# Patient Record
Sex: MALE | Race: BLACK OR AFRICAN AMERICAN | NOT HISPANIC OR LATINO | Employment: FULL TIME | URBAN - METROPOLITAN AREA
[De-identification: names, ages, dates, MRNs, and addresses within clinical notes are randomized per-mention and may not be internally consistent; named-entity substitution may affect disease eponyms.]

---

## 2017-01-04 ENCOUNTER — OFFICE VISIT (OUTPATIENT)
Dept: INTERNAL MEDICINE | Facility: CLINIC | Age: 47
End: 2017-01-04

## 2017-01-04 VITALS
OXYGEN SATURATION: 96 % | TEMPERATURE: 98.7 F | RESPIRATION RATE: 16 BRPM | HEART RATE: 88 BPM | BODY MASS INDEX: 40.18 KG/M2 | WEIGHT: 280 LBS | SYSTOLIC BLOOD PRESSURE: 130 MMHG | DIASTOLIC BLOOD PRESSURE: 90 MMHG

## 2017-01-04 DIAGNOSIS — L81.8 HYPERPIGMENTATION OF SKIN OF EYE REGION: Primary | ICD-10-CM

## 2017-01-04 DIAGNOSIS — Z09 FOLLOW UP: ICD-10-CM

## 2017-01-04 PROCEDURE — 99213 OFFICE O/P EST LOW 20 MIN: CPT | Performed by: NURSE PRACTITIONER

## 2017-01-04 NOTE — PROGRESS NOTES
Vitals:    01/04/17 1541   BP: 130/90   Pulse:    Resp:    Temp:    SpO2:      Last 2 weights    01/04/17  1523   Weight: 280 lb (127 kg)     Social History   Substance Use Topics   • Smoking status: Never Smoker   • Smokeless tobacco: Not on file   • Alcohol use No       Subjective     HPI  Pt presents to office for follow up bilateral leg swelling. Last visit we stopped his minoxidil due to possibility of that being the cause of leg swelling. Pt states swelling has much improved and no longer having issues with that.    He did mention he has noticed around his eyes have become darker than usual and was unsure if that was a SE of minoxidil or not. Denies changes in vision, pain, recent rashes, discharge from eye.    The following portions of the patient's history were reviewed and updated as appropriate: allergies, current medications, past medical history, past social history and problem list.    Review of Systems   Constitutional: Negative.    Eyes:        Eyelid hyperpigmentation   Respiratory: Negative.    Cardiovascular: Negative.         Follow up leg swelling         Objective     Physical Exam   Constitutional: He is oriented to person, place, and time. He appears well-developed and well-nourished.   HENT:   Head: Normocephalic and atraumatic.   Eyes:   Bilateral upper and lower eyelid hyperpigmentation   Neck: Normal range of motion.   Cardiovascular: Normal rate, regular rhythm and normal heart sounds.    Pulmonary/Chest: Effort normal and breath sounds normal.   Musculoskeletal: Normal range of motion.   Neurological: He is alert and oriented to person, place, and time.   Nursing note and vitals reviewed.      Assessment/Plan   Grant was seen today for follow-up.    Diagnoses and all orders for this visit:    Hyperpigmentation of skin of eye region  -     Ambulatory Referral to Dermatology    Follow up           -sleep study appointment 4/27 at 9:15  -spoke to Dr Burger in regards to possible causes  of hyperpigmentation of eye. Cont to monitor, will put in referral for derm to be further evaluated  -cont home meds  -FU prn or if symptoms persist/worsen

## 2017-01-04 NOTE — MR AVS SNAPSHOT
Grant FABOI Edge Jr.   1/4/2017 3:00 PM   Office Visit    Dept Phone:  [phone]   Encounter #:  [id number]    Provider:  MELANY Hicks   Department:  [facility] INTERNAL MEDICINE                Your Full Care Plan              Your Updated Medication List          This list is accurate as of: 1/4/17  3:43 PM.  Always use your most recent med list.                benazepril 10 MG tablet   Commonly known as:  LOTENSIN   TAKE 1 TABLET BY MOUTH EVERY DAY       * diltiaZEM 30 MG tablet   Commonly known as:  CARDIZEM       * DILT- MG 24 hr capsule   Generic drug:  diltiazem XR   TAKE 1 CAPSULE BY MOUTH EVERY DAY       esomeprazole 40 MG capsule   Commonly known as:  nexIUM       levothyroxine 125 MCG tablet   Commonly known as:  SYNTHROID, LEVOTHROID       * metoprolol succinate XL 50 MG 24 hr tablet   Commonly known as:  TOPROL-XL       * metoprolol succinate XL 50 MG 24 hr tablet   Commonly known as:  TOPROL-XL   TAKE 1 TABLET BY MOUTH DAILY       minoxidil 10 MG tablet   Commonly known as:  LONITEN       * Notice:  This list has 4 medication(s) that are the same as other medications prescribed for you. Read the directions carefully, and ask your doctor or other care provider to review them with you.            We Performed the Following     Ambulatory Referral to Dermatology       You Were Diagnosed With        Codes Comments    Hyperpigmentation of skin of eye region    -  Primary ICD-10-CM: H02.719  ICD-9-CM: 374.52     Follow up     ICD-10-CM: Z09  ICD-9-CM: V67.9       Instructions     None    Patient Instructions History      Upcoming Appointments     Visit Type Date Time Department    OFFICE VISIT 1/4/2017  3:00 PM [facility] 1 4003    OFFICE VISIT 2/22/2017  3:15 PM [facility] 1 4003    CONSULT SLEEP CLINIC 4/27/2017  9:15 AM [facility] SLEEP LAB      MyChart Signup     Our records indicate that you have an active [facility] account.    You can  view your After Visit Summary by going to [employer] and logging in with your [employer] username and password.  If you don't have a TapTrak username and password but a parent or guardian has access to your record, the parent or guardian should login with their own Tap[employer]k username and password and access your record to view the After Visit Summary.    If you have questions, you can email [email or web address] or call [phone] to talk to our Tap[employer] staff.  Remember, Tap[employer]k is NOT to be used for urgent needs.  For medical emergencies, dial 911.               Other Info from Your Visit           Your Appointments     Feb 22, 2017  3:15 PM EST   Office Visit with Stone Burger MD   [facility] INTERNAL MEDICINE (--)    [address]  [facility] [place] [address] [phone]           Arrive 15 minutes prior to appointment.            Apr 27, 2017  9:15 AM EDT   Consult Sleep Clinic with CRYSTAL RAMIRES SLEEP LAB PROVIDER SCHEDULE   [facility] ([place])    [address] [facility] [address] [phone]              Allergies     No Known Allergies      Reason for Visit     Follow-up swelling in leg and foot has gone away      Vital Signs     Blood Pressure Pulse Temperature Respirations Weight Oxygen Saturation    130/90 88 98.7 °F (37.1 °C) (Tympanic) 16 280 lb (127 kg) 96%    Body Mass Index Smoking Status                40.18 kg/m2 Never Smoker          Problems and Diagnoses Noted     Hyperpigmentation of skin of eye region    -  Primary    Follow up

## 2017-01-09 RX ORDER — ESOMEPRAZOLE MAGNESIUM 40 MG/1
CAPSULE, DELAYED RELEASE ORAL
Qty: 30 CAPSULE | Refills: 5 | Status: SHIPPED | OUTPATIENT
Start: 2017-01-09 | End: 2017-09-23 | Stop reason: SDUPTHER

## 2017-01-16 RX ORDER — BENAZEPRIL HYDROCHLORIDE 10 MG/1
TABLET ORAL
Qty: 30 TABLET | Refills: 0 | Status: SHIPPED | OUTPATIENT
Start: 2017-01-16 | End: 2017-03-24 | Stop reason: SDUPTHER

## 2017-02-02 ENCOUNTER — HOSPITAL ENCOUNTER (OUTPATIENT)
Dept: SLEEP MEDICINE | Facility: HOSPITAL | Age: 47
Discharge: HOME | End: 2017-02-02
Admitting: NURSE PRACTITIONER

## 2017-02-02 PROCEDURE — G0463 HOSPITAL OUTPT CLINIC VISIT: HCPCS

## 2017-02-03 NOTE — PROGRESS NOTES
DATE OF VISIT:  02/02/2017    CONSULTATION    REFERRING/PRIMARY CARE PHYSICIAN:  Stone Burger MD    REASON FOR VISIT:  Obstructive sleep apnea     CHIEF COMPLAINT: Hypersomnia     I had the pleasure of seeing the patient in the office today. Below, please find summary report for pertinent findings and conclusions.      HISTORY: The patient is a very pleasant 46-year-old male who was diagnosed with obstructive sleep apnea in 2014 through [facility]. He was prescribed a BiPAP device for treatment of severe obstructive sleep apnea. He is currently on pressures of 19/ 15. His machine currently does not have a card. He misplaced his card. He was previously following with Dr. Pop Milton, but prefers to transfer to [facility] for sleep services. His humidifier is broken.    He goes to bed at 11 p.m. He falls asleep fairly quickly and is up at 5 a.m. He wakes up in the morning feeling tired. He works a 2nd job in the evening, hours from 4 p.m. until 8 p.m. Unfortunately, he continues to feel sleepy during the day and requires a nap if he can to feel more refreshed. His sleep quality is poor. At times, he finds himself snoring despite using the BiPAP device.     PAST HISTORY:  1. Thyroid disease. He required total thyroidectomy due to a benign mass.  2. Hypertension.    3. GERD.     CURRENT MEDICATIONS:  1. Diltiazem.  2. Levothyroxine.   3. Benazepril.  4. Metoprolol.    5. Omeprazole     ALLERGIES: MINOXIDIL.    FAMILY HISTORY:   Significant for diabetes and obesity.    SOCIAL HISTORY: He works maintenance. Does not smoke.  Does not drink. He drinks 1-2 sodas a day.     REVIEW OF SYSTEMS: Significant for frequent urination, irregular heartbeat and diarrhea.     [place] Sleepiness Scale score is 7 out of 24.     PHYSICAL EXAMINATION:  VITAL SIGNS: Height 5 feet 9 inches, weight 280 pounds, BMI 41, blood pressure 136/98, heart rate 85, neck size 16-3/4.   HEENT: Class IV  Mallampati airway. Large-size tongue. There is redundant lateral pharyngeal tissue.   NECK: Trachea midline.    LUNGS: Respiratory effort nonlabored.   HEART: Regular rate and rhythm. No murmurs, rubs or gallops.   ABDOMEN: Soft and nontender. Bowel sounds are present.   EXTREMITIES: No evidence of edema. Pedal pulses positive.     DIAGNOSTIC DATA:  Sleep study done at [facility] in 2013 reviewed and shows severe sleep apnea corrected with BiPAP 19 over 15.     ASSESSMENT:  1. Severe sleep apnea.   2. Morbid obesity.   3. Hypertension.  4. History of thyroidectomy.     PLAN:   1. The patient presents today for evaluation of hypersomnia despite using the BiPAP device as prescribed. He misplaced his card. We do not have any data from his current machine to evaluate the apnea-hypopnea index as well as his recent use. He is not sure what type of device he has. I asked him to bring his device to the sleep lab in the next several days, so that our technologist, Polly, can examine the device.  After the device type is confirmed, he will be given a new smart chip to insert into the device. He will be asked to use the device for 3-4 weeks and bring the card here for our review. We may need to adjust the BiPAP settings if apnea-hypopnea index remains suboptimal. He may also require another BiPAP titration here if he remains sleepy and continues to snore despite increasing the BiPAP pressures. Of note, he is already on high BiPAP pressure of 19 over 15.  He may develop intolerance of the BiPAP if we continue to raise pressures. In that case, he perhaps could be a good candidate for an oral mandibular advancement device to be used with the BiPAP device in conjunction to allow better BiPAP tolerance. A combination of oral mandibular advancement device with the BiPAP device at times can allow lowering BiPAP pressures and improved compliance.   2. I asked the patient to follow up with Dr. Michael Davalos in 4 weeks.     I  appreciate the opportunity of participating in this patient's care.       MELANY Pulido  AZ:maida  D:   02/03/2017 09:24:28  T:   02/03/2017 15:07:40  Job ID:   [id number]  Document ID:   [id number]  cc:   Stone Burger M.D.

## 2017-03-07 ENCOUNTER — TELEPHONE (OUTPATIENT)
Dept: SLEEP MEDICINE | Facility: HOSPITAL | Age: 47
End: 2017-03-07

## 2017-03-07 NOTE — TELEPHONE ENCOUNTER
Spoke with pt.  Pt states his dme( [facility]) is requesting an order for full face msk fit be faxed to them.      Pt is having issues with oral leak with his current nasal msk.    sdc to request order from either samy or dr. Davalos, then fax to [facility].

## 2017-03-07 NOTE — TELEPHONE ENCOUNTER
[facility] [place] once order has been signed and faxed to [facility].    Call pt  @ his cell number [phone]

## 2017-03-24 ENCOUNTER — HOSPITAL ENCOUNTER (OUTPATIENT)
Dept: SLEEP MEDICINE | Facility: HOSPITAL | Age: 47
Discharge: HOME | End: 2017-03-24
Admitting: INTERNAL MEDICINE

## 2017-03-24 PROCEDURE — G0463 HOSPITAL OUTPT CLINIC VISIT: HCPCS

## 2017-03-24 RX ORDER — BENAZEPRIL HYDROCHLORIDE 10 MG/1
TABLET ORAL
Qty: 30 TABLET | Refills: 0 | Status: SHIPPED | OUTPATIENT
Start: 2017-03-24 | End: 2017-04-28 | Stop reason: SDUPTHER

## 2017-03-24 RX ORDER — METOPROLOL SUCCINATE 50 MG/1
TABLET, EXTENDED RELEASE ORAL
Qty: 90 TABLET | Refills: 0 | Status: SHIPPED | OUTPATIENT
Start: 2017-03-24 | End: 2017-07-14 | Stop reason: SDUPTHER

## 2017-03-30 NOTE — PROGRESS NOTES
DATE OF SERVICE: 03/24/2017    PRIMARY CARE/REFERRING PHYSICIAN:  Stone Burger MD    I had the pleasure of seeing the patient in the office today. Below please find summary of positive pertinent findings and conclusions.      HISTORY OF PRESENT ILLNESS: The patient is a very pleasant 46-year-old male who was last seen in this office on 12/08/2016. He is here today to follow up on his progress with the CPAP device. He is currently set with pressures of 13. He uses the device every night. He complains of snoring despite using the device. Of note, his machine is a CPAP, not a BiPAP. He goes to bed at midnight and is up at 5 a.m. His [place] Sleepiness Score today is 5.     CURRENT MEDICATIONS:  1.  Benazepril.   2.  Nexium.  3.  Levothyroxine.  4.  Diltiazem.  5.  Metoprolol     SOCIAL HISTORY: No tobacco. Drinks no alcohol.  Drinks 1-2 caffeinated beverages a day. No energy drinks.     PHYSICAL EXAMINATION:  VITAL SIGNS: Height 5 feet 10 inches, weight 282 pounds, BMI 39, blood pressure 187/106, heart rate 102.   HEENT: Class IV Mallampati airway.  Large-size tongue with no evidence of exudate.   NECK: Trachea is midline.   LUNGS: Respiratory effort nonlabored.   HEART: Regular rate and rhythm. No murmurs or rubs.   ABDOMEN: Soft and nontender. Bowel sounds are present. Morbidly obese.   EXTREMITIES: No evidence of edema. Pedal pulses positive.     DIAGNOSTIC DATA: Download dates 02/20/2017 through 03/21/2017 shows 76% compliance with average use of 4 hours 37 minutes on CPAP pressures of 13 with a residual AHI of 0.4.     ASSESSMENT:  1.  Obstructive sleep apnea.   2.  Morbid obesity.   3.  Hypertension.  4.  Hypothyroidism.     PLAN: The patient will be changed to auto CPAP, 7-15 cmH2O.  Hopefully that will improve the snoring. He is requesting a new mask and all supplies renewed. Weight loss will be strongly beneficial in order to reduce the severity of sleep-disordered breathing. He does have elevated blood  pressure reading today.  He has not taken his blood pressure medication. He was asked to take as soon as possible. He does have hypothyroidism, history of, currently controlled.    I asked the patient to follow up with me at [facility] in 3 months to see how he is doing. I appreciate the opportunity to participate in this patient's care.    MELANY Pulido, dictating for MD Qi Menard APRN  AZ:tp  D:   03/29/2017 15:58:03  T:   03/30/2017 00:59:15  Job ID:   [id number]  Document ID:   [id number]  cc:   Stone Burger M.D.

## 2017-04-28 RX ORDER — BENAZEPRIL HYDROCHLORIDE 10 MG/1
TABLET ORAL
Qty: 30 TABLET | Refills: 0 | Status: SHIPPED | OUTPATIENT
Start: 2017-04-28 | End: 2017-06-02 | Stop reason: SDUPTHER

## 2017-05-04 ENCOUNTER — APPOINTMENT (OUTPATIENT)
Dept: GENERAL RADIOLOGY | Facility: HOSPITAL | Age: 47
End: 2017-05-04

## 2017-05-04 ENCOUNTER — HOSPITAL ENCOUNTER (EMERGENCY)
Facility: HOSPITAL | Age: 47
Discharge: HOME | End: 2017-05-04
Attending: EMERGENCY MEDICINE | Admitting: EMERGENCY MEDICINE

## 2017-05-04 VITALS
HEIGHT: 70 IN | WEIGHT: 285 LBS | SYSTOLIC BLOOD PRESSURE: 153 MMHG | BODY MASS INDEX: 40.8 KG/M2 | HEART RATE: 105 BPM | DIASTOLIC BLOOD PRESSURE: 103 MMHG | RESPIRATION RATE: 18 BRPM | OXYGEN SATURATION: 98 % | TEMPERATURE: 98.6 F

## 2017-05-04 DIAGNOSIS — M25.561 ACUTE PAIN OF RIGHT KNEE: Primary | ICD-10-CM

## 2017-05-04 PROCEDURE — 73560 X-RAY EXAM OF KNEE 1 OR 2: CPT

## 2017-05-04 PROCEDURE — 99283 EMERGENCY DEPT VISIT LOW MDM: CPT

## 2017-05-04 RX ORDER — TRAMADOL HYDROCHLORIDE 50 MG/1
50 TABLET ORAL EVERY 6 HOURS PRN
Qty: 8 TABLET | Refills: 0 | Status: SHIPPED | OUTPATIENT
Start: 2017-05-04 | End: 2017-10-26

## 2017-05-04 RX ORDER — FEXOFENADINE HCL 180 MG/1
180 TABLET ORAL ONCE AS NEEDED
COMMUNITY
End: 2020-07-08

## 2017-05-10 ENCOUNTER — TELEPHONE (OUTPATIENT)
Dept: SOCIAL WORK | Facility: HOSPITAL | Age: 47
End: 2017-05-10

## 2017-05-17 ENCOUNTER — OFFICE VISIT (OUTPATIENT)
Dept: INTERNAL MEDICINE | Facility: CLINIC | Age: 47
End: 2017-05-17

## 2017-05-17 VITALS
HEART RATE: 88 BPM | SYSTOLIC BLOOD PRESSURE: 130 MMHG | RESPIRATION RATE: 16 BRPM | TEMPERATURE: 97.8 F | DIASTOLIC BLOOD PRESSURE: 90 MMHG | BODY MASS INDEX: 40.03 KG/M2 | WEIGHT: 279 LBS

## 2017-05-17 DIAGNOSIS — I10 ESSENTIAL HYPERTENSION: Primary | ICD-10-CM

## 2017-05-17 PROCEDURE — 99213 OFFICE O/P EST LOW 20 MIN: CPT | Performed by: NURSE PRACTITIONER

## 2017-05-19 ENCOUNTER — OFFICE VISIT (OUTPATIENT)
Dept: ORTHOPEDIC SURGERY | Facility: CLINIC | Age: 47
End: 2017-05-19

## 2017-05-19 VITALS — BODY MASS INDEX: 41.83 KG/M2 | HEIGHT: 68 IN | WEIGHT: 276 LBS | TEMPERATURE: 98.2 F

## 2017-05-19 DIAGNOSIS — M25.561 CHRONIC PAIN OF RIGHT KNEE: Primary | ICD-10-CM

## 2017-05-19 DIAGNOSIS — G89.29 CHRONIC PAIN OF RIGHT KNEE: Primary | ICD-10-CM

## 2017-05-19 PROCEDURE — 73562 X-RAY EXAM OF KNEE 3: CPT | Performed by: NURSE PRACTITIONER

## 2017-05-19 PROCEDURE — 20610 DRAIN/INJ JOINT/BURSA W/O US: CPT | Performed by: NURSE PRACTITIONER

## 2017-05-19 PROCEDURE — 99213 OFFICE O/P EST LOW 20 MIN: CPT | Performed by: NURSE PRACTITIONER

## 2017-05-19 RX ORDER — LIDOCAINE HYDROCHLORIDE 10 MG/ML
4 INJECTION, SOLUTION INFILTRATION; PERINEURAL
Status: COMPLETED | OUTPATIENT
Start: 2017-05-19 | End: 2017-05-19

## 2017-05-19 RX ORDER — MELOXICAM 15 MG/1
TABLET ORAL
Qty: 30 TABLET | Refills: 3 | Status: SHIPPED | OUTPATIENT
Start: 2017-05-19 | End: 2017-10-26

## 2017-05-19 RX ORDER — BUPIVACAINE HYDROCHLORIDE 2.5 MG/ML
2 INJECTION, SOLUTION INFILTRATION; PERINEURAL
Status: COMPLETED | OUTPATIENT
Start: 2017-05-19 | End: 2017-05-19

## 2017-05-19 RX ORDER — METHYLPREDNISOLONE ACETATE 80 MG/ML
80 INJECTION, SUSPENSION INTRA-ARTICULAR; INTRALESIONAL; INTRAMUSCULAR; SOFT TISSUE
Status: COMPLETED | OUTPATIENT
Start: 2017-05-19 | End: 2017-05-19

## 2017-05-19 RX ADMIN — METHYLPREDNISOLONE ACETATE 80 MG: 80 INJECTION, SUSPENSION INTRA-ARTICULAR; INTRALESIONAL; INTRAMUSCULAR; SOFT TISSUE at 17:29

## 2017-05-19 RX ADMIN — BUPIVACAINE HYDROCHLORIDE 2 ML: 2.5 INJECTION, SOLUTION INFILTRATION; PERINEURAL at 17:29

## 2017-05-19 RX ADMIN — LIDOCAINE HYDROCHLORIDE 4 ML: 10 INJECTION, SOLUTION INFILTRATION; PERINEURAL at 17:29

## 2017-06-02 ENCOUNTER — HOSPITAL ENCOUNTER (OUTPATIENT)
Dept: SLEEP MEDICINE | Facility: HOSPITAL | Age: 47
Discharge: HOME | End: 2017-06-02

## 2017-06-02 RX ORDER — LEVOTHYROXINE SODIUM 0.12 MG/1
TABLET ORAL
Qty: 30 TABLET | Refills: 0 | Status: SHIPPED | OUTPATIENT
Start: 2017-06-02 | End: 2017-07-05 | Stop reason: SDUPTHER

## 2017-06-02 RX ORDER — BENAZEPRIL HYDROCHLORIDE 10 MG/1
TABLET ORAL
Qty: 30 TABLET | Refills: 0 | Status: SHIPPED | OUTPATIENT
Start: 2017-06-02 | End: 2017-07-05 | Stop reason: SDUPTHER

## 2017-06-07 NOTE — PROGRESS NOTES
DATE OF SERVICE: 06/02/2017    PRIMARY CARE/REFERRING PHYSICIAN:  Stone Burger MD    I had the pleasure of seeing the patient in the office today. Below please find summary of positive pertinent findings and conclusions.       HISTORY OF PRESENT ILLNESS: The patient is a very pleasant 46-year-old male who was last seen in this office on 03/24/2017. His last visit CPAP settings were changed to 7-15 cmH2O; however, his device continues to demonstrate that he is on fixed pressures of 13. He has not used the CPAP device since he got a new mask recently. [facility] apparently did not show the patient the setup. His [place] Sleepiness Score is 4.     CURRENT MEDICATIONS:   1.  Donepezil.  2.  Nexium.    3.  Metoprolol.    4.  Levothyroxine.    PHYSICAL EXAMINATION:  VITAL SIGNS: Height 69 inches, weight 268 pounds, BMI pressure 40, blood pressure 150/90, heart rate 102.   HEENT: Class IV Mallampati airway. Large-size with no evidence of exudate.    NECK: Trachea in midline.  LUNGS: Respiratory effort nonlabored.   HEART: Regular rate and rhythm. No murmurs, rubs, or gallops.  ABDOMEN: Soft, nontender. Bowel sounds  are present.  EXTREMITIES: No evidence of edema.    DIAGNOSTIC DATA: Download dates 04/14/2017 through 04/20/2017 shows 100% compliance in the past 7 days used, 4 hours 35 minutes on set pressures of 13 with residual AHI of 0.4.     ASSESSMENT:  1.  Obstructive sleep apnea.   2.  Morbid obesity.   3.  Hypertension.   4.  Hypothyroidism, history of.    PLAN:  I asked our technologist to change the pressures to 7-15 cmH2O. The change was done by Polly  during today's office visit.  Weight loss will be strongly beneficial in order to reduce the severity of sleep-disordered breathing. The patient was met with our sleep technologist today to show him the setup of the mask. He will need to have a good mask fit in order to remain compliant. The download from 04/2017 does not show excessive air leak.      The patient will follow up at [facility] in 6-8 weeks.     I appreciate the opportunity of participating in this patient's care.     MELANY Pulido, dictating for MD Qi Menard APRN  AZ:rufina  D:   06/06/2017 14:23:38  T:   06/06/2017 23:54:12  Job ID:   [id number]  Document ID:   [id number]  cc:   Stone Burger M.D.

## 2017-07-05 ENCOUNTER — OFFICE VISIT (OUTPATIENT)
Dept: INTERNAL MEDICINE | Facility: CLINIC | Age: 47
End: 2017-07-05

## 2017-07-05 VITALS
OXYGEN SATURATION: 97 % | DIASTOLIC BLOOD PRESSURE: 103 MMHG | SYSTOLIC BLOOD PRESSURE: 158 MMHG | HEART RATE: 73 BPM | HEIGHT: 68 IN | BODY MASS INDEX: 41.22 KG/M2 | WEIGHT: 272 LBS | TEMPERATURE: 97.4 F

## 2017-07-05 DIAGNOSIS — F32.1 MODERATE SINGLE CURRENT EPISODE OF MAJOR DEPRESSIVE DISORDER (HCC): ICD-10-CM

## 2017-07-05 DIAGNOSIS — J30.1 SEASONAL ALLERGIC RHINITIS DUE TO POLLEN: ICD-10-CM

## 2017-07-05 DIAGNOSIS — F51.01 PRIMARY INSOMNIA: ICD-10-CM

## 2017-07-05 DIAGNOSIS — E03.8 OTHER SPECIFIED HYPOTHYROIDISM: ICD-10-CM

## 2017-07-05 DIAGNOSIS — K21.9 GASTROESOPHAGEAL REFLUX DISEASE WITHOUT ESOPHAGITIS: ICD-10-CM

## 2017-07-05 DIAGNOSIS — I10 ESSENTIAL HYPERTENSION: Primary | ICD-10-CM

## 2017-07-05 DIAGNOSIS — E66.01 MORBID OBESITY DUE TO EXCESS CALORIES (HCC): ICD-10-CM

## 2017-07-05 PROCEDURE — 90471 IMMUNIZATION ADMIN: CPT | Performed by: INTERNAL MEDICINE

## 2017-07-05 PROCEDURE — 90715 TDAP VACCINE 7 YRS/> IM: CPT | Performed by: INTERNAL MEDICINE

## 2017-07-05 PROCEDURE — 99214 OFFICE O/P EST MOD 30 MIN: CPT | Performed by: INTERNAL MEDICINE

## 2017-07-05 RX ORDER — BENAZEPRIL HYDROCHLORIDE 10 MG/1
TABLET ORAL
Qty: 30 TABLET | Refills: 0 | Status: SHIPPED | OUTPATIENT
Start: 2017-07-05 | End: 2017-08-05 | Stop reason: SDUPTHER

## 2017-07-05 RX ORDER — LEVOTHYROXINE SODIUM 0.12 MG/1
TABLET ORAL
Qty: 30 TABLET | Refills: 0 | Status: SHIPPED | OUTPATIENT
Start: 2017-07-05 | End: 2017-08-05 | Stop reason: SDUPTHER

## 2017-07-05 RX ORDER — BUPROPION HYDROCHLORIDE 75 MG/1
75 TABLET ORAL 3 TIMES DAILY
Qty: 270 TABLET | Refills: 1 | Status: SHIPPED | OUTPATIENT
Start: 2017-07-05 | End: 2020-07-08

## 2017-07-05 RX ORDER — AMLODIPINE BESYLATE 10 MG/1
10 TABLET ORAL DAILY
Qty: 90 TABLET | Refills: 1 | Status: SHIPPED | OUTPATIENT
Start: 2017-07-05 | End: 2018-05-04 | Stop reason: SDUPTHER

## 2017-07-06 LAB
ALBUMIN SERPL-MCNC: 4.4 G/DL (ref 3.5–5.2)
ALBUMIN/GLOB SERPL: 2 G/DL
ALP SERPL-CCNC: 88 U/L (ref 39–117)
ALT SERPL-CCNC: 17 U/L (ref 1–41)
APPEARANCE UR: CLEAR
AST SERPL-CCNC: 14 U/L (ref 1–40)
BACTERIA #/AREA URNS HPF: NORMAL /HPF
BASOPHILS # BLD AUTO: 0.05 10*3/MM3 (ref 0–0.2)
BASOPHILS NFR BLD AUTO: 0.9 % (ref 0–1.5)
BILIRUB SERPL-MCNC: 0.9 MG/DL (ref 0.1–1.2)
BILIRUB UR QL STRIP: NEGATIVE
BUN SERPL-MCNC: 8 MG/DL (ref 6–20)
BUN/CREAT SERPL: 6.7 (ref 7–25)
CALCIUM SERPL-MCNC: 9.1 MG/DL (ref 8.6–10.5)
CASTS URNS MICRO: NORMAL
CHLORIDE SERPL-SCNC: 101 MMOL/L (ref 98–107)
CHOLEST SERPL-MCNC: 160 MG/DL (ref 0–200)
CO2 SERPL-SCNC: 26.9 MMOL/L (ref 22–29)
COLOR UR: YELLOW
CREAT SERPL-MCNC: 1.2 MG/DL (ref 0.76–1.27)
EOSINOPHIL # BLD AUTO: 0.08 10*3/MM3 (ref 0–0.7)
EOSINOPHIL NFR BLD AUTO: 1.4 % (ref 0.3–6.2)
EPI CELLS #/AREA URNS HPF: NORMAL /HPF
ERYTHROCYTE [DISTWIDTH] IN BLOOD BY AUTOMATED COUNT: 15 % (ref 11.5–14.5)
GLOBULIN SER CALC-MCNC: 2.2 GM/DL
GLUCOSE SERPL-MCNC: 105 MG/DL (ref 65–99)
GLUCOSE UR QL: NEGATIVE
HCT VFR BLD AUTO: 49.3 % (ref 40.4–52.2)
HDLC SERPL-MCNC: 50 MG/DL (ref 40–60)
HGB BLD-MCNC: 16.5 G/DL (ref 13.7–17.6)
HGB UR QL STRIP: NEGATIVE
IMM GRANULOCYTES # BLD: 0 10*3/MM3 (ref 0–0.03)
IMM GRANULOCYTES NFR BLD: 0 % (ref 0–0.5)
KETONES UR QL STRIP: NEGATIVE
LDLC SERPL CALC-MCNC: 98 MG/DL (ref 0–100)
LDLC/HDLC SERPL: 1.96 {RATIO}
LEUKOCYTE ESTERASE UR QL STRIP: NEGATIVE
LYMPHOCYTES # BLD AUTO: 1.58 10*3/MM3 (ref 0.9–4.8)
LYMPHOCYTES NFR BLD AUTO: 27.1 % (ref 19.6–45.3)
MCH RBC QN AUTO: 30.8 PG (ref 27–32.7)
MCHC RBC AUTO-ENTMCNC: 33.5 G/DL (ref 32.6–36.4)
MCV RBC AUTO: 92.1 FL (ref 79.8–96.2)
MONOCYTES # BLD AUTO: 0.59 10*3/MM3 (ref 0.2–1.2)
MONOCYTES NFR BLD AUTO: 10.1 % (ref 5–12)
NEUTROPHILS # BLD AUTO: 3.52 10*3/MM3 (ref 1.9–8.1)
NEUTROPHILS NFR BLD AUTO: 60.5 % (ref 42.7–76)
NITRITE UR QL STRIP: NEGATIVE
PH UR STRIP: 7.5 [PH] (ref 5–8)
PLATELET # BLD AUTO: 230 10*3/MM3 (ref 140–500)
POTASSIUM SERPL-SCNC: 4 MMOL/L (ref 3.5–5.2)
PROT SERPL-MCNC: 6.6 G/DL (ref 6–8.5)
PROT UR QL STRIP: NEGATIVE
RBC # BLD AUTO: 5.35 10*6/MM3 (ref 4.6–6)
RBC #/AREA URNS HPF: NORMAL /HPF
SODIUM SERPL-SCNC: 140 MMOL/L (ref 136–145)
SP GR UR: 1.02 (ref 1–1.03)
T4 FREE SERPL-MCNC: 1.74 NG/DL (ref 0.93–1.7)
TRIGL SERPL-MCNC: 61 MG/DL (ref 0–150)
TSH SERPL DL<=0.005 MIU/L-ACNC: 0.88 MIU/ML (ref 0.27–4.2)
UROBILINOGEN UR STRIP-MCNC: (no result) MG/DL
VLDLC SERPL CALC-MCNC: 12.2 MG/DL (ref 5–40)
WBC # BLD AUTO: 5.82 10*3/MM3 (ref 4.5–10.7)
WBC #/AREA URNS HPF: NORMAL /HPF

## 2017-07-14 RX ORDER — METOPROLOL SUCCINATE 50 MG/1
TABLET, EXTENDED RELEASE ORAL
Qty: 90 TABLET | Refills: 0 | Status: SHIPPED | OUTPATIENT
Start: 2017-07-14 | End: 2018-05-04 | Stop reason: SDUPTHER

## 2017-07-17 NOTE — PROGRESS NOTES
Subjective   Grant Edge Jr. is a 46 y.o. male.   He is here today for hypertension allergic rhinitis GERD hypothyroidism insomnia depression morbid obesity  History of Present Illness   He is here today for hypertension allergic rhinitis GERD hypothyroidism insomnia depression and morbid obesity  The following portions of the patient's history were reviewed and updated as appropriate: allergies, current medications, past family history, past medical history, past social history, past surgical history and problem list.    Review of Systems   Psychiatric/Behavioral: Positive for dysphoric mood and sleep disturbance.   All other systems reviewed and are negative.      Objective   Physical Exam   Constitutional: He is oriented to person, place, and time. He appears well-developed and well-nourished. He is cooperative.   HENT:   Head: Normocephalic and atraumatic.   Right Ear: Hearing, tympanic membrane, external ear and ear canal normal.   Left Ear: Hearing, tympanic membrane, external ear and ear canal normal.   Nose: Nose normal.   Mouth/Throat: Uvula is midline, oropharynx is clear and moist and mucous membranes are normal.   Eyes: Conjunctivae, EOM and lids are normal. Pupils are equal, round, and reactive to light.   Neck: Phonation normal. Neck supple. Carotid bruit is not present.   Cardiovascular: Normal rate, regular rhythm and normal heart sounds.  Exam reveals no gallop and no friction rub.    No murmur heard.  Pulmonary/Chest: Effort normal and breath sounds normal. No respiratory distress.   Abdominal: Soft. Bowel sounds are normal. He exhibits no distension and no mass. There is no hepatosplenomegaly. There is no tenderness. There is no rebound and no guarding. No hernia.   Musculoskeletal: He exhibits no edema.   Neurological: He is alert and oriented to person, place, and time. Coordination and gait normal.   Skin: Skin is warm and dry.   Psychiatric: His speech is normal and behavior is normal.  Judgment and thought content normal. He exhibits a depressed mood.   Nursing note and vitals reviewed.      Assessment/Plan   Diagnoses and all orders for this visit:    Essential hypertension  -     Lipid Panel With LDL / HDL Ratio  -     T4, Free  -     CBC & Differential  -     Comprehensive Metabolic Panel  -     TSH  -     Urinalysis With Microscopic    Seasonal allergic rhinitis due to pollen  -     Lipid Panel With LDL / HDL Ratio  -     T4, Free  -     CBC & Differential  -     Comprehensive Metabolic Panel  -     TSH  -     Urinalysis With Microscopic    Gastroesophageal reflux disease without esophagitis  -     Lipid Panel With LDL / HDL Ratio  -     T4, Free  -     CBC & Differential  -     Comprehensive Metabolic Panel  -     TSH  -     Urinalysis With Microscopic    Other specified hypothyroidism  -     Lipid Panel With LDL / HDL Ratio  -     T4, Free  -     CBC & Differential  -     Comprehensive Metabolic Panel  -     TSH  -     Urinalysis With Microscopic    Primary insomnia  -     Lipid Panel With LDL / HDL Ratio  -     T4, Free  -     CBC & Differential  -     Comprehensive Metabolic Panel  -     TSH  -     Urinalysis With Microscopic    Moderate single current episode of major depressive disorder  -     Lipid Panel With LDL / HDL Ratio  -     T4, Free  -     CBC & Differential  -     Comprehensive Metabolic Panel  -     TSH  -     Urinalysis With Microscopic    Morbid obesity due to excess calories  -     Lipid Panel With LDL / HDL Ratio  -     T4, Free  -     CBC & Differential  -     Comprehensive Metabolic Panel  -     TSH  -     Urinalysis With Microscopic    Other orders  -     buPROPion (WELLBUTRIN) 75 MG tablet; Take 1 tablet by mouth 3 (Three) Times a Day.  -     Tdap Vaccine Greater Than or Equal To 6yo IM  -     amLODIPine (NORVASC) 10 MG tablet; Take 1 tablet by mouth Daily.  -     Microscopic Examination      Hypertension not well-controlled we will try new medication for  this  Insomnia supportive meds proper sleep hygiene  Depression medication for this as well  Morbid obesity weight loss with proper diet exercise medication  Hypothyroidism follow TSH free T4  GERD stable on current medication  Allergic rhinitis supportive meds for this

## 2017-07-31 ENCOUNTER — TELEPHONE (OUTPATIENT)
Dept: ORTHOPEDIC SURGERY | Facility: CLINIC | Age: 47
End: 2017-07-31

## 2017-08-01 NOTE — TELEPHONE ENCOUNTER
Would recommend that he supplement with Tylenol.  We cannot prescribe any additional pain medication

## 2017-08-04 ENCOUNTER — APPOINTMENT (OUTPATIENT)
Dept: SLEEP MEDICINE | Facility: HOSPITAL | Age: 47
End: 2017-08-04

## 2017-08-07 RX ORDER — LEVOTHYROXINE SODIUM 0.12 MG/1
TABLET ORAL
Qty: 30 TABLET | Refills: 0 | Status: SHIPPED | OUTPATIENT
Start: 2017-08-07 | End: 2017-08-08

## 2017-08-07 RX ORDER — BENAZEPRIL HYDROCHLORIDE 10 MG/1
TABLET ORAL
Qty: 30 TABLET | Refills: 0 | Status: SHIPPED | OUTPATIENT
Start: 2017-08-07 | End: 2017-09-24 | Stop reason: SDUPTHER

## 2017-08-08 ENCOUNTER — OFFICE VISIT (OUTPATIENT)
Dept: ORTHOPEDIC SURGERY | Facility: CLINIC | Age: 47
End: 2017-08-08

## 2017-08-08 VITALS — HEIGHT: 68 IN | WEIGHT: 272 LBS | TEMPERATURE: 99.3 F | BODY MASS INDEX: 41.22 KG/M2

## 2017-08-08 DIAGNOSIS — M17.11 PRIMARY OSTEOARTHRITIS OF RIGHT KNEE: Primary | ICD-10-CM

## 2017-08-08 PROCEDURE — 20610 DRAIN/INJ JOINT/BURSA W/O US: CPT | Performed by: ORTHOPAEDIC SURGERY

## 2017-08-08 RX ORDER — LIDOCAINE HYDROCHLORIDE 10 MG/ML
4 INJECTION, SOLUTION INFILTRATION; PERINEURAL
Status: COMPLETED | OUTPATIENT
Start: 2017-08-08 | End: 2017-08-08

## 2017-08-08 RX ORDER — METHYLPREDNISOLONE ACETATE 80 MG/ML
80 INJECTION, SUSPENSION INTRA-ARTICULAR; INTRALESIONAL; INTRAMUSCULAR; SOFT TISSUE
Status: COMPLETED | OUTPATIENT
Start: 2017-08-08 | End: 2017-08-08

## 2017-08-08 RX ADMIN — METHYLPREDNISOLONE ACETATE 80 MG: 80 INJECTION, SUSPENSION INTRA-ARTICULAR; INTRALESIONAL; INTRAMUSCULAR; SOFT TISSUE at 14:39

## 2017-08-08 RX ADMIN — LIDOCAINE HYDROCHLORIDE 4 ML: 10 INJECTION, SOLUTION INFILTRATION; PERINEURAL at 14:39

## 2017-08-08 NOTE — PROGRESS NOTES
8/8/2017    Grant MCCARTHY Minesh Henao is here today for worsening knee pain. Pt has undergone injection of the knee in the past with good resolution of symptoms. Pt is requesting a repeat injection.     KNEE Injection Procedure Note:    Large Joint Arthrocentesis  Date/Time: 8/8/2017 2:39 PM  Consent given by: patient  Site marked: site marked  Timeout: Immediately prior to procedure a time out was called to verify the correct patient, procedure, equipment, support staff and site/side marked as required   Supporting Documentation  Indications: pain and joint swelling   Procedure Details  Location: knee - R knee  Preparation: Patient was prepped and draped in the usual sterile fashion  Needle size: 18 G  Approach: anterolateral  Medications administered: 4 mL lidocaine 1 %; 80 mg methylPREDNISolone acetate 80 MG/ML            At the conclusion of the injection I discussed the importance of continued quad strengthening exercises on a daily basis. I will see the patient back if the symptoms should fail to improve or worsen.    Also discussed the importance of weight management.    Lionel Swift MD  8/8/2017

## 2017-09-25 RX ORDER — BENAZEPRIL HYDROCHLORIDE 10 MG/1
TABLET ORAL
Qty: 30 TABLET | Refills: 0 | Status: SHIPPED | OUTPATIENT
Start: 2017-09-25 | End: 2017-11-16 | Stop reason: SDUPTHER

## 2017-09-25 RX ORDER — LEVOTHYROXINE SODIUM 0.12 MG/1
TABLET ORAL
Qty: 30 TABLET | Refills: 0 | Status: SHIPPED | OUTPATIENT
Start: 2017-09-25 | End: 2017-11-03 | Stop reason: SDUPTHER

## 2017-09-25 RX ORDER — ESOMEPRAZOLE MAGNESIUM 40 MG/1
CAPSULE, DELAYED RELEASE ORAL
Qty: 30 CAPSULE | Refills: 0 | Status: SHIPPED | OUTPATIENT
Start: 2017-09-25 | End: 2017-11-16 | Stop reason: SDUPTHER

## 2017-10-26 ENCOUNTER — OFFICE VISIT (OUTPATIENT)
Dept: ORTHOPEDIC SURGERY | Facility: CLINIC | Age: 47
End: 2017-10-26

## 2017-10-26 VITALS — TEMPERATURE: 97.8 F | WEIGHT: 271 LBS | HEIGHT: 68 IN | BODY MASS INDEX: 41.07 KG/M2

## 2017-10-26 DIAGNOSIS — M25.561 CHRONIC PAIN OF RIGHT KNEE: Primary | ICD-10-CM

## 2017-10-26 DIAGNOSIS — G89.29 CHRONIC PAIN OF RIGHT KNEE: Primary | ICD-10-CM

## 2017-10-26 PROCEDURE — 99213 OFFICE O/P EST LOW 20 MIN: CPT | Performed by: NURSE PRACTITIONER

## 2017-10-26 NOTE — PROGRESS NOTES
Patient: Grant Edge Jr.  YOB: 1970 47 y.o. male  Medical Record Number: [id number]    Chief Complaints:   Chief Complaint   Patient presents with   • Right Knee - Follow-up, Pain       History of Present Illness:Grant Edge Jr. is a 47 y.o. male who presents for follow-up of  Right knee pain.  He is about 10 weeks out from a right knee cortisone injection and it did seem to help for a little while but the pain has returned.  The majority of the patient's pain is with sitting or lying down, doesn't really have much pain with walking.  He has not yet gone to physical therapy for his knee but is interested in doing so.  He describes the right knee pain as a mild to moderate constant ache again worse with sitting or lying down.    Allergies: No Known Allergies    Medications:   Current Outpatient Prescriptions   Medication Sig Dispense Refill   • amLODIPine (NORVASC) 10 MG tablet Take 1 tablet by mouth Daily. 90 tablet 1   • benazepril (LOTENSIN) 10 MG tablet TAKE 1 TABLET BY MOUTH EVERY DAY 30 tablet 0   • buPROPion (WELLBUTRIN) 75 MG tablet Take 1 tablet by mouth 3 (Three) Times a Day. 270 tablet 1   • esomeprazole (nexIUM) 40 MG capsule TAKE 1 CAPSULE BY MOUTH EVERY DAY 30 capsule 0   • levothyroxine (SYNTHROID, LEVOTHROID) 125 MCG tablet TAKE 1 TABLET BY MOUTH EVERY DAY 30 tablet 0   • metoprolol succinate XL (TOPROL-XL) 50 MG 24 hr tablet TAKE 1 TABLET BY MOUTH DAILY 90 tablet 0   • fexofenadine (ALLEGRA) 180 MG tablet Take 180 mg by mouth 1 (One) Time As Needed (allergy).       No current facility-administered medications for this visit.          The following portions of the patient's history were reviewed and updated as appropriate: allergies, current medications, past family history, past medical history, past social history, past surgical history and problem list.    Review of Systems:   A 14 point review of systems was performed. All systems negative except pertinent positives/negative  "listed in HPI above    Physical Exam:   Vitals:    10/26/17 1644   Temp: 97.8 °F (36.6 °C)   TempSrc: Temporal Artery    Weight: 271 lb (123 kg)   Height: 68\" (172.7 cm)       General: A and O x 3, ASA, NAD    SCLERA:    Normal    DENTITION:   NormalSkin clear no unusual lesions noted  Right knee patient has no appreciable fusion with 120° flexion neutron extension with some mild patellofemoral crepitus noted.  Calf is soft and nontender    Assessment/Plan:  Right knee pain secondary to osteoarthritis    Patient was given samples of Pennsaid, was referred to outpatient physical therapy, was given information for Synvisc and was encouraged to continue to work on weight loss and he will return to the office as needed  "

## 2017-10-31 DIAGNOSIS — G89.29 CHRONIC PAIN OF RIGHT KNEE: Primary | ICD-10-CM

## 2017-10-31 DIAGNOSIS — M25.561 CHRONIC PAIN OF RIGHT KNEE: Primary | ICD-10-CM

## 2017-11-03 RX ORDER — LEVOTHYROXINE SODIUM 0.12 MG/1
TABLET ORAL
Qty: 30 TABLET | Refills: 5 | Status: SHIPPED | OUTPATIENT
Start: 2017-11-03 | End: 2018-09-04 | Stop reason: SDUPTHER

## 2017-11-05 DIAGNOSIS — M17.12 PRIMARY OSTEOARTHRITIS OF LEFT KNEE: Primary | ICD-10-CM

## 2017-11-16 RX ORDER — BENAZEPRIL HYDROCHLORIDE 10 MG/1
TABLET ORAL
Qty: 30 TABLET | Refills: 5 | Status: SHIPPED | OUTPATIENT
Start: 2017-11-16 | End: 2018-10-22 | Stop reason: SDUPTHER

## 2017-11-16 RX ORDER — ESOMEPRAZOLE MAGNESIUM 40 MG/1
CAPSULE, DELAYED RELEASE ORAL
Qty: 30 CAPSULE | Refills: 5 | Status: SHIPPED | OUTPATIENT
Start: 2017-11-16 | End: 2021-12-28 | Stop reason: SDUPTHER

## 2017-11-17 ENCOUNTER — OFFICE VISIT (OUTPATIENT)
Dept: ORTHOPEDIC SURGERY | Facility: CLINIC | Age: 47
End: 2017-11-17

## 2017-11-17 VITALS — HEIGHT: 70 IN | BODY MASS INDEX: 40.09 KG/M2 | TEMPERATURE: 98.4 F | WEIGHT: 280 LBS

## 2017-11-17 DIAGNOSIS — M17.11 PRIMARY OSTEOARTHRITIS OF RIGHT KNEE: Primary | ICD-10-CM

## 2017-11-17 DIAGNOSIS — M17.12 PRIMARY OSTEOARTHRITIS OF LEFT KNEE: ICD-10-CM

## 2017-11-17 PROCEDURE — 20610 DRAIN/INJ JOINT/BURSA W/O US: CPT | Performed by: NURSE PRACTITIONER

## 2017-11-17 RX ORDER — LIDOCAINE HYDROCHLORIDE 10 MG/ML
2 INJECTION, SOLUTION EPIDURAL; INFILTRATION; INTRACAUDAL; PERINEURAL
Status: COMPLETED | OUTPATIENT
Start: 2017-11-17 | End: 2017-11-17

## 2017-11-17 RX ADMIN — LIDOCAINE HYDROCHLORIDE 2 ML: 10 INJECTION, SOLUTION EPIDURAL; INFILTRATION; INTRACAUDAL; PERINEURAL at 15:13

## 2017-11-17 NOTE — PROGRESS NOTES
11/17/2017    Grant FABIO Edge Jr. is here today for worsening knee pain. Pt has undergone injection of the knee in the past with good resolution of symptoms. Pt is requesting a repeat injection.     KNEE Injection Procedure Note:    Large Joint Arthrocentesis  Date/Time: 11/17/2017 3:13 PM  Consent given by: patient  Timeout: Immediately prior to procedure a time out was called to verify the correct patient, procedure, equipment, support staff and site/side marked as required   Supporting Documentation  Indications: pain   Procedure Details  Location: knee - R knee  Preparation: Patient was prepped and draped in the usual sterile fashion  Needle size: 22 G  Approach: anterolateral  Medications administered: 48 mg hylan 48 MG/6ML; 2 mL lidocaine PF 1% 1 %  Patient tolerance: patient tolerated the procedure well with no immediate complications      Risks were discussed including infection and pain.  Patient verbalized understanding and would like to proceed with injection.    At the conclusion of the injection I discussed the importance of continued quad strengthening exercises on a daily basis. I will see the patient back if the symptoms should fail to improve or worsen.    Ivonne Beauchamp APRN  11/17/2017

## 2017-12-15 ENCOUNTER — OFFICE VISIT (OUTPATIENT)
Dept: ORTHOPEDIC SURGERY | Facility: CLINIC | Age: 47
End: 2017-12-15

## 2017-12-15 VITALS — TEMPERATURE: 98 F | BODY MASS INDEX: 40.8 KG/M2 | HEIGHT: 70 IN | WEIGHT: 285 LBS

## 2017-12-15 DIAGNOSIS — M25.532 WRIST PAIN, LEFT: Primary | ICD-10-CM

## 2017-12-15 PROCEDURE — 99202 OFFICE O/P NEW SF 15 MIN: CPT | Performed by: ORTHOPAEDIC SURGERY

## 2017-12-15 PROCEDURE — 73100 X-RAY EXAM OF WRIST: CPT | Performed by: ORTHOPAEDIC SURGERY

## 2017-12-18 NOTE — PROGRESS NOTES
Patient:Grant Edge Jr.    YOB: 1970    Medical Record Number:[id number]    Chief Complaints:  Left wrist mass    History of Present Illness:     47 y.o. male patient who presents for evaluation of his left wrist.  He noticed a mass over the front of his left wrist several months ago.  He tells me that it periodically gets bigger and smaller.  He has some mild discomfort when pushing on it but otherwise it does not bother him.  Denies any other associated symptoms.    Allergies:No Known Allergies    Home Medications:    Current Outpatient Prescriptions:   •  amLODIPine (NORVASC) 10 MG tablet, Take 1 tablet by mouth Daily., Disp: 90 tablet, Rfl: 1  •  benazepril (LOTENSIN) 10 MG tablet, TAKE 1 TABLET BY MOUTH EVERY DAY, Disp: 30 tablet, Rfl: 5  •  buPROPion (WELLBUTRIN) 75 MG tablet, Take 1 tablet by mouth 3 (Three) Times a Day., Disp: 270 tablet, Rfl: 1  •  esomeprazole (nexIUM) 40 MG capsule, TAKE 1 CAPSULE BY MOUTH EVERY DAY, Disp: 30 capsule, Rfl: 5  •  fexofenadine (ALLEGRA) 180 MG tablet, Take 180 mg by mouth 1 (One) Time As Needed (allergy)., Disp: , Rfl:   •  levothyroxine (SYNTHROID, LEVOTHROID) 125 MCG tablet, TAKE 1 TABLET BY MOUTH EVERY DAY, Disp: 30 tablet, Rfl: 5  •  metoprolol succinate XL (TOPROL-XL) 50 MG 24 hr tablet, TAKE 1 TABLET BY MOUTH DAILY, Disp: 90 tablet, Rfl: 0    Current Facility-Administered Medications:   •  hylan (SYNVISC ONE) injection 48 mg, 48 mg, Intra-articular, Once, MELANY Cohen    Past Medical History:   Diagnosis Date   • Abnormal ECG    • Chest pain    • DVT (deep venous thrombosis)    • GERD (gastroesophageal reflux disease)    • Hyperlipidemia    • Hypertension    • Hypokalemia    • Myocardial infarction    • Simple goiter    • Sinus bradycardia    • Vitamin D deficiency        Past Surgical History:   Procedure Laterality Date   • CARDIAC CATHETERIZATION N/A 10/18/2016    Procedure: Left Heart Cath;  Surgeon: Daniel Rosas MD;  Location:  "[facility] INVASIVE LOCATION;  Service:    • CHOLECYSTECTOMY     • SALIVARY GLAND SURGERY     • THYROIDECTOMY  2013   • TONSILLECTOMY     • TOTAL HIP ARTHROPLASTY REVISION Left 2015       Social History     Occupational History   • Not on file.     Social History Main Topics   • Smoking status: Never Smoker   • Smokeless tobacco: Never Used   • Alcohol use No   • Drug use: No   • Sexual activity: Defer      Social History     Social History Narrative       Family History   Problem Relation Age of Onset   • Diabetes Father    • Heart disease Father        Review of Systems:      Constitutional: Denies fever, shaking or chills   Eyes: Denies change in visual acuity   HEENT: Denies nasal congestion or sore throat   Respiratory: Denies cough or shortness of breath   Cardiovascular: Denies chest pain or edema  Endocrine: Denies tremors, palpitations, intolerance of heat or cold, polyuria, polydipsia.  GI: Denies abdominal pain, nausea, vomiting, bloody stools or diarrhea  : Denies frequency, urgency, incontinence, retention, or nocturia.  Musculoskeletal: Denies numbness tingling or loss of motor function except as above  Integument: Denies rash, lesion or ulceration   Neurologic: Denies headache or focal weakness, deficits  Heme: Denies epistaxis, spontaneous or excessive bleeding, epistaxis, hematuria, melena, fatigue, enlarged or tender lymph nodes.      All other pertinent positives and negatives as noted above in HPI.    Physical Exam:47 y.o. male  Vitals:    12/15/17 1452   Temp: 98 °F (36.7 °C)   Weight: 129 kg (285 lb)   Height: 177.8 cm (70\")       General:  Patient is awake and alert.  Appears in no acute distress or discomfort.    Psych:  Affect and demeanor are appropriate.    Extremities:  The left wrist is examined.  Skin is benign.  He has a small volar mass just adjacent to the F CR tendon along the volar wrist crease.  The overlying skin is benign.  The mass is soft, non-pulsatile and nontender.  He " has good wrist motion.  No wrist instability.  No tenderness.  Good , pinch, finger and thumb abduction strength.  Intact sensation.  Palpable radial pulse.  Brisk cap refill.    Imaging:  AP and lateral views left wrist are ordered and reviewed.  No comparison films are available.  No concerning findings noted on the x-rays.    Assessment/Plan:  Left wrist ganglion cyst    We discussed options for him.  He is not interested in getting this aspirated/ injected nor is he interested in considering surgical excision.  He tells me that he can live with it as is.  Going forward, he will follow-up as needed.  I told him that if he notices changes in the size or character of the lesion, he needs to let me know.    Omid Booker MD    12/15/2017

## 2018-05-04 RX ORDER — AMLODIPINE BESYLATE 10 MG/1
10 TABLET ORAL DAILY
Qty: 90 TABLET | Refills: 2 | Status: SHIPPED | OUTPATIENT
Start: 2018-05-04 | End: 2019-08-02 | Stop reason: SDUPTHER

## 2018-05-04 RX ORDER — METOPROLOL SUCCINATE 50 MG/1
TABLET, EXTENDED RELEASE ORAL
Qty: 90 TABLET | Refills: 2 | Status: SHIPPED | OUTPATIENT
Start: 2018-05-04 | End: 2020-03-06 | Stop reason: SDUPTHER

## 2018-07-23 ENCOUNTER — APPOINTMENT (OUTPATIENT)
Dept: GENERAL RADIOLOGY | Facility: HOSPITAL | Age: 48
End: 2018-07-23

## 2018-07-23 PROCEDURE — 73130 X-RAY EXAM OF HAND: CPT | Performed by: GENERAL PRACTICE

## 2018-07-27 ENCOUNTER — APPOINTMENT (OUTPATIENT)
Dept: GENERAL RADIOLOGY | Facility: HOSPITAL | Age: 48
End: 2018-07-27

## 2018-07-27 PROCEDURE — 73140 X-RAY EXAM OF FINGER(S): CPT | Performed by: GENERAL PRACTICE

## 2018-07-30 RX ORDER — ESOMEPRAZOLE MAGNESIUM 40 MG/1
CAPSULE, DELAYED RELEASE ORAL
Qty: 30 CAPSULE | Refills: 0 | OUTPATIENT
Start: 2018-07-30

## 2018-09-04 RX ORDER — LEVOTHYROXINE SODIUM 0.12 MG/1
TABLET ORAL
Qty: 30 TABLET | Refills: 0 | Status: SHIPPED | OUTPATIENT
Start: 2018-09-04 | End: 2018-10-22 | Stop reason: SDUPTHER

## 2018-10-22 RX ORDER — LEVOTHYROXINE SODIUM 0.12 MG/1
TABLET ORAL
Qty: 30 TABLET | Refills: 0 | Status: SHIPPED | OUTPATIENT
Start: 2018-10-22 | End: 2018-11-23 | Stop reason: SDUPTHER

## 2018-10-22 RX ORDER — BENAZEPRIL HYDROCHLORIDE 10 MG/1
TABLET ORAL
Qty: 30 TABLET | Refills: 0 | Status: SHIPPED | OUTPATIENT
Start: 2018-10-22 | End: 2018-11-24 | Stop reason: SDUPTHER

## 2018-11-26 RX ORDER — LEVOTHYROXINE SODIUM 0.12 MG/1
TABLET ORAL
Qty: 30 TABLET | Refills: 0 | Status: SHIPPED | OUTPATIENT
Start: 2018-11-26 | End: 2020-11-02

## 2018-11-26 RX ORDER — BENAZEPRIL HYDROCHLORIDE 10 MG/1
TABLET ORAL
Qty: 30 TABLET | Refills: 0 | Status: SHIPPED | OUTPATIENT
Start: 2018-11-26 | End: 2018-12-26 | Stop reason: SDUPTHER

## 2018-11-29 ENCOUNTER — OFFICE VISIT (OUTPATIENT)
Dept: INTERNAL MEDICINE | Facility: CLINIC | Age: 48
End: 2018-11-29

## 2018-11-29 VITALS
RESPIRATION RATE: 19 BRPM | HEART RATE: 96 BPM | SYSTOLIC BLOOD PRESSURE: 144 MMHG | BODY MASS INDEX: 42.09 KG/M2 | DIASTOLIC BLOOD PRESSURE: 87 MMHG | HEIGHT: 70 IN | TEMPERATURE: 97.4 F | OXYGEN SATURATION: 96 % | WEIGHT: 294 LBS

## 2018-11-29 DIAGNOSIS — E78.2 MIXED HYPERLIPIDEMIA: ICD-10-CM

## 2018-11-29 DIAGNOSIS — E55.9 VITAMIN D DEFICIENCY: ICD-10-CM

## 2018-11-29 DIAGNOSIS — M25.561 CHRONIC PAIN OF RIGHT KNEE: ICD-10-CM

## 2018-11-29 DIAGNOSIS — I10 ESSENTIAL HYPERTENSION: Primary | ICD-10-CM

## 2018-11-29 DIAGNOSIS — E03.8 OTHER SPECIFIED HYPOTHYROIDISM: ICD-10-CM

## 2018-11-29 DIAGNOSIS — G89.29 CHRONIC PAIN OF RIGHT KNEE: ICD-10-CM

## 2018-11-29 PROCEDURE — 99214 OFFICE O/P EST MOD 30 MIN: CPT | Performed by: INTERNAL MEDICINE

## 2018-11-29 PROCEDURE — 90471 IMMUNIZATION ADMIN: CPT | Performed by: INTERNAL MEDICINE

## 2018-11-29 PROCEDURE — 90674 CCIIV4 VAC NO PRSV 0.5 ML IM: CPT | Performed by: INTERNAL MEDICINE

## 2018-11-30 LAB
ALBUMIN SERPL-MCNC: 4.5 G/DL (ref 3.5–5.2)
ALBUMIN/GLOB SERPL: 1.9 G/DL
ALP SERPL-CCNC: 93 U/L (ref 39–117)
ALT SERPL-CCNC: 16 U/L (ref 1–41)
APPEARANCE UR: CLEAR
AST SERPL-CCNC: 17 U/L (ref 1–40)
BACTERIA #/AREA URNS HPF: ABNORMAL /HPF
BASOPHILS # BLD AUTO: 0.03 10*3/MM3 (ref 0–0.2)
BASOPHILS NFR BLD AUTO: 0.6 % (ref 0–1.5)
BILIRUB SERPL-MCNC: 1.1 MG/DL (ref 0.1–1.2)
BILIRUB UR QL STRIP: NEGATIVE
BUN SERPL-MCNC: 10 MG/DL (ref 6–20)
BUN/CREAT SERPL: 8.6 (ref 7–25)
CALCIUM SERPL-MCNC: 9.5 MG/DL (ref 8.6–10.5)
CASTS URNS MICRO: ABNORMAL
CHLORIDE SERPL-SCNC: 103 MMOL/L (ref 98–107)
CHOLEST SERPL-MCNC: 166 MG/DL (ref 0–200)
CO2 SERPL-SCNC: 26.3 MMOL/L (ref 22–29)
COLOR UR: YELLOW
CREAT SERPL-MCNC: 1.16 MG/DL (ref 0.76–1.27)
EOSINOPHIL # BLD AUTO: 0.07 10*3/MM3 (ref 0–0.7)
EOSINOPHIL NFR BLD AUTO: 1.4 % (ref 0.3–6.2)
EPI CELLS #/AREA URNS HPF: ABNORMAL /HPF
ERYTHROCYTE [DISTWIDTH] IN BLOOD BY AUTOMATED COUNT: 14.3 % (ref 11.5–14.5)
GLOBULIN SER CALC-MCNC: 2.4 GM/DL
GLUCOSE SERPL-MCNC: 107 MG/DL (ref 65–99)
GLUCOSE UR QL: NEGATIVE
HBA1C MFR BLD: 5.7 % (ref 4.8–5.6)
HCT VFR BLD AUTO: 47.7 % (ref 40.4–52.2)
HDLC SERPL-MCNC: 38 MG/DL (ref 40–60)
HGB BLD-MCNC: 16.2 G/DL (ref 13.7–17.6)
HGB UR QL STRIP: (no result)
IMM GRANULOCYTES # BLD: 0.01 10*3/MM3 (ref 0–0.03)
IMM GRANULOCYTES NFR BLD: 0.2 % (ref 0–0.5)
KETONES UR QL STRIP: NEGATIVE
LDLC SERPL CALC-MCNC: 112 MG/DL (ref 0–100)
LDLC/HDLC SERPL: 2.95 {RATIO}
LEUKOCYTE ESTERASE UR QL STRIP: NEGATIVE
LYMPHOCYTES # BLD AUTO: 1.3 10*3/MM3 (ref 0.9–4.8)
LYMPHOCYTES NFR BLD AUTO: 25.9 % (ref 19.6–45.3)
MCH RBC QN AUTO: 31 PG (ref 27–32.7)
MCHC RBC AUTO-ENTMCNC: 34 G/DL (ref 32.6–36.4)
MCV RBC AUTO: 91.2 FL (ref 79.8–96.2)
MONOCYTES # BLD AUTO: 0.5 10*3/MM3 (ref 0.2–1.2)
MONOCYTES NFR BLD AUTO: 10 % (ref 5–12)
NEUTROPHILS # BLD AUTO: 3.11 10*3/MM3 (ref 1.9–8.1)
NEUTROPHILS NFR BLD AUTO: 62.1 % (ref 42.7–76)
NITRITE UR QL STRIP: NEGATIVE
PH UR STRIP: 7 [PH] (ref 5–8)
PLATELET # BLD AUTO: 241 10*3/MM3 (ref 140–500)
POTASSIUM SERPL-SCNC: 4 MMOL/L (ref 3.5–5.2)
PROT SERPL-MCNC: 6.9 G/DL (ref 6–8.5)
PROT UR QL STRIP: NEGATIVE
PSA SERPL-MCNC: 1.77 NG/ML (ref 0–4)
RBC # BLD AUTO: 5.23 10*6/MM3 (ref 4.6–6)
RBC #/AREA URNS HPF: ABNORMAL /HPF
SODIUM SERPL-SCNC: 141 MMOL/L (ref 136–145)
SP GR UR: 1.02 (ref 1–1.03)
T4 FREE SERPL-MCNC: 1.44 NG/DL (ref 0.93–1.7)
TRIGL SERPL-MCNC: 79 MG/DL (ref 0–150)
TSH SERPL DL<=0.005 MIU/L-ACNC: 1.21 MIU/ML (ref 0.27–4.2)
UROBILINOGEN UR STRIP-MCNC: (no result) MG/DL
VLDLC SERPL CALC-MCNC: 15.8 MG/DL (ref 5–40)
WBC # BLD AUTO: 5.01 10*3/MM3 (ref 4.5–10.7)
WBC #/AREA URNS HPF: ABNORMAL /HPF

## 2018-12-14 NOTE — PROGRESS NOTES
Subjective   Grant Edge Jr. is a 48 y.o. male.   He is here for six-month follow-up for hypertension mixed hyper lipidemia vitamin D deficiency and hypothyroidism and chronic pain of right knee  History of Present Illness   He is here today for six-month follow-up for hypertension which is overall stable on current medication mixed hyper lipidemia which has been stable on current medication vitamin D deficiency stable on supplementation hypothyroidism which has been stable on levothyroxin chronic pain of right knee which overall is about the same as before  The following portions of the patient's history were reviewed and updated as appropriate: allergies, current medications, past family history, past medical history, past social history, past surgical history and problem list.    Review of Systems   Constitutional: Negative for fatigue.   Endocrine: Negative for cold intolerance and heat intolerance.   Musculoskeletal: Positive for arthralgias. Negative for myalgias.   Neurological: Negative for weakness.   All other systems reviewed and are negative.      Objective   Physical Exam   Constitutional: He is oriented to person, place, and time. He appears well-developed and well-nourished. He is cooperative.   HENT:   Head: Normocephalic and atraumatic.   Right Ear: Hearing, tympanic membrane, external ear and ear canal normal.   Left Ear: Hearing, tympanic membrane, external ear and ear canal normal.   Nose: Nose normal.   Mouth/Throat: Uvula is midline, oropharynx is clear and moist and mucous membranes are normal.   Eyes: Conjunctivae, EOM and lids are normal. Pupils are equal, round, and reactive to light.   Neck: Phonation normal. Neck supple. Carotid bruit is not present.   Cardiovascular: Normal rate, regular rhythm and normal heart sounds. Exam reveals no gallop and no friction rub.   No murmur heard.  Pulmonary/Chest: Effort normal and breath sounds normal. No respiratory distress.   Abdominal: Soft.  Bowel sounds are normal. He exhibits no distension and no mass. There is no hepatosplenomegaly. There is no tenderness. There is no rebound and no guarding. No hernia.   Musculoskeletal: He exhibits no edema.        Right knee: Tenderness found.   Neurological: He is alert and oriented to person, place, and time. Coordination and gait normal.   Skin: Skin is warm and dry.   Psychiatric: He has a normal mood and affect. His speech is normal and behavior is normal. Judgment and thought content normal.   Nursing note and vitals reviewed.      Assessment/Plan   Diagnoses and all orders for this visit:    Essential hypertension  -     PSA DIAGNOSTIC    Mixed hyperlipidemia  -     Lipid Panel With LDL / HDL Ratio  -     Comprehensive Metabolic Panel  -     CBC & Differential  -     T4, Free  -     TSH  -     Urinalysis With Microscopic - Urine, Clean Catch  -     Hemoglobin A1c    Vitamin D deficiency    Other specified hypothyroidism    Chronic pain of right knee    Other orders  -     Flucelvax Quad=>4Years (PFS)  -     Microscopic Examination -        Hypertension well-controlled current medication no changes  Mixed hyper lipidemia has been stable on current medication we will check lipid panel with liver enzymes  M.D. deficiency stable on supplementation we will follow  Hypothyroidism asthma stable on levothyroxin we will check TSH free T4  Chronic pain of right knee about the same as before no changes he will work on weight loss

## 2018-12-21 RX ORDER — LEVOTHYROXINE SODIUM 0.12 MG/1
TABLET ORAL
Qty: 90 TABLET | Refills: 2 | Status: SHIPPED | OUTPATIENT
Start: 2018-12-21 | End: 2019-11-06 | Stop reason: SDUPTHER

## 2018-12-26 RX ORDER — BENAZEPRIL HYDROCHLORIDE 10 MG/1
TABLET ORAL
Qty: 30 TABLET | Refills: 0 | Status: SHIPPED | OUTPATIENT
Start: 2018-12-26 | End: 2019-01-26 | Stop reason: SDUPTHER

## 2019-01-28 RX ORDER — BENAZEPRIL HYDROCHLORIDE 10 MG/1
TABLET ORAL
Qty: 90 TABLET | Refills: 1 | Status: SHIPPED | OUTPATIENT
Start: 2019-01-28 | End: 2019-11-06 | Stop reason: SDUPTHER

## 2019-05-28 ENCOUNTER — OFFICE VISIT (OUTPATIENT)
Dept: ORTHOPEDIC SURGERY | Facility: CLINIC | Age: 49
End: 2019-05-28

## 2019-05-28 VITALS — HEIGHT: 70 IN | TEMPERATURE: 98.3 F | WEIGHT: 285 LBS | BODY MASS INDEX: 40.8 KG/M2

## 2019-05-28 DIAGNOSIS — M17.11 PRIMARY OSTEOARTHRITIS OF RIGHT KNEE: Primary | ICD-10-CM

## 2019-05-28 PROCEDURE — 73562 X-RAY EXAM OF KNEE 3: CPT | Performed by: ORTHOPAEDIC SURGERY

## 2019-05-28 PROCEDURE — 99212 OFFICE O/P EST SF 10 MIN: CPT | Performed by: ORTHOPAEDIC SURGERY

## 2019-05-28 NOTE — PROGRESS NOTES
Patient: Grant Edge Jr.  YOB: 1970 48 y.o. male  Medical Record Number: [id number]    Chief Complaints:   Chief Complaint   Patient presents with   • Right Knee - Follow-up       History of Present Illness:Grant Edge Jr. is a 48 y.o. male who presents for follow-up of right knee pain has progressive increase in aching about the medial and anterior aspect of the knee months.  Has had previous Synvisc injection which helped for almost a year.  Pain has worsened recently.    Allergies: No Known Allergies    Medications:   Current Outpatient Medications   Medication Sig Dispense Refill   • amLODIPine (NORVASC) 10 MG tablet TAKE 1 TABLET BY MOUTH DAILY 90 tablet 2   • benazepril (LOTENSIN) 10 MG tablet TAKE ONE TABLET BY MOUTH EVERY DAY 90 tablet 1   • buPROPion (WELLBUTRIN) 75 MG tablet Take 1 tablet by mouth 3 (Three) Times a Day. 270 tablet 1   • esomeprazole (nexIUM) 40 MG capsule TAKE 1 CAPSULE BY MOUTH EVERY DAY 30 capsule 5   • fexofenadine (ALLEGRA) 180 MG tablet Take 180 mg by mouth 1 (One) Time As Needed (allergy).     • levothyroxine (SYNTHROID, LEVOTHROID) 125 MCG tablet TAKE 1 TABLET BY MOUTH EVERY DAY 30 tablet 0   • levothyroxine (SYNTHROID, LEVOTHROID) 125 MCG tablet TAKE 1 TABLET BY MOUTH EVERY DAY 90 tablet 2   • metoprolol succinate XL (TOPROL-XL) 50 MG 24 hr tablet TAKE 1 TABLET BY MOUTH DAILY 90 tablet 2     Current Facility-Administered Medications   Medication Dose Route Frequency Provider Last Rate Last Dose   • hylan (SYNVISC ONE) injection 48 mg  48 mg Intra-articular Once Ivonne Beauchamp APRN             The following portions of the patient's history were reviewed and updated as appropriate: allergies, current medications, past family history, past medical history, past social history, past surgical history and problem list.    Review of Systems:   A 14 point review of systems was performed. All systems negative except pertinent positives/negative listed in HPI  "above    Physical Exam:   Vitals:    05/28/19 1634   Temp: 98.3 °F (36.8 °C)   TempSrc: Temporal   Weight: 129 kg (285 lb)   Height: 177.8 cm (70\")       General: A and O x 3, ASA, NAD    SCLERA:    Normal    DENTITION:   Normal  Knee:  right    ALIGNMENT:     Varus  ,   Patella  tracks  midline    GAIT:    Antalgic    SKIN:    No abnormality    RANGE OF MOTION:   0  -  130   DEG    STRENGTH:   4  / 5    LIGAMENTS:    No varus / valgus instability.   Negative  Lachman.    MENISCUS:     Negative   Ebenezer       DISTAL PULSES:    Paplable    DISTAL SENSATION :   Intact    LYMPHATICS:     No   lymphadenopathy    OTHER:          - Positive   effusion      - Crepitance with ROM       Radiology:  Xrays 3views right knee  (ap,lateral, sunrise) were ordered and reviewed for evaluation of knee pain demonstratingmoderate joint space narrowing  - medial and PF  todays xrays were compared to previous xrays and demonstrate no change    Assessment/Plan:  Right knee moderate osteoarthritis approved for Synvisc injection if not improved we will do steroid.  He will return to see Olivia in 2 weeks for injection  "

## 2019-08-02 ENCOUNTER — OFFICE VISIT (OUTPATIENT)
Dept: INTERNAL MEDICINE | Facility: CLINIC | Age: 49
End: 2019-08-02

## 2019-08-02 VITALS
OXYGEN SATURATION: 98 % | SYSTOLIC BLOOD PRESSURE: 138 MMHG | WEIGHT: 294 LBS | TEMPERATURE: 98.3 F | HEART RATE: 105 BPM | DIASTOLIC BLOOD PRESSURE: 94 MMHG | BODY MASS INDEX: 42.18 KG/M2

## 2019-08-02 DIAGNOSIS — M25.512 ACUTE PAIN OF LEFT SHOULDER: Primary | ICD-10-CM

## 2019-08-02 PROCEDURE — 99213 OFFICE O/P EST LOW 20 MIN: CPT | Performed by: NURSE PRACTITIONER

## 2019-08-02 RX ORDER — AMLODIPINE BESYLATE 10 MG/1
10 TABLET ORAL DAILY
Qty: 30 TABLET | Refills: 0 | Status: SHIPPED | OUTPATIENT
Start: 2019-08-02 | End: 2019-12-14 | Stop reason: SDUPTHER

## 2019-08-05 ENCOUNTER — HOSPITAL ENCOUNTER (OUTPATIENT)
Dept: GENERAL RADIOLOGY | Facility: HOSPITAL | Age: 49
Discharge: HOME | End: 2019-08-05

## 2019-08-05 ENCOUNTER — HOSPITAL ENCOUNTER (OUTPATIENT)
Dept: GENERAL RADIOLOGY | Facility: HOSPITAL | Age: 49
Discharge: HOME | End: 2019-08-05
Admitting: NURSE PRACTITIONER

## 2019-08-05 PROCEDURE — 73030 X-RAY EXAM OF SHOULDER: CPT

## 2019-08-05 PROCEDURE — 72040 X-RAY EXAM NECK SPINE 2-3 VW: CPT

## 2019-08-05 NOTE — PROGRESS NOTES
Please notify patient that his x-rays are showing he has some severe disc space narrowing in his cervical spine as well as degenerative changes of the spine and left shoulder.  Please let me know if the Voltaren gel, lidocaine patches help.  If symptoms persist or worsen we can proceed with physical therapy referral or MRI for further evaluation and treatment.

## 2019-11-06 ENCOUNTER — HOSPITAL ENCOUNTER (EMERGENCY)
Facility: HOSPITAL | Age: 49
Discharge: HOME | End: 2019-11-06
Attending: EMERGENCY MEDICINE | Admitting: EMERGENCY MEDICINE

## 2019-11-06 VITALS
DIASTOLIC BLOOD PRESSURE: 100 MMHG | BODY MASS INDEX: 42.23 KG/M2 | TEMPERATURE: 98.4 F | OXYGEN SATURATION: 97 % | HEIGHT: 70 IN | WEIGHT: 295 LBS | HEART RATE: 81 BPM | RESPIRATION RATE: 18 BRPM | SYSTOLIC BLOOD PRESSURE: 171 MMHG

## 2019-11-06 DIAGNOSIS — G89.29 CHRONIC PAIN OF RIGHT KNEE: Primary | ICD-10-CM

## 2019-11-06 DIAGNOSIS — Z87.39 HISTORY OF OSTEOARTHRITIS: ICD-10-CM

## 2019-11-06 DIAGNOSIS — M25.561 CHRONIC PAIN OF RIGHT KNEE: Primary | ICD-10-CM

## 2019-11-06 PROCEDURE — 99282 EMERGENCY DEPT VISIT SF MDM: CPT

## 2019-11-06 PROCEDURE — 25010000002 KETOROLAC TROMETHAMINE PER 15 MG: Performed by: EMERGENCY MEDICINE

## 2019-11-06 PROCEDURE — 96372 THER/PROPH/DIAG INJ SC/IM: CPT

## 2019-11-06 RX ORDER — NAPROXEN SODIUM 550 MG/1
550 TABLET ORAL 2 TIMES DAILY WITH MEALS
Qty: 12 TABLET | Refills: 0 | OUTPATIENT
Start: 2019-11-06 | End: 2020-01-23

## 2019-11-06 RX ORDER — LEVOTHYROXINE SODIUM 0.12 MG/1
TABLET ORAL
Qty: 90 TABLET | Refills: 1 | Status: SHIPPED | OUTPATIENT
Start: 2019-11-06 | End: 2020-05-04

## 2019-11-06 RX ORDER — BENAZEPRIL HYDROCHLORIDE 10 MG/1
TABLET ORAL
Qty: 30 TABLET | Refills: 5 | Status: SHIPPED | OUTPATIENT
Start: 2019-11-06 | End: 2020-05-12

## 2019-11-06 RX ORDER — KETOROLAC TROMETHAMINE 30 MG/ML
30 INJECTION, SOLUTION INTRAMUSCULAR; INTRAVENOUS ONCE
Status: COMPLETED | OUTPATIENT
Start: 2019-11-06 | End: 2019-11-06

## 2019-11-06 RX ADMIN — KETOROLAC TROMETHAMINE 30 MG: 30 INJECTION, SOLUTION INTRAMUSCULAR at 08:28

## 2019-11-06 NOTE — ED TRIAGE NOTES
Pt has R knee pain.  Can not get into see his Ortho Dr until next week, came to ER for steroid shot.

## 2019-11-06 NOTE — ED PROVIDER NOTES
" EMERGENCY DEPARTMENT ENCOUNTER    Room Number:  02/02  Date of encounter:  11/6/2019  PCP: Patricia Peter APRN  Historian: patient      HPI:  Chief Complaint: right knee pain  A complete HPI/ROS/PMH/PSH/SH/FH are unobtainable due to: none    Context: Grant Edge Jr. is a 49 y.o. male who presents to the ED c/o worsening right knee pain, described as \"burning\", for the last few days radiating into the right calf.  Pt denies erythema and warmth to the knee. Pt states he came to [facility] ED today for a steroid shot. Past medical hx of osteoarthritis to right and left knees since 2016 and left total hip arthroplasty 7/2015. Pt is supposed to see orthopedist on 11/12.      PAST MEDICAL HISTORY  Active Ambulatory Problems     Diagnosis Date Noted   • Abdominal abscess 03/07/2016   • Acute bronchitis 03/07/2016   • Seasonal allergic rhinitis 03/07/2016   • Benign essential hypertension 03/07/2016   • Benign prostatic hyperplasia with urinary obstruction 03/07/2016   • Atypical chest pain 03/07/2016   • Cough 03/07/2016   • Degeneration of intervertebral disc of lumbar region 03/07/2016   • Shortness of breath 03/07/2016   • Gastroesophageal reflux disease with esophagitis 03/07/2016   • Primary hypertriglyceridemia 03/07/2016   • Gastroesophageal reflux disease 03/07/2016   • Green stool 03/07/2016   • Hematochezia 03/07/2016   • Hematuria 03/07/2016   • Hyperlipidemia 03/07/2016   • Hypertension 03/07/2016   • Hypokalemia 03/07/2016   • Hypothyroidism 03/07/2016   • Insomnia 03/07/2016   • Arthralgia of hip 03/07/2016   • Chronic low back pain 03/07/2016   • Lumbar radiculopathy 03/07/2016   • Morbid obesity (CMS/HCC) 03/07/2016   • Nonvenomous insect bite with infection 03/07/2016   • Osteoarthritis of hip 03/07/2016   • Pleurodynia 03/07/2016   • Sinus tachycardia 03/07/2016   • Vitamin D deficiency 03/07/2016   • Right knee pain 03/07/2016   • Pain of lower extremity 03/07/2016   • Bilateral headaches 03/07/2016   • Left " lower quadrant pain 03/07/2016   • Chest pain on breathing 08/22/2016   • Health care maintenance 08/22/2016   • Old anterior myocardial infarction 08/22/2016   • EKG, abnormal 08/22/2016   • History of hepatic disease 04/21/2015   • Essential hypertension 10/12/2016   • Abnormal stress test 10/12/2016   • Moderate single current episode of major depressive disorder (CMS/HCC) 07/05/2017     Resolved Ambulatory Problems     Diagnosis Date Noted   • No Resolved Ambulatory Problems     Past Medical History:   Diagnosis Date   • Abnormal ECG    • Chest pain    • DVT (deep venous thrombosis) (CMS/HCC)    • GERD (gastroesophageal reflux disease)    • Hyperlipidemia    • Hypertension    • Hypokalemia    • Myocardial infarction (CMS/[facility])    • Simple goiter    • Sinus bradycardia    • Vitamin D deficiency          PAST SURGICAL HISTORY  Past Surgical History:   Procedure Laterality Date   • CARDIAC CATHETERIZATION N/A 10/18/2016    Procedure: Left Heart Cath;  Surgeon: Daniel Rosas MD;  Location: [facility] INVASIVE LOCATION;  Service:    • CHOLECYSTECTOMY     • SALIVARY GLAND SURGERY     • THYROIDECTOMY  2013   • TONSILLECTOMY     • TOTAL HIP ARTHROPLASTY REVISION Left 2015         FAMILY HISTORY  Family History   Problem Relation Age of Onset   • Diabetes Father    • Heart disease Father          SOCIAL HISTORY  Social History     Socioeconomic History   • Marital status:      Spouse name: Not on file   • Number of children: Not on file   • Years of education: Not on file   • Highest education level: Not on file   Tobacco Use   • Smoking status: Never Smoker   • Smokeless tobacco: Never Used   Substance and Sexual Activity   • Alcohol use: No   • Drug use: No   • Sexual activity: Defer         ALLERGIES  Patient has no known allergies.        REVIEW OF SYSTEMS  Review of Systems   Cardiovascular: Negative for leg swelling.   Musculoskeletal: Negative for joint swelling.      All other systems reviewed and  negative except for those discussed in HPI.       PHYSICAL EXAM    I have reviewed the triage vital signs and nursing notes.    ED Triage Vitals [11/06/19 0807]   Temp Heart Rate Resp BP SpO2   98.4 °F (36.9 °C) 101 18 -- 97 %      Temp src Heart Rate Source Patient Position BP Location FiO2 (%)   -- -- -- -- --       General: Awake, alert, no acute distress  HEENT: EOMI  Pulm: Symmetric chest rise, nonlabored breathing  CV: Regular rate and rhythm  GI: Non-distended  MSK: Right knee without erythema or warmth, no significant edema, no deformity  Skin: Warm, dry  Neuro: Alert and oriented x 3, moving all extremities, no focal deficits  Psych: Calm, cooperative    Vital signs and nursing notes reviewed.         Physical Exam    LAB RESULTS  No results found for this or any previous visit (from the past 24 hour(s)).          RADIOLOGY  No Radiology Exams Resulted Within Past 24 Hours          PROCEDURES    Procedures      MEDICATIONS GIVEN IN ER    Medications   ketorolac (TORADOL) injection 30 mg (30 mg Intramuscular Given 11/6/19 0828)         PROGRESS, DATA ANALYSIS, CONSULTS, AND MEDICAL DECISION MAKING    All labs have been independently reviewed by me.  All radiology studies have been reviewed by me and discussed with radiologist dictating the report.   EKG's independently viewed and interpreted by me.  Discussion below represents my analysis of pertinent findings related to patient's condition, differential diagnosis, treatment plan and final disposition.      ED Course as of Nov 06 1553 Wed Nov 06, 2019   0822 Patient comes in today for evaluation of chronic right knee pain.  Has been worsening over the last week.  Has received Synvisc and steroid injections in the past with Orthopedics per chart review, and has an appointment scheduled for next Tuesday.  Initially was asking for a steroid injection, however, I discussed with him that I do not do these in the emergency department, and recent literature has  suggested that steroid injections into the joint may not be as beneficial as previously thought.  Plan today will be for IM Toradol, and a course of Anaprox.  We did discuss Voltaren cream, but I feel like the oral medication may give him more diffuse chronic pain improvement.  I discussed needing to follow-up with orthopedics as scheduled next week, and return to the emergency department for worsening symptoms as needed.  No evidence or concern at this time for an infected joint. No recent concerning trauma to suggest the need for new x-rays.   [DC]      ED Course User Index  [DC] Jack Roy MD        8177 informed pt that ED does not give steroid shots but offered Toradol shot and plans to discharge with anti inflammatory and instructions to f/u with orthopedics. Pt understands and agrees with the plan. All questions have been answered.        AS OF 3:53 PM VITALS:    BP - 171/100  HR - 81  TEMP - 98.4 °F (36.9 °C)  02 SATS - 97%        DIAGNOSIS  Final diagnoses:   Chronic pain of right knee   History of osteoarthritis         DISPOSITION  DISCHARGE    Patient discharged in stable condition.    Reviewed implications of results, diagnosis, meds, responsibility to follow up, warning signs and symptoms of possible worsening, potential complications and reasons to return to ER, including new or worsening sx.    Patient/Family voiced understanding of above instructions.    Discussed plan for discharge, as there is no emergent indication for admission. Patient referred to primary care provider for BP management due to today's BP. Pt/family is agreeable and understands need for follow up and repeat testing.  Pt is aware that discharge does not mean that nothing is wrong but it indicates no emergency is present that requires admission and they must continue care with follow-up as given below or physician of their choice.     FOLLOW-UP  [facility] Emergency Department  [address]  [place] [address] [phone]    As needed, If symptoms worsen    Patricia Peter APRN  [address]  [facility] [address]  [facility] [address] [phone]    Schedule an appointment as soon as possible for a visit   As needed         Medication List      New Prescriptions    naproxen sodium 550 MG tablet  Commonly known as:  ANAPROX  Take 1 tablet by mouth 2 (Two) Times a Day With Meals.                  Documentation assistance provided by alden Ramos for Dr. Roy.  Information recorded by the alden was done at my direction and has been verified and validated by me.       Shasta Ramos  11/06/19 0823       Jack Roy MD  11/06/19 5927

## 2019-11-06 NOTE — DISCHARGE INSTRUCTIONS
Take medications as prescribed, follow-up with orthopedics next week as scheduled, follow-up with your primary care provider as needed, return to the emergency department for worsening symptoms as needed.

## 2019-11-12 ENCOUNTER — OFFICE VISIT (OUTPATIENT)
Dept: ORTHOPEDIC SURGERY | Facility: CLINIC | Age: 49
End: 2019-11-12

## 2019-11-12 VITALS — HEIGHT: 70 IN | BODY MASS INDEX: 42.23 KG/M2 | WEIGHT: 295 LBS | TEMPERATURE: 96.2 F

## 2019-11-12 DIAGNOSIS — M17.11 PRIMARY OSTEOARTHRITIS OF RIGHT KNEE: Primary | ICD-10-CM

## 2019-11-12 PROCEDURE — 20610 DRAIN/INJ JOINT/BURSA W/O US: CPT | Performed by: NURSE PRACTITIONER

## 2019-11-12 RX ORDER — METHYLPREDNISOLONE ACETATE 80 MG/ML
80 INJECTION, SUSPENSION INTRA-ARTICULAR; INTRALESIONAL; INTRAMUSCULAR; SOFT TISSUE
Status: COMPLETED | OUTPATIENT
Start: 2019-11-12 | End: 2019-11-12

## 2019-11-12 RX ADMIN — METHYLPREDNISOLONE ACETATE 80 MG: 80 INJECTION, SUSPENSION INTRA-ARTICULAR; INTRALESIONAL; INTRAMUSCULAR; SOFT TISSUE at 08:15

## 2019-11-12 NOTE — PROGRESS NOTES
11/12/2019    Grant FABIO Edge Jr. is here today for worsening knee pain. Pt has undergone injection of the knee in the past with good resolution of symptoms. Pt is requesting a repeat injection.     KNEE Injection Procedure Note:    Large Joint Arthrocentesis: R knee  Date/Time: 11/12/2019 8:15 AM  Consent given by: patient  Site marked: site marked  Timeout: Immediately prior to procedure a time out was called to verify the correct patient, procedure, equipment, support staff and site/side marked as required   Supporting Documentation  Indications: pain and joint swelling   Procedure Details  Location: knee - R knee  Preparation: Patient was prepped and draped in the usual sterile fashion  Needle gauge: 21.  Approach: anterolateral  Medications administered: 2 mL lidocaine (cardiac); 80 mg methylPREDNISolone acetate 80 MG/ML  Patient tolerance: patient tolerated the procedure well with no immediate complications          At the conclusion of the injection I discussed the importance of continued quad strengthening exercises on a daily basis. I will see the patient back if the symptoms should fail to improve or worsen.    Ivonne Beauchamp APRN  11/12/2019

## 2019-11-21 ENCOUNTER — TELEPHONE (OUTPATIENT)
Dept: INTERNAL MEDICINE | Facility: CLINIC | Age: 49
End: 2019-11-21

## 2019-11-21 RX ORDER — AZITHROMYCIN 250 MG/1
TABLET, FILM COATED ORAL
Qty: 6 TABLET | Refills: 0 | Status: SHIPPED | OUTPATIENT
Start: 2019-11-21 | End: 2019-12-05

## 2019-11-21 NOTE — TELEPHONE ENCOUNTER
Pt called and stated he has a sinus infection, he is taking mucinex right now, so he is spitting flem up, he has nasal drip, and head congestion. Wants to know if we can send something in? Please advise?

## 2019-12-05 ENCOUNTER — OFFICE VISIT (OUTPATIENT)
Dept: INTERNAL MEDICINE | Facility: CLINIC | Age: 49
End: 2019-12-05

## 2019-12-05 VITALS
BODY MASS INDEX: 43.09 KG/M2 | SYSTOLIC BLOOD PRESSURE: 137 MMHG | DIASTOLIC BLOOD PRESSURE: 96 MMHG | WEIGHT: 301 LBS | RESPIRATION RATE: 16 BRPM | HEIGHT: 70 IN | OXYGEN SATURATION: 97 % | HEART RATE: 95 BPM | TEMPERATURE: 98.9 F

## 2019-12-05 DIAGNOSIS — J01.00 ACUTE NON-RECURRENT MAXILLARY SINUSITIS: ICD-10-CM

## 2019-12-05 DIAGNOSIS — N13.8 BENIGN PROSTATIC HYPERPLASIA WITH URINARY OBSTRUCTION: ICD-10-CM

## 2019-12-05 DIAGNOSIS — G47.33 OBSTRUCTIVE SLEEP APNEA SYNDROME: ICD-10-CM

## 2019-12-05 DIAGNOSIS — Z23 NEEDS FLU SHOT: ICD-10-CM

## 2019-12-05 DIAGNOSIS — E78.2 MIXED HYPERLIPIDEMIA: ICD-10-CM

## 2019-12-05 DIAGNOSIS — R73.03 PREDIABETES: ICD-10-CM

## 2019-12-05 DIAGNOSIS — N40.1 BENIGN PROSTATIC HYPERPLASIA WITH URINARY OBSTRUCTION: ICD-10-CM

## 2019-12-05 DIAGNOSIS — E03.8 OTHER SPECIFIED HYPOTHYROIDISM: ICD-10-CM

## 2019-12-05 DIAGNOSIS — F32.1 MODERATE SINGLE CURRENT EPISODE OF MAJOR DEPRESSIVE DISORDER (HCC): ICD-10-CM

## 2019-12-05 DIAGNOSIS — Z00.00 HEALTHCARE MAINTENANCE: ICD-10-CM

## 2019-12-05 DIAGNOSIS — K21.9 GASTROESOPHAGEAL REFLUX DISEASE WITHOUT ESOPHAGITIS: ICD-10-CM

## 2019-12-05 DIAGNOSIS — I10 BENIGN ESSENTIAL HYPERTENSION: Primary | ICD-10-CM

## 2019-12-05 PROCEDURE — 90471 IMMUNIZATION ADMIN: CPT | Performed by: NURSE PRACTITIONER

## 2019-12-05 PROCEDURE — 90674 CCIIV4 VAC NO PRSV 0.5 ML IM: CPT | Performed by: NURSE PRACTITIONER

## 2019-12-05 PROCEDURE — 99214 OFFICE O/P EST MOD 30 MIN: CPT | Performed by: NURSE PRACTITIONER

## 2019-12-05 RX ORDER — AMOXICILLIN AND CLAVULANATE POTASSIUM 875; 125 MG/1; MG/1
1 TABLET, FILM COATED ORAL 2 TIMES DAILY
Qty: 20 TABLET | Refills: 0 | Status: SHIPPED | OUTPATIENT
Start: 2019-12-05 | End: 2019-12-15

## 2019-12-05 NOTE — PATIENT INSTRUCTIONS
"Please monitor blood pressure at home and let me know if readings are staying consistently higher than 130/80.       Hypertension  Hypertension, commonly called high blood pressure, is when the force of blood pumping through the arteries is too strong. The arteries are the blood vessels that carry blood from the heart throughout the body. Hypertension forces the heart to work harder to pump blood and may cause arteries to become narrow or stiff. Having untreated or uncontrolled hypertension can cause heart attacks, strokes, kidney disease, and other problems.  A blood pressure reading consists of a higher number over a lower number. Ideally, your blood pressure should be below 120/80. The first (\"top\") number is called the systolic pressure. It is a measure of the pressure in your arteries as your heart beats. The second (\"bottom\") number is called the diastolic pressure. It is a measure of the pressure in your arteries as the heart relaxes.  What are the causes?  The cause of this condition is not known.  What increases the risk?  Some risk factors for high blood pressure are under your control. Others are not.  Factors you can change  · Smoking.  · Having type 2 diabetes mellitus, high cholesterol, or both.  · Not getting enough exercise or physical activity.  · Being overweight.  · Having too much fat, sugar, calories, or salt (sodium) in your diet.  · Drinking too much alcohol.  Factors that are difficult or impossible to change  · Having chronic kidney disease.  · Having a family history of high blood pressure.  · Age. Risk increases with age.  · Race. You may be at higher risk if you are -American.  · Gender. Men are at higher risk than women before age 45. After age 65, women are at higher risk than men.  · Having obstructive sleep apnea.  · Stress.  What are the signs or symptoms?  Extremely high blood pressure (hypertensive crisis) may cause:  · Headache.  · Anxiety.  · Shortness of " breath.  · Nosebleed.  · Nausea and vomiting.  · Severe chest pain.  · Jerky movements you cannot control (seizures).  How is this diagnosed?  This condition is diagnosed by measuring your blood pressure while you are seated, with your arm resting on a surface. The cuff of the blood pressure monitor will be placed directly against the skin of your upper arm at the level of your heart. It should be measured at least twice using the same arm. Certain conditions can cause a difference in blood pressure between your right and left arms.  Certain factors can cause blood pressure readings to be lower or higher than normal (elevated) for a short period of time:  · When your blood pressure is higher when you are in a health care provider's office than when you are at home, this is called white coat hypertension. Most people with this condition do not need medicines.  · When your blood pressure is higher at home than when you are in a health care provider's office, this is called masked hypertension. Most people with this condition may need medicines to control blood pressure.  If you have a high blood pressure reading during one visit or you have normal blood pressure with other risk factors:  · You may be asked to return on a different day to have your blood pressure checked again.  · You may be asked to monitor your blood pressure at home for 1 week or longer.  If you are diagnosed with hypertension, you may have other blood or imaging tests to help your health care provider understand your overall risk for other conditions.  How is this treated?  This condition is treated by making healthy lifestyle changes, such as eating healthy foods, exercising more, and reducing your alcohol intake. Your health care provider may prescribe medicine if lifestyle changes are not enough to get your blood pressure under control, and if:  · Your systolic blood pressure is above 130.  · Your diastolic blood pressure is above 80.  Your  personal target blood pressure may vary depending on your medical conditions, your age, and other factors.  Follow these instructions at home:  Eating and drinking    · Eat a diet that is high in fiber and potassium, and low in sodium, added sugar, and fat. An example eating plan is called the DASH (Dietary Approaches to Stop Hypertension) diet. To eat this way:  ? Eat plenty of fresh fruits and vegetables. Try to fill half of your plate at each meal with fruits and vegetables.  ? Eat whole grains, such as whole wheat pasta, brown rice, or whole grain bread. Fill about one quarter of your plate with whole grains.  ? Eat or drink low-fat dairy products, such as skim milk or low-fat yogurt.  ? Avoid fatty cuts of meat, processed or cured meats, and poultry with skin. Fill about one quarter of your plate with lean proteins, such as fish, chicken without skin, beans, eggs, and tofu.  ? Avoid premade and processed foods. These tend to be higher in sodium, added sugar, and fat.  · Reduce your daily sodium intake. Most people with hypertension should eat less than 1,500 mg of sodium a day.  · Limit alcohol intake to no more than 1 drink a day for nonpregnant women and 2 drinks a day for men. One drink equals 12 oz of beer, 5 oz of wine, or 1½ oz of hard liquor.  Lifestyle    · Work with your health care provider to maintain a healthy body weight or to lose weight. Ask what an ideal weight is for you.  · Get at least 30 minutes of exercise that causes your heart to beat faster (aerobic exercise) most days of the week. Activities may include walking, swimming, or biking.  · Include exercise to strengthen your muscles (resistance exercise), such as pilates or lifting weights, as part of your weekly exercise routine. Try to do these types of exercises for 30 minutes at least 3 days a week.  · Do not use any products that contain nicotine or tobacco, such as cigarettes and e-cigarettes. If you need help quitting, ask your  health care provider.  · Monitor your blood pressure at home as told by your health care provider.  · Keep all follow-up visits as told by your health care provider. This is important.  Medicines  · Take over-the-counter and prescription medicines only as told by your health care provider. Follow directions carefully. Blood pressure medicines must be taken as prescribed.  · Do not skip doses of blood pressure medicine. Doing this puts you at risk for problems and can make the medicine less effective.  · Ask your health care provider about side effects or reactions to medicines that you should watch for.  Contact a health care provider if:  · You think you are having a reaction to a medicine you are taking.  · You have headaches that keep coming back (recurring).  · You feel dizzy.  · You have swelling in your ankles.  · You have trouble with your vision.  Get help right away if:  · You develop a severe headache or confusion.  · You have unusual weakness or numbness.  · You feel faint.  · You have severe pain in your chest or abdomen.  · You vomit repeatedly.  · You have trouble breathing.  Summary  · Hypertension is when the force of blood pumping through your arteries is too strong. If this condition is not controlled, it may put you at risk for serious complications.  · Your personal target blood pressure may vary depending on your medical conditions, your age, and other factors. For most people, a normal blood pressure is less than 120/80.  · Hypertension is treated with lifestyle changes, medicines, or a combination of both. Lifestyle changes include weight loss, eating a healthy, low-sodium diet, exercising more, and limiting alcohol.  This information is not intended to replace advice given to you by your health care provider. Make sure you discuss any questions you have with your health care provider.  Document Released: 12/18/2006 Document Revised: 11/15/2017 Document Reviewed: 11/15/2017  Andre  Interactive Patient Education © 2019 [employer]

## 2019-12-05 NOTE — PROGRESS NOTES
"Subjective   Grant Edge Jr. is a 49 y.o. male.   CC: 6-month follow-up, hypertension, GERD, hypothyroidism, sinus congestion    Patient presents for six-month follow-up.  This is a 49-year-old male.    For hypertension he takes amlodipine 10 mg daily and benazepril 10 mg daily.  He reports that he has taken Toprol-XL in the past but not \"in quite a while.\"  He reports that he does not routinely check his blood pressures at home.  Blood pressure today is 137/96.    He also has a history of GERD.  He has had no recent symptoms.  He does take Nexium 40 mg as needed for symptoms but \"I have not had to take it completely.\"    For depression he takes Wellbutrin 75 mg twice daily.  No SI or HI and this is working well for him.    For hypothyroidism he takes levothyroxine 125 mcg daily. He reports excellent compliance and toleration of this medication.    For about 2 to 3 weeks he has had sinus pressure and pain with nasal congestion, postnasal drip and dry cough.  He was treated with a Z-Jono in late November but reports that symptoms are gradually worsening despite this therapy.  He denies fever or chills, shortness of breath or chest discomfort.    He denies development of any other new issues today.         The following portions of the patient's history were reviewed and updated as appropriate: allergies, current medications, past family history, past medical history, past social history, past surgical history and problem list.    Review of Systems   Constitutional: Negative for activity change, chills, fatigue, fever, unexpected weight gain and unexpected weight loss.   HENT: Positive for congestion, postnasal drip, rhinorrhea and sinus pressure. Negative for hearing loss, sneezing, sore throat, swollen glands and tinnitus.    Eyes: Negative for photophobia, pain and visual disturbance.   Respiratory: Positive for cough. Negative for chest tightness, shortness of breath and wheezing.    Cardiovascular: Negative for " "chest pain, palpitations and leg swelling.   Gastrointestinal: Negative for abdominal distention, abdominal pain, constipation, diarrhea, nausea and vomiting.   Endocrine: Negative for polydipsia, polyphagia and polyuria.   Genitourinary: Negative for dysuria, frequency, hematuria and urgency.   Neurological: Negative for dizziness, weakness, numbness and headache.   All other systems reviewed and are negative.      Objective    /96 (BP Location: Left arm, Patient Position: Sitting, Cuff Size: Adult)   Pulse 95   Temp 98.9 °F (37.2 °C) (Oral)   Resp 16   Ht 177.8 cm (70\")   Wt (!) 137 kg (301 lb)   SpO2 97%   BMI 43.19 kg/m²     Physical Exam   Constitutional: He is oriented to person, place, and time. He appears well-developed and well-nourished. No distress.   HENT:   Head: Normocephalic and atraumatic.   Right Ear: External ear normal.   Left Ear: External ear normal.   Nose: Nose normal.   Mouth/Throat: Oropharynx is clear and moist.   Eyes: EOM are normal. Pupils are equal, round, and reactive to light.   Neck: Normal range of motion. Neck supple. No JVD present. No tracheal deviation present. No thyromegaly present.   Cardiovascular: Normal rate, regular rhythm, normal heart sounds and intact distal pulses. Exam reveals no gallop and no friction rub.   No murmur heard.  No peripheral edema.  Posterior tib pulses 2+ and equal bilaterally.   Pulmonary/Chest: Effort normal and breath sounds normal. No stridor. No respiratory distress. He has no wheezes. He has no rales. He exhibits no tenderness.   Lungs CTA bilaterally   Abdominal: Soft. Bowel sounds are normal. He exhibits no distension. There is no tenderness.   Musculoskeletal: Normal range of motion.   Lymphadenopathy:     He has no cervical adenopathy.   Neurological: He is alert and oriented to person, place, and time.   Skin: Skin is warm and dry. Capillary refill takes less than 2 seconds. He is not diaphoretic.   Psychiatric: He has a " "normal mood and affect. His behavior is normal. Judgment and thought content normal.   Nursing note and vitals reviewed.    Current outpatient and discharge medications have been reconciled for the patient.  Reviewed by: MELANY Bridges      Assessment/Plan   Grant was seen today for follow-up and nasal congestion.    Diagnoses and all orders for this visit:    Benign essential hypertension  -     CBC & Differential  -     Comprehensive metabolic panel    Mixed hyperlipidemia  -     Lipid panel    Gastroesophageal reflux disease without esophagitis    Other specified hypothyroidism  -     TSH  -     T4, Free    Benign prostatic hyperplasia with urinary obstruction    Moderate single current episode of major depressive disorder (CMS/HCC)    Healthcare maintenance  -     PSA SCREENING    Needs flu shot  -     Flucelvax Quad=>4Years (PFS)    Obstructive sleep apnea syndrome  -     Ambulatory Referral to Sleep Medicine    Acute non-recurrent maxillary sinusitis  -     amoxicillin-clavulanate (AUGMENTIN) 875-125 MG per tablet; Take 1 tablet by mouth 2 (Two) Times a Day for 10 days.    Prediabetes  -     Hemoglobin A1c    -Hypertension: Continue benazepril and amlodipine.  He has a history of SUSAN but reports that he has not been using his mask\" quite some time.\"  I did educate him that uncontrolled sleep apnea can contribute to heart disease and uncontrolled hypertension.  Blood pressure is a bit high today at 137/96.  Continue current medications and we will refer him back to sleep medicine for further fitting and evaluation/treatment of his SUSAN.    -Hyperlipidemia: Continue dietary modifications.  We will check a lipid panel today and adjust therapy if needed based on labs.  Currently not on a statin but we will adjust therapy if needed.    -GERD: Stable with PRN Nexium, continue current therapy.    -Hypothyroidism: Continue levo thyroxine 125 mcg daily.  We will check a TSH and free T4 today and adjust therapy if " needed based on labs.    -BPH: Stable at this time.  We will check a PSA today.  No new symptoms.    -Depression: Stable with Wellbutrin, continue current therapy.    -Sinusitis: We will treat with Augmentin 875-125 twice daily x10 days.  I recommended Flonase and an antihistamine as well.    -Healthcare maintenance: Routine labs today.  He will get his flu shot today.  He will get a colonoscopy after he turns 50 in August 2020.    -Prediabetes: Last A1c was 5.7.  We will recheck today.    -We will contact patient with the results of his labs and any further recommendations.  Follow-up PRN and I will see him back in 3 months for recheck of labs/routine health maintenance, follow-up on chronic conditions.

## 2019-12-06 LAB
ALBUMIN SERPL-MCNC: 4.6 G/DL (ref 3.5–5.2)
ALBUMIN/GLOB SERPL: 1.9 G/DL
ALP SERPL-CCNC: 98 U/L (ref 39–117)
ALT SERPL-CCNC: 23 U/L (ref 1–41)
AST SERPL-CCNC: 20 U/L (ref 1–40)
BASOPHILS # BLD AUTO: 0.06 10*3/MM3 (ref 0–0.2)
BASOPHILS NFR BLD AUTO: 0.8 % (ref 0–1.5)
BILIRUB SERPL-MCNC: 1.3 MG/DL (ref 0.2–1.2)
BUN SERPL-MCNC: 10 MG/DL (ref 6–20)
BUN/CREAT SERPL: 9.1 (ref 7–25)
CALCIUM SERPL-MCNC: 9.3 MG/DL (ref 8.6–10.5)
CHLORIDE SERPL-SCNC: 102 MMOL/L (ref 98–107)
CHOLEST SERPL-MCNC: 151 MG/DL (ref 0–200)
CO2 SERPL-SCNC: 28 MMOL/L (ref 22–29)
CREAT SERPL-MCNC: 1.1 MG/DL (ref 0.76–1.27)
EOSINOPHIL # BLD AUTO: 0.05 10*3/MM3 (ref 0–0.4)
EOSINOPHIL NFR BLD AUTO: 0.7 % (ref 0.3–6.2)
ERYTHROCYTE [DISTWIDTH] IN BLOOD BY AUTOMATED COUNT: 12.7 % (ref 12.3–15.4)
GLOBULIN SER CALC-MCNC: 2.4 GM/DL
GLUCOSE SERPL-MCNC: 88 MG/DL (ref 65–99)
HBA1C MFR BLD: 5.9 % (ref 4.8–5.6)
HCT VFR BLD AUTO: 47.5 % (ref 37.5–51)
HDLC SERPL-MCNC: 49 MG/DL (ref 40–60)
HGB BLD-MCNC: 16.3 G/DL (ref 13–17.7)
IMM GRANULOCYTES # BLD AUTO: 0.05 10*3/MM3 (ref 0–0.05)
IMM GRANULOCYTES NFR BLD AUTO: 0.7 % (ref 0–0.5)
LDLC SERPL CALC-MCNC: 87 MG/DL (ref 0–100)
LYMPHOCYTES # BLD AUTO: 1.69 10*3/MM3 (ref 0.7–3.1)
LYMPHOCYTES NFR BLD AUTO: 23.9 % (ref 19.6–45.3)
MCH RBC QN AUTO: 31.8 PG (ref 26.6–33)
MCHC RBC AUTO-ENTMCNC: 34.3 G/DL (ref 31.5–35.7)
MCV RBC AUTO: 92.8 FL (ref 79–97)
MONOCYTES # BLD AUTO: 0.66 10*3/MM3 (ref 0.1–0.9)
MONOCYTES NFR BLD AUTO: 9.3 % (ref 5–12)
NEUTROPHILS # BLD AUTO: 4.55 10*3/MM3 (ref 1.7–7)
NEUTROPHILS NFR BLD AUTO: 64.6 % (ref 42.7–76)
NRBC BLD AUTO-RTO: 0.1 /100 WBC (ref 0–0.2)
PLATELET # BLD AUTO: 259 10*3/MM3 (ref 140–450)
POTASSIUM SERPL-SCNC: 4.2 MMOL/L (ref 3.5–5.2)
PROT SERPL-MCNC: 7 G/DL (ref 6–8.5)
PSA SERPL-MCNC: 2.19 NG/ML (ref 0–4)
RBC # BLD AUTO: 5.12 10*6/MM3 (ref 4.14–5.8)
SODIUM SERPL-SCNC: 141 MMOL/L (ref 136–145)
T4 FREE SERPL-MCNC: 1.59 NG/DL (ref 0.93–1.7)
TRIGL SERPL-MCNC: 73 MG/DL (ref 0–150)
TSH SERPL DL<=0.005 MIU/L-ACNC: 1.26 UIU/ML (ref 0.27–4.2)
VLDLC SERPL CALC-MCNC: 14.6 MG/DL
WBC # BLD AUTO: 7.06 10*3/MM3 (ref 3.4–10.8)

## 2019-12-06 NOTE — PROGRESS NOTES
Please notify patient that his blood count looks stable with no signs of anemia.  Metabolic panel looks stable as well.  Cholesterol looks great.  PSA is normal.  A1c is stable at 5.9.  Thyroid labs are normal.  Please let me know of any questions or concerns.

## 2019-12-16 RX ORDER — AMLODIPINE BESYLATE 10 MG/1
10 TABLET ORAL DAILY
Qty: 30 TABLET | Refills: 3 | Status: SHIPPED | OUTPATIENT
Start: 2019-12-16 | End: 2020-04-13

## 2020-01-06 ENCOUNTER — APPOINTMENT (OUTPATIENT)
Dept: GENERAL RADIOLOGY | Facility: HOSPITAL | Age: 50
End: 2020-01-06

## 2020-01-06 ENCOUNTER — HOSPITAL ENCOUNTER (EMERGENCY)
Facility: HOSPITAL | Age: 50
Discharge: HOME | End: 2020-01-06
Attending: EMERGENCY MEDICINE | Admitting: EMERGENCY MEDICINE

## 2020-01-06 VITALS
DIASTOLIC BLOOD PRESSURE: 90 MMHG | TEMPERATURE: 97.9 F | WEIGHT: 295 LBS | RESPIRATION RATE: 18 BRPM | BODY MASS INDEX: 42.23 KG/M2 | HEIGHT: 70 IN | OXYGEN SATURATION: 97 % | HEART RATE: 106 BPM | SYSTOLIC BLOOD PRESSURE: 145 MMHG

## 2020-01-06 DIAGNOSIS — M25.511 RIGHT SHOULDER PAIN, UNSPECIFIED CHRONICITY: ICD-10-CM

## 2020-01-06 DIAGNOSIS — R07.89 ATYPICAL CHEST PAIN: Primary | ICD-10-CM

## 2020-01-06 LAB
ALBUMIN SERPL-MCNC: 4.2 G/DL (ref 3.5–5.2)
ALBUMIN/GLOB SERPL: 1.4 G/DL
ALP SERPL-CCNC: 91 U/L (ref 39–117)
ALT SERPL W P-5'-P-CCNC: 19 U/L (ref 1–41)
ANION GAP SERPL CALCULATED.3IONS-SCNC: 9.7 MMOL/L (ref 5–15)
AST SERPL-CCNC: 19 U/L (ref 1–40)
BASOPHILS # BLD AUTO: 0.05 10*3/MM3 (ref 0–0.2)
BASOPHILS NFR BLD AUTO: 0.8 % (ref 0–1.5)
BILIRUB SERPL-MCNC: 0.8 MG/DL (ref 0.2–1.2)
BUN BLD-MCNC: 8 MG/DL (ref 6–20)
BUN/CREAT SERPL: 7.9 (ref 7–25)
CALCIUM SPEC-SCNC: 9.3 MG/DL (ref 8.6–10.5)
CHLORIDE SERPL-SCNC: 101 MMOL/L (ref 98–107)
CO2 SERPL-SCNC: 29.3 MMOL/L (ref 22–29)
CREAT BLD-MCNC: 1.01 MG/DL (ref 0.76–1.27)
D DIMER PPP FEU-MCNC: 0.39 MCGFEU/ML (ref 0–0.49)
DEPRECATED RDW RBC AUTO: 44 FL (ref 37–54)
EOSINOPHIL # BLD AUTO: 0.04 10*3/MM3 (ref 0–0.4)
EOSINOPHIL NFR BLD AUTO: 0.7 % (ref 0.3–6.2)
ERYTHROCYTE [DISTWIDTH] IN BLOOD BY AUTOMATED COUNT: 13 % (ref 12.3–15.4)
GFR SERPL CREATININE-BSD FRML MDRD: 95 ML/MIN/1.73
GLOBULIN UR ELPH-MCNC: 2.9 GM/DL
GLUCOSE BLD-MCNC: 107 MG/DL (ref 65–99)
HCT VFR BLD AUTO: 46.9 % (ref 37.5–51)
HGB BLD-MCNC: 16 G/DL (ref 13–17.7)
IMM GRANULOCYTES # BLD AUTO: 0.06 10*3/MM3 (ref 0–0.05)
IMM GRANULOCYTES NFR BLD AUTO: 1 % (ref 0–0.5)
LYMPHOCYTES # BLD AUTO: 1.25 10*3/MM3 (ref 0.7–3.1)
LYMPHOCYTES NFR BLD AUTO: 20.9 % (ref 19.6–45.3)
MAGNESIUM SERPL-MCNC: 2.3 MG/DL (ref 1.6–2.6)
MCH RBC QN AUTO: 31.9 PG (ref 26.6–33)
MCHC RBC AUTO-ENTMCNC: 34.1 G/DL (ref 31.5–35.7)
MCV RBC AUTO: 93.6 FL (ref 79–97)
MONOCYTES # BLD AUTO: 0.52 10*3/MM3 (ref 0.1–0.9)
MONOCYTES NFR BLD AUTO: 8.7 % (ref 5–12)
NEUTROPHILS # BLD AUTO: 4.07 10*3/MM3 (ref 1.7–7)
NEUTROPHILS NFR BLD AUTO: 67.9 % (ref 42.7–76)
NRBC BLD AUTO-RTO: 0 /100 WBC (ref 0–0.2)
PLATELET # BLD AUTO: 245 10*3/MM3 (ref 140–450)
PMV BLD AUTO: 9.5 FL (ref 6–12)
POTASSIUM BLD-SCNC: 4.5 MMOL/L (ref 3.5–5.2)
PROT SERPL-MCNC: 7.1 G/DL (ref 6–8.5)
RBC # BLD AUTO: 5.01 10*6/MM3 (ref 4.14–5.8)
SODIUM BLD-SCNC: 140 MMOL/L (ref 136–145)
TROPONIN T SERPL-MCNC: <0.01 NG/ML (ref 0–0.03)
WBC NRBC COR # BLD: 5.99 10*3/MM3 (ref 3.4–10.8)

## 2020-01-06 PROCEDURE — 85025 COMPLETE CBC W/AUTO DIFF WBC: CPT | Performed by: EMERGENCY MEDICINE

## 2020-01-06 PROCEDURE — 73030 X-RAY EXAM OF SHOULDER: CPT

## 2020-01-06 PROCEDURE — 93005 ELECTROCARDIOGRAM TRACING: CPT | Performed by: EMERGENCY MEDICINE

## 2020-01-06 PROCEDURE — 84484 ASSAY OF TROPONIN QUANT: CPT | Performed by: EMERGENCY MEDICINE

## 2020-01-06 PROCEDURE — 71046 X-RAY EXAM CHEST 2 VIEWS: CPT

## 2020-01-06 PROCEDURE — 83735 ASSAY OF MAGNESIUM: CPT | Performed by: EMERGENCY MEDICINE

## 2020-01-06 PROCEDURE — 93010 ELECTROCARDIOGRAM REPORT: CPT | Performed by: INTERNAL MEDICINE

## 2020-01-06 PROCEDURE — 80053 COMPREHEN METABOLIC PANEL: CPT | Performed by: EMERGENCY MEDICINE

## 2020-01-06 PROCEDURE — 85379 FIBRIN DEGRADATION QUANT: CPT | Performed by: EMERGENCY MEDICINE

## 2020-01-06 PROCEDURE — 99283 EMERGENCY DEPT VISIT LOW MDM: CPT

## 2020-01-06 RX ORDER — SODIUM CHLORIDE 0.9 % (FLUSH) 0.9 %
10 SYRINGE (ML) INJECTION AS NEEDED
Status: DISCONTINUED | OUTPATIENT
Start: 2020-01-06 | End: 2020-01-06 | Stop reason: HOSPADM

## 2020-01-06 NOTE — ED TRIAGE NOTES
"Pt to ED from home with reports of right arm pain, states \"I don't know if I pulled something, a month ago I was having the same symptoms in my left arm and they told me I have a herniated disc, so I don't know if I'm having that or not\".      Pt denies numbness/tingling, has full ROM of affected extremity.  Pt states \"I just be sitting and have that feeling like I had before so I figure I should just get it checked out\".  "

## 2020-01-06 NOTE — ED PROVIDER NOTES
EMERGENCY DEPARTMENT ENCOUNTER    Room Number:  03/03  Date of encounter:  1/6/2020  PCP: Patricia Peter APRN  Historian: Pt      HPI:  Chief Complaint: R sided chest pain  A complete HPI/ROS/PMH/PSH/SH/FH are unobtainable due to: None  Context: Grant Edge Jr. is a 49 y.o. male who presents to the ED c/o constant, gradual onset R upper chest discomfort radiating over R shoulder over the last couple of days. The pain is exacerbated with movement of shoulder and extending the chest. Pt had a similar episode with the opposite extremity about a month ago. No other complaints at this time. He denies any cardiac or pulmonary hx.       MEDICAL HISTORY REVIEW  Old records reviewed.     PAST MEDICAL HISTORY  Active Ambulatory Problems     Diagnosis Date Noted   • Abdominal abscess 03/07/2016   • Acute bronchitis 03/07/2016   • Seasonal allergic rhinitis 03/07/2016   • Benign essential hypertension 03/07/2016   • Benign prostatic hyperplasia with urinary obstruction 03/07/2016   • Atypical chest pain 03/07/2016   • Cough 03/07/2016   • Degeneration of intervertebral disc of lumbar region 03/07/2016   • Shortness of breath 03/07/2016   • Gastroesophageal reflux disease with esophagitis 03/07/2016   • Primary hypertriglyceridemia 03/07/2016   • Gastroesophageal reflux disease 03/07/2016   • Green stool 03/07/2016   • Hematochezia 03/07/2016   • Hematuria 03/07/2016   • Hyperlipidemia 03/07/2016   • Hypertension 03/07/2016   • Hypokalemia 03/07/2016   • Hypothyroidism 03/07/2016   • Insomnia 03/07/2016   • Arthralgia of hip 03/07/2016   • Chronic low back pain 03/07/2016   • Lumbar radiculopathy 03/07/2016   • Morbid obesity (CMS/HCC) 03/07/2016   • Nonvenomous insect bite with infection 03/07/2016   • Osteoarthritis of hip 03/07/2016   • Pleurodynia 03/07/2016   • Sinus tachycardia 03/07/2016   • Vitamin D deficiency 03/07/2016   • Right knee pain 03/07/2016   • Pain of lower extremity 03/07/2016   • Bilateral headaches  03/07/2016   • Left lower quadrant pain 03/07/2016   • Chest pain on breathing 08/22/2016   • Health care maintenance 08/22/2016   • Old anterior myocardial infarction 08/22/2016   • EKG, abnormal 08/22/2016   • History of hepatic disease 04/21/2015   • Essential hypertension 10/12/2016   • Abnormal stress test 10/12/2016   • Moderate single current episode of major depressive disorder (CMS/HCC) 07/05/2017   • Prediabetes 12/05/2019     Resolved Ambulatory Problems     Diagnosis Date Noted   • No Resolved Ambulatory Problems     Past Medical History:   Diagnosis Date   • Abnormal ECG    • Chest pain    • DVT (deep venous thrombosis) (CMS/HCC)    • GERD (gastroesophageal reflux disease)    • Myocardial infarction (CMS/[facility])    • Simple goiter    • Sinus bradycardia          PAST SURGICAL HISTORY  Past Surgical History:   Procedure Laterality Date   • CARDIAC CATHETERIZATION N/A 10/18/2016    Procedure: Left Heart Cath;  Surgeon: Daneil Rosas MD;  Location: [facility] INVASIVE LOCATION;  Service:    • CHOLECYSTECTOMY     • SALIVARY GLAND SURGERY     • THYROIDECTOMY  2013   • TONSILLECTOMY     • TOTAL HIP ARTHROPLASTY REVISION Left 2015         FAMILY HISTORY  Family History   Problem Relation Age of Onset   • Diabetes Father    • Heart disease Father          SOCIAL HISTORY  Social History     Socioeconomic History   • Marital status:      Spouse name: Not on file   • Number of children: Not on file   • Years of education: Not on file   • Highest education level: Not on file   Tobacco Use   • Smoking status: Never Smoker   • Smokeless tobacco: Never Used   Substance and Sexual Activity   • Alcohol use: No   • Drug use: No   • Sexual activity: Defer         ALLERGIES  Patient has no known allergies.        REVIEW OF SYSTEMS  Review of Systems   Constitutional: Negative for activity change, appetite change and fever.   HENT: Negative for congestion and sore throat.    Eyes: Negative.    Respiratory:  Negative for cough and shortness of breath.    Cardiovascular: Positive for chest pain (R sided). Negative for leg swelling.   Gastrointestinal: Negative for abdominal pain, diarrhea and vomiting.   Endocrine: Negative.    Genitourinary: Negative for decreased urine volume and dysuria.   Musculoskeletal: Negative for neck pain.   Skin: Negative for rash and wound.   Allergic/Immunologic: Negative.    Neurological: Negative for weakness, numbness and headaches.   Hematological: Negative.    Psychiatric/Behavioral: Negative.    All other systems reviewed and are negative.       All systems reviewed and negative except for those discussed in HPI.       PHYSICAL EXAM    I have reviewed the triage vital signs and nursing notes.    ED Triage Vitals   Temp Heart Rate Resp BP SpO2   01/06/20 0645 01/06/20 0644 01/06/20 0644 01/06/20 0644 01/06/20 0644   97.9 °F (36.6 °C) 106 18 145/90 97 %      Temp src Heart Rate Source Patient Position BP Location FiO2 (%)   01/06/20 0645 01/06/20 0644 01/06/20 0644 01/06/20 0644 --   Tympanic Monitor Standing Left arm        GENERAL: awake and alert, not distressed  HENT: nares patent  EYES: no scleral icterus  CV: regular rhythm, regular rate, intact distal pulses  RESPIRATORY: normal effort, CTAB  ABDOMEN: soft, non-tender, morbidly obese  MUSCULOSKELETAL: no deformity, tenderness to R superior lateral chest wall reproducible with movement and palpation.  Patient also has pain is reproducible to his right shoulder with rotation around the glenohumeral joint and specifically with abduction of the right arm.  He does have some radiation of the pain or discomfort feeling to his right deltoid aspect of his right upper extremity.  He has intact distal pulses he has no motor or sensory changes to his upper extremities bilaterally.  His  strength is intact and equal.  His motor and sensory to his radial, ulnar, and medial nerve are normal and equal bilaterally.  Patient's biceps and  triceps are intact without weakness and equal bilaterally.  NEURO: alert, moves all extremities, follows commands, strength and sensation 5/5 BUE  SKIN: warm, dry    LAB RESULTS  Recent Results (from the past 24 hour(s))   Comprehensive Metabolic Panel    Collection Time: 01/06/20  9:29 AM   Result Value Ref Range    Glucose 107 (H) 65 - 99 mg/dL    BUN 8 6 - 20 mg/dL    Creatinine 1.01 0.76 - 1.27 mg/dL    Sodium 140 136 - 145 mmol/L    Potassium 4.5 3.5 - 5.2 mmol/L    Chloride 101 98 - 107 mmol/L    CO2 29.3 (H) 22.0 - 29.0 mmol/L    Calcium 9.3 8.6 - 10.5 mg/dL    Total Protein 7.1 6.0 - 8.5 g/dL    Albumin 4.20 3.50 - 5.20 g/dL    ALT (SGPT) 19 1 - 41 U/L    AST (SGOT) 19 1 - 40 U/L    Alkaline Phosphatase 91 39 - 117 U/L    Total Bilirubin 0.8 0.2 - 1.2 mg/dL    eGFR  African Amer 95 >60 mL/min/1.73    Globulin 2.9 gm/dL    A/G Ratio 1.4 g/dL    BUN/Creatinine Ratio 7.9 7.0 - 25.0    Anion Gap 9.7 5.0 - 15.0 mmol/L   Troponin    Collection Time: 01/06/20  9:29 AM   Result Value Ref Range    Troponin T <0.010 0.000 - 0.030 ng/mL   D-dimer, Quantitative    Collection Time: 01/06/20  9:29 AM   Result Value Ref Range    D-Dimer, Quantitative 0.39 0.00 - 0.49 MCGFEU/mL   Magnesium    Collection Time: 01/06/20  9:29 AM   Result Value Ref Range    Magnesium 2.3 1.6 - 2.6 mg/dL   CBC Auto Differential    Collection Time: 01/06/20  9:29 AM   Result Value Ref Range    WBC 5.99 3.40 - 10.80 10*3/mm3    RBC 5.01 4.14 - 5.80 10*6/mm3    Hemoglobin 16.0 13.0 - 17.7 g/dL    Hematocrit 46.9 37.5 - 51.0 %    MCV 93.6 79.0 - 97.0 fL    MCH 31.9 26.6 - 33.0 pg    MCHC 34.1 31.5 - 35.7 g/dL    RDW 13.0 12.3 - 15.4 %    RDW-SD 44.0 37.0 - 54.0 fl    MPV 9.5 6.0 - 12.0 fL    Platelets 245 140 - 450 10*3/mm3    Neutrophil % 67.9 42.7 - 76.0 %    Lymphocyte % 20.9 19.6 - 45.3 %    Monocyte % 8.7 5.0 - 12.0 %    Eosinophil % 0.7 0.3 - 6.2 %    Basophil % 0.8 0.0 - 1.5 %    Immature Grans % 1.0 (H) 0.0 - 0.5 %    Neutrophils, Absolute  4.07 1.70 - 7.00 10*3/mm3    Lymphocytes, Absolute 1.25 0.70 - 3.10 10*3/mm3    Monocytes, Absolute 0.52 0.10 - 0.90 10*3/mm3    Eosinophils, Absolute 0.04 0.00 - 0.40 10*3/mm3    Basophils, Absolute 0.05 0.00 - 0.20 10*3/mm3    Immature Grans, Absolute 0.06 (H) 0.00 - 0.05 10*3/mm3    nRBC 0.0 0.0 - 0.2 /100 WBC       Ordered the above labs and independently reviewed the results.        RADIOLOGY  Xr Chest 2 View    Result Date: 1/6/2020  PA AND LATERAL CHEST X-RAY AND RIGHT SHOULDER X-RAY  HISTORY: Upper chest and right shoulder pain. No known injury.  Chest x-ray consisting of three images is provided. Two images of the right shoulder were also acquired. Comparison exams: Chest x-ray 08/22/2016 and the left shoulder x-ray from 08/05/2019.  FINDINGS: There are surgical clips along the lower neck, compatible with previous thyroidectomy. There are also clips from previous cholecystectomy. The cardiomediastinal silhouette is normal. The lungs are clear. The costophrenic sulci are dry and the bones appear normal. There is no pneumothorax.  Images of the right shoulder show subcortical cystic change at the glenoid along with a small ridge of marginal osteophyte around the caudal edge of the humeral head, indicative of chronic osteoarthritic change at the glenohumeral joint. The acromioclavicular joint appears normal and humeral head is normally positioned. The bones around the shoulder otherwise appear normal.      Negative chest x-ray. Evidence of chronic degenerative change at the glenohumeral joint of the right shoulder.  This report was finalized on 1/6/2020 10:15 AM by Dr. Collin Dennis M.D.      Xr Shoulder 2+ View Right    Result Date: 1/6/2020  PA AND LATERAL CHEST X-RAY AND RIGHT SHOULDER X-RAY  HISTORY: Upper chest and right shoulder pain. No known injury.  Chest x-ray consisting of three images is provided. Two images of the right shoulder were also acquired. Comparison exams: Chest x-ray 08/22/2016 and  the left shoulder x-ray from 08/05/2019.  FINDINGS: There are surgical clips along the lower neck, compatible with previous thyroidectomy. There are also clips from previous cholecystectomy. The cardiomediastinal silhouette is normal. The lungs are clear. The costophrenic sulci are dry and the bones appear normal. There is no pneumothorax.  Images of the right shoulder show subcortical cystic change at the glenoid along with a small ridge of marginal osteophyte around the caudal edge of the humeral head, indicative of chronic osteoarthritic change at the glenohumeral joint. The acromioclavicular joint appears normal and humeral head is normally positioned. The bones around the shoulder otherwise appear normal.      Negative chest x-ray. Evidence of chronic degenerative change at the glenohumeral joint of the right shoulder.  This report was finalized on 1/6/2020 10:15 AM by Dr. Collin Dennis M.D.        I ordered the above noted radiological studies. Reviewed by me and discussed with radiologist.  See dictation for official radiology interpretation.      PROCEDURES    Procedures    EKG          EKG time: 0921  Rhythm/Rate: SR 87  P waves and [place]: Nml  QRS, axis: Narrow complex, normal axis   ST and T waves: Nonspecific T wave changes in inferior leads    Interpreted Contemporaneously by me, independently viewed  Unchanged compared to prior 1/8/2013    MEDICATIONS GIVEN IN ER    Medications   sodium chloride 0.9 % flush 10 mL (has no administration in time range)         PROGRESS, DATA ANALYSIS, CONSULTS, AND MEDICAL DECISION MAKING    All labs have been independently reviewed by me.  All radiology studies have been reviewed by me and discussed with radiologist dictating the report.   EKG's independently viewed and interpreted by me.  Discussion below represents my analysis of pertinent findings related to patient's condition, differential diagnosis, treatment plan and final disposition.    0909: Ordered XR chest,  XR R shoulder and labs for further evaluation.     1052: Rechecked pt who is resting comfortably. Informed pt on negative lab and imaging results. Discussed plan to discharge pt home. Pt understands and agrees with the plan, all questions answered.    ED Course as of Jan 06 1247   Mon Jan 06, 2020   1050 I have a very low clinical suspicion that this is a pulmonary embolism.  The only PERC positive was at triage she had a heart rate of 106.  On my exam he is not tachycardic.  He has no risk factors for pulmonary embolism other than obesity and he has had no history of pulmonary embolism in the past.  His history and exam are very inconsistent with PE.   D-Dimer, Quant: 0.39 [MM]   1053 I have reevaluated the patient.  Patient still is very comfortable in no distress.  I re-examined him and his pain is very reproducible with abduction of his right shoulder also with extending his chest and bringing in his shoulder blades posteriorly.  And rotation at the glenohumeral humeral joint on the right.  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  He believes that is what caused that irritation to his right shoulder.  I think this is very unlikely a serious etiology.  Think is unlikely cardiac in etiology.  I instructed him to take Tylenol or Motrin and follow-up with his primary care doctor.  Instructed him to return if he develops any shortness of breath, chest pain, fever, or any concerns.    [MM]      ED Course User Index  [MM] Claus Hawkins MD       AS OF 12:47 PM VITALS:    BP - 145/90  HR - 106  TEMP - 97.9 °F (36.6 °C) (Tympanic)  O2 SATS - 97%        DIAGNOSIS  Final diagnoses:   Atypical chest pain   Right shoulder pain, unspecified chronicity         DISPOSITION  DISCHARGE    Patient discharged in stable condition.    Reviewed implications of results, diagnosis, meds, responsibility to follow up, warning signs and symptoms of possible worsening, potential  complications and reasons to return to ER.    Patient/Family voiced understanding of above instructions.    Discussed plan for discharge, as there is no emergent indication for admission.  Pt/family is agreeable and understands need for follow up and repeat testing.  Pt is aware that discharge does not mean that nothing is wrong but it indicates no emergency is present that requires admission and they must continue care with follow-up as given below or physician of their choice.     FOLLOW-UP  Patricia Peter APRN  [address] [facility] [email or web address]][place] [facility]  [place]   [facility] [id number] [phone]    In 1 week  Return if pain worsens, If symptoms worsen, shortness of breath, fever, any concerns    Tip Zheng II, MD  [address] [facility]  [place]   [facility] [id number] [phone]      For the shoulder pain you can follow-up with the orthopedic doctor, Dr. Zheng., Return if pain worsens, If symptoms worsen, shortness of breath, fever, any concerns         Medication List      No changes were made to your prescriptions during this visit.               Documentation assistance provided by alden Hawkins MD for Dr. Hawkins.  Information recorded by the scribe was done at my direction and has been verified and validated by me.       Ángela Sprague  01/06/20 1891       Claus Hawkins MD  01/06/20 9726

## 2020-01-23 ENCOUNTER — HOSPITAL ENCOUNTER (EMERGENCY)
Facility: HOSPITAL | Age: 50
Discharge: HOME | End: 2020-01-23
Attending: EMERGENCY MEDICINE | Admitting: EMERGENCY MEDICINE

## 2020-01-23 VITALS
SYSTOLIC BLOOD PRESSURE: 141 MMHG | BODY MASS INDEX: 42.23 KG/M2 | OXYGEN SATURATION: 100 % | WEIGHT: 295 LBS | RESPIRATION RATE: 16 BRPM | TEMPERATURE: 98.5 F | DIASTOLIC BLOOD PRESSURE: 96 MMHG | HEART RATE: 89 BPM | HEIGHT: 70 IN

## 2020-01-23 DIAGNOSIS — M19.011 ARTHRITIS OF RIGHT SHOULDER REGION: Primary | ICD-10-CM

## 2020-01-23 PROCEDURE — 99282 EMERGENCY DEPT VISIT SF MDM: CPT

## 2020-01-23 RX ORDER — TRAMADOL HYDROCHLORIDE 50 MG/1
50 TABLET ORAL EVERY 6 HOURS PRN
Qty: 20 TABLET | Refills: 0 | Status: SHIPPED | OUTPATIENT
Start: 2020-01-23 | End: 2020-07-08

## 2020-01-23 NOTE — ED PROVIDER NOTES
EMERGENCY DEPARTMENT ENCOUNTER    Room Number:  11/11  Date of encounter:  1/23/2020  PCP: Patricia Peter APRN  Historian: Pt      HPI:  Chief Complaint: Right shoulder pain  A complete HPI/ROS/PMH/PSH/SH/FH are unobtainable due to: n/a    Context: Grant Edge Jr. is a 49 y.o. male who presents to the ED c/o worsening posterior right shoulder pain for several weeks. The pain radiates down his right arm and is aggravated with movement. He denies numbness or weakness. He was seen in the ED on 1/6/20 for right arm and shoulder pain. His pain was felt to be related to chronic arthritis. He is scheduled to f/u with Dr. Booker (ortho) on 2/5/20. Pt has been taking Tylenol Arthritis at home without significant relief.       Duration:  Several weeks  Onset: gradual  Timing: constant   Location: R shoulder  Quality: aching   Intensity/Severity: moderate  Progression: worsening   Associated Symptoms: none stated  Aggravating Factors: movement   Alleviating Factors: none  Treatment before arrival: Pt was seen in ED for right arm and shoulder pain on 1/6/20      PAST MEDICAL HISTORY  Active Ambulatory Problems     Diagnosis Date Noted   • Abdominal abscess 03/07/2016   • Acute bronchitis 03/07/2016   • Seasonal allergic rhinitis 03/07/2016   • Benign essential hypertension 03/07/2016   • Benign prostatic hyperplasia with urinary obstruction 03/07/2016   • Atypical chest pain 03/07/2016   • Cough 03/07/2016   • Degeneration of intervertebral disc of lumbar region 03/07/2016   • Shortness of breath 03/07/2016   • Gastroesophageal reflux disease with esophagitis 03/07/2016   • Primary hypertriglyceridemia 03/07/2016   • Gastroesophageal reflux disease 03/07/2016   • Green stool 03/07/2016   • Hematochezia 03/07/2016   • Hematuria 03/07/2016   • Hyperlipidemia 03/07/2016   • Hypertension 03/07/2016   • Hypokalemia 03/07/2016   • Hypothyroidism 03/07/2016   • Insomnia 03/07/2016   • Arthralgia of hip 03/07/2016   • Chronic low  back pain 03/07/2016   • Lumbar radiculopathy 03/07/2016   • Morbid obesity (CMS/HCC) 03/07/2016   • Nonvenomous insect bite with infection 03/07/2016   • Osteoarthritis of hip 03/07/2016   • Pleurodynia 03/07/2016   • Sinus tachycardia 03/07/2016   • Vitamin D deficiency 03/07/2016   • Right knee pain 03/07/2016   • Pain of lower extremity 03/07/2016   • Bilateral headaches 03/07/2016   • Left lower quadrant pain 03/07/2016   • Chest pain on breathing 08/22/2016   • Health care maintenance 08/22/2016   • Old anterior myocardial infarction 08/22/2016   • EKG, abnormal 08/22/2016   • History of hepatic disease 04/21/2015   • Essential hypertension 10/12/2016   • Abnormal stress test 10/12/2016   • Moderate single current episode of major depressive disorder (CMS/HCC) 07/05/2017   • Prediabetes 12/05/2019     Resolved Ambulatory Problems     Diagnosis Date Noted   • No Resolved Ambulatory Problems     Past Medical History:   Diagnosis Date   • Abnormal ECG    • Chest pain    • DVT (deep venous thrombosis) (CMS/[facility])    • GERD (gastroesophageal reflux disease)    • Myocardial infarction (CMS/[facility])    • Simple goiter    • Sinus bradycardia          PAST SURGICAL HISTORY  Past Surgical History:   Procedure Laterality Date   • CARDIAC CATHETERIZATION N/A 10/18/2016    Procedure: Left Heart Cath;  Surgeon: Daniel Rosas MD;  Location: [facility] INVASIVE LOCATION;  Service:    • CHOLECYSTECTOMY     • SALIVARY GLAND SURGERY     • THYROIDECTOMY  2013   • TONSILLECTOMY     • TOTAL HIP ARTHROPLASTY REVISION Left 2015         FAMILY HISTORY  Family History   Problem Relation Age of Onset   • Diabetes Father    • Heart disease Father          SOCIAL HISTORY  Social History     Socioeconomic History   • Marital status:      Spouse name: Not on file   • Number of children: Not on file   • Years of education: Not on file   • Highest education level: Not on file   Tobacco Use   • Smoking status: Never Smoker   •  Smokeless tobacco: Never Used   Substance and Sexual Activity   • Alcohol use: No   • Drug use: No   • Sexual activity: Defer         ALLERGIES  Patient has no known allergies.        REVIEW OF SYSTEMS  Review of Systems   Constitutional: Negative for fever.   Respiratory: Negative for shortness of breath.    Cardiovascular: Negative for chest pain.   Musculoskeletal: Positive for arthralgias (R shoulder).   Neurological: Negative for weakness and numbness.        All systems reviewed and negative except for those discussed in HPI.       PHYSICAL EXAM    I have reviewed the triage vital signs and nursing notes.    ED Triage Vitals   Temp Heart Rate Resp BP SpO2   01/23/20 0643 01/23/20 0643 01/23/20 0643 01/23/20 0746 01/23/20 0643   98.8 °F (37.1 °C) 102 18 (!) 142/102 97 %      Temp src Heart Rate Source Patient Position BP Location FiO2 (%)   01/23/20 0643 01/23/20 0643 -- -- --   Tympanic Monitor          Physical Exam   Constitutional: Pt. is oriented to person, place, and time and well-developed, well-nourished, and in no distress. No distress.   HENT:   Head: Normocephalic and atraumatic.   Abdominal: Obese   Musculoskeletal:  Right upper extremity: There is limited abduction of the shoulder due to pain. Internal and external rotation is normal. Forward flexion is normal. Sensation is normal throughout. There is no deformity nor effusion.   Neurological: Pt. is alert and oriented to person, place, and time. Pt. has normal sensation and normal strength. No cranial nerve deficit. GCS score is 15.   Skin: Skin is warm and dry. No rash noted. Pt. is not diaphoretic. No erythema.   Psychiatric: Mood, affect and judgment normal.   Nursing note and vitals reviewed.      PROCEDURES    Procedures      MEDICATIONS GIVEN IN ER    Medications - No data to display      PROGRESS, DATA ANALYSIS, CONSULTS, AND MEDICAL DECISION MAKING  Discussion below represents my analysis of pertinent findings related to patient's  condition, differential diagnosis, treatment plan and final disposition.      ED Course as of Jan 23 1403   Thu Jan 23, 2020   0869 Prior record review: The patient was seen here on 1/6/2024 right arm and shoulder pain.  Symptoms were felt to be due to chronic osteoarthritis.  Work-ups for other etiologies were unremarkable.    [WC]   0756 Right upper extremity: There is limited abduction of the shoulder due to pain.  Internal and external rotation is normal.  Forward flexion is normal.  Sensation is normal throughout.  There is no deformity nor effusion.    [WC]      ED Course User Index  [WC] Marcus Cross MD     7:58 AM  Discussed plan to discharge pt with pain medicine. I recommended he f/u with ortho as scheduled. Pt understands and agrees with the plan, all questions answered.          AS OF 2:03 PM VITALS:    BP - 141/96  HR - 89  TEMP - 98.5 °F (36.9 °C) (Oral)  02 SATS - 100%        DIAGNOSIS  Final diagnoses:   Arthritis of right shoulder region         DISPOSITION  DISCHARGE    Patient discharged in stable condition.    Reviewed implications of results, diagnosis, meds, responsibility to follow up, warning signs and symptoms of possible worsening, potential complications and reasons to return to ER.    Patient/Family voiced understanding of above instructions.    Discussed plan for discharge, as there is no emergent indication for admission. Patient referred to primary care provider for BP management due to today's BP. Pt/family is agreeable and understands need for follow up and repeat testing.  Pt is aware that discharge does not mean that nothing is wrong but it indicates no emergency is present that requires admission and they must continue care with follow-up as given below or physician of their choice.     FOLLOW-UP  Omid Booker MD  [address] [facility] [address]  [place] [id number]  [phone]      as scheduled    [facility]  [address] [facility]  [place] [address] [phone]    As needed         Medication List      New Prescriptions    diclofenac 50 MG EC tablet  Commonly known as:  VOLTAREN  Take 1 tablet by mouth 3 (Three) Times a Day.     traMADol 50 MG tablet  Commonly known as:  ULTRAM  Take 1 tablet by mouth Every 6 (Six) Hours As Needed for Moderate Pain .        Stop    diclofenac 1 % gel gel  Commonly known as:  VOLTAREN     naproxen sodium 550 MG tablet  Commonly known as:  ANAPROX                Documentation assistance provided by alden Acevedo for Dr. Cross.  Information recorded by the alden was done at my direction and has been verified and validated by me.     Anamika Acevedo  01/23/20 0803       Marcus Cross MD  01/23/20 1634

## 2020-01-23 NOTE — ED NOTES
"PT SEEN HERE RECENTLY FOR RIGHT ARM/SHOULDER PAIN, HAD CARDIAC WORKUP, DX ARTHRITIS, HAS ORTHO FOLLOW UP 2/5 BUT SAYS \"I NEED RELIEF FOR NOW\"  PAIN WORSE WITH MOVEMENT     Dick Mccormick RN  01/23/20 7475    "

## 2020-01-27 ENCOUNTER — TELEPHONE (OUTPATIENT)
Dept: INTERNAL MEDICINE | Facility: CLINIC | Age: 50
End: 2020-01-27

## 2020-01-27 NOTE — TELEPHONE ENCOUNTER
Pt called and LVM stating he wanted something stronger than Tramadol.    I advised the Pt MELANY Bridges would need to see him for a ER follow up. Due to her not seeing him for this issue.     MELANY Bridges also advised that Pt needs to follow up with his orthopedic doctor for this issue. But he can make an appt with us if needed.

## 2020-02-05 ENCOUNTER — OFFICE VISIT (OUTPATIENT)
Dept: ORTHOPEDIC SURGERY | Facility: CLINIC | Age: 50
End: 2020-02-05

## 2020-02-05 VITALS — BODY MASS INDEX: 43.52 KG/M2 | WEIGHT: 304 LBS | TEMPERATURE: 99.2 F | HEIGHT: 70 IN

## 2020-02-05 DIAGNOSIS — M25.511 RIGHT SHOULDER PAIN, UNSPECIFIED CHRONICITY: Primary | ICD-10-CM

## 2020-02-05 PROCEDURE — 20610 DRAIN/INJ JOINT/BURSA W/O US: CPT | Performed by: ORTHOPAEDIC SURGERY

## 2020-02-05 PROCEDURE — 99214 OFFICE O/P EST MOD 30 MIN: CPT | Performed by: ORTHOPAEDIC SURGERY

## 2020-02-05 RX ADMIN — METHYLPREDNISOLONE ACETATE 80 MG: 80 INJECTION, SUSPENSION INTRA-ARTICULAR; INTRALESIONAL; INTRAMUSCULAR; SOFT TISSUE at 16:00

## 2020-02-05 NOTE — PROGRESS NOTES
Patient: Grant Edge Jr.    YOB: 1970    Medical Record Number: [id number]    Chief Complaints: Right shoulder and arm pain    History of Present Illness:     49 y.o. male patient who presents for his right shoulder and arm.  He reports a one-month history of pain in the back of his shoulder that occasionally shoots down the arm into his hand.  He reports occasional numbness and tingling as well.  Denies weakness.  The pain is severe, constant and aching.  Symptoms are worse with walking, driving, sitting and standing.  He has not noticed any alleviating factors.  He works maintenance for a living.  He is right-hand dominant.    Allergies: No Known Allergies    Home Medications:    Current Outpatient Medications:   •  amLODIPine (NORVASC) 10 MG tablet, TAKE 1 TABLET BY MOUTH DAILY, Disp: 30 tablet, Rfl: 3  •  benazepril (LOTENSIN) 10 MG tablet, TAKE ONE TABLET BY MOUTH EVERY DAY, Disp: 30 tablet, Rfl: 5  •  esomeprazole (nexIUM) 40 MG capsule, TAKE 1 CAPSULE BY MOUTH EVERY DAY, Disp: 30 capsule, Rfl: 5  •  levothyroxine (SYNTHROID, LEVOTHROID) 125 MCG tablet, TAKE 1 TABLET BY MOUTH EVERY DAY, Disp: 30 tablet, Rfl: 0  •  traMADol (ULTRAM) 50 MG tablet, Take 1 tablet by mouth Every 6 (Six) Hours As Needed for Moderate Pain ., Disp: 20 tablet, Rfl: 0  •  buPROPion (WELLBUTRIN) 75 MG tablet, Take 1 tablet by mouth 3 (Three) Times a Day., Disp: 270 tablet, Rfl: 1  •  diclofenac (VOLTAREN) 50 MG EC tablet, Take 1 tablet by mouth 3 (Three) Times a Day., Disp: 30 tablet, Rfl: 0  •  fexofenadine (ALLEGRA) 180 MG tablet, Take 180 mg by mouth 1 (One) Time As Needed (allergy)., Disp: , Rfl:   •  levothyroxine (SYNTHROID, LEVOTHROID) 125 MCG tablet, TAKE 1 TABLET BY MOUTH EVERY DAY, Disp: 90 tablet, Rfl: 1  •  methylPREDNISolone (MEDROL) 4 MG tablet, Take as directed on package instructions, Disp: 21 tablet, Rfl: 0  •  metoprolol succinate XL (TOPROL-XL) 50 MG 24 hr tablet, TAKE 1 TABLET BY MOUTH DAILY, Disp:  90 tablet, Rfl: 2    Current Facility-Administered Medications:   •  hylan (SYNVISC ONE) injection 48 mg, 48 mg, Intra-articular, Once, Ivonne Beauchamp APRN    Past Medical History:   Diagnosis Date   • Abnormal ECG    • Chest pain    • DVT (deep venous thrombosis) (CMS/[facility])    • GERD (gastroesophageal reflux disease)    • Hyperlipidemia    • Hypertension    • Hypokalemia    • Myocardial infarction (CMS/HCC)    • Simple goiter    • Sinus bradycardia    • Vitamin D deficiency        Past Surgical History:   Procedure Laterality Date   • CARDIAC CATHETERIZATION N/A 10/18/2016    Procedure: Left Heart Cath;  Surgeon: Daniel Rosas MD;  Location:  [facility];  Service:    • CHOLECYSTECTOMY     • SALIVARY GLAND SURGERY     • THYROIDECTOMY  2013   • TONSILLECTOMY     • TOTAL HIP ARTHROPLASTY REVISION Left 2015       Social History     Occupational History   • Not on file   Tobacco Use   • Smoking status: Never Smoker   • Smokeless tobacco: Never Used   Substance and Sexual Activity   • Alcohol use: No   • Drug use: No   • Sexual activity: Defer      Social History     Social History Narrative   • Not on file       Family History   Problem Relation Age of Onset   • Diabetes Father    • Heart disease Father        Review of Systems:      Constitutional: Denies fever, shaking or chills   Eyes: Denies change in visual acuity   HEENT: Denies nasal congestion or sore throat   Respiratory: Denies cough or shortness of breath   Cardiovascular: Denies chest pain or edema  Endocrine: Denies tremors, palpitations, intolerance of heat or cold, polyuria, polydipsia.  GI: Denies abdominal pain, nausea, vomiting, bloody stools or diarrhea  : Denies frequency, urgency, incontinence, retention, or nocturia.  Musculoskeletal: Denies numbness, tingling or loss of motor function except as above  Integument: Denies rash, lesion or ulceration   Neurologic: Denies headache or focal weakness, deficits  Heme: Denies  "spontaneous or excessive bleeding, epistaxis, hematuria, melena, fatigue, enlarged or tender lymph nodes.      All other pertinent positives and negatives as noted above in HPI.    Physical Exam: 49 y.o. male  Vitals:    02/05/20 1524   Temp: 99.2 °F (37.3 °C)   Weight: (!) 138 kg (304 lb)   Height: 177.8 cm (70\")       General:  Patient is awake and alert.  Appears in no acute distress or discomfort.    Psych:  Affect and demeanor are appropriate.    Eyes:  Conjunctiva and sclera appear grossly normal.  Eyes track well and EOM seem to be intact.    Ears:  No gross abnormalities.  Hearing adequate for the exam.    Cardiovascular:  Regular rate and rhythm.    Lungs:  Good chest expansion.  Breathing unlabored.    Neck: Skin is benign.  No tenderness.  Good motion.  A Spurling's to the right does reproduce posterior scapular pain and some tingling into his fingers.    Extremities: Right shoulder is examined. Skin is benign.  No obvious gross abnormalities.  No palpable masses or adenopathy.  Moderate tenderness noted over anterior glenohumeral joint and rotator interval.  Motion is functional but a little limited.  Forward elevation about 160 degrees, external rotation about 60 degrees, internal rotation to roughly T10.  No instability.  Rotator cuff strength seems to be well preserved but the exam is limited due to patient discomfort with resisted active motion.  Good motor and sensory function in lower arm and hand.  Brisk capillary refill in hand.  Palpable radial pulse.  Good skin turgor.    Imaging: Outside 2 views of the right shoulder are reviewed alongside the associated report.  No comparison films are available.  He has large cyst in the glenoid consistent with advanced glenohumeral arthritis.  Acromiohumeral interval measures normal.  Glenoid version is difficult to assess on the limited views.  AP and lateral views of the cervical spine are also reviewed.  He has degenerative disc disease at C4-5, C5-6 and " C6-7.  No malalignment or other concerning findings.    Assessment/Plan: 1.  Right shoulder osteoarthritis 2.  Cervical radiculopathy    I think he has 2 separate and distinct issues.  I definitely think the shoulder is part of his overall symptomatology.  Ultimately, he is headed towards a replacement but I recommend conservative treatment for now.  I suggested an injection and therapy.    I also think he has a component of radiculopathy.  I recommend that we try a Dosepak and therapy.  Risk of the medicine were discussed.      The risk, benefits and alternatives to the shoulder injection were discussed.  He consented and the injection was performed as described below. He will follow-up with me in a month if no better.    Omid Booker MD    02/05/2020    Large Joint Arthrocentesis: R glenohumeral  Date/Time: 2/5/2020 4:00 PM  Consent given by: patient  Site marked: site marked  Timeout: Immediately prior to procedure a time out was called to verify the correct patient, procedure, equipment, support staff and site/side marked as required   Supporting Documentation  Indications: pain and joint swelling   Procedure Details  Location: shoulder - R glenohumeral  Preparation: Patient was prepped and draped in the usual sterile fashion  Needle gauge: 21g.  Approach: anterolateral  Medications administered: 80 mg methylPREDNISolone acetate 80 MG/ML; 2 mL lidocaine (cardiac)  Patient tolerance: patient tolerated the procedure well with no immediate complications

## 2020-02-06 ENCOUNTER — TELEPHONE (OUTPATIENT)
Dept: ORTHOPEDIC SURGERY | Facility: CLINIC | Age: 50
End: 2020-02-06

## 2020-02-06 RX ORDER — METHYLPREDNISOLONE 4 MG/1
TABLET ORAL
Qty: 21 TABLET | Refills: 0 | Status: SHIPPED | OUTPATIENT
Start: 2020-02-06 | End: 2020-03-06

## 2020-02-09 RX ORDER — METHYLPREDNISOLONE ACETATE 80 MG/ML
80 INJECTION, SUSPENSION INTRA-ARTICULAR; INTRALESIONAL; INTRAMUSCULAR; SOFT TISSUE
Status: COMPLETED | OUTPATIENT
Start: 2020-02-05 | End: 2020-02-05

## 2020-02-18 ENCOUNTER — TREATMENT (OUTPATIENT)
Dept: PHYSICAL THERAPY | Facility: CLINIC | Age: 50
End: 2020-02-18

## 2020-02-18 DIAGNOSIS — M25.511 RIGHT SHOULDER PAIN, UNSPECIFIED CHRONICITY: Primary | ICD-10-CM

## 2020-02-18 DIAGNOSIS — M54.12 RADICULOPATHY, CERVICAL: ICD-10-CM

## 2020-02-18 PROCEDURE — 97014 ELECTRIC STIMULATION THERAPY: CPT | Performed by: PHYSICAL THERAPIST

## 2020-02-18 PROCEDURE — 97110 THERAPEUTIC EXERCISES: CPT | Performed by: PHYSICAL THERAPIST

## 2020-02-18 PROCEDURE — 97162 PT EVAL MOD COMPLEX 30 MIN: CPT | Performed by: PHYSICAL THERAPIST

## 2020-02-18 NOTE — PROGRESS NOTES
Physical Therapy Initial Evaluation and Plan of Care    Patient: Grant Edge Jr.   : 1970  Diagnosis/ICD-10 Code:  Right shoulder pain, unspecified chronicity [M25.511]  Referring practitioner: Omid Booker MD  Date of Initial Visit: 2020  Today's Date: 2020    Subjective Evaluation    History of Present Illness  Mechanism of injury: Patient is a 49 year old male who reports to PT for pain in the right shoulder that travels down the arm. He reports that it's been happening for a little over a month.The pain originated within the back of the shoulder and occasionally will travel down the arm into the hand. He reports that he has occasional numbness and tingling also but denies any weakness. He describes the pain as severe, constant and aching. Symptoms are worse with walking, driving, sitting and standing.  He has not noticed any alleviating factors. Patient went to see Dr. Booker on 2020 and issued him a cortisone injection, which helped some but he still has some discomfort.    Subjective comment: Quick DASH score: 36.36%  Patient Occupation: Maintenance/longterm for [employer] Quality of life: good    Pain  Current pain rating: 3 (Currently.)  At worst pain rating: 10 (Prior to the injection)  Quality: dull ache  Alleviating factors: No alleviating factors noted.  Aggravating factors: ambulation and movement    Social Support  Lives with: spouse    Hand dominance: right    Diagnostic Tests  X-ray: normal (X-rays taken on 1/10/2020. Refer to [facility] for results. )    Treatments  Previous treatment: immobilization  Current treatment: physical therapy  Patient Goals  Patient goals for therapy: decreased edema, increased motion, decreased pain, increased strength, independence with ADLs/IADLs, return to sport/leisure activities and return to work             Objective     Special Questions      Additional Special Questions  Patient is (-) for any red flags.      Postural Observations  Seated  posture: good  Standing posture: good  Correction of posture: has no consistent effect        Observations     Right Shoulder  Negative for atrophy, deformity, drainage, edema, effusion, incision, spasms and trophic changes.     Additional Observation Details  No atrophy, edema or effusion noted.    Palpation     Right   No palpable tenderness to the biceps, cervical interspinals, deltoid, intercostals, latissimus, levator scapulae, lower trapezius, middle trapezius, pectoralis major, pectoralis minor, rhomboids, scalenes, serratus anterior, sternocleidomastoid, subclavius, suboccipitals, subscapularis and thoracic paraspinals. Tenderness of the anterior deltoid, infraspinatus, middle deltoid, posterior deltoid, supraspinatus, teres major, teres minor, triceps and upper trapezius.     Additional Palpation Details  Palpable tenderness noted at the structures listed above.    Tenderness   Cervical Spine   Tenderness in the right scapula.   No tenderness in the hyoid bone, thyroid cartilage, sternum, facet joint, spinous process, pedicle, right transverse process, right ribs/costal cartilage and right 1st rib.     Right Shoulder  No tenderness No tenderness in the clavicle.     Additional Tenderness Details  Some sensitivity, however no tenderness reported.    Cervical/Thoracic Screen   Cervical range of motion within normal limits  Thoracic range of motion within normal limits    Neurological Testing     Sensation   Cervical/Thoracic   Left   Intact: light touch and hot/cold discrimination    Right   Intact: light touch and hot/cold discrimination    Shoulder   Left Shoulder   Intact: light touch and hot/cold discrimination    Right Shoulder   Intact: light touch and hot/cold discrimination  Hypersensation: light touch    Reflexes   Left   Brachioradialis (C6): normal (2+)    Right   Brachioradialis (C6): normal (2+)    Additional Neurological Details  Significant hyper sensation noted on the affected side. Reflexes  are present and equal bilaterally.     Active Range of Motion   Cervical/Thoracic Spine   Cervical    Flexion: 25 degrees   Extension: 35 degrees   Left rotation: 45 degrees   Right rotation: 40 degrees     Right Shoulder   Flexion: 160 degrees with pain  Abduction: 145 degrees with pain    Additional Active Range of Motion Details  Significant tenderness noted at end range.    Scapular Mobility     Right Shoulder   Scapular mobility: WFL  Scapular Mobility with Shoulder to 90° FF   Scapular winging: minimal  Scapular elevation: minimal    Scapular Mobility beyond 90° FF   Scapular winging: minimal  Scapular elevation: minimal    Strength/Myotome Testing   Cervical Spine   Neck extension: 4  Neck flexion: 4    Left   Neck lateral flexion (C3): 4    Right   Neck lateral flexion (C3): 4 (pain reproduced on right side.)    Right Shoulder     Planes of Motion   Flexion: 4   Abduction: 4   External rotation at 0°: 4   Internal rotation at 0°: 4     Additional Strength Details  Significant pain reproduced with resisted flexion and extension.     Tests   Cervical   Negative repeated extension and repeated flexion.     Right   Positive Spurling's sign.   Negative active compression ([place]).     Right Shoulder   Positive apprehension.   Negative Hawkin's and Neer's.     Additional Tests Details  Patient exhibited (+) response to compression and Spurling's on the right side.   Patient was (+) for apprehension.          Assessment & Plan     Assessment  Impairments: abnormal muscle firing, abnormal or restricted ROM, activity intolerance, impaired physical strength, lacks appropriate home exercise program, pain with function and weight-bearing intolerance  Assessment details: Patient is a pleasant 49 year old male who reports to PT for an insidious onset of right shoulder pain that has a tendency to travel down to the hand. Most likely resulting from a combination of cervical radiculopathy and degeneration of the  shoulder/cervical spine. Patient presents with inability to tolerate overhead positioning, pain with most movement and numbness and tingling that travels to the wrist and hand. Patient will benefit from skilled PT intervention to address these deficits so he can regain function necessary to complete ADL's.   Prognosis: good  Prognosis details: Patient has a good prognosis for skilled PT intervention.                  Functional Limitations: carrying objects, lifting, uncomfortable because of pain, sitting, standing and reaching overhead  Goals  Plan Goals: Long Term Goals, in 6-8 weeks patient will:  1) Demonstrate improved endurance and strength for postural control and UE function by being able to drive for 60 minutes, without reproduction of adverse symptoms.  2) Demonstrate improvement of pain by being able to report 0/10 pain rating consecutively for an entire week.  3) Demonstrate improved ROM and decreased pain by being able to obtain 160 degrees, without reproduction of adverse symptoms in order to regain functional independence and be able to reach overhead in the cabinet.  4) Score </= 10 on the Quick DASH to demonstrate improvement of function.    Short Term Goals, in 4 weeks patient will:  1) Be independent and compliant with HEP.  2) Demonstrate cessation of pain by reporting 0/10 pain rating for 3 consecutive days.  3) Demonstrate improvement of ROM by being able to obtain 90 degrees of shoulder flexion and abduction, without reproduction of adverse symptoms traveling down to the wrist and hand.   4)Demonstrate improved endurance and strength of UE function by being able to tolerate driving for 30 minutes, without reproduction of adverse symptoms.          Plan  Therapy options: will be seen for skilled physical therapy services  Planned modality interventions: TENS, iontophoresis, thermotherapy (hydrocollator packs), traction, ultrasound, microcurrent electrical stimulation, hydrotherapy, high voltage  pulsed current (spasm management), high voltage pulsed current (pain management), high voltage pulsed current (dermal wound therapy), electrical stimulation/Russian stimulation and cryotherapy  Planned therapy interventions: abdominal trunk stabilization, manual therapy, neuromuscular re-education, ADL retraining, balance/weight-bearing training, body mechanics training, postural training, soft tissue mobilization, spinal/joint mobilization, strengthening, stretching, therapeutic activities, joint mobilization, functional ROM exercises, flexibility and home exercise program  Frequency: 2x week  Treatment plan discussed with: patient  Plan details: Patient is a pleasant 49 year old male who reports to PT for an insidious onset of right shoulder pain that has a tendency to travel down to the hand. Most likely resulting from a combination of cervical radiculopathy and degeneration of the shoulder/cervical spine. Patient presents with inability to tolerate overhead positioning, pain with most movement and numbness and tingling that travels to the wrist and hand. Patient will report to PT 2x/wk to increase strength, improve shoulder ROM and decrease pain in order to return to functional activity and total independence.           Manual Therapy:    0     mins  81257;  Therapeutic Exercise:    8     mins  20319;     Neuromuscular Michael:   0    mins  29565;    Therapeutic Activity:     0     mins  58269;     Gait Trainin     mins  28894;     Ultrasound:     0     mins  53282;    Electrical Stimulation:    15     mins  66546 ( );    Timed Treatment:   8   mins   Total Treatment:     60   mins    PT SIGNATURE:  Ngoc Landon PT, DPT           Physical Therapist                                 [place] License #[id number]    DATE TREATMENT INITIATED: 2020    Initial Certification  Certification Period: 2020  I certify that the therapy services are furnished while this patient is under my care.  The services  outlined above are required by this patient, and will be reviewed every 90 days.     PHYSICIAN: Omid Booker MD      DATE:     Please sign and return via fax to [phone].. Thank you, [facility].

## 2020-02-25 ENCOUNTER — TREATMENT (OUTPATIENT)
Dept: PHYSICAL THERAPY | Facility: CLINIC | Age: 50
End: 2020-02-25

## 2020-02-25 DIAGNOSIS — M54.12 RADICULOPATHY, CERVICAL: ICD-10-CM

## 2020-02-25 DIAGNOSIS — M25.511 RIGHT SHOULDER PAIN, UNSPECIFIED CHRONICITY: Primary | ICD-10-CM

## 2020-02-25 PROCEDURE — 97014 ELECTRIC STIMULATION THERAPY: CPT | Performed by: PHYSICAL THERAPIST

## 2020-02-25 PROCEDURE — 97012 MECHANICAL TRACTION THERAPY: CPT | Performed by: PHYSICAL THERAPIST

## 2020-02-25 PROCEDURE — 97110 THERAPEUTIC EXERCISES: CPT | Performed by: PHYSICAL THERAPIST

## 2020-02-25 NOTE — PROGRESS NOTES
Physical Therapy Daily Progress Note    Visit #2    Subjective     Grant Edge reports: that his symptoms have not worsened since the initial evaluation. Patient reports compliance with his HEP.      Objective   See Exercise, Manual, and Modality Logs for complete treatment.       Assessment/Plan  Placed patient on mechanical cervical traction for a span of 15 minutes to determine how well he can tolerate it. Initially, patient reported some difficulty of the mechanical traction due to the device impinging on his neck causing discomfort. After numerous times repositioning he was able to tolerate 15 minutes of mechanical traction. Following completion of mechanical traction, additional exercises were incorporated, which included:  **Shoulder extension (using green theraband)  **Mini scapular retractions (using green theraband)  **Theraband rows (using green theraband)  **Theraband abduction at 90 degrees (using green theraband)  Overall, patient exhibited excellent tolerance following completion of new and existing exercises. Discussed with patient at the next session will resume with traction and continue to gradually incorporate scapular stabilization exercises that help promote scapular retraction.     **Patient was issued a moist hot pack/ESTIM following completion of therapy.**    Progress per Plan of Care           Manual Therapy:    0     mins  07900;  Therapeutic Exercise:    15     mins  81675;     Neuromuscular Michael:   0    mins  71656;    Therapeutic Activity:     0     mins  08572;     Gait Trainin     mins  91487;     Ultrasound:     0     mins  21259;    Electrical Stimulation:   15     mins  81175 ( );  Mechanical Traction     15     Mins   94168    Timed Treatment:   15   mins   Total Treatment:     45   mins    Ngoc Landon PT, DPT  Physical Therapist  [place] License #[id number]

## 2020-02-27 ENCOUNTER — TREATMENT (OUTPATIENT)
Dept: PHYSICAL THERAPY | Facility: CLINIC | Age: 50
End: 2020-02-27

## 2020-02-27 DIAGNOSIS — M25.511 RIGHT SHOULDER PAIN, UNSPECIFIED CHRONICITY: Primary | ICD-10-CM

## 2020-02-27 DIAGNOSIS — M54.12 RADICULOPATHY, CERVICAL: ICD-10-CM

## 2020-02-27 PROCEDURE — 97110 THERAPEUTIC EXERCISES: CPT | Performed by: PHYSICAL THERAPIST

## 2020-02-27 PROCEDURE — 97012 MECHANICAL TRACTION THERAPY: CPT | Performed by: PHYSICAL THERAPIST

## 2020-02-27 PROCEDURE — 97014 ELECTRIC STIMULATION THERAPY: CPT | Performed by: PHYSICAL THERAPIST

## 2020-02-27 NOTE — PROGRESS NOTES
Physical Therapy Daily Progress Note    Visit #3    Subjective     Grant Edge reports: that he's still been experiencing some intermittent numbness and tingling in the right upper extremity. Patient reports that pain is about the same though and he felt okay following the last session.      Objective   See Exercise, Manual, and Modality Logs for complete treatment.       Assessment/Plan  Resumed with current POC and did not incorporate any additional exercises. However,educated patient on a couple of median nerve glides that were incorporated into the HEP for patient to continue outside of PT. Patient was able to tolerate mechanical traction for a period of 20 minutes and reported no increase of adverse symptoms. Following that he was issued a moist hot pack and ESTIM for pain relief. Patient returns to work next week, therefore discussed with him about maintaining compliance with his HEP once he returns to work. Next session, will resume with current POC and progress as tolerated.    Progress per Plan of Care           Manual Therapy:    0     mins  38089;  Therapeutic Exercise:    15   mins  67732;     Neuromuscular Michael:   0     mins  37501;    Therapeutic Activity:     0     mins  64425;     Gait Trainin     mins  34176;     Ultrasound:     0     mins  97545;    Electrical Stimulation:    15   mins  64959 ( );  Mechanical Traction      20    mins  92259    Timed Treatment:   15   mins   Total Treatment:     45   mins    Ngoc Landon PT, DPT  Physical Therapist  [place] License #[id number]

## 2020-03-05 ENCOUNTER — TREATMENT (OUTPATIENT)
Dept: PHYSICAL THERAPY | Facility: CLINIC | Age: 50
End: 2020-03-05

## 2020-03-05 DIAGNOSIS — M54.12 RADICULOPATHY, CERVICAL: ICD-10-CM

## 2020-03-05 DIAGNOSIS — M25.511 RIGHT SHOULDER PAIN, UNSPECIFIED CHRONICITY: Primary | ICD-10-CM

## 2020-03-05 PROCEDURE — 97110 THERAPEUTIC EXERCISES: CPT | Performed by: PHYSICAL THERAPIST

## 2020-03-05 PROCEDURE — 97012 MECHANICAL TRACTION THERAPY: CPT | Performed by: PHYSICAL THERAPIST

## 2020-03-05 PROCEDURE — 97014 ELECTRIC STIMULATION THERAPY: CPT | Performed by: PHYSICAL THERAPIST

## 2020-03-05 NOTE — PROGRESS NOTES
Physical Therapy Daily Progress Note    Visit #4    Subjective     Grant Edge reports: that his shoulder pain has not worsened. If anything, patient reports that he feels the same. He reports that since he has returned to work the pain has calmed but the numbness has been more problematic.       Objective   See Exercise, Manual, and Modality Logs for complete treatment.       Assessment/Plan  Due to time restrictions from patient arriving 13 minutes late resumed with current POC. Did not incorporate any additional exercises to the POC. Since patient has returned to work encouraged him to be more aware of postural deficits when working. He has a tendency to slouch and round the shoulder forward. He reports that a lot of repetitive motions seem to reproduce his symptoms. Therefore, will continue to monitor his symptoms over time. Next session, will resume with current POC and progress as tolerated.    Progress per Plan of Care           Manual Therapy:    0     mins  57244;  Therapeutic Exercise:    15     mins  56859;     Neuromuscular Michael:   0    mins  78633;    Therapeutic Activity:     0     mins  87321;     Gait Trainin     mins  79913;     Ultrasound:     0     mins  51468;    Electrical Stimulation:    15     mins  15611 ( );  Mechanical Traction      20      Mins   62991      Timed Treatment:   15   mins   Total Treatment:     50   mins    Ngoc Landon PT, DPT  Physical Therapist  [place] License #[id number]

## 2020-03-06 ENCOUNTER — OFFICE VISIT (OUTPATIENT)
Dept: INTERNAL MEDICINE | Facility: CLINIC | Age: 50
End: 2020-03-06

## 2020-03-06 VITALS
BODY MASS INDEX: 44.24 KG/M2 | OXYGEN SATURATION: 94 % | HEIGHT: 70 IN | WEIGHT: 309 LBS | DIASTOLIC BLOOD PRESSURE: 90 MMHG | TEMPERATURE: 98.2 F | HEART RATE: 104 BPM | SYSTOLIC BLOOD PRESSURE: 149 MMHG | RESPIRATION RATE: 16 BRPM

## 2020-03-06 DIAGNOSIS — E03.8 OTHER SPECIFIED HYPOTHYROIDISM: ICD-10-CM

## 2020-03-06 DIAGNOSIS — J01.00 ACUTE NON-RECURRENT MAXILLARY SINUSITIS: ICD-10-CM

## 2020-03-06 DIAGNOSIS — R73.03 PREDIABETES: ICD-10-CM

## 2020-03-06 DIAGNOSIS — Z00.00 HEALTHCARE MAINTENANCE: Primary | ICD-10-CM

## 2020-03-06 DIAGNOSIS — E78.1 PRIMARY HYPERTRIGLYCERIDEMIA: ICD-10-CM

## 2020-03-06 DIAGNOSIS — I10 BENIGN ESSENTIAL HYPERTENSION: ICD-10-CM

## 2020-03-06 DIAGNOSIS — F32.A DEPRESSION, UNSPECIFIED DEPRESSION TYPE: ICD-10-CM

## 2020-03-06 PROCEDURE — 99396 PREV VISIT EST AGE 40-64: CPT | Performed by: NURSE PRACTITIONER

## 2020-03-06 RX ORDER — FLUTICASONE PROPIONATE 50 MCG
2 SPRAY, SUSPENSION (ML) NASAL DAILY
Qty: 15.8 ML | Refills: 1 | Status: SHIPPED | OUTPATIENT
Start: 2020-03-06

## 2020-03-06 RX ORDER — AMOXICILLIN AND CLAVULANATE POTASSIUM 875; 125 MG/1; MG/1
1 TABLET, FILM COATED ORAL 2 TIMES DAILY
Qty: 20 TABLET | Refills: 0 | Status: SHIPPED | OUTPATIENT
Start: 2020-03-06 | End: 2020-03-16

## 2020-03-06 RX ORDER — METOPROLOL SUCCINATE 50 MG/1
50 TABLET, EXTENDED RELEASE ORAL DAILY
Qty: 30 TABLET | Refills: 3 | Status: SHIPPED | OUTPATIENT
Start: 2020-03-06 | End: 2020-07-22

## 2020-03-06 NOTE — PROGRESS NOTES
Subjective   Grant Edge Jr. is a 49 y.o. male.     CC: CPE, prediabetes, sinus pressure/congestion    Patient presents for CPE. This is a 49 YOM.     He has hyperlipidemia hx but no statin currently. Last lipid panel was checked December 2019, within normal limits.    For hypertension he takes amlodipine 10 mg daily, benazepril 10 mg daily.  He has been off of Toprol-XL for a while, stating that he ran out and never restarted the medication.  BP is elevated today at 149/90.    He has hypothyroidism and takes levothyroxine 125 mcg daily.  Thyroid labs on 12/5/2019 were normal.    He has had maxillary sinus pain and pressure for 2 weeks.  He endorses cough with production of thick sputum, sinus congestion, bilateral plugged ear sensation.  He has not been taking his Allegra.  No fever or chills.    He is prediabetic, last A1c was 5.9 on 12/5/2019.    For depression he takes Wellbutrin 75 mg twice daily.  No SI or HI and this works well for him.    He denies development of any other new issues today.       The following portions of the patient's history were reviewed and updated as appropriate: allergies, current medications, past family history, past medical history, past social history, past surgical history and problem list.    Review of Systems   Constitutional: Negative for activity change, chills, fatigue, fever, unexpected weight gain and unexpected weight loss.   HENT: Positive for congestion and sinus pressure. Negative for hearing loss, postnasal drip, sneezing, sore throat, swollen glands and tinnitus.    Eyes: Negative for photophobia, pain and visual disturbance.   Respiratory: Negative for cough, chest tightness, shortness of breath and wheezing.    Cardiovascular: Negative for chest pain, palpitations and leg swelling.   Gastrointestinal: Negative for abdominal distention, abdominal pain, constipation, diarrhea, nausea and vomiting.   Endocrine: Negative for polydipsia, polyphagia and polyuria.  "  Genitourinary: Negative for dysuria, frequency, hematuria and urgency.   Neurological: Negative for dizziness, weakness, numbness and headache.   All other systems reviewed and are negative.      Objective    /90 (BP Location: Left arm, Patient Position: Sitting, Cuff Size: Adult)   Pulse 104   Temp 98.2 °F (36.8 °C) (Oral)   Resp 16   Ht 177.8 cm (70\")   Wt (!) 140 kg (309 lb)   SpO2 94%   BMI 44.34 kg/m²     Physical Exam   Constitutional: He is oriented to person, place, and time. He appears well-developed and well-nourished. No distress.   HENT:   Head: Normocephalic and atraumatic.   Eyes: Pupils are equal, round, and reactive to light. EOM are normal.   Neck: Normal range of motion. Neck supple. No JVD present. No tracheal deviation present. No thyromegaly present.   Cardiovascular: Normal rate, regular rhythm, normal heart sounds and intact distal pulses. Exam reveals no gallop and no friction rub.   No murmur heard.  No peripheral pitting edema, posterior tib pulses 2+ and equal bilat   Pulmonary/Chest: Effort normal and breath sounds normal. No stridor. No respiratory distress. He has no wheezes. He has no rales. He exhibits no tenderness.   Lungs CTA bilat   Abdominal: Soft. Bowel sounds are normal. He exhibits no distension. There is no tenderness.   Musculoskeletal: Normal range of motion.   Lymphadenopathy:     He has no cervical adenopathy.   Neurological: He is alert and oriented to person, place, and time.   Skin: Skin is warm and dry. Capillary refill takes less than 2 seconds. He is not diaphoretic.   Psychiatric: He has a normal mood and affect. His behavior is normal. Judgment and thought content normal.   Nursing note and vitals reviewed.    Current outpatient and discharge medications have been reconciled for the patient.  Reviewed by: MELANY Bridges      Assessment/Plan   Grant was seen today for annual exam.    Diagnoses and all orders for this visit:    Healthcare " maintenance    Primary hypertriglyceridemia    Benign essential hypertension  -     metoprolol succinate XL (TOPROL-XL) 50 MG 24 hr tablet; Take 1 tablet by mouth Daily.    Other specified hypothyroidism    Prediabetes    Depression, unspecified depression type    Acute non-recurrent maxillary sinusitis  -     amoxicillin-clavulanate (AUGMENTIN) 875-125 MG per tablet; Take 1 tablet by mouth 2 (Two) Times a Day for 10 days.  -     fluticasone (FLONASE) 50 MCG/ACT nasal spray; 2 sprays into the nostril(s) as directed by provider Daily.      -Hypertriglyceridemia: Normal lipid panel in 12/5/2019.  Continue diet modifications will recheck at next 3-month follow-up.    -Hypertension: Uncontrolled at this time, 149/90.  Will add back Toprol-XL 50 mg daily.  Continue benazepril 10 mg daily and amlodipine 10 mg daily.  He will monitor blood pressures daily for goal of less than 130/80.    Hypothyroidism: Continue levothyroxine 125 mcg daily.  TSH and free T4 within normal limits on 12/5/2019.    Prediabetes: Stable at 5.9 A1c on 12/5/2019.  Continue diet modifications.  We will check at next 3-month follow-up.    We will treat maxillary sinusitis with Augmentin twice daily x10 days along with Flonase.  He will add back his Allegra as well.    Discussed diet lifestyle modifications including increasing physical activity and decreasing dietary cholesterol, sodium and concentrated sweets intake.    Depression: Stable with Wellbutrin, no SI or HI.  Continue current therapy.    Follow-up PRN.  I will see him back in 3 months for routine health maintenance/follow-up on chronic conditions with fasting labs on that day.

## 2020-03-10 ENCOUNTER — TREATMENT (OUTPATIENT)
Dept: PHYSICAL THERAPY | Facility: CLINIC | Age: 50
End: 2020-03-10

## 2020-03-10 DIAGNOSIS — M54.12 RADICULOPATHY, CERVICAL: ICD-10-CM

## 2020-03-10 DIAGNOSIS — M25.511 RIGHT SHOULDER PAIN, UNSPECIFIED CHRONICITY: Primary | ICD-10-CM

## 2020-03-10 PROCEDURE — 97012 MECHANICAL TRACTION THERAPY: CPT | Performed by: PHYSICAL THERAPIST

## 2020-03-10 PROCEDURE — 97014 ELECTRIC STIMULATION THERAPY: CPT | Performed by: PHYSICAL THERAPIST

## 2020-03-10 PROCEDURE — 97110 THERAPEUTIC EXERCISES: CPT | Performed by: PHYSICAL THERAPIST

## 2020-03-10 NOTE — PROGRESS NOTES
Physical Therapy Daily Progress Note    Visit #5    Subjective     Grant Edge reports: that he still has some intermittent numbness around the digits of his right hand. Patient reports that he feels some relief around the elbow region, however the symptoms are still prominent. The therapy is helping to a certain degree, however he is aware that this may take time for actual relief.      Objective   See Exercise, Manual, and Modality Logs for complete treatment.       Assessment/Plan  Resumed with current POC and did not incorporate any additional exercises. Patient reports that with the mechanical traction he is starting to notice a slight improvement of symptoms. Encouraged patient to be more aware of postural deficits at work and to try and refrain from any forward positioning. Following completion of therapy, patient was issued a moist hot pack/ESTIM. Next session, will resume with current POC and progress as tolerated.    Progress per Plan of Care           Manual Therapy:    0     mins  55680;  Therapeutic Exercise:    15     mins  65619;     Neuromuscular Michael:   0    mins  83737;    Therapeutic Activity:     0     mins  26459;     Gait Trainin     mins  03919;     Ultrasound:     0     mins  58685;    Electrical Stimulation:    15     mins  50953 ( );  Mechanical Traction      20      Mins   96608    Timed Treatment:   15   mins   Total Treatment:     50   mins    Ngoc Landon PT, DPT  Physical Therapist  [place] License #[id number]

## 2020-03-11 ENCOUNTER — OFFICE VISIT (OUTPATIENT)
Dept: ORTHOPEDIC SURGERY | Facility: CLINIC | Age: 50
End: 2020-03-11

## 2020-03-11 VITALS — HEIGHT: 70 IN | BODY MASS INDEX: 43.95 KG/M2 | WEIGHT: 307 LBS | TEMPERATURE: 97.8 F

## 2020-03-11 DIAGNOSIS — R20.2 NUMBNESS AND TINGLING IN RIGHT HAND: Primary | ICD-10-CM

## 2020-03-11 DIAGNOSIS — M25.511 RIGHT SHOULDER PAIN, UNSPECIFIED CHRONICITY: ICD-10-CM

## 2020-03-11 DIAGNOSIS — R20.0 NUMBNESS AND TINGLING IN RIGHT HAND: Primary | ICD-10-CM

## 2020-03-11 PROCEDURE — 99213 OFFICE O/P EST LOW 20 MIN: CPT | Performed by: ORTHOPAEDIC SURGERY

## 2020-03-12 ENCOUNTER — TREATMENT (OUTPATIENT)
Dept: PHYSICAL THERAPY | Facility: CLINIC | Age: 50
End: 2020-03-12

## 2020-03-12 DIAGNOSIS — M25.511 RIGHT SHOULDER PAIN, UNSPECIFIED CHRONICITY: Primary | ICD-10-CM

## 2020-03-12 DIAGNOSIS — M54.12 RADICULOPATHY, CERVICAL: ICD-10-CM

## 2020-03-12 PROCEDURE — 97012 MECHANICAL TRACTION THERAPY: CPT | Performed by: PHYSICAL THERAPIST

## 2020-03-12 PROCEDURE — 97014 ELECTRIC STIMULATION THERAPY: CPT | Performed by: PHYSICAL THERAPIST

## 2020-03-12 PROCEDURE — 97110 THERAPEUTIC EXERCISES: CPT | Performed by: PHYSICAL THERAPIST

## 2020-03-12 NOTE — PROGRESS NOTES
Physical Therapy Daily Progress Note    Visit #6    Subjective     Grant Edge reports: that he went to see his physician yesterday who wants to run a few nerve tests on him because of how his symptoms run. Patient reports that the shoulder is still problematic, but also the elbow is where some of the pain originates as well. The numbness and tingling within the hand is still prominent.      Objective   See Exercise, Manual, and Modality Logs for complete treatment.       Assessment/Plan  Resumed with current POC and incorporated 2 additional theraband exercises, which include:  **Theraband lat pull downs (using green theraband)  **Shoulder rows (using green theraband)  The lat pull down seemed to really reproduce some of his symptoms, however after modifying the shoulder placement it seemed to get better. Patient was placed on the mechanical traction machine instead of using the device (which is what he has been doing prior) to determine if he can reach a better threshold and decrease reproduction of adverse symptoms. Overall, he exhibited a better response to the machine versus the device. Following completion of therapy, patient was issued a moist hot pack/ESTIM. Next session, will resume with current POC and progress as tolerated.    Progress per Plan of Care           Manual Therapy:    0     mins  35302;  Therapeutic Exercise:    15     mins  46955;     Neuromuscular Michael:   0    mins  77956;    Therapeutic Activity:     0     mins  50705;     Gait Trainin     mins  75742;     Ultrasound:     0     mins  26210;    Electrical Stimulation:    15     mins  65437 ( );  Mechanical Traction      20      Mins   17053    Timed Treatment:   15   mins   Total Treatment:     50   mins    Ngoc Landon PT, DPT  Physical Therapist  [place] License #[id number]

## 2020-03-12 NOTE — PROGRESS NOTES
Patient:Grant Edge Jr.    YOB: 1970    Medical Record Number:[id number]    Chief Complaints: Follow-up regarding right arm pain, right hand numbness and tingling    History of Present Illness:     49 y.o. male patient who presents for follow-up of his right upper extremity.  The shot did help tremendously with his pain.  He says that his pain and motion are both significantly improved.  He continues to have numbness and tingling in his hand.  The symptoms seem to be predominantly along the ulnar border of his hand and in the small and ring fingers.  He reports constant numbness and tingling but denies any weakness.    Allergies:No Known Allergies    Home Medications:    Current Outpatient Medications:   •  amLODIPine (NORVASC) 10 MG tablet, TAKE 1 TABLET BY MOUTH DAILY, Disp: 30 tablet, Rfl: 3  •  amoxicillin-clavulanate (AUGMENTIN) 875-125 MG per tablet, Take 1 tablet by mouth 2 (Two) Times a Day for 10 days., Disp: 20 tablet, Rfl: 0  •  benazepril (LOTENSIN) 10 MG tablet, TAKE ONE TABLET BY MOUTH EVERY DAY, Disp: 30 tablet, Rfl: 5  •  fluticasone (FLONASE) 50 MCG/ACT nasal spray, 2 sprays into the nostril(s) as directed by provider Daily., Disp: 15.8 mL, Rfl: 1  •  levothyroxine (SYNTHROID, LEVOTHROID) 125 MCG tablet, TAKE 1 TABLET BY MOUTH EVERY DAY, Disp: 30 tablet, Rfl: 0  •  levothyroxine (SYNTHROID, LEVOTHROID) 125 MCG tablet, TAKE 1 TABLET BY MOUTH EVERY DAY, Disp: 90 tablet, Rfl: 1  •  metoprolol succinate XL (TOPROL-XL) 50 MG 24 hr tablet, Take 1 tablet by mouth Daily., Disp: 30 tablet, Rfl: 3  •  buPROPion (WELLBUTRIN) 75 MG tablet, Take 1 tablet by mouth 3 (Three) Times a Day., Disp: 270 tablet, Rfl: 1  •  diclofenac (VOLTAREN) 50 MG EC tablet, Take 1 tablet by mouth 3 (Three) Times a Day., Disp: 30 tablet, Rfl: 0  •  esomeprazole (nexIUM) 40 MG capsule, TAKE 1 CAPSULE BY MOUTH EVERY DAY, Disp: 30 capsule, Rfl: 5  •  fexofenadine (ALLEGRA) 180 MG tablet, Take 180 mg by mouth 1 (One) Time  As Needed (allergy)., Disp: , Rfl:   •  traMADol (ULTRAM) 50 MG tablet, Take 1 tablet by mouth Every 6 (Six) Hours As Needed for Moderate Pain ., Disp: 20 tablet, Rfl: 0    Past Medical History:   Diagnosis Date   • Abnormal ECG    • Chest pain    • DVT (deep venous thrombosis) (CMS/HCC)    • GERD (gastroesophageal reflux disease)    • Hyperlipidemia    • Hypertension    • Hypokalemia    • Myocardial infarction (CMS/HCC)    • Simple goiter    • Sinus bradycardia    • Vitamin D deficiency        Past Surgical History:   Procedure Laterality Date   • CARDIAC CATHETERIZATION N/A 10/18/2016    Procedure: Left Heart Cath;  Surgeon: Daniel Rosas MD;  Location: [facility] INVASIVE LOCATION;  Service:    • CHOLECYSTECTOMY     • SALIVARY GLAND SURGERY     • THYROIDECTOMY  2013   • TONSILLECTOMY     • TOTAL HIP ARTHROPLASTY REVISION Left 2015       Social History     Occupational History   • Not on file   Tobacco Use   • Smoking status: Never Smoker   • Smokeless tobacco: Never Used   Substance and Sexual Activity   • Alcohol use: No   • Drug use: No   • Sexual activity: Defer      Social History     Social History Narrative   • Not on file       Family History   Problem Relation Age of Onset   • Diabetes Father    • Heart disease Father        Review of Systems:      Constitutional: Denies fever, shaking or chills   Eyes: Denies change in visual acuity   HEENT: Denies nasal congestion or sore throat   Respiratory: Denies cough or shortness of breath   Cardiovascular: Denies chest pain or edema  Endocrine: Denies tremors, palpitations, intolerance of heat or cold, polyuria, polydipsia.  GI: Denies abdominal pain, nausea, vomiting, bloody stools or diarrhea  : Denies frequency, urgency, incontinence, retention, or nocturia.  Musculoskeletal: Denies numbness, tingling or loss of motor function except as above  Integument: Denies rash, lesion or ulceration   Neurologic: Denies headache or focal weakness,  "deficits  Heme: Denies spontaneous or excessive bleeding, epistaxis, hematuria, melena, fatigue, enlarged or tender lymph nodes.      All other pertinent positives and negatives as noted above in HPI.    Physical Exam:49 y.o. male  Vitals:    03/11/20 1512   Temp: 97.8 °F (36.6 °C)   Weight: (!) 139 kg (307 lb)   Height: 177.8 cm (70\")       General:  Patient is awake and alert.  Appears in no acute distress or discomfort.    Psych:  Affect and demeanor are appropriate.    Extremities: Right upper extremity is examined.  His exam of the shoulder is fairly benign today.  No significant tenderness.  He has good motion.  He has a little bit of discomfort with elevation and abduction but good strength.  His shoulder exam seems to be tremendously improved.  He does have a positive Tinel's over the cubital tunnel.  He has full elbow, wrist and hand motion.  Good  and pinch strength.  He does have diminished sensation in the ulnar nerve distribution.  Brisk capillary refill.    Imaging: None taken.    Assessment/Plan:  1.  Right hand numbness and tingling, suspected cubital tunnel  2.  Right shoulder osteoarthritis    His shoulder seems to be much better since the injection.  We will manage that expectantly for now.  His numbness and tingling seem to be consistent with cubital tunnel.  Last visit, I was convinced that it may be coming from his neck but this seems to be declaring as cubital tunnel.  I am going to send him for nerve studies.  I told him I will call him with the results.    Omid Booker MD    03/11/2020    "

## 2020-03-19 ENCOUNTER — TREATMENT (OUTPATIENT)
Dept: PHYSICAL THERAPY | Facility: CLINIC | Age: 50
End: 2020-03-19

## 2020-03-19 DIAGNOSIS — M25.511 RIGHT SHOULDER PAIN, UNSPECIFIED CHRONICITY: Primary | ICD-10-CM

## 2020-03-19 DIAGNOSIS — M54.12 RADICULOPATHY, CERVICAL: ICD-10-CM

## 2020-03-19 PROCEDURE — 97110 THERAPEUTIC EXERCISES: CPT | Performed by: PHYSICAL THERAPIST

## 2020-03-19 PROCEDURE — 97012 MECHANICAL TRACTION THERAPY: CPT | Performed by: PHYSICAL THERAPIST

## 2020-03-19 PROCEDURE — 97014 ELECTRIC STIMULATION THERAPY: CPT | Performed by: PHYSICAL THERAPIST

## 2020-03-19 NOTE — PROGRESS NOTES
Re-Assessment / Re-Certification    Patient: Grant Edge Jr.   : 1970  Diagnosis/ICD-10 Code:  Right shoulder pain, unspecified chronicity [M25.511]  Referring practitioner: Omid Booker MD  Date of Initial Visit: 3/19/2020  Today's Date: 3/19/2020  Patient seen for 7 sessions      Subjective:   Grant Edge reports: that his symptoms have not worsened. However, due to the coronavirus he has taken additional hours at work and with his job as a  he is using the right shoulder more than often.  Subjective Questionnaire: QuickDASH: 18.18% (Previously 36.36%)  Clinical Progress: improved  Home Program Compliance: Yes  Treatment has included: therapeutic exercise, manual therapy, therapeutic activity, electrical stimulation, ultrasound, traction and moist heat    Previous Active Range of Motion   Cervical  Flexion: 25 degrees   Extension: 35 degrees   Left rotation: 45 degrees   Right rotation: 40 degrees     Right Shoulder   Flexion: 160 degrees with pain  Abduction: 145 degrees with pain      Previous Strength/Myotome Testing:   Cervical Spine   Neck extension: 4  Neck flexion: 4     Left   Neck lateral flexion (C3): 4     Right   Neck lateral flexion (C3): 4 (pain reproduced on right side.)    Right Shoulder   Flexion: 4   Abduction: 4   External rotation at 0°: 4   Internal rotation at 0°: 4     Subjective   Objective       Active Range of Motion   Cervical/Thoracic Spine   Cervical    Flexion: 25 degrees   Extension: 35 degrees   Left rotation: 43 degrees   Right rotation: 41 degrees     Strength/Myotome Testing   Cervical Spine   Neck extension: 4  Neck flexion: 4+    Right   Neck lateral flexion (C3): 4    Right Shoulder     Planes of Motion   Flexion: 4+   Abduction: 4   External rotation at 0°: 4   Internal rotation at 0°: 4      Assessment/Plan   Patient's functional outcome measure from the Quick DASH has improved greatly. His initial score was 36.36% and his current score is 18.18%.   His pain is still present, however patient reports that since he started PT he has had some relief. Current pain rating is 4/10, however he reports that he was feeling great for a majority of the day until he reached the end of his workday. His strength and ROM have not improved greatly (compared to the initial evaluation); however he reports that he has more mobility. His pain seems to reside on the lateral aspect of the right shoulder. Therefore, will continue to work on current POC and progress as tolerated.    Progress toward previous goals: Partially Met    Current Goals:   Long Term Goals, in 6-8 weeks patient will:  1) Demonstrate improved endurance and strength for postural control and UE function by being able to drive for 60 minutes, without reproduction of adverse symptoms.(NOT MET)  2) Demonstrate improvement of pain by being able to report 0/10 pain rating consecutively for an entire week.(NOT MET)  3) Demonstrate improved ROM and decreased pain by being able to obtain 160 degrees, without reproduction of adverse symptoms in order to regain functional independence and be able to reach overhead in the cabinet. (NOT MET)  4) Score </= 10 on the Quick DASH to demonstrate improvement of function. (PROGRESSING, currently at 18.18%)    Short Term Goals, in 4 weeks patient will:  1) Be independent and compliant with HEP. (MET)  2) Demonstrate cessation of pain by reporting 0/10 pain rating for 3 consecutive days. (PROGRESSING, pain varies depending on the workload)  3) Demonstrate improvement of ROM by being able to obtain 90 degrees of shoulder flexion and abduction, without reproduction of adverse symptoms traveling down to the wrist and hand. (PROGRESSING, patient still has significant difficulty with active motion due to work)  4)Demonstrate improved endurance and strength of UE function by being able to tolerate driving for 30 minutes, without reproduction of adverse symptoms. (NOT MET)         Recommendations: Continue as planned  Timeframe: 1 month  Prognosis to achieve goals: good    PT Signature: Ngoc Landon, PT, DPT                         Physical Therapist                         [place] License #[id number]    Based upon review of the patient's progress and continued therapy plan, it is my medical opinion that Grant Edge should continue physical therapy treatment at [facility]  [address] [place] [address], [address] [address] [phone].    Signature: __________________________________  Omid Booker MD    Manual Therapy:    0     mins  64682;  Therapeutic Exercise:    20     mins  82933;     Neuromuscular Michael:   0    mins  67884;    Therapeutic Activity:     0     mins  35936;     Gait Trainin     mins  41545;     Ultrasound:     0     mins  49416;    Electrical Stimulation:    15     mins  38508 ( );  Mechanical Traction      15      Mins   35013    Timed Treatment:   20   mins   Total Treatment:     50   mins

## 2020-04-01 ENCOUNTER — APPOINTMENT (OUTPATIENT)
Dept: INFUSION THERAPY | Facility: HOSPITAL | Age: 50
End: 2020-04-01

## 2020-04-13 RX ORDER — AMLODIPINE BESYLATE 10 MG/1
10 TABLET ORAL DAILY
Qty: 30 TABLET | Refills: 3 | Status: SHIPPED | OUTPATIENT
Start: 2020-04-13 | End: 2020-07-08 | Stop reason: SINTOL

## 2020-04-27 ENCOUNTER — APPOINTMENT (OUTPATIENT)
Dept: INFUSION THERAPY | Facility: HOSPITAL | Age: 50
End: 2020-04-27

## 2020-05-04 RX ORDER — LEVOTHYROXINE SODIUM 0.12 MG/1
TABLET ORAL
Qty: 90 TABLET | Refills: 1 | Status: SHIPPED | OUTPATIENT
Start: 2020-05-04 | End: 2020-07-08 | Stop reason: SDUPTHER

## 2020-05-12 RX ORDER — BENAZEPRIL HYDROCHLORIDE 10 MG/1
TABLET ORAL
Qty: 30 TABLET | Refills: 5 | Status: SHIPPED | OUTPATIENT
Start: 2020-05-12 | End: 2020-07-08 | Stop reason: DRUGHIGH

## 2020-05-20 ENCOUNTER — APPOINTMENT (OUTPATIENT)
Dept: INFUSION THERAPY | Facility: HOSPITAL | Age: 50
End: 2020-05-20

## 2020-05-30 ENCOUNTER — APPOINTMENT (OUTPATIENT)
Dept: CARDIOLOGY | Facility: HOSPITAL | Age: 50
End: 2020-05-30

## 2020-05-30 ENCOUNTER — HOSPITAL ENCOUNTER (EMERGENCY)
Facility: HOSPITAL | Age: 50
Discharge: HOME | End: 2020-05-30
Attending: EMERGENCY MEDICINE | Admitting: EMERGENCY MEDICINE

## 2020-05-30 VITALS
OXYGEN SATURATION: 99 % | RESPIRATION RATE: 16 BRPM | HEART RATE: 80 BPM | DIASTOLIC BLOOD PRESSURE: 84 MMHG | TEMPERATURE: 98.2 F | SYSTOLIC BLOOD PRESSURE: 142 MMHG

## 2020-05-30 DIAGNOSIS — I82.432 ACUTE DEEP VEIN THROMBOSIS (DVT) OF POPLITEAL VEIN OF LEFT LOWER EXTREMITY (HCC): Primary | ICD-10-CM

## 2020-05-30 LAB
ALBUMIN SERPL-MCNC: 4.6 G/DL (ref 3.5–5.2)
ALBUMIN/GLOB SERPL: 1.6 G/DL
ALP SERPL-CCNC: 88 U/L (ref 39–117)
ALT SERPL W P-5'-P-CCNC: 18 U/L (ref 1–41)
ANION GAP SERPL CALCULATED.3IONS-SCNC: 10.6 MMOL/L (ref 5–15)
AST SERPL-CCNC: 20 U/L (ref 1–40)
BASOPHILS # BLD AUTO: 0.06 10*3/MM3 (ref 0–0.2)
BASOPHILS NFR BLD AUTO: 1 % (ref 0–1.5)
BH CV LOW VAS LEFT POPLITEAL SPONT: 1
BH CV LOWER VASCULAR LEFT COMMON FEMORAL AUGMENT: NORMAL
BH CV LOWER VASCULAR LEFT COMMON FEMORAL COMPETENT: NORMAL
BH CV LOWER VASCULAR LEFT COMMON FEMORAL COMPRESS: NORMAL
BH CV LOWER VASCULAR LEFT COMMON FEMORAL PHASIC: NORMAL
BH CV LOWER VASCULAR LEFT COMMON FEMORAL SPONT: NORMAL
BH CV LOWER VASCULAR LEFT DISTAL FEMORAL COMPRESS: NORMAL
BH CV LOWER VASCULAR LEFT GASTRONEMIUS COMPRESS: NORMAL
BH CV LOWER VASCULAR LEFT GREATER SAPH AK COMPRESS: NORMAL
BH CV LOWER VASCULAR LEFT GREATER SAPH BK COMPRESS: NORMAL
BH CV LOWER VASCULAR LEFT MID FEMORAL AUGMENT: NORMAL
BH CV LOWER VASCULAR LEFT MID FEMORAL COMPETENT: NORMAL
BH CV LOWER VASCULAR LEFT MID FEMORAL COMPRESS: NORMAL
BH CV LOWER VASCULAR LEFT MID FEMORAL PHASIC: NORMAL
BH CV LOWER VASCULAR LEFT MID FEMORAL SPONT: NORMAL
BH CV LOWER VASCULAR LEFT PERONEAL COMPRESS: NORMAL
BH CV LOWER VASCULAR LEFT POPLITEAL AUGMENT: NORMAL
BH CV LOWER VASCULAR LEFT POPLITEAL COMPRESS: NORMAL
BH CV LOWER VASCULAR LEFT POPLITEAL PHASIC: NORMAL
BH CV LOWER VASCULAR LEFT POPLITEAL SPONT: NORMAL
BH CV LOWER VASCULAR LEFT POPLITEAL THROMBUS: NORMAL
BH CV LOWER VASCULAR LEFT POSTERIOR TIBIAL COMPRESS: NORMAL
BH CV LOWER VASCULAR LEFT PROFUNDA FEMORAL COMPRESS: NORMAL
BH CV LOWER VASCULAR LEFT PROXIMAL FEMORAL COMPRESS: NORMAL
BH CV LOWER VASCULAR LEFT SAPHENOFEMORAL JUNCTION COMPRESS: NORMAL
BH CV LOWER VASCULAR RIGHT COMMON FEMORAL AUGMENT: NORMAL
BH CV LOWER VASCULAR RIGHT COMMON FEMORAL COMPETENT: NORMAL
BH CV LOWER VASCULAR RIGHT COMMON FEMORAL COMPRESS: NORMAL
BH CV LOWER VASCULAR RIGHT COMMON FEMORAL PHASIC: NORMAL
BH CV LOWER VASCULAR RIGHT COMMON FEMORAL SPONT: NORMAL
BH CV LOWER VASCULAR RIGHT DISTAL FEMORAL COMPRESS: NORMAL
BH CV LOWER VASCULAR RIGHT GASTRONEMIUS COMPRESS: NORMAL
BH CV LOWER VASCULAR RIGHT GREATER SAPH AK COMPRESS: NORMAL
BH CV LOWER VASCULAR RIGHT GREATER SAPH BK COMPRESS: NORMAL
BH CV LOWER VASCULAR RIGHT MID FEMORAL AUGMENT: NORMAL
BH CV LOWER VASCULAR RIGHT MID FEMORAL COMPETENT: NORMAL
BH CV LOWER VASCULAR RIGHT MID FEMORAL COMPRESS: NORMAL
BH CV LOWER VASCULAR RIGHT MID FEMORAL PHASIC: NORMAL
BH CV LOWER VASCULAR RIGHT MID FEMORAL SPONT: NORMAL
BH CV LOWER VASCULAR RIGHT PERONEAL COMPRESS: NORMAL
BH CV LOWER VASCULAR RIGHT POPLITEAL AUGMENT: NORMAL
BH CV LOWER VASCULAR RIGHT POPLITEAL COMPETENT: NORMAL
BH CV LOWER VASCULAR RIGHT POPLITEAL COMPRESS: NORMAL
BH CV LOWER VASCULAR RIGHT POPLITEAL PHASIC: NORMAL
BH CV LOWER VASCULAR RIGHT POPLITEAL SPONT: NORMAL
BH CV LOWER VASCULAR RIGHT POSTERIOR TIBIAL COMPRESS: NORMAL
BH CV LOWER VASCULAR RIGHT PROFUNDA FEMORAL COMPRESS: NORMAL
BH CV LOWER VASCULAR RIGHT PROXIMAL FEMORAL COMPRESS: NORMAL
BH CV LOWER VASCULAR RIGHT SAPHENOFEMORAL JUNCTION COMPRESS: NORMAL
BILIRUB SERPL-MCNC: 0.8 MG/DL (ref 0.2–1.2)
BUN BLD-MCNC: 10 MG/DL (ref 6–20)
BUN/CREAT SERPL: 7.6 (ref 7–25)
CALCIUM SPEC-SCNC: 9.3 MG/DL (ref 8.6–10.5)
CHLORIDE SERPL-SCNC: 104 MMOL/L (ref 98–107)
CO2 SERPL-SCNC: 26.4 MMOL/L (ref 22–29)
CREAT BLD-MCNC: 1.31 MG/DL (ref 0.76–1.27)
DEPRECATED RDW RBC AUTO: 43 FL (ref 37–54)
EOSINOPHIL # BLD AUTO: 0.1 10*3/MM3 (ref 0–0.4)
EOSINOPHIL NFR BLD AUTO: 1.7 % (ref 0.3–6.2)
ERYTHROCYTE [DISTWIDTH] IN BLOOD BY AUTOMATED COUNT: 12.8 % (ref 12.3–15.4)
GFR SERPL CREATININE-BSD FRML MDRD: 70 ML/MIN/1.73
GLOBULIN UR ELPH-MCNC: 2.9 GM/DL
GLUCOSE BLD-MCNC: 108 MG/DL (ref 65–99)
HCT VFR BLD AUTO: 47 % (ref 37.5–51)
HGB BLD-MCNC: 15.7 G/DL (ref 13–17.7)
IMM GRANULOCYTES # BLD AUTO: 0.03 10*3/MM3 (ref 0–0.05)
IMM GRANULOCYTES NFR BLD AUTO: 0.5 % (ref 0–0.5)
LYMPHOCYTES # BLD AUTO: 2.04 10*3/MM3 (ref 0.7–3.1)
LYMPHOCYTES NFR BLD AUTO: 34.2 % (ref 19.6–45.3)
MCH RBC QN AUTO: 30.7 PG (ref 26.6–33)
MCHC RBC AUTO-ENTMCNC: 33.4 G/DL (ref 31.5–35.7)
MCV RBC AUTO: 92 FL (ref 79–97)
MONOCYTES # BLD AUTO: 0.59 10*3/MM3 (ref 0.1–0.9)
MONOCYTES NFR BLD AUTO: 9.9 % (ref 5–12)
NEUTROPHILS # BLD AUTO: 3.15 10*3/MM3 (ref 1.7–7)
NEUTROPHILS NFR BLD AUTO: 52.7 % (ref 42.7–76)
NRBC BLD AUTO-RTO: 0 /100 WBC (ref 0–0.2)
NT-PROBNP SERPL-MCNC: 21.4 PG/ML (ref 5–450)
PLATELET # BLD AUTO: 246 10*3/MM3 (ref 140–450)
PMV BLD AUTO: 9.7 FL (ref 6–12)
POTASSIUM BLD-SCNC: 3.6 MMOL/L (ref 3.5–5.2)
PROT SERPL-MCNC: 7.5 G/DL (ref 6–8.5)
RBC # BLD AUTO: 5.11 10*6/MM3 (ref 4.14–5.8)
SODIUM BLD-SCNC: 141 MMOL/L (ref 136–145)
WBC NRBC COR # BLD: 5.97 10*3/MM3 (ref 3.4–10.8)

## 2020-05-30 PROCEDURE — 99283 EMERGENCY DEPT VISIT LOW MDM: CPT

## 2020-05-30 PROCEDURE — 85025 COMPLETE CBC W/AUTO DIFF WBC: CPT | Performed by: EMERGENCY MEDICINE

## 2020-05-30 PROCEDURE — 83880 ASSAY OF NATRIURETIC PEPTIDE: CPT | Performed by: EMERGENCY MEDICINE

## 2020-05-30 PROCEDURE — 93970 EXTREMITY STUDY: CPT

## 2020-05-30 PROCEDURE — 80053 COMPREHEN METABOLIC PANEL: CPT | Performed by: EMERGENCY MEDICINE

## 2020-05-30 RX ORDER — SODIUM CHLORIDE 0.9 % (FLUSH) 0.9 %
10 SYRINGE (ML) INJECTION AS NEEDED
Status: DISCONTINUED | OUTPATIENT
Start: 2020-05-30 | End: 2020-05-30 | Stop reason: HOSPADM

## 2020-06-03 ENCOUNTER — OFFICE VISIT (OUTPATIENT)
Dept: INTERNAL MEDICINE | Facility: CLINIC | Age: 50
End: 2020-06-03

## 2020-06-03 ENCOUNTER — APPOINTMENT (OUTPATIENT)
Dept: WOMENS IMAGING | Facility: HOSPITAL | Age: 50
End: 2020-06-03

## 2020-06-03 VITALS
TEMPERATURE: 98.4 F | RESPIRATION RATE: 17 BRPM | HEART RATE: 91 BPM | BODY MASS INDEX: 45.1 KG/M2 | OXYGEN SATURATION: 97 % | DIASTOLIC BLOOD PRESSURE: 98 MMHG | SYSTOLIC BLOOD PRESSURE: 142 MMHG | HEIGHT: 70 IN | WEIGHT: 315 LBS

## 2020-06-03 DIAGNOSIS — Z00.00 HEALTHCARE MAINTENANCE: ICD-10-CM

## 2020-06-03 DIAGNOSIS — I10 ESSENTIAL HYPERTENSION: ICD-10-CM

## 2020-06-03 DIAGNOSIS — R60.0 LEG EDEMA: ICD-10-CM

## 2020-06-03 DIAGNOSIS — I82.432 ACUTE DEEP VEIN THROMBOSIS (DVT) OF POPLITEAL VEIN OF LEFT LOWER EXTREMITY (HCC): ICD-10-CM

## 2020-06-03 DIAGNOSIS — R73.03 PREDIABETES: ICD-10-CM

## 2020-06-03 DIAGNOSIS — E78.2 MIXED HYPERLIPIDEMIA: ICD-10-CM

## 2020-06-03 DIAGNOSIS — E03.8 OTHER SPECIFIED HYPOTHYROIDISM: Primary | ICD-10-CM

## 2020-06-03 DIAGNOSIS — R06.2 WHEEZING: ICD-10-CM

## 2020-06-03 DIAGNOSIS — J30.2 SEASONAL ALLERGIES: ICD-10-CM

## 2020-06-03 PROCEDURE — 99214 OFFICE O/P EST MOD 30 MIN: CPT | Performed by: NURSE PRACTITIONER

## 2020-06-03 PROCEDURE — 71046 X-RAY EXAM CHEST 2 VIEWS: CPT | Performed by: RADIOLOGY

## 2020-06-03 PROCEDURE — 71046 X-RAY EXAM CHEST 2 VIEWS: CPT | Performed by: NURSE PRACTITIONER

## 2020-06-03 RX ORDER — HYDROCHLOROTHIAZIDE 12.5 MG/1
12.5 TABLET ORAL DAILY
Qty: 90 TABLET | Refills: 1 | Status: SHIPPED | OUTPATIENT
Start: 2020-06-03 | End: 2021-03-22

## 2020-06-03 RX ORDER — MONTELUKAST SODIUM 10 MG/1
10 TABLET ORAL NIGHTLY
Qty: 30 TABLET | Refills: 5 | Status: SHIPPED | OUTPATIENT
Start: 2020-06-03 | End: 2020-12-02 | Stop reason: SDUPTHER

## 2020-06-03 NOTE — PROGRESS NOTES
Subjective   Grant Edge Jr. is a 49 y.o. male.   CC: 3-month follow-up, left popliteal DVT, thick respiratory secretions in the morning    Patient presents for 3-month follow-up.  This is a 49-year-old male.    He presented to the ER on 5/30/2020 complaining bilateral lower extremity edema worsening over the past few weeks.  He was diagnosed with an acute left popliteal DVT after bilateral Dopplers were done.  He was started on the Eliquis starter pack.  He has a history of left DVT several years ago after hip replacement.  Reports that he is doing well with the Eliquis and is having no abnormal bleeding.    He is still having swelling in the bilateral legs.  He stands on his a lot throughout the day at work and legs are significantly more swollen toward the end of the day.  He denies shortness of breath or chest discomfort.  He has been taking amlodipine 10 mg daily for several years.  Denies erythema or heat in either leg and denies pain in the legs.  He is no longer wearing compression hose consistently either.    He has hypertension and takes amlodipine 10 mg daily, benazepril 10 mg daily Toprol-XL 50 mg daily.  Blood pressure is a bit high today at 142/98.  He reports that he does not routinely check his blood pressure at home.    He has hypothyroidism and takes levothyroxine 125 mcg daily reporting good compliance with this medication.    He is prediabetic.  Last A1c was 5.9 on 12/5/2019.    He has depression and takes Wellbutrin 75 mg 3 times daily.  No SI or HI and this works well for him.    He has seasonal allergies and has taken allergy shots in the past.  Over the past 3 to 4 months he has been waking up with a lot of thick drainage in his throat.  Has been taking Mucinex regularly as well as an antihistamine and Flonase.  He is still having issues with this.  He denies fever, chills, shortness of breath or chest discomfort but wonders whether there is anything else that can be done for his  "drainage.    He denies development of any other new issues today.       The following portions of the patient's history were reviewed and updated as appropriate: allergies, current medications, past family history, past medical history, past social history, past surgical history and problem list.    Review of Systems   Constitutional: Negative for activity change, chills, fatigue, fever, unexpected weight gain and unexpected weight loss.   HENT: Negative for congestion, hearing loss, postnasal drip, sinus pressure, sneezing, sore throat, swollen glands and tinnitus.    Eyes: Negative for photophobia, pain and visual disturbance.   Respiratory: Negative for cough, chest tightness, shortness of breath and wheezing.         Increased respiratory secretions x3 to 4 months   Cardiovascular: Positive for leg swelling. Negative for chest pain and palpitations.   Gastrointestinal: Negative for abdominal distention, abdominal pain, constipation, diarrhea, nausea and vomiting.   Endocrine: Negative for polydipsia, polyphagia and polyuria.   Genitourinary: Negative for dysuria, frequency, hematuria and urgency.   Neurological: Negative for dizziness, weakness, numbness and headache.   All other systems reviewed and are negative.      Objective    Blood pressure 142/98, pulse 91, temperature 98.4 °F (36.9 °C), temperature source Oral, resp. rate 17, height 177.8 cm (70\"), weight (!) 145 kg (320 lb), SpO2 97 %.      Physical Exam   Constitutional: He is oriented to person, place, and time. He appears well-developed and well-nourished. No distress.   HENT:   Head: Normocephalic and atraumatic.   Eyes: Pupils are equal, round, and reactive to light. EOM are normal.   Neck: Normal range of motion. Neck supple.   Cardiovascular: Normal rate, regular rhythm, normal heart sounds and intact distal pulses. Exam reveals no gallop and no friction rub.   No murmur heard.  1+ bilateral lower extremity edema.  Posterior tib pulses 2+ and " equal bilaterally.  No erythema or heat.   Pulmonary/Chest: Effort normal. No stridor. No respiratory distress. He has wheezes in the left middle field and the left lower field. He has no rales. He exhibits no tenderness.   Abdominal: Soft. Bowel sounds are normal. He exhibits no distension. There is no tenderness.   Musculoskeletal: Normal range of motion.   Neurological: He is alert and oriented to person, place, and time.   Skin: Skin is warm and dry. Capillary refill takes less than 2 seconds. He is not diaphoretic.   Psychiatric: He has a normal mood and affect. His behavior is normal. Judgment and thought content normal.   Nursing note and vitals reviewed.    Current outpatient and discharge medications have been reconciled for the patient.  Reviewed by: MELANY Bridges      Assessment/Plan   Grant was seen today for follow-up.    Diagnoses and all orders for this visit:    Other specified hypothyroidism    Prediabetes  -     Hemoglobin A1c    Essential hypertension  -     CBC & Differential  -     Comprehensive metabolic panel  -     Hepatitis C antibody  -     hydroCHLOROthiazide (HYDRODIURIL) 12.5 MG tablet; Take 1 tablet by mouth Daily.  -     Lipid panel  -     TSH  -     T4, Free    Mixed hyperlipidemia    Acute deep vein thrombosis (DVT) of popliteal vein of left lower extremity (CMS/HCC)  -     Ambulatory Referral to Hematology    Healthcare maintenance  -     Hepatitis C antibody    Leg edema  -     hydroCHLOROthiazide (HYDRODIURIL) 12.5 MG tablet; Take 1 tablet by mouth Daily.    Seasonal allergies  -     montelukast (Singulair) 10 MG tablet; Take 1 tablet by mouth Every Night.    Wheezing  -     XR Chest 2 View    -Thyroidism: Continue levothyroxine 125 mcg daily.  We will check TSH and free T4 today and adjust therapy if needed based on labs.    -Prediabetes: Last A1c 5.9 on 12/5/2019.  A1c today we will adjust therapy if needed based on labs.    -Hypertension/bilateral lower extremity edema:  Continue amlodipine 10 mg daily, benazepril 10 mg daily and Toprol-XL 50 mg daily.  He is having increasing edema in his legs over the past 2 to 3 months.  Stands for long periods of time throughout the day.  We will add hydrochlorothiazide 12.5 mg daily.  This will help with swelling and his blood pressure which is a bit above goal.  Discussed limiting dietary sodium and he will add compression hose while at work on his feet.  We will follow-up in 1 month on the swelling and if no better we will discuss discontinuing or lowering the amlodipine.    -Acute DVT, left popliteal: Continue Eliquis.  He is almost done with the starter pack and will proceed to the continuing dose at that point.  We will have him see hematology for further evaluation as this is his second DVT.  Although the first was provoked by left hip surgery, the current DVT was non-provoked.  Continue Eliquis at this time.  Referral is entered for hematology.    -Healthcare maintenance: One-time hep C screening today.    -Seasonal allergies: We will add Singulair 10 mg nightly.  Continue Flonase and he may use an antihistamine as needed along with Mucinex.    -Wheezing: He has wheezing throughout the left middle and lower lobes.  As he has had increased respiratory drainage in the mornings over the past 3 to 4 months, we will get a chest x-ray to ensure that the lungs are clear.    -We will contact patient with his x-ray and lab results and any further recommendations.  Follow-up PRN and I will see him back in 1 month for follow-up on the initiation of the hydrochlorothiazide/bilateral lower extremity edema.

## 2020-06-04 ENCOUNTER — TELEMEDICINE (OUTPATIENT)
Dept: ONCOLOGY | Facility: CLINIC | Age: 50
End: 2020-06-04

## 2020-06-04 DIAGNOSIS — I82.432 ACUTE DEEP VEIN THROMBOSIS (DVT) OF POPLITEAL VEIN OF LEFT LOWER EXTREMITY (HCC): Primary | ICD-10-CM

## 2020-06-04 LAB
ALBUMIN SERPL-MCNC: 4.6 G/DL (ref 3.5–5.2)
ALBUMIN/GLOB SERPL: 1.8 G/DL
ALP SERPL-CCNC: 91 U/L (ref 39–117)
ALT SERPL-CCNC: 19 U/L (ref 1–41)
AST SERPL-CCNC: 17 U/L (ref 1–40)
BASOPHILS # BLD AUTO: 0.05 10*3/MM3 (ref 0–0.2)
BASOPHILS NFR BLD AUTO: 0.9 % (ref 0–1.5)
BILIRUB SERPL-MCNC: 0.9 MG/DL (ref 0.2–1.2)
BUN SERPL-MCNC: 9 MG/DL (ref 6–20)
BUN/CREAT SERPL: 8 (ref 7–25)
CALCIUM SERPL-MCNC: 9.6 MG/DL (ref 8.6–10.5)
CHLORIDE SERPL-SCNC: 100 MMOL/L (ref 98–107)
CHOLEST SERPL-MCNC: 152 MG/DL (ref 0–200)
CO2 SERPL-SCNC: 28.2 MMOL/L (ref 22–29)
CREAT SERPL-MCNC: 1.13 MG/DL (ref 0.76–1.27)
EOSINOPHIL # BLD AUTO: 0.08 10*3/MM3 (ref 0–0.4)
EOSINOPHIL NFR BLD AUTO: 1.4 % (ref 0.3–6.2)
ERYTHROCYTE [DISTWIDTH] IN BLOOD BY AUTOMATED COUNT: 12.8 % (ref 12.3–15.4)
GLOBULIN SER CALC-MCNC: 2.5 GM/DL
GLUCOSE SERPL-MCNC: 122 MG/DL (ref 65–99)
HBA1C MFR BLD: 6 % (ref 4.8–5.6)
HCT VFR BLD AUTO: 47.5 % (ref 37.5–51)
HCV AB S/CO SERPL IA: 0.1 S/CO RATIO (ref 0–0.9)
HDLC SERPL-MCNC: 40 MG/DL (ref 40–60)
HGB BLD-MCNC: 15.9 G/DL (ref 13–17.7)
IMM GRANULOCYTES # BLD AUTO: 0.04 10*3/MM3 (ref 0–0.05)
IMM GRANULOCYTES NFR BLD AUTO: 0.7 % (ref 0–0.5)
LDLC SERPL CALC-MCNC: 97 MG/DL (ref 0–100)
LYMPHOCYTES # BLD AUTO: 1.53 10*3/MM3 (ref 0.7–3.1)
LYMPHOCYTES NFR BLD AUTO: 26.1 % (ref 19.6–45.3)
MCH RBC QN AUTO: 30.9 PG (ref 26.6–33)
MCHC RBC AUTO-ENTMCNC: 33.5 G/DL (ref 31.5–35.7)
MCV RBC AUTO: 92.2 FL (ref 79–97)
MONOCYTES # BLD AUTO: 0.58 10*3/MM3 (ref 0.1–0.9)
MONOCYTES NFR BLD AUTO: 9.9 % (ref 5–12)
NEUTROPHILS # BLD AUTO: 3.59 10*3/MM3 (ref 1.7–7)
NEUTROPHILS NFR BLD AUTO: 61 % (ref 42.7–76)
NRBC BLD AUTO-RTO: 0 /100 WBC (ref 0–0.2)
PLATELET # BLD AUTO: 270 10*3/MM3 (ref 140–450)
POTASSIUM SERPL-SCNC: 4.1 MMOL/L (ref 3.5–5.2)
PROT SERPL-MCNC: 7.1 G/DL (ref 6–8.5)
RBC # BLD AUTO: 5.15 10*6/MM3 (ref 4.14–5.8)
SODIUM SERPL-SCNC: 136 MMOL/L (ref 136–145)
T4 FREE SERPL-MCNC: 1.38 NG/DL (ref 0.93–1.7)
TRIGL SERPL-MCNC: 75 MG/DL (ref 0–150)
TSH SERPL DL<=0.005 MIU/L-ACNC: 0.61 UIU/ML (ref 0.27–4.2)
VLDLC SERPL CALC-MCNC: 15 MG/DL (ref 5–40)
WBC # BLD AUTO: 5.87 10*3/MM3 (ref 3.4–10.8)

## 2020-06-04 PROCEDURE — 99204 OFFICE O/P NEW MOD 45 MIN: CPT | Performed by: INTERNAL MEDICINE

## 2020-06-04 NOTE — PROGRESS NOTES
Please notify patient that his A1c looks stable at 6.0.  This is still in the prediabetic range.  Please watch intake of carbs and sugars.  Blood count looks stable with no anemia.  Metabolic panel looks stable except for blood sugar which was a bit high at 122, reflecting the prediabetes.  Cholesterol panel is stable along with thyroid labs.  Please let me know of any questions or concerns and we will recheck routinely.

## 2020-06-04 NOTE — PROGRESS NOTES
Subjective   Grant Edge Jr. is a 49 y.o. male. Referred for Left LE DVT     History of Present Illness   Mr. Edge is a 49-year-old male with history of hypertension, previous history of provoked DVT following total hip replacement about 6 to 7 years ago presents for further evaluation and management of left lower extremity DVT.  He noticed bilateral lower extremity swelling about 2 weeks ago following which he presented to the emergency room.  Bilateral lower extremity Doppler was performed on 5/30/2020 on which an acute left lower extremity DVT was noted in the popliteal vein.  All other veins appeared normal.  He was started on Eliquis.  He was seen by Patricia Peter on 6/3/2020 and started on hydrochlorothiazide for bilateral lower extremity edema.  He has also been referred to us for further management of the DVT.  He denies any history of tobacco use, denies any recent travel.  Denies any recent changes in his health besides the bilateral lower extremity swelling.  No significant weight loss, fatigue, night sweats, lymphadenopathy, cough, dyspnea, nausea, vomiting, hematemesis, melena, hematochezia.  Last colonoscopy was about 5 to 6 years ago in which there was a benign polyp.      The following portions of the patient's history were reviewed and updated as appropriate: allergies, current medications, past family history, past medical history, past social history, past surgical history and problem list.    Past Medical History:   Diagnosis Date   • Abnormal ECG    • Chest pain    • DVT (deep venous thrombosis) (CMS/HCC)    • GERD (gastroesophageal reflux disease)    • Hyperlipidemia    • Hypertension    • Hypokalemia    • Myocardial infarction (CMS/HCC)    • Simple goiter    • Sinus bradycardia    • Vitamin D deficiency     hypothyroidism  Following total thyroidectomy    Past Surgical History:   Procedure Laterality Date   • CARDIAC CATHETERIZATION N/A 10/18/2016    Procedure: Left Heart Cath;  Surgeon:  Daniel Rosas MD;  Location: [facility] INVASIVE LOCATION;  Service:    • CHOLECYSTECTOMY     • SALIVARY GLAND SURGERY     • THYROIDECTOMY  2013   • TONSILLECTOMY     • TOTAL HIP ARTHROPLASTY REVISION Left 2015        Family History   Problem Relation Age of Onset   • Diabetes Father    • Heart disease Father    Maternal aunt - blood clots   Maternal aunt - lupus     Social History     Socioeconomic History   • Marital status:      Spouse name: Not on file   • Number of children: Not on file   • Years of education: Not on file   • Highest education level: Not on file   Tobacco Use   • Smoking status: Never Smoker   • Smokeless tobacco: Never Used   Substance and Sexual Activity   • Alcohol use: No   • Drug use: No   • Sexual activity: Defer      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   HENT: Negative for congestion, drooling, facial swelling, mouth sores, sore throat and trouble swallowing.    Eyes: Negative for blurred vision, double vision and visual disturbance.   Respiratory: Negative for apnea, cough, chest tightness, shortness of breath, wheezing and stridor.    Cardiovascular: Positive for leg swelling. Negative for chest pain and palpitations.   Gastrointestinal: Negative for abdominal distention, constipation, diarrhea, nausea, vomiting and indigestion.   Endocrine: Negative for cold intolerance and heat intolerance.   Genitourinary: Negative for dysuria, hematuria and urgency.   Musculoskeletal: Negative for arthralgias, back pain and myalgias.   Skin: Negative for color change, dry skin, skin lesions and bruise.   Neurological: Negative for dizziness, seizures, syncope, facial asymmetry, weakness, light-headedness and confusion.   Hematological: Negative for adenopathy. Does not  bruise/bleed easily.   Psychiatric/Behavioral: Negative for agitation, sleep disturbance and stress. The patient is not nervous/anxious.          Objective   There were no vitals taken for this visit.   Physical Exam   Constitutional: He is oriented to person, place, and time. He appears well-developed and well-nourished.   HENT:   Head: Normocephalic and atraumatic.   Eyes: EOM are normal. Right eye exhibits no discharge. Left eye exhibits no discharge.   Neck: Normal range of motion.   Pulmonary/Chest: Effort normal.   Neurological: He is alert and oriented to person, place, and time.   Skin: Skin is dry.   Psychiatric: He has a normal mood and affect. His behavior is normal. Judgment and thought content normal.   Vitals reviewed.        Office Visit on 06/03/2020   Component Date Value Ref Range Status   • Hemoglobin A1C 06/03/2020 6.00* 4.80 - 5.60 % Final    Comment: Hemoglobin A1C Ranges:  Increased Risk for Diabetes  5.7% to 6.4%  Diabetes                     >= 6.5%  Diabetic Goal                < 7.0%     • WBC 06/03/2020 5.87  3.40 - 10.80 10*3/mm3 Final   • RBC 06/03/2020 5.15  4.14 - 5.80 10*6/mm3 Final   • Hemoglobin 06/03/2020 15.9  13.0 - 17.7 g/dL Final   • Hematocrit 06/03/2020 47.5  37.5 - 51.0 % Final   • MCV 06/03/2020 92.2  79.0 - 97.0 fL Final   • MCH 06/03/2020 30.9  26.6 - 33.0 pg Final   • MCHC 06/03/2020 33.5  31.5 - 35.7 g/dL Final   • RDW 06/03/2020 12.8  12.3 - 15.4 % Final   • Platelets 06/03/2020 270  140 - 450 10*3/mm3 Final   • Neutrophil Rel % 06/03/2020 61.0  42.7 - 76.0 % Final   • Lymphocyte Rel % 06/03/2020 26.1  19.6 - 45.3 % Final   • Monocyte Rel % 06/03/2020 9.9  5.0 - 12.0 % Final   • Eosinophil Rel % 06/03/2020 1.4  0.3 - 6.2 % Final   • Basophil Rel % 06/03/2020 0.9  0.0 - 1.5 % Final   • Neutrophils Absolute 06/03/2020 3.59  1.70 - 7.00 10*3/mm3 Final   • Lymphocytes Absolute 06/03/2020 1.53  0.70 - 3.10 10*3/mm3 Final   • Monocytes Absolute 06/03/2020 0.58  0.10 - 0.90  10*3/mm3 Final   • Eosinophils Absolute 06/03/2020 0.08  0.00 - 0.40 10*3/mm3 Final   • Basophils Absolute 06/03/2020 0.05  0.00 - 0.20 10*3/mm3 Final   • Immature Granulocyte Rel % 06/03/2020 0.7* 0.0 - 0.5 % Final   • Immature Grans Absolute 06/03/2020 0.04  0.00 - 0.05 10*3/mm3 Final   • nRBC 06/03/2020 0.0  0.0 - 0.2 /100 WBC Final   • Glucose 06/03/2020 122* 65 - 99 mg/dL Final   • BUN 06/03/2020 9  6 - 20 mg/dL Final   • Creatinine 06/03/2020 1.13  0.76 - 1.27 mg/dL Final   • eGFR Non African Am 06/03/2020 69  >60 mL/min/1.73 Final   • eGFR African Am 06/03/2020 84  >60 mL/min/1.73 Final   • BUN/Creatinine Ratio 06/03/2020 8.0  7.0 - 25.0 Final   • Sodium 06/03/2020 136  136 - 145 mmol/L Final   • Potassium 06/03/2020 4.1  3.5 - 5.2 mmol/L Final   • Chloride 06/03/2020 100  98 - 107 mmol/L Final   • Total CO2 06/03/2020 28.2  22.0 - 29.0 mmol/L Final   • Calcium 06/03/2020 9.6  8.6 - 10.5 mg/dL Final   • Total Protein 06/03/2020 7.1  6.0 - 8.5 g/dL Final   • Albumin 06/03/2020 4.60  3.50 - 5.20 g/dL Final   • Globulin 06/03/2020 2.5  gm/dL Final   • A/G Ratio 06/03/2020 1.8  g/dL Final   • Total Bilirubin 06/03/2020 0.9  0.2 - 1.2 mg/dL Final   • Alkaline Phosphatase 06/03/2020 91  39 - 117 U/L Final   • AST (SGOT) 06/03/2020 17  1 - 40 U/L Final   • ALT (SGPT) 06/03/2020 19  1 - 41 U/L Final   • Hep C Virus Ab 06/03/2020 0.1  0.0 - 0.9 s/co ratio Final    Comment:                                   Negative:     < 0.8                               Indeterminate: 0.8 - 0.9                                    Positive:     > 0.9   The CDC recommends that a positive HCV antibody result   be followed up with a HCV Nucleic Acid Amplification   test ([id number]).     • Total Cholesterol 06/03/2020 152  0 - 200 mg/dL Final   • Triglycerides 06/03/2020 75  0 - 150 mg/dL Final   • HDL Cholesterol 06/03/2020 40  40 - 60 mg/dL Final   • VLDL Cholesterol 06/03/2020 15  5 - 40 mg/dL Final   • LDL Cholesterol  06/03/2020 97  0 -  100 mg/dL Final   • TSH 06/03/2020 0.607  0.270 - 4.200 uIU/mL Final   • Free T4 06/03/2020 1.38  0.93 - 1.70 ng/dL Final    Results may be falsely increased if patient taking Biotin.   Admission on 05/30/2020, Discharged on 05/30/2020   Component Date Value Ref Range Status   • Glucose 05/30/2020 108* 65 - 99 mg/dL Final   • BUN 05/30/2020 10  6 - 20 mg/dL Final   • Creatinine 05/30/2020 1.31* 0.76 - 1.27 mg/dL Final   • Sodium 05/30/2020 141  136 - 145 mmol/L Final   • Potassium 05/30/2020 3.6  3.5 - 5.2 mmol/L Final   • Chloride 05/30/2020 104  98 - 107 mmol/L Final   • CO2 05/30/2020 26.4  22.0 - 29.0 mmol/L Final   • Calcium 05/30/2020 9.3  8.6 - 10.5 mg/dL Final   • Total Protein 05/30/2020 7.5  6.0 - 8.5 g/dL Final   • Albumin 05/30/2020 4.60  3.50 - 5.20 g/dL Final   • ALT (SGPT) 05/30/2020 18  1 - 41 U/L Final   • AST (SGOT) 05/30/2020 20  1 - 40 U/L Final   • Alkaline Phosphatase 05/30/2020 88  39 - 117 U/L Final   • Total Bilirubin 05/30/2020 0.8  0.2 - 1.2 mg/dL Final   • eGFR   Amer 05/30/2020 70  >60 mL/min/1.73 Final   • Globulin 05/30/2020 2.9  gm/dL Final   • A/G Ratio 05/30/2020 1.6  g/dL Final   • BUN/Creatinine Ratio 05/30/2020 7.6  7.0 - 25.0 Final   • Anion Gap 05/30/2020 10.6  5.0 - 15.0 mmol/L Final   • proBNP 05/30/2020 21.4  5.0 - 450.0 pg/mL Final   • Left Popliteal Spont 05/30/2020 1   Final   • Right Common Femoral Spont 05/30/2020 Y   Final   • Right Common Femoral Phasic 05/30/2020 Y   Final   • Right Common Femoral Augment 05/30/2020 Y   Final   • Right Common Femoral Competent 05/30/2020 Y   Final   • Right Common Femoral Compress 05/30/2020 C   Final   • Right Saphenofemoral Junction Comp* 05/30/2020 C   Final   • Right Profunda Femoral Compress 05/30/2020 C   Final   • Right Proximal Femoral Compress 05/30/2020 C   Final   • Right Mid Femoral Spont 05/30/2020 Y   Final   • Right Mid Femoral Phasic 05/30/2020 Y   Final   • Right Mid Femoral Augment 05/30/2020 Y   Final   •  Right Mid Femoral Competent 05/30/2020 Y   Final   • Right Mid Femoral Compress 05/30/2020 C   Final   • Right Distal Femoral Compress 05/30/2020 C   Final   • Right Popliteal Spont 05/30/2020 Y   Final   • Right Popliteal Phasic 05/30/2020 Y   Final   • Right Popliteal Augment 05/30/2020 Y   Final   • Right Popliteal Competent 05/30/2020 Y   Final   • Right Popliteal Compress 05/30/2020 C   Final   • Right Posterior Tibial Compress 05/30/2020 C   Final   • Right Peroneal Compress 05/30/2020 C   Final   • Right GastronemiusSoleal Compress 05/30/2020 C   Final   • Right Greater Saph AK Compress 05/30/2020 C   Final   • Right Greater Saph BK Compress 05/30/2020 C   Final   • Left Common Femoral Spont 05/30/2020 Y   Final   • Left Common Femoral Phasic 05/30/2020 Y   Final   • Left Common Femoral Augment 05/30/2020 Y   Final   • Left Common Femoral Competent 05/30/2020 Y   Final   • Left Common Femoral Compress 05/30/2020 C   Final   • Left Saphenofemoral Junction Compr* 05/30/2020 C   Final   • Left Profunda Femoral Compress 05/30/2020 C   Final   • Left Proximal Femoral Compress 05/30/2020 C   Final   • Left Mid Femoral Spont 05/30/2020 Y   Final   • Left Mid Femoral Phasic 05/30/2020 Y   Final   • Left Mid Femoral Augment 05/30/2020 Y   Final   • Left Mid Femoral Competent 05/30/2020 Y   Final   • Left Mid Femoral Compress 05/30/2020 C   Final   • Left Distal Femoral Compress 05/30/2020 C   Final   • Left Popliteal Spont 05/30/2020 Y   Final   • Left Popliteal Phasic 05/30/2020 Y   Final   • Left Popliteal Augment 05/30/2020 Y   Final   • Left Popliteal Compress 05/30/2020 N   Final   • Left Popliteal Thrombus 05/30/2020 A   Final   • Left Posterior Tibial Compress 05/30/2020 C   Final   • Left Peroneal Compress 05/30/2020 C   Final   • Left GastronemiusSoleal Compress 05/30/2020 C   Final   • Left Greater Saph AK Compress 05/30/2020 C   Final   • Left Greater Saph BK Compress 05/30/2020 C   Final   • WBC 05/30/2020  5.97  3.40 - 10.80 10*3/mm3 Final   • RBC 05/30/2020 5.11  4.14 - 5.80 10*6/mm3 Final   • Hemoglobin 05/30/2020 15.7  13.0 - 17.7 g/dL Final   • Hematocrit 05/30/2020 47.0  37.5 - 51.0 % Final   • MCV 05/30/2020 92.0  79.0 - 97.0 fL Final   • MCH 05/30/2020 30.7  26.6 - 33.0 pg Final   • MCHC 05/30/2020 33.4  31.5 - 35.7 g/dL Final   • RDW 05/30/2020 12.8  12.3 - 15.4 % Final   • RDW-SD 05/30/2020 43.0  37.0 - 54.0 fl Final   • MPV 05/30/2020 9.7  6.0 - 12.0 fL Final   • Platelets 05/30/2020 246  140 - 450 10*3/mm3 Final   • Neutrophil % 05/30/2020 52.7  42.7 - 76.0 % Final   • Lymphocyte % 05/30/2020 34.2  19.6 - 45.3 % Final   • Monocyte % 05/30/2020 9.9  5.0 - 12.0 % Final   • Eosinophil % 05/30/2020 1.7  0.3 - 6.2 % Final   • Basophil % 05/30/2020 1.0  0.0 - 1.5 % Final   • Immature Grans % 05/30/2020 0.5  0.0 - 0.5 % Final   • Neutrophils, Absolute 05/30/2020 3.15  1.70 - 7.00 10*3/mm3 Final   • Lymphocytes, Absolute 05/30/2020 2.04  0.70 - 3.10 10*3/mm3 Final   • Monocytes, Absolute 05/30/2020 0.59  0.10 - 0.90 10*3/mm3 Final   • Eosinophils, Absolute 05/30/2020 0.10  0.00 - 0.40 10*3/mm3 Final   • Basophils, Absolute 05/30/2020 0.06  0.00 - 0.20 10*3/mm3 Final   • Immature Grans, Absolute 05/30/2020 0.03  0.00 - 0.05 10*3/mm3 Final   • nRBC 05/30/2020 0.0  0.0 - 0.2 /100 WBC Final        Xr Chest 2 View    Result Date: 6/3/2020  .xray exam complete     I reviewed his left lower extremity Doppler and noted to have left lower extremity DVT.  CBC from 5/30/2020 reviewed and noted to have normal WBC, hemoglobin, platelet count.  CMP from 5/30/2020 reviewed by me creatinine mildly elevated at 1.31      Assessment/Plan      Mr. Edge is a 49-year-old -American male with a previous history of left lower extremity DVT following a left hip replacement surgery, now with newly diagnosed left lower extremity DVT.     *DVT-  He is currently on Eliquis.  Recommend continuing the same.  This is his second episode of  DVT which is unprovoked.  His the first episode was provoked secondary to surgery.  Given that he has had 2 DVTs so far and the most recent one being unprovoked I will proceed with hypercoagulability work-up including prothrombin gene mutation, factor V Leiden mutation, protein S and protein C levels, Antithrombin III deficiency, testing for antiphospholipid syndrome including lupus anticoagulant, anticardiolipin antibody, beta-2 glycoprotein antibody.    I will plan to see Mr. Edge back in about 3 weeks from now to discuss the test results.  Also recommend age-appropriate screening which he has had a colonoscopy about 5 to 6 years ago and had a benign polyp.    Even if hypercoagulable work-up comes back negative, he would be someone I would likely recommend indefinite anticoagulation given that this is the second episode of DVT and this is unprovoked.  He also has a family history of DVT and PE.    Discussed the details of the testing as well as less the plan with Mr. Edge.      *Bilateral lower extremity swelling-could be secondary to amlodipine.  He has recently been started on hydrochlorothiazide by his primary care physician.  Further work-up to be performed by his primary care physician as needed.    *Hypertension-on hydrochlorothiazide, benazepril, metoprolol.    *Hypothyroidism-continue Synthroid    *Follow-up-3 weeks

## 2020-06-05 ENCOUNTER — LAB (OUTPATIENT)
Dept: OTHER | Facility: HOSPITAL | Age: 50
End: 2020-06-05

## 2020-06-05 PROCEDURE — 86146 BETA-2 GLYCOPROTEIN ANTIBODY: CPT | Performed by: INTERNAL MEDICINE

## 2020-06-05 PROCEDURE — 81241 F5 GENE: CPT | Performed by: INTERNAL MEDICINE

## 2020-06-05 PROCEDURE — 86147 CARDIOLIPIN ANTIBODY EA IG: CPT | Performed by: INTERNAL MEDICINE

## 2020-06-05 PROCEDURE — 85300 ANTITHROMBIN III ACTIVITY: CPT | Performed by: INTERNAL MEDICINE

## 2020-06-05 PROCEDURE — 81240 F2 GENE: CPT | Performed by: INTERNAL MEDICINE

## 2020-06-05 PROCEDURE — 85303 CLOT INHIBIT PROT C ACTIVITY: CPT | Performed by: INTERNAL MEDICINE

## 2020-06-05 PROCEDURE — 36415 COLL VENOUS BLD VENIPUNCTURE: CPT | Performed by: INTERNAL MEDICINE

## 2020-06-05 PROCEDURE — 85306 CLOT INHIBIT PROT S FREE: CPT | Performed by: INTERNAL MEDICINE

## 2020-06-06 LAB
CARDIOLIPIN IGG SER IA-ACNC: <9 GPL U/ML (ref 0–14)
CARDIOLIPIN IGM SER IA-ACNC: <9 MPL U/ML (ref 0–12)
F5 GENE MUT ANL BLD/T: NORMAL
FACTOR II, DNA ANALYSIS: NORMAL

## 2020-06-07 LAB
AT III PPP CHRO-ACNC: 103 % (ref 90–134)
PROT C ACT/NOR PPP: 151 % (ref 86–163)
PROT S ACT/NOR PPP: 131 % (ref 70–127)
PROT S FREE PPP-ACNC: 103 % (ref 49–138)

## 2020-06-08 LAB
B2 GLYCOPROT1 IGA SER-ACNC: 11 GPI IGA UNITS (ref 0–25)
B2 GLYCOPROT1 IGG SER-ACNC: <9 GPI IGG UNITS (ref 0–20)
B2 GLYCOPROT1 IGM SER-ACNC: <9 GPI IGM UNITS (ref 0–32)

## 2020-06-25 ENCOUNTER — TELEMEDICINE (OUTPATIENT)
Dept: ONCOLOGY | Facility: CLINIC | Age: 50
End: 2020-06-25

## 2020-06-25 DIAGNOSIS — I82.432 ACUTE DEEP VEIN THROMBOSIS (DVT) OF POPLITEAL VEIN OF LEFT LOWER EXTREMITY (HCC): Primary | ICD-10-CM

## 2020-06-25 PROCEDURE — 99213 OFFICE O/P EST LOW 20 MIN: CPT | Performed by: INTERNAL MEDICINE

## 2020-06-25 NOTE — PROGRESS NOTES
Subjective   Grant Edge Jr. is a 49 y.o. male. Referred for Left LE DVT     History of Present Illness   Mr. Edge is a 49-year-old male with history of hypertension, previous history of provoked DVT following total hip replacement about 6 to 7 years ago presents for further evaluation and management of left lower extremity DVT.  He noticed bilateral lower extremity swelling about 2 weeks ago following which he presented to the emergency room.  Bilateral lower extremity Doppler was performed on 5/30/2020 on which an acute left lower extremity DVT was noted in the popliteal vein.  All other veins appeared normal.  He was started on Eliquis.  He was seen by Patricia Peter on 6/3/2020 and started on hydrochlorothiazide for bilateral lower extremity edema.  He has also been referred to us for further management of the DVT.  He denies any history of tobacco use, denies any recent travel.  Denies any recent changes in his health besides the bilateral lower extremity swelling.  No significant weight loss, fatigue, night sweats, lymphadenopathy, cough, dyspnea, nausea, vomiting, hematemesis, melena, hematochezia.  Last colonoscopy was about 5 to 6 years ago in which there was a benign polyp.    Interval history  Mr. Edge presents today for a follow-up diabetes visit.  He reports that the lower extremity swelling has decreased some.  He is here to review the thrombophilia work-up and further recommendations.    The following portions of the patient's history were reviewed and updated as appropriate: allergies, current medications, past family history, past medical history, past social history, past surgical history and problem list.    Past Medical History:   Diagnosis Date   • Abnormal ECG    • Chest pain    • DVT (deep venous thrombosis) (CMS/HCC)    • GERD (gastroesophageal reflux disease)    • Hyperlipidemia    • Hypertension    • Hypokalemia    • Myocardial infarction (CMS/HCC)    • Simple goiter    • Sinus  bradycardia    • Vitamin D deficiency     hypothyroidism  Following total thyroidectomy    Past Surgical History:   Procedure Laterality Date   • CARDIAC CATHETERIZATION N/A 10/18/2016    Procedure: Left Heart Cath;  Surgeon: Daniel Rosas MD;  Location:  [facility];  Service:    • CHOLECYSTECTOMY     • SALIVARY GLAND SURGERY     • THYROIDECTOMY  2013   • TONSILLECTOMY     • TOTAL HIP ARTHROPLASTY REVISION Left 2015        Family History   Problem Relation Age of Onset   • Diabetes Father    • Heart disease Father    Maternal aunt - blood clots   Maternal aunt - lupus     Social History     Socioeconomic History   • Marital status:      Spouse name: Not on file   • Number of children: Not on file   • Years of education: Not on file   • Highest education level: Not on file   Tobacco Use   • Smoking status: Never Smoker   • Smokeless tobacco: Never Used   Substance and Sexual Activity   • Alcohol use: No   • Drug use: No   • Sexual activity: Defer      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   HENT: Negative for congestion, drooling, facial swelling, mouth sores, sore throat and trouble swallowing.    Eyes: Negative for blurred vision, double vision and visual disturbance.   Respiratory: Negative for apnea, cough, chest tightness, shortness of breath, wheezing and stridor.    Cardiovascular: Positive for leg swelling. Negative for chest pain and palpitations.   Gastrointestinal: Negative for abdominal distention, constipation, diarrhea, nausea, vomiting and indigestion.   Endocrine: Negative for cold intolerance and heat intolerance.   Genitourinary: Negative for dysuria, hematuria and urgency.   Musculoskeletal: Negative for arthralgias, back pain and myalgias.   Skin: Negative  for color change, dry skin, skin lesions and bruise.   Neurological: Negative for dizziness, seizures, syncope, facial asymmetry, weakness, light-headedness and confusion.   Hematological: Negative for adenopathy. Does not bruise/bleed easily.   Psychiatric/Behavioral: Negative for agitation, sleep disturbance and stress. The patient is not nervous/anxious.          Objective   There were no vitals taken for this visit.   Physical Exam   Constitutional: He is oriented to person, place, and time. He appears well-developed and well-nourished.   HENT:   Head: Normocephalic and atraumatic.   Eyes: EOM are normal. Right eye exhibits no discharge. Left eye exhibits no discharge.   Neck: Normal range of motion.   Pulmonary/Chest: Effort normal.   Neurological: He is alert and oriented to person, place, and time.   Skin: Skin is dry.   Psychiatric: He has a normal mood and affect. His behavior is normal. Judgment and thought content normal.         Telemedicine on 06/04/2020   Component Date Value Ref Range Status   • Factor V Leiden 06/05/2020 Normal  Normal Final   • Factor II, DNA Analysis 06/05/2020 Normal  Normal Final   • Antithrombin Activity 06/05/2020 103  90 - 134 % Final   • Protein S Functional 06/05/2020 131* 70 - 127 % Final   • Protein C Activity 06/05/2020 151  86 - 163 % Final   • Protein S Ag, Free 06/05/2020 103.0  49.0 - 138.0 % Final   • Anticardiolipin IgG 06/05/2020 <9  0 - 14 GPL U/mL Final                              Negative:              <15                            Indeterminate:     15 - 20                            Low-Med Positive: >20 - 80                            High Positive:         >80   • Anticardiolipin IgM 06/05/2020 <9  0 - 12 MPL U/mL Final                              Negative:              <13                            Indeterminate:     13 - 20                            Low-Med Positive: >20 - 80                            High Positive:         >80   • Beta-2 Glyco 1 IgG  06/05/2020 <9  0 - 20 GPI IgG units Final    The reference interval reflects a 3SD or 99th percentile interval,  which is thought to represent a potentially clinically significant  result in accordance with the International Consensus Statement on  the classification criteria for definitive antiphospholipid syndrome  (APS). J Thromb Haem 2006;4:295-306.   • Beta-2 Glyco 1 IgA 06/05/2020 11  0 - 25 GPI IgA units Final    The reference interval reflects a 3SD or 99th percentile interval,  which is thought to represent a potentially clinically significant  result in accordance with the International Consensus Statement on  the classification criteria for definitive antiphospholipid syndrome  (APS). J Thromb Haem 2006;4:295-306.   • Beta-2 Glyco 1 IgM 06/05/2020 <9  0 - 32 GPI IgM units Final    The reference interval reflects a 3SD or 99th percentile interval,  which is thought to represent a potentially clinically significant  result in accordance with the International Consensus Statement on  the classification criteria for definitive antiphospholipid syndrome  (APS). J Thromb Haem 2006;4:295-306.   Office Visit on 06/03/2020   Component Date Value Ref Range Status   • Hemoglobin A1C 06/03/2020 6.00* 4.80 - 5.60 % Final    Comment: Hemoglobin A1C Ranges:  Increased Risk for Diabetes  5.7% to 6.4%  Diabetes                     >= 6.5%  Diabetic Goal                < 7.0%     • WBC 06/03/2020 5.87  3.40 - 10.80 10*3/mm3 Final   • RBC 06/03/2020 5.15  4.14 - 5.80 10*6/mm3 Final   • Hemoglobin 06/03/2020 15.9  13.0 - 17.7 g/dL Final   • Hematocrit 06/03/2020 47.5  37.5 - 51.0 % Final   • MCV 06/03/2020 92.2  79.0 - 97.0 fL Final   • MCH 06/03/2020 30.9  26.6 - 33.0 pg Final   • MCHC 06/03/2020 33.5  31.5 - 35.7 g/dL Final   • RDW 06/03/2020 12.8  12.3 - 15.4 % Final   • Platelets 06/03/2020 270  140 - 450 10*3/mm3 Final   • Neutrophil Rel % 06/03/2020 61.0  42.7 - 76.0 % Final   • Lymphocyte Rel % 06/03/2020 26.1  19.6 -  45.3 % Final   • Monocyte Rel % 06/03/2020 9.9  5.0 - 12.0 % Final   • Eosinophil Rel % 06/03/2020 1.4  0.3 - 6.2 % Final   • Basophil Rel % 06/03/2020 0.9  0.0 - 1.5 % Final   • Neutrophils Absolute 06/03/2020 3.59  1.70 - 7.00 10*3/mm3 Final   • Lymphocytes Absolute 06/03/2020 1.53  0.70 - 3.10 10*3/mm3 Final   • Monocytes Absolute 06/03/2020 0.58  0.10 - 0.90 10*3/mm3 Final   • Eosinophils Absolute 06/03/2020 0.08  0.00 - 0.40 10*3/mm3 Final   • Basophils Absolute 06/03/2020 0.05  0.00 - 0.20 10*3/mm3 Final   • Immature Granulocyte Rel % 06/03/2020 0.7* 0.0 - 0.5 % Final   • Immature Grans Absolute 06/03/2020 0.04  0.00 - 0.05 10*3/mm3 Final   • nRBC 06/03/2020 0.0  0.0 - 0.2 /100 WBC Final   • Glucose 06/03/2020 122* 65 - 99 mg/dL Final   • BUN 06/03/2020 9  6 - 20 mg/dL Final   • Creatinine 06/03/2020 1.13  0.76 - 1.27 mg/dL Final   • eGFR Non African Am 06/03/2020 69  >60 mL/min/1.73 Final   • eGFR African Am 06/03/2020 84  >60 mL/min/1.73 Final   • BUN/Creatinine Ratio 06/03/2020 8.0  7.0 - 25.0 Final   • Sodium 06/03/2020 136  136 - 145 mmol/L Final   • Potassium 06/03/2020 4.1  3.5 - 5.2 mmol/L Final   • Chloride 06/03/2020 100  98 - 107 mmol/L Final   • Total CO2 06/03/2020 28.2  22.0 - 29.0 mmol/L Final   • Calcium 06/03/2020 9.6  8.6 - 10.5 mg/dL Final   • Total Protein 06/03/2020 7.1  6.0 - 8.5 g/dL Final   • Albumin 06/03/2020 4.60  3.50 - 5.20 g/dL Final   • Globulin 06/03/2020 2.5  gm/dL Final   • A/G Ratio 06/03/2020 1.8  g/dL Final   • Total Bilirubin 06/03/2020 0.9  0.2 - 1.2 mg/dL Final   • Alkaline Phosphatase 06/03/2020 91  39 - 117 U/L Final   • AST (SGOT) 06/03/2020 17  1 - 40 U/L Final   • ALT (SGPT) 06/03/2020 19  1 - 41 U/L Final   • Hep C Virus Ab 06/03/2020 0.1  0.0 - 0.9 s/co ratio Final    Comment:                                   Negative:     < 0.8                               Indeterminate: 0.8 - 0.9                                    Positive:     > 0.9   The CDC recommends that  a positive HCV antibody result   be followed up with a HCV Nucleic Acid Amplification   test ([id number]).     • Total Cholesterol 06/03/2020 152  0 - 200 mg/dL Final   • Triglycerides 06/03/2020 75  0 - 150 mg/dL Final   • HDL Cholesterol 06/03/2020 40  40 - 60 mg/dL Final   • VLDL Cholesterol 06/03/2020 15  5 - 40 mg/dL Final   • LDL Cholesterol  06/03/2020 97  0 - 100 mg/dL Final   • TSH 06/03/2020 0.607  0.270 - 4.200 uIU/mL Final   • Free T4 06/03/2020 1.38  0.93 - 1.70 ng/dL Final    Results may be falsely increased if patient taking Biotin.   Admission on 05/30/2020, Discharged on 05/30/2020   Component Date Value Ref Range Status   • Glucose 05/30/2020 108* 65 - 99 mg/dL Final   • BUN 05/30/2020 10  6 - 20 mg/dL Final   • Creatinine 05/30/2020 1.31* 0.76 - 1.27 mg/dL Final   • Sodium 05/30/2020 141  136 - 145 mmol/L Final   • Potassium 05/30/2020 3.6  3.5 - 5.2 mmol/L Final   • Chloride 05/30/2020 104  98 - 107 mmol/L Final   • CO2 05/30/2020 26.4  22.0 - 29.0 mmol/L Final   • Calcium 05/30/2020 9.3  8.6 - 10.5 mg/dL Final   • Total Protein 05/30/2020 7.5  6.0 - 8.5 g/dL Final   • Albumin 05/30/2020 4.60  3.50 - 5.20 g/dL Final   • ALT (SGPT) 05/30/2020 18  1 - 41 U/L Final   • AST (SGOT) 05/30/2020 20  1 - 40 U/L Final   • Alkaline Phosphatase 05/30/2020 88  39 - 117 U/L Final   • Total Bilirubin 05/30/2020 0.8  0.2 - 1.2 mg/dL Final   • eGFR   Amer 05/30/2020 70  >60 mL/min/1.73 Final   • Globulin 05/30/2020 2.9  gm/dL Final   • A/G Ratio 05/30/2020 1.6  g/dL Final   • BUN/Creatinine Ratio 05/30/2020 7.6  7.0 - 25.0 Final   • Anion Gap 05/30/2020 10.6  5.0 - 15.0 mmol/L Final   • proBNP 05/30/2020 21.4  5.0 - 450.0 pg/mL Final   • Left Popliteal Spont 05/30/2020 1   Final   • Right Common Femoral Spont 05/30/2020 Y   Final   • Right Common Femoral Phasic 05/30/2020 Y   Final   • Right Common Femoral Augment 05/30/2020 Y   Final   • Right Common Femoral Competent 05/30/2020 Y   Final   • Right Common  Femoral Compress 05/30/2020 C   Final   • Right Saphenofemoral Junction Comp* 05/30/2020 C   Final   • Right Profunda Femoral Compress 05/30/2020 C   Final   • Right Proximal Femoral Compress 05/30/2020 C   Final   • Right Mid Femoral Spont 05/30/2020 Y   Final   • Right Mid Femoral Phasic 05/30/2020 Y   Final   • Right Mid Femoral Augment 05/30/2020 Y   Final   • Right Mid Femoral Competent 05/30/2020 Y   Final   • Right Mid Femoral Compress 05/30/2020 C   Final   • Right Distal Femoral Compress 05/30/2020 C   Final   • Right Popliteal Spont 05/30/2020 Y   Final   • Right Popliteal Phasic 05/30/2020 Y   Final   • Right Popliteal Augment 05/30/2020 Y   Final   • Right Popliteal Competent 05/30/2020 Y   Final   • Right Popliteal Compress 05/30/2020 C   Final   • Right Posterior Tibial Compress 05/30/2020 C   Final   • Right Peroneal Compress 05/30/2020 C   Final   • Right GastronemiusSoleal Compress 05/30/2020 C   Final   • Right Greater Saph AK Compress 05/30/2020 C   Final   • Right Greater Saph BK Compress 05/30/2020 C   Final   • Left Common Femoral Spont 05/30/2020 Y   Final   • Left Common Femoral Phasic 05/30/2020 Y   Final   • Left Common Femoral Augment 05/30/2020 Y   Final   • Left Common Femoral Competent 05/30/2020 Y   Final   • Left Common Femoral Compress 05/30/2020 C   Final   • Left Saphenofemoral Junction Compr* 05/30/2020 C   Final   • Left Profunda Femoral Compress 05/30/2020 C   Final   • Left Proximal Femoral Compress 05/30/2020 C   Final   • Left Mid Femoral Spont 05/30/2020 Y   Final   • Left Mid Femoral Phasic 05/30/2020 Y   Final   • Left Mid Femoral Augment 05/30/2020 Y   Final   • Left Mid Femoral Competent 05/30/2020 Y   Final   • Left Mid Femoral Compress 05/30/2020 C   Final   • Left Distal Femoral Compress 05/30/2020 C   Final   • Left Popliteal Spont 05/30/2020 Y   Final   • Left Popliteal Phasic 05/30/2020 Y   Final   • Left Popliteal Augment 05/30/2020 Y   Final   • Left Popliteal  Compress 05/30/2020 N   Final   • Left Popliteal Thrombus 05/30/2020 A   Final   • Left Posterior Tibial Compress 05/30/2020 C   Final   • Left Peroneal Compress 05/30/2020 C   Final   • Left GastronemiusSoleal Compress 05/30/2020 C   Final   • Left Greater Saph AK Compress 05/30/2020 C   Final   • Left Greater Saph BK Compress 05/30/2020 C   Final   • WBC 05/30/2020 5.97  3.40 - 10.80 10*3/mm3 Final   • RBC 05/30/2020 5.11  4.14 - 5.80 10*6/mm3 Final   • Hemoglobin 05/30/2020 15.7  13.0 - 17.7 g/dL Final   • Hematocrit 05/30/2020 47.0  37.5 - 51.0 % Final   • MCV 05/30/2020 92.0  79.0 - 97.0 fL Final   • MCH 05/30/2020 30.7  26.6 - 33.0 pg Final   • MCHC 05/30/2020 33.4  31.5 - 35.7 g/dL Final   • RDW 05/30/2020 12.8  12.3 - 15.4 % Final   • RDW-SD 05/30/2020 43.0  37.0 - 54.0 fl Final   • MPV 05/30/2020 9.7  6.0 - 12.0 fL Final   • Platelets 05/30/2020 246  140 - 450 10*3/mm3 Final   • Neutrophil % 05/30/2020 52.7  42.7 - 76.0 % Final   • Lymphocyte % 05/30/2020 34.2  19.6 - 45.3 % Final   • Monocyte % 05/30/2020 9.9  5.0 - 12.0 % Final   • Eosinophil % 05/30/2020 1.7  0.3 - 6.2 % Final   • Basophil % 05/30/2020 1.0  0.0 - 1.5 % Final   • Immature Grans % 05/30/2020 0.5  0.0 - 0.5 % Final   • Neutrophils, Absolute 05/30/2020 3.15  1.70 - 7.00 10*3/mm3 Final   • Lymphocytes, Absolute 05/30/2020 2.04  0.70 - 3.10 10*3/mm3 Final   • Monocytes, Absolute 05/30/2020 0.59  0.10 - 0.90 10*3/mm3 Final   • Eosinophils, Absolute 05/30/2020 0.10  0.00 - 0.40 10*3/mm3 Final   • Basophils, Absolute 05/30/2020 0.06  0.00 - 0.20 10*3/mm3 Final   • Immature Grans, Absolute 05/30/2020 0.03  0.00 - 0.05 10*3/mm3 Final   • nRBC 05/30/2020 0.0  0.0 - 0.2 /100 WBC Final        Xr Chest 2 View    Result Date: 6/3/2020  .Xray results for review.     I reviewed his left lower extremity Doppler and noted to have left lower extremity DVT.  CBC from 5/30/2020 reviewed and noted to have normal WBC, hemoglobin, platelet count.  CMP from  5/30/2020 reviewed by me creatinine mildly elevated at 1.31    Reviewed thrombo-failure work-up and documented below  Beta-2 glycoprotein and anticardiolipin antibody negative  Protein S antigen free normal  Protein S functional normal  Antithrombin III level normal  Prothrombin gene mutation negative  Factor V Leiden mutation negative.    Assessment/Plan      Mr. Edge is a 49-year-old -American male with a previous history of left lower extremity DVT following a left hip replacement surgery, now with newly diagnosed left lower extremity DVT.     *DVT-  He is currently on Eliquis.  Recommend continuing the same.  This is his second episode of DVT which is unprovoked.  His the first episode was provoked secondary to surgery.  Given that he has had 2 DVTs so far and the most recent one being unprovoked thrombophilia work-up has been performed and all labs are negative.  Reviewed the thrombophilia work-up with Mr. Edge.  Given that he has had 1 provoked DVT in the past but now the most recent one is unprovoked, although thrombophilia work-up is negative would recommend long-term anticoagulation to prevent any further DVTs and PEs.     Also recommend age-appropriate screening which he has had a colonoscopy about 5 to 6 years ago and had a benign polyp.    Discussed the details of the testing as well as less the plan with Mr. Edge.    *Bilateral lower extremity swelling-could be secondary to amlodipine.  He has recently been started on hydrochlorothiazide by his primary care physician.  Further work-up to be performed by his primary care physician as needed.  Lower extremity edema improving.    *Hypertension-on hydrochlorothiazide, benazepril, metoprolol.    *Hypothyroidism-continue Synthroid    *Follow-up-1 year

## 2020-07-08 ENCOUNTER — OFFICE VISIT (OUTPATIENT)
Dept: INTERNAL MEDICINE | Facility: CLINIC | Age: 50
End: 2020-07-08

## 2020-07-08 VITALS
BODY MASS INDEX: 45.1 KG/M2 | OXYGEN SATURATION: 96 % | TEMPERATURE: 99.2 F | HEIGHT: 70 IN | WEIGHT: 315 LBS | DIASTOLIC BLOOD PRESSURE: 80 MMHG | HEART RATE: 97 BPM | SYSTOLIC BLOOD PRESSURE: 122 MMHG

## 2020-07-08 DIAGNOSIS — Z12.11 ENCOUNTER FOR SCREENING COLONOSCOPY: ICD-10-CM

## 2020-07-08 DIAGNOSIS — I82.432 ACUTE DEEP VEIN THROMBOSIS (DVT) OF POPLITEAL VEIN OF LEFT LOWER EXTREMITY (HCC): ICD-10-CM

## 2020-07-08 DIAGNOSIS — R60.0 BILATERAL LOWER EXTREMITY EDEMA: ICD-10-CM

## 2020-07-08 DIAGNOSIS — I10 ESSENTIAL HYPERTENSION: Primary | ICD-10-CM

## 2020-07-08 DIAGNOSIS — J30.2 SEASONAL ALLERGIES: ICD-10-CM

## 2020-07-08 DIAGNOSIS — I10 ESSENTIAL HYPERTENSION: ICD-10-CM

## 2020-07-08 PROCEDURE — 99214 OFFICE O/P EST MOD 30 MIN: CPT | Performed by: NURSE PRACTITIONER

## 2020-07-08 RX ORDER — BENAZEPRIL HYDROCHLORIDE 20 MG/1
20 TABLET ORAL DAILY
Qty: 60 TABLET | Refills: 0 | Status: SHIPPED | OUTPATIENT
Start: 2020-07-08 | End: 2020-07-08

## 2020-07-08 RX ORDER — BENAZEPRIL HYDROCHLORIDE 20 MG/1
20 TABLET ORAL DAILY
Qty: 90 TABLET | Refills: 1 | Status: SHIPPED | OUTPATIENT
Start: 2020-07-08 | End: 2020-10-09

## 2020-07-08 RX ORDER — AZELASTINE 1 MG/ML
2 SPRAY, METERED NASAL 2 TIMES DAILY
Qty: 30 ML | Refills: 2 | Status: SHIPPED | OUTPATIENT
Start: 2020-07-08

## 2020-07-08 NOTE — PROGRESS NOTES
"Subjective   Grant Edge Jr. is a 49 y.o. male.   CC: 1 month follow-up, lower extremity edema, dry cough/allergies    Patient presents for one-month follow-up on bilateral lower extremity edema.  This is a 49-year-old male.  He has chronic DVT as diagnosed on 5/30/2020 in the ER.  This was a second unprovoked DVT therefore he is now on Eliquis 5 mg every 12 hours chronically per the direction of hematology.  He needs an ultrasound ordered for recheck of the DVT, left-sided.  At his visit with me last on 6/3/2020, he was complaining of bilateral lower extremity edema.  I advised decreasing dietary sodium, adding compression hose and we added hydrochlorothiazide 12.5 mg daily.  Reports that he has been taking this as prescribed but has not seen much difference in the edema.  It is equal bilaterally, no weeping.  He does take amlodipine 10 mg daily.  Additionally, he has been having some associated right lateral ankle pain that \"flares up and down depending on the day.\"  No redness or erythema.  He has no known history of gout but wonders whether this may be the cause.    He also complains of a chronic \"cough and tickle in my throat.\"  The cough is nonproductive.  He does have allergies and in the past has had to see an allergist for injections.  Currently taking Singulair.  No longer taking an antihistamine or Flonase consistently.  We did a chest x-ray on 6/3/2020 which was clear.  He wonders what to do next.    He denies development of any other new issues today.       The following portions of the patient's history were reviewed and updated as appropriate: allergies, current medications, past family history, past medical history, past social history, past surgical history and problem list.    Review of Systems   Constitutional: Negative for activity change, chills, fatigue, fever, unexpected weight gain and unexpected weight loss.   HENT: Negative for congestion, hearing loss, postnasal drip, sinus pressure, " "sneezing, sore throat, swollen glands and tinnitus.    Eyes: Negative for photophobia, pain and visual disturbance.   Respiratory: Negative for cough, chest tightness, shortness of breath and wheezing.    Cardiovascular: Positive for leg swelling. Negative for chest pain and palpitations.   Gastrointestinal: Negative for abdominal distention, abdominal pain, constipation, diarrhea, nausea and vomiting.   Endocrine: Negative for polydipsia, polyphagia and polyuria.   Genitourinary: Negative for dysuria, frequency, hematuria and urgency.   Musculoskeletal: Positive for arthralgias.   Neurological: Negative for dizziness, weakness, numbness and headache.   All other systems reviewed and are negative.      Objective    /80   Pulse 97   Temp 99.2 °F (37.3 °C) (Oral)   Ht 177.8 cm (70\")   Wt (!) 143 kg (316 lb)   SpO2 96%   BMI 45.34 kg/m²     Physical Exam   Constitutional: He is oriented to person, place, and time. He appears well-developed and well-nourished. No distress.   HENT:   Head: Normocephalic and atraumatic.   Eyes: Pupils are equal, round, and reactive to light. EOM are normal.   Neck: Normal range of motion. Neck supple. No JVD present. No tracheal deviation present. No thyromegaly present.   Cardiovascular: Normal rate, regular rhythm, normal heart sounds and intact distal pulses. Exam reveals no gallop and no friction rub.   No murmur heard.  1+ pitting edema, bilateral lower extremities, equal.  Posterior tib pulses 2+ and equal bilaterally.   Pulmonary/Chest: Effort normal and breath sounds normal. No stridor. No respiratory distress. He has no wheezes. He has no rales. He exhibits no tenderness.   Lungs CTA bilaterally   Abdominal: Soft. Bowel sounds are normal. He exhibits no distension. There is no tenderness.   Musculoskeletal: Normal range of motion.   Lymphadenopathy:     He has no cervical adenopathy.   Neurological: He is alert and oriented to person, place, and time.   Skin: Skin is " "warm and dry. Capillary refill takes less than 2 seconds. He is not diaphoretic.   Psychiatric: He has a normal mood and affect. His behavior is normal. Judgment and thought content normal.   Nursing note and vitals reviewed.    Current outpatient and discharge medications have been reconciled for the patient.  Reviewed by: MELANY Bridges      Assessment/Plan   Grant was seen today for hypertension, hypothyroidism and cough.    Diagnoses and all orders for this visit:    Essential hypertension  -     benazepril (LOTENSIN) 20 MG tablet; Take 1 tablet by mouth Daily.    Bilateral lower extremity edema  -     Uric acid  -     Basic Metabolic Panel    Seasonal allergies  -     azelastine (ASTELIN) 0.1 % nasal spray; 2 sprays into the nostril(s) as directed by provider 2 (Two) Times a Day. Use in each nostril as directed    Acute deep vein thrombosis (DVT) of popliteal vein of left lower extremity (CMS/HCC)  -     Duplex Venous Lower Extremity - Left CAR; Future    Encounter for screening colonoscopy  -     Ambulatory Referral For Screening Colonoscopy    -Blood pressure has improved with the addition of the HCTZ.  He is still having lower extremity edema.  Continue HCTZ.  We will discontinue amlodipine and increase benazepril from 10 to 20 mg daily.  He will check blood pressures once daily and record readings in a log.  He will start to use compression hose when on his feet for long periods of time.    -Uric acid level today to rule out gout as contributor to ankle pain.    -Seasonal allergies: I believe this may be the cause of his chronic dry \"tickle in the throat/cough.\"  Chest x-ray was clear 6/3/2020.  I have asked him to use Flonase consistently, daily we will add Astelin as well as Claritin.  Continue Singulair.  We will recheck on the phone visit in 3 weeks and if not improved we will get him back to an allergist.    -DVT: We will recheck the left lower extremity DVT with a lower extremity ultrasound " Doppler 3 months from date of diagnosis which would be late August/early September 2020.  Orders entered.    -He will be due for screening colonoscopy which I have ordered.    -We will contact patient with his lab and imaging results and any further recommendations.    -Follow-up PRN.  I will have a phone visit with him in 3 weeks to follow-up on blood pressure/leg swelling after discontinuing the amlodipine, continuing the HCTZ 12.5 mg daily and increasing benazepril to 20 mg daily.  We will follow-up on his cough/new allergy regimen at that time.  4-month follow-up in the office as well.

## 2020-07-09 LAB
BUN SERPL-MCNC: 13 MG/DL (ref 6–20)
BUN/CREAT SERPL: 10.9 (ref 7–25)
CALCIUM SERPL-MCNC: 9.5 MG/DL (ref 8.6–10.5)
CHLORIDE SERPL-SCNC: 103 MMOL/L (ref 98–107)
CO2 SERPL-SCNC: 26.5 MMOL/L (ref 22–29)
CREAT SERPL-MCNC: 1.19 MG/DL (ref 0.76–1.27)
GLUCOSE SERPL-MCNC: 98 MG/DL (ref 65–99)
POTASSIUM SERPL-SCNC: 3.7 MMOL/L (ref 3.5–5.2)
SODIUM SERPL-SCNC: 140 MMOL/L (ref 136–145)
URATE SERPL-MCNC: 7.8 MG/DL (ref 3.4–7)

## 2020-07-10 PROBLEM — E79.0 HYPERURICEMIA: Status: ACTIVE | Noted: 2020-07-10

## 2020-07-10 RX ORDER — METHYLPREDNISOLONE 4 MG/1
TABLET ORAL
Qty: 21 TABLET | Refills: 0 | Status: SHIPPED | OUTPATIENT
Start: 2020-07-10 | End: 2020-07-29

## 2020-07-10 NOTE — PROGRESS NOTES
I sent in a medrol pack for him due to potential gout flare but be aware that steroids can cause elevation in blood sugar while on the medication.

## 2020-07-10 NOTE — PROGRESS NOTES
Please notify pt that his uric acid level came back slightly high indicating gout could periodically be playing a role in joint pain. Please let me know of any acute flare ups of joint pain. Metabolic panel looks stable.

## 2020-07-13 ENCOUNTER — PREP FOR SURGERY (OUTPATIENT)
Dept: OTHER | Facility: HOSPITAL | Age: 50
End: 2020-07-13

## 2020-07-13 DIAGNOSIS — Z83.71 FH: COLON POLYPS: ICD-10-CM

## 2020-07-13 DIAGNOSIS — Z86.010 HX OF COLONIC POLYPS: Primary | ICD-10-CM

## 2020-07-22 DIAGNOSIS — I10 BENIGN ESSENTIAL HYPERTENSION: ICD-10-CM

## 2020-07-22 RX ORDER — METOPROLOL SUCCINATE 50 MG/1
50 TABLET, EXTENDED RELEASE ORAL DAILY
Qty: 90 TABLET | Refills: 0 | Status: SHIPPED | OUTPATIENT
Start: 2020-07-22 | End: 2020-10-20

## 2020-07-24 ENCOUNTER — HOSPITAL ENCOUNTER (OUTPATIENT)
Dept: CARDIOLOGY | Facility: HOSPITAL | Age: 50
Discharge: HOME | End: 2020-07-24
Admitting: NURSE PRACTITIONER

## 2020-07-24 DIAGNOSIS — I82.432 ACUTE DEEP VEIN THROMBOSIS (DVT) OF POPLITEAL VEIN OF LEFT LOWER EXTREMITY (HCC): ICD-10-CM

## 2020-07-24 LAB
BH CV LOWER VASCULAR LEFT COMMON FEMORAL AUGMENT: NORMAL
BH CV LOWER VASCULAR LEFT COMMON FEMORAL COMPETENT: NORMAL
BH CV LOWER VASCULAR LEFT COMMON FEMORAL COMPRESS: NORMAL
BH CV LOWER VASCULAR LEFT COMMON FEMORAL PHASIC: NORMAL
BH CV LOWER VASCULAR LEFT COMMON FEMORAL SPONT: NORMAL
BH CV LOWER VASCULAR LEFT DISTAL FEMORAL COMPRESS: NORMAL
BH CV LOWER VASCULAR LEFT GASTRONEMIUS COMPRESS: NORMAL
BH CV LOWER VASCULAR LEFT GREATER SAPH AK COMPRESS: NORMAL
BH CV LOWER VASCULAR LEFT GREATER SAPH BK COMPRESS: NORMAL
BH CV LOWER VASCULAR LEFT MID FEMORAL AUGMENT: NORMAL
BH CV LOWER VASCULAR LEFT MID FEMORAL COMPETENT: NORMAL
BH CV LOWER VASCULAR LEFT MID FEMORAL COMPRESS: NORMAL
BH CV LOWER VASCULAR LEFT MID FEMORAL PHASIC: NORMAL
BH CV LOWER VASCULAR LEFT MID FEMORAL SPONT: NORMAL
BH CV LOWER VASCULAR LEFT PERONEAL COMPRESS: NORMAL
BH CV LOWER VASCULAR LEFT POPLITEAL AUGMENT: NORMAL
BH CV LOWER VASCULAR LEFT POPLITEAL COMPETENT: NORMAL
BH CV LOWER VASCULAR LEFT POPLITEAL COMPRESS: NORMAL
BH CV LOWER VASCULAR LEFT POPLITEAL PHASIC: NORMAL
BH CV LOWER VASCULAR LEFT POPLITEAL SPONT: NORMAL
BH CV LOWER VASCULAR LEFT POSTERIOR TIBIAL COMPRESS: NORMAL
BH CV LOWER VASCULAR LEFT PROFUNDA FEMORAL COMPRESS: NORMAL
BH CV LOWER VASCULAR LEFT PROXIMAL FEMORAL COMPRESS: NORMAL
BH CV LOWER VASCULAR LEFT SAPHENOFEMORAL JUNCTION COMPRESS: NORMAL
BH CV LOWER VASCULAR RIGHT COMMON FEMORAL AUGMENT: NORMAL
BH CV LOWER VASCULAR RIGHT COMMON FEMORAL COMPETENT: NORMAL
BH CV LOWER VASCULAR RIGHT COMMON FEMORAL COMPRESS: NORMAL
BH CV LOWER VASCULAR RIGHT COMMON FEMORAL PHASIC: NORMAL
BH CV LOWER VASCULAR RIGHT COMMON FEMORAL SPONT: NORMAL

## 2020-07-24 PROCEDURE — 93971 EXTREMITY STUDY: CPT

## 2020-07-29 ENCOUNTER — OFFICE VISIT (OUTPATIENT)
Dept: INTERNAL MEDICINE | Facility: CLINIC | Age: 50
End: 2020-07-29

## 2020-07-29 DIAGNOSIS — R60.0 BILATERAL LOWER EXTREMITY EDEMA: ICD-10-CM

## 2020-07-29 DIAGNOSIS — I10 BENIGN ESSENTIAL HYPERTENSION: ICD-10-CM

## 2020-07-29 DIAGNOSIS — J30.2 SEASONAL ALLERGIES: Primary | ICD-10-CM

## 2020-07-29 DIAGNOSIS — R05.3 CHRONIC COUGH: ICD-10-CM

## 2020-07-29 DIAGNOSIS — R51.9 ACUTE NONINTRACTABLE HEADACHE, UNSPECIFIED HEADACHE TYPE: ICD-10-CM

## 2020-07-29 PROCEDURE — 99442 PR PHYS/QHP TELEPHONE EVALUATION 11-20 MIN: CPT | Performed by: NURSE PRACTITIONER

## 2020-07-29 RX ORDER — SUMATRIPTAN 25 MG/1
TABLET, FILM COATED ORAL
Qty: 30 TABLET | Refills: 0 | Status: SHIPPED | OUTPATIENT
Start: 2020-07-29 | End: 2022-01-13

## 2020-07-29 NOTE — PATIENT INSTRUCTIONS
"Please monitor blood pressure at home for goal of less than 130/80.    Hypertension, Adult  High blood pressure (hypertension) is when the force of blood pumping through the arteries is too strong. The arteries are the blood vessels that carry blood from the heart throughout the body. Hypertension forces the heart to work harder to pump blood and may cause arteries to become narrow or stiff. Untreated or uncontrolled hypertension can cause a heart attack, heart failure, a stroke, kidney disease, and other problems.  A blood pressure reading consists of a higher number over a lower number. Ideally, your blood pressure should be below 120/80. The first (\"top\") number is called the systolic pressure. It is a measure of the pressure in your arteries as your heart beats. The second (\"bottom\") number is called the diastolic pressure. It is a measure of the pressure in your arteries as the heart relaxes.  What are the causes?  The exact cause of this condition is not known. There are some conditions that result in or are related to high blood pressure.  What increases the risk?  Some risk factors for high blood pressure are under your control. The following factors may make you more likely to develop this condition:  · Smoking.  · Having type 2 diabetes mellitus, high cholesterol, or both.  · Not getting enough exercise or physical activity.  · Being overweight.  · Having too much fat, sugar, calories, or salt (sodium) in your diet.  · Drinking too much alcohol.  Some risk factors for high blood pressure may be difficult or impossible to change. Some of these factors include:  · Having chronic kidney disease.  · Having a family history of high blood pressure.  · Age. Risk increases with age.  · Race. You may be at higher risk if you are .  · Gender. Men are at higher risk than women before age 45. After age 65, women are at higher risk than men.  · Having obstructive sleep apnea.  · Stress.  What are the " signs or symptoms?  High blood pressure may not cause symptoms. Very high blood pressure (hypertensive crisis) may cause:  · Headache.  · Anxiety.  · Shortness of breath.  · Nosebleed.  · Nausea and vomiting.  · Vision changes.  · Severe chest pain.  · Seizures.  How is this diagnosed?  This condition is diagnosed by measuring your blood pressure while you are seated, with your arm resting on a flat surface, your legs uncrossed, and your feet flat on the floor. The cuff of the blood pressure monitor will be placed directly against the skin of your upper arm at the level of your heart. It should be measured at least twice using the same arm. Certain conditions can cause a difference in blood pressure between your right and left arms.  Certain factors can cause blood pressure readings to be lower or higher than normal for a short period of time:  · When your blood pressure is higher when you are in a health care provider's office than when you are at home, this is called white coat hypertension. Most people with this condition do not need medicines.  · When your blood pressure is higher at home than when you are in a health care provider's office, this is called masked hypertension. Most people with this condition may need medicines to control blood pressure.  If you have a high blood pressure reading during one visit or you have normal blood pressure with other risk factors, you may be asked to:  · Return on a different day to have your blood pressure checked again.  · Monitor your blood pressure at home for 1 week or longer.  If you are diagnosed with hypertension, you may have other blood or imaging tests to help your health care provider understand your overall risk for other conditions.  How is this treated?  This condition is treated by making healthy lifestyle changes, such as eating healthy foods, exercising more, and reducing your alcohol intake. Your health care provider may prescribe medicine if lifestyle  changes are not enough to get your blood pressure under control, and if:  · Your systolic blood pressure is above 130.  · Your diastolic blood pressure is above 80.  Your personal target blood pressure may vary depending on your medical conditions, your age, and other factors.  Follow these instructions at home:  Eating and drinking    · Eat a diet that is high in fiber and potassium, and low in sodium, added sugar, and fat. An example eating plan is called the DASH (Dietary Approaches to Stop Hypertension) diet. To eat this way:  ? Eat plenty of fresh fruits and vegetables. Try to fill one half of your plate at each meal with fruits and vegetables.  ? Eat whole grains, such as whole-wheat pasta, brown rice, or whole-grain bread. Fill about one fourth of your plate with whole grains.  ? Eat or drink low-fat dairy products, such as skim milk or low-fat yogurt.  ? Avoid fatty cuts of meat, processed or cured meats, and poultry with skin. Fill about one fourth of your plate with lean proteins, such as fish, chicken without skin, beans, eggs, or tofu.  ? Avoid pre-made and processed foods. These tend to be higher in sodium, added sugar, and fat.  · Reduce your daily sodium intake. Most people with hypertension should eat less than 1,500 mg of sodium a day.  · Do not drink alcohol if:  ? Your health care provider tells you not to drink.  ? You are pregnant, may be pregnant, or are planning to become pregnant.  · If you drink alcohol:  ? Limit how much you use to:  § 0-1 drink a day for women.  § 0-2 drinks a day for men.  ? Be aware of how much alcohol is in your drink. In the [place] one drink equals one 12 oz bottle of beer (355 mL), one 5 oz glass of wine (148 mL), or one 1½ oz glass of hard liquor (44 mL).  Lifestyle    · Work with your health care provider to maintain a healthy body weight or to lose weight. Ask what an ideal weight is for you.  · Get at least 30 minutes of exercise most days of the week. Activities  may include walking, swimming, or biking.  · Include exercise to strengthen your muscles (resistance exercise), such as Pilates or lifting weights, as part of your weekly exercise routine. Try to do these types of exercises for 30 minutes at least 3 days a week.  · Do not use any products that contain nicotine or tobacco, such as cigarettes, e-cigarettes, and chewing tobacco. If you need help quitting, ask your health care provider.  · Monitor your blood pressure at home as told by your health care provider.  · Keep all follow-up visits as told by your health care provider. This is important.  Medicines  · Take over-the-counter and prescription medicines only as told by your health care provider. Follow directions carefully. Blood pressure medicines must be taken as prescribed.  · Do not skip doses of blood pressure medicine. Doing this puts you at risk for problems and can make the medicine less effective.  · Ask your health care provider about side effects or reactions to medicines that you should watch for.  Contact a health care provider if you:  · Think you are having a reaction to a medicine you are taking.  · Have headaches that keep coming back (recurring).  · Feel dizzy.  · Have swelling in your ankles.  · Have trouble with your vision.  Get help right away if you:  · Develop a severe headache or confusion.  · Have unusual weakness or numbness.  · Feel faint.  · Have severe pain in your chest or abdomen.  · Vomit repeatedly.  · Have trouble breathing.  Summary  · Hypertension is when the force of blood pumping through your arteries is too strong. If this condition is not controlled, it may put you at risk for serious complications.  · Your personal target blood pressure may vary depending on your medical conditions, your age, and other factors. For most people, a normal blood pressure is less than 120/80.  · Hypertension is treated with lifestyle changes, medicines, or a combination of both. Lifestyle  changes include losing weight, eating a healthy, low-sodium diet, exercising more, and limiting alcohol.  This information is not intended to replace advice given to you by your health care provider. Make sure you discuss any questions you have with your health care provider.  Document Released: 12/18/2006 Document Revised: 08/28/2019 Document Reviewed: 08/28/2019  Elsevier Patient Education © 2020 [employer]

## 2020-07-29 NOTE — PROGRESS NOTES
Subjective   Grant Edge Jr. is a 49 y.o. male.   Chief Complaint   Patient presents with   • Leg Swelling     follow up   • Headache   • Allergies     follow up   You have chosen to receive care through a telephone visit. Do you consent to use a telephone visit for your medical care today? Yes      Patient presents for 3-week follow-up via telephone.  This is a 49-year-old male.  I saw him in the office on 7/8/2020 at which time we were following up on bilateral lower extremity edema.  He was still having issues with edema despite adding hydrochlorothiazide 12.5 mg daily.  At that time I discontinued his amlodipine and increased his benazepril from 10 to 20 mg daily.  He presents today for follow-up.  Reports that the edema is much better in his legs.  He has not been checking his blood pressure over the past few days but notes that on average they have been less than 130/80 since our visit.  Denies visual changes, shortness of breath or chest discomfort.    He has seasonal allergies and has had a chronic nonproductive cough over the past few months.  He had a chest x-ray in June 2019 which was clear.  At our last visit I added Astelin and Claritin and he continued Singulair.  He has had no fever, chills, shortness of breath or chest discomfort and denies any COVID-19 exposure.  Reports that the cough is still present at times, worse on the days when he is outside around pollen.  Endorses postnasal drip and rhinorrhea as well.  Again this has been a chronic issue over the past few months.  He denies body aches or shortness of breath.  He has been taking his allergy medication as prescribed.    He also complains of intermittent headaches over the past few months.  Reports that these are present daily to every other day.  They usually occur around the base of the head but also at times on the top of the head.  He denies temple pain.  No visual changes.  Tylenol helps, but only temporarily.  At times he feels light  sensitive.  He cannot take NSAIDs due to being on Eliquis for chronic DVT.  He states that the headaches feels similar to migraines.    He denies development of any other new issues today.       The following portions of the patient's history were reviewed and updated as appropriate: allergies, current medications, past family history, past medical history, past social history, past surgical history and problem list.    Review of Systems   Constitutional: Negative for activity change, chills, fatigue, fever, unexpected weight gain and unexpected weight loss.   HENT: Negative for congestion, hearing loss, postnasal drip, sinus pressure, sneezing, sore throat, swollen glands and tinnitus.    Eyes: Negative for photophobia, pain and visual disturbance.   Respiratory: Positive for cough. Negative for chest tightness, shortness of breath and wheezing.    Cardiovascular: Negative for chest pain, palpitations and leg swelling.   Gastrointestinal: Negative for abdominal distention, abdominal pain, constipation, diarrhea, nausea and vomiting.   Endocrine: Negative for polydipsia, polyphagia and polyuria.   Genitourinary: Negative for dysuria, frequency, hematuria and urgency.   Neurological: Positive for headache (  daily to QOD). Negative for dizziness, weakness and numbness.   All other systems reviewed and are negative.      Objective    There were no vitals taken for this visit.    Physical Exam   Constitutional: He is oriented to person, place, and time.   Neurological: He is alert and oriented to person, place, and time.   Psychiatric: Thought content normal.     Current outpatient and discharge medications have been reconciled for the patient.  Reviewed by: MELANY Bridges      Assessment/Plan   Grant was seen today for leg swelling, headache and allergies.    Diagnoses and all orders for this visit:    Seasonal allergies  -     Ambulatory Referral to Allergy    Chronic cough  -     Ambulatory Referral to  Allergy    Acute nonintractable headache, unspecified headache type  -     SUMAtriptan (IMITREX) 25 MG tablet; Take one tablet at onset of headache. May repeat dose one time in 2 hours if headache not relieved.    Benign essential hypertension    Bilateral lower extremity edema      -Seasonal allergies: Still present.  He has required allergy shots in the past and I believe this may be contributing to his chronic dry cough, postnasal drip and headache.  We will get him referred back to an allergist for further intervention.  Continue Astelin, Claritin and Singulair.    -Headache: We will try Imitrex.  These may be migraines.  I did ask him to monitor his blood pressure while having the headaches to ensure that no spikes in BP are contributing.  He will use the Imitrex very sparingly, cautiously and take Tylenol for less severe headaches.    -Bilateral lower extremity edema is mostly resolved after going off of amlodipine.  Continue with increased dose of benazepril and daily blood pressure checks.    This visit has been contacted via telephone therefore no physical exam was performed.    This visit has been rescheduled as a phone visit to comply with patient safety concerns in accordance with CDC recommendations. Total time of discussion was 15 minutes.

## 2020-08-10 ENCOUNTER — APPOINTMENT (OUTPATIENT)
Dept: SLEEP MEDICINE | Facility: HOSPITAL | Age: 50
End: 2020-08-10

## 2020-08-27 ENCOUNTER — OFFICE VISIT (OUTPATIENT)
Dept: SLEEP MEDICINE | Facility: HOSPITAL | Age: 50
End: 2020-08-27

## 2020-08-27 VITALS
WEIGHT: 315 LBS | SYSTOLIC BLOOD PRESSURE: 138 MMHG | BODY MASS INDEX: 45.1 KG/M2 | HEART RATE: 88 BPM | HEIGHT: 70 IN | DIASTOLIC BLOOD PRESSURE: 86 MMHG | OXYGEN SATURATION: 96 %

## 2020-08-27 DIAGNOSIS — G47.34 NOCTURNAL HYPOXEMIA: ICD-10-CM

## 2020-08-27 DIAGNOSIS — E66.01 MORBID (SEVERE) OBESITY DUE TO EXCESS CALORIES (HCC): ICD-10-CM

## 2020-08-27 DIAGNOSIS — G47.33 OSA (OBSTRUCTIVE SLEEP APNEA): Primary | ICD-10-CM

## 2020-08-27 PROCEDURE — G0463 HOSPITAL OUTPT CLINIC VISIT: HCPCS

## 2020-08-27 NOTE — PROGRESS NOTES
[facility]  Telephone: [phone] / Fax: [phone] [place]  Telephone: [phone] / Fax: [phone] Lulu Ram[facility]rez    Referring Physician: Patricia Peter APRN  PCP: Patricia Peter APRN    Reason for consult:  sleep apnea    Grant Edge Jr. is a 50 y.o.male  was [facility] in the [facility] today for re-evaluation of sleep apnea. He was diagnosed with extremely severe SUSAN in 2013 by [facility]. AHI on the diagnostic study was 91 and RDI 94. Lowest O2 saturation was 81%. He used the BIPAP 19/15 until 1 year ago.  He stopped using his machine due to restless and disturbed sleep pattern and difficulty going back to sleep. When he was using the machine, he was feeling better. His machine is 7 years old and does not report AHI. In the past 3 years he gained 15 lbs. It is unclear if his old machine is an auto device. It is now beyond useful motor life.    He returns to see us in view of loud snoring and would like to resume using the machine. He reports worsening EDS and fatigue.  He wakes up gasping/coughing and choking. He used to use nasal mask. He was last seen by me in 2017. He did not return for f/u since then.    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    ROS-+frequent urination, +frequent heartburn, rest is negative.    Grant Edge Jr.  has a past medical history of Abnormal ECG, Chest pain, DVT (deep venous thrombosis) (CMS/[facility]), GERD (gastroesophageal reflux disease), Hyperlipidemia, Hypertension, Hypokalemia, Myocardial infarction (CMS/[facility]), Simple goiter, Sinus bradycardia, and Vitamin D deficiency.    Current Medications:    Current Outpatient Medications:   •  apixaban (ELIQUIS) 5 MG tablet tablet, Take 1 tablet by mouth Every 12 (Twelve) Hours., Disp: 120 tablet, Rfl: 2  •  azelastine (ASTELIN) 0.1 % nasal spray, 2 sprays into the nostril(s) as directed by provider 2 (Two) Times a Day. Use in each nostril as directed,  "Disp: 30 mL, Rfl: 2  •  benazepril (LOTENSIN) 20 MG tablet, TAKE 1 TABLET BY MOUTH DAILY, Disp: 90 tablet, Rfl: 1  •  esomeprazole (nexIUM) 40 MG capsule, TAKE 1 CAPSULE BY MOUTH EVERY DAY, Disp: 30 capsule, Rfl: 5  •  fluticasone (FLONASE) 50 MCG/ACT nasal spray, 2 sprays into the nostril(s) as directed by provider Daily., Disp: 15.8 mL, Rfl: 1  •  hydroCHLOROthiazide (HYDRODIURIL) 12.5 MG tablet, Take 1 tablet by mouth Daily., Disp: 90 tablet, Rfl: 1  •  levothyroxine (SYNTHROID, LEVOTHROID) 125 MCG tablet, TAKE 1 TABLET BY MOUTH EVERY DAY, Disp: 30 tablet, Rfl: 0  •  metoprolol succinate XL (TOPROL-XL) 50 MG 24 hr tablet, TAKE 1 TABLET BY MOUTH DAILY, Disp: 90 tablet, Rfl: 0  •  montelukast (Singulair) 10 MG tablet, Take 1 tablet by mouth Every Night., Disp: 30 tablet, Rfl: 5  •  SUMAtriptan (IMITREX) 25 MG tablet, Take one tablet at onset of headache. May repeat dose one time in 2 hours if headache not relieved., Disp: 30 tablet, Rfl: 0    I have reviewed Past Medical History, Past Surgical History, Medication List, Social History and Family History as entered in Sleep Questionnaire and EPIC.    ESS  6   Vital Signs /86   Pulse 88   Ht 177.8 cm (70\")   Wt (!) 144 kg (318 lb)   SpO2 96%   BMI 45.63 kg/m²  Body mass index is 45.63 kg/m².    General Alert and oriented. No acute distress noted   Pharynx/Throat Class IV Mallampati airway, large tongue, no evidence of redundant lateral pharyngeal tissue. No oral lesions. No thrush. Moist mucous membranes.   Head Normocephalic. Symmetrical. Atraumatic.    Nose No septal deviation. No drainage   Chest Wall Normal shape. Symmetric expansion with respiration. No tenderness.   Neck Trachea midline, no thyromegaly or adenopathy    Lungs Clear to auscultation bilaterally. No wheezes. No rhonchi. No rales. Respirations regular, even and unlabored.   Heart Regular rhythm and normal rate. Normal S1 and S2. No murmur   Abdomen Soft, non-tender and non-distended. " Normal bowel sounds. No masses.   Extremities Moves all extremities well. No edema   Psychiatric Normal mood and affect.       Testing- through [facility]- in 2013- AHI 91, RDI 94, lowest desaturation 81%, failed CPAP, therefore recommended BIPAP 19/15.     Impression:  1. SUSAN (obstructive sleep apnea)    2. Nocturnal hypoxemia    3. Morbid (severe) obesity due to excess calories (CMS/[facility])          Plan:  CPAP/ BIPAP titration in the lab was scheduled.  He has extremely severe SUSAN.  We will check for the need for supplemental O2 during titration also. On current machine, his humidifier is broken. Machine does not report AHI. If insurance denies repeat titration, we will ask for the approval of a new machine-auto BIPAP with IPAP max 25, EPAP min 10, PS 4 and review his progress back in the office in 6-8 weeks.    I discussed the pathophysiology of obstructive sleep apnea with the patient.  We discussed the adverse outcomes associated with untreated sleep-disordered breathing.      Rx for Ambien 5mg x 1 was provided for in lab polysomnogram. Patient was instructed to bring the Ambien tablet to the sleep lab and take at lights out . Strict instructions were given to NOT take the Ambien at home.      I appreciate the opportunity to participate in this patient's care.      MELANY Pulido  [facility]  Phone: [phone]      Part of this note may be an electronic transcription/translation of spoken language to printed text using the Dragon Dictation System. Some errors may exist even though the document was edited.

## 2020-08-28 ENCOUNTER — TELEPHONE (OUTPATIENT)
Dept: SLEEP MEDICINE | Facility: HOSPITAL | Age: 50
End: 2020-08-28

## 2020-09-04 ENCOUNTER — APPOINTMENT (OUTPATIENT)
Dept: SLEEP MEDICINE | Facility: HOSPITAL | Age: 50
End: 2020-09-04

## 2020-10-09 DIAGNOSIS — I10 ESSENTIAL HYPERTENSION: ICD-10-CM

## 2020-10-09 RX ORDER — BENAZEPRIL HYDROCHLORIDE 20 MG/1
20 TABLET ORAL DAILY
Qty: 90 TABLET | Refills: 1 | Status: SHIPPED | OUTPATIENT
Start: 2020-10-09 | End: 2021-04-07

## 2020-10-12 ENCOUNTER — OFFICE VISIT (OUTPATIENT)
Dept: SLEEP MEDICINE | Facility: HOSPITAL | Age: 50
End: 2020-10-12

## 2020-10-12 VITALS
OXYGEN SATURATION: 96 % | SYSTOLIC BLOOD PRESSURE: 150 MMHG | DIASTOLIC BLOOD PRESSURE: 77 MMHG | BODY MASS INDEX: 45.1 KG/M2 | HEIGHT: 70 IN | HEART RATE: 91 BPM | WEIGHT: 315 LBS

## 2020-10-12 DIAGNOSIS — G47.33 OSA (OBSTRUCTIVE SLEEP APNEA): Primary | ICD-10-CM

## 2020-10-12 DIAGNOSIS — E66.01 MORBID (SEVERE) OBESITY DUE TO EXCESS CALORIES (HCC): ICD-10-CM

## 2020-10-12 DIAGNOSIS — G47.34 NOCTURNAL HYPOXEMIA: ICD-10-CM

## 2020-10-12 PROCEDURE — G0463 HOSPITAL OUTPT CLINIC VISIT: HCPCS

## 2020-10-12 NOTE — PROGRESS NOTES
[facility]  Telephone: [phone] / Fax: [phone] [place]  Telephone: [phone] / Fax: [phone] Lulu Ramírez    PCP: Patricia Peter APRN    Reason for visit: SUSAN f/u    Grant Edge Jr. is a 50 y.o.male  was last seen at [facility] sleep lab in August. We ordered him a new machine-auto BIPAP based on the old sleep study. He is here today to review his progress on the device. He uses nasal pillow mask which fits well. He denies snoring or gasping respirations. Health is stable. Current auto BIPAP pressures appear comfortable. He is on IPAP max 25, EPAP min 10, PS 4, avg pressure 14/10.    SH- no tobacco, no alcohol, 2 soda/tea per day, 0 energy drinks.    ROS- +GERD. Rest is negative.    DME [facility]     Current Medications:    Current Outpatient Medications:   •  apixaban (ELIQUIS) 5 MG tablet tablet, Take 1 tablet by mouth Every 12 (Twelve) Hours., Disp: 120 tablet, Rfl: 2  •  azelastine (ASTELIN) 0.1 % nasal spray, 2 sprays into the nostril(s) as directed by provider 2 (Two) Times a Day. Use in each nostril as directed, Disp: 30 mL, Rfl: 2  •  benazepril (LOTENSIN) 20 MG tablet, TAKE 1 TABLET BY MOUTH DAILY, Disp: 90 tablet, Rfl: 1  •  esomeprazole (nexIUM) 40 MG capsule, TAKE 1 CAPSULE BY MOUTH EVERY DAY, Disp: 30 capsule, Rfl: 5  •  fluticasone (FLONASE) 50 MCG/ACT nasal spray, 2 sprays into the nostril(s) as directed by provider Daily., Disp: 15.8 mL, Rfl: 1  •  hydroCHLOROthiazide (HYDRODIURIL) 12.5 MG tablet, Take 1 tablet by mouth Daily., Disp: 90 tablet, Rfl: 1  •  levothyroxine (SYNTHROID, LEVOTHROID) 125 MCG tablet, TAKE 1 TABLET BY MOUTH EVERY DAY, Disp: 30 tablet, Rfl: 0  •  metoprolol succinate XL (TOPROL-XL) 50 MG 24 hr tablet, TAKE 1 TABLET BY MOUTH DAILY, Disp: 90 tablet, Rfl: 0  •  montelukast (Singulair) 10 MG tablet, Take 1 tablet by mouth Every Night., Disp: 30 tablet, Rfl: 5  •  SUMAtriptan (IMITREX) 25 MG tablet, Take one tablet at onset of headache. May repeat  "dose one time in 2 hours if headache not relieved., Disp: 30 tablet, Rfl: 0   also entered in Sleep Questionnaire    Patient  has a past medical history of Abnormal ECG, Chest pain, DVT (deep venous thrombosis) (CMS/HCC), GERD (gastroesophageal reflux disease), Hyperlipidemia, Hypertension, Hypokalemia, Myocardial infarction (CMS/HCC), Simple goiter, Sinus bradycardia, and Vitamin D deficiency.    I have reviewed the Past Medical History, Past Surgical History, Social History and Family History.    Vital Signs /77   Pulse 91   Ht 177.8 cm (70\")   Wt (!) 148 kg (326 lb)   SpO2 96%   BMI 46.78 kg/m²  Body mass index is 46.78 kg/m².    General Alert and oriented. No acute distress noted   Pharynx/Throat Class IV Mallampati airway, large tongue, no evidence of redundant lateral pharyngeal tissue. No oral lesions. No thrush. Moist mucous membranes.   Head Normocephalic. Symmetrical. Atraumatic.    Nose No septal deviation. No drainage   Chest Wall Normal shape. Symmetric expansion with respiration. No tenderness.   Neck Trachea midline, no thyromegaly or adenopathy    Lungs Clear to auscultation bilaterally. No wheezes. No rhonchi. No rales. Respirations regular, even and unlabored.   Heart Regular rhythm and normal rate. Normal S1 and S2. No murmur   Abdomen Soft, non-tender and non-distended. Normal bowel sounds. No masses.   Extremities Moves all extremities well. No edema   Psychiatric Normal mood and affect.     Testing:  Download 9/3/20-10/8/20- 100% use with average nightly use of 6 hours and 0 minutes on auto BIPAP with IPAP max 25, EPAP min 10, PS 4, leak of 14 L/min    Study-He was diagnosed with extremely severe SUSAN in 2013 by [facility]. AHI on the diagnostic study was 91 and RDI 94. Lowest O2 saturation was 81%    Impression:  1. SUSAN (obstructive sleep apnea)    2. Nocturnal hypoxemia    3. Morbid (severe) obesity due to excess calories (CMS/HCC)          Plan:  Patient uses the BIPAP " device and benefits from its use in terms of reduction of hypersomnia and snoring.  AHI appears to be within adequate range. I reviewed download report and original sleep study report with the patient. I ordered overnight oximetry on BIPAP RA through [employer] to make sure current BIPAP settings are correcting desaturations. He likes his mask and current pressures appear comfortable. Continue same.    Weight loss will be strongly beneficial to reduce the severity of sleep-disordered breathing.  Caution during activities that require prolonged concentration is strongly advised if sleepiness returns. Changing of PAP supplies regularly is important for effective use.  Nasal pillows should be changed twice per month, tubing every 3 months, disposable filters twice per  month, water chamber every 6 months. Nasal mask frame should be changed every 3 months, nasal mask cushion twice per month and headgear every 6 months. Full face mask frame should be replaced every 3 months, full face cushion monthly and headgear every 6 months..    Follow up with Dr. Davalos in one year    Thank you for allowing me to participate in your patient's care.      MELANY Pulido  [facility]  Phone: [phone]      Part of this note may be an electronic transcription/translation of spoken language to printed text using the Dragon Dictation System.

## 2020-10-19 DIAGNOSIS — I10 BENIGN ESSENTIAL HYPERTENSION: ICD-10-CM

## 2020-10-20 RX ORDER — METOPROLOL SUCCINATE 50 MG/1
50 TABLET, EXTENDED RELEASE ORAL DAILY
Qty: 90 TABLET | Refills: 0 | Status: SHIPPED | OUTPATIENT
Start: 2020-10-20 | End: 2021-01-15

## 2020-10-21 ENCOUNTER — OFFICE VISIT (OUTPATIENT)
Dept: ORTHOPEDIC SURGERY | Facility: CLINIC | Age: 50
End: 2020-10-21

## 2020-10-21 VITALS — HEIGHT: 70 IN | BODY MASS INDEX: 45.1 KG/M2 | TEMPERATURE: 98.6 F | WEIGHT: 315 LBS

## 2020-10-21 DIAGNOSIS — M25.511 RIGHT SHOULDER PAIN, UNSPECIFIED CHRONICITY: Primary | ICD-10-CM

## 2020-10-21 DIAGNOSIS — M25.512 LEFT SHOULDER PAIN, UNSPECIFIED CHRONICITY: ICD-10-CM

## 2020-10-21 PROCEDURE — 20610 DRAIN/INJ JOINT/BURSA W/O US: CPT | Performed by: ORTHOPAEDIC SURGERY

## 2020-10-21 PROCEDURE — 73030 X-RAY EXAM OF SHOULDER: CPT | Performed by: ORTHOPAEDIC SURGERY

## 2020-10-21 PROCEDURE — 99214 OFFICE O/P EST MOD 30 MIN: CPT | Performed by: ORTHOPAEDIC SURGERY

## 2020-10-21 RX ORDER — METHYLPREDNISOLONE ACETATE 80 MG/ML
80 INJECTION, SUSPENSION INTRA-ARTICULAR; INTRALESIONAL; INTRAMUSCULAR; SOFT TISSUE
Status: COMPLETED | OUTPATIENT
Start: 2020-10-21 | End: 2020-10-21

## 2020-10-21 RX ADMIN — METHYLPREDNISOLONE ACETATE 80 MG: 80 INJECTION, SUSPENSION INTRA-ARTICULAR; INTRALESIONAL; INTRAMUSCULAR; SOFT TISSUE at 16:09

## 2020-10-21 NOTE — PROGRESS NOTES
Patient: Grant Edge Jr.    YOB: 1970    Medical Record Number: [id number]    Chief Complaints: New complaint of left shoulder pain    History of Present Illness:     50 y.o. male patient who presents for his left shoulder.  He rolled over in bed and felt a sharp pain about 3 weeks ago.  Prior to that, he had only occasional discomfort.  His pain has gotten a little better over the past few weeks.  He says it feels exactly like the right shoulder did before his injection.  Pain is moderate, constant and aching.  He reports that he still has good movement.  Sitting seems to aggravate his pain.  Reaching and lifting also aggravate his pain.  He has not noticed any alleviating factors.  Denies numbness, tingling or weakness.    Allergies: No Known Allergies    Home Medications:    Current Outpatient Medications:   •  apixaban (ELIQUIS) 5 MG tablet tablet, Take 1 tablet by mouth Every 12 (Twelve) Hours., Disp: 120 tablet, Rfl: 2  •  azelastine (ASTELIN) 0.1 % nasal spray, 2 sprays into the nostril(s) as directed by provider 2 (Two) Times a Day. Use in each nostril as directed, Disp: 30 mL, Rfl: 2  •  benazepril (LOTENSIN) 20 MG tablet, TAKE 1 TABLET BY MOUTH DAILY, Disp: 90 tablet, Rfl: 1  •  esomeprazole (nexIUM) 40 MG capsule, TAKE 1 CAPSULE BY MOUTH EVERY DAY, Disp: 30 capsule, Rfl: 5  •  fluticasone (FLONASE) 50 MCG/ACT nasal spray, 2 sprays into the nostril(s) as directed by provider Daily., Disp: 15.8 mL, Rfl: 1  •  hydroCHLOROthiazide (HYDRODIURIL) 12.5 MG tablet, Take 1 tablet by mouth Daily., Disp: 90 tablet, Rfl: 1  •  levothyroxine (SYNTHROID, LEVOTHROID) 125 MCG tablet, TAKE 1 TABLET BY MOUTH EVERY DAY, Disp: 30 tablet, Rfl: 0  •  metoprolol succinate XL (TOPROL-XL) 50 MG 24 hr tablet, TAKE 1 TABLET BY MOUTH DAILY, Disp: 90 tablet, Rfl: 0  •  montelukast (Singulair) 10 MG tablet, Take 1 tablet by mouth Every Night., Disp: 30 tablet, Rfl: 5  •  SUMAtriptan (IMITREX) 25 MG tablet, Take one  tablet at onset of headache. May repeat dose one time in 2 hours if headache not relieved., Disp: 30 tablet, Rfl: 0    Past Medical History:   Diagnosis Date   • Abnormal ECG    • Chest pain    • DVT (deep venous thrombosis) (CMS/HCC)    • GERD (gastroesophageal reflux disease)    • Hyperlipidemia    • Hypertension    • Hypokalemia    • Myocardial infarction (CMS/HCC)    • Simple goiter    • Sinus bradycardia    • Vitamin D deficiency        Past Surgical History:   Procedure Laterality Date   • CARDIAC CATHETERIZATION N/A 10/18/2016    Procedure: Left Heart Cath;  Surgeon: Daniel Rosas MD;  Location: [facility] CATH INVASIVE LOCATION;  Service:    • CHOLECYSTECTOMY     • SALIVARY GLAND SURGERY     • THYROIDECTOMY  2013   • TONSILLECTOMY     • TOTAL HIP ARTHROPLASTY REVISION Left 2015       Social History     Occupational History   • Not on file   Tobacco Use   • Smoking status: Never Smoker   • Smokeless tobacco: Never Used   Substance and Sexual Activity   • Alcohol use: No   • Drug use: No   • Sexual activity: Defer      Social History     Social History Narrative   • Not on file       Family History   Problem Relation Age of Onset   • Diabetes Father    • Heart disease Father        Review of Systems:      Constitutional: Denies fever, shaking or chills   Eyes: Denies change in visual acuity   HEENT: Denies nasal congestion or sore throat   Respiratory: Denies cough or shortness of breath   Cardiovascular: Denies chest pain or edema  Endocrine: Denies tremors, palpitations, intolerance of heat or cold, polyuria, polydipsia.  GI: Denies abdominal pain, nausea, vomiting, bloody stools or diarrhea  : Denies frequency, urgency, incontinence, retention, or nocturia.  Musculoskeletal: Denies numbness, tingling or loss of motor function except as above  Integument: Denies rash, lesion or ulceration   Neurologic: Denies headache or focal weakness, deficits  Heme: Denies spontaneous or excessive bleeding,  "epistaxis, hematuria, melena, fatigue, enlarged or tender lymph nodes.      All other pertinent positives and negatives as noted above in HPI.    Physical Exam: 50 y.o. male  Vitals:    10/21/20 1541   Temp: 98.6 °F (37 °C)   TempSrc: Temporal   Weight: (!) 143 kg (315 lb)   Height: 177.8 cm (70\")     General:  Patient is awake and alert.  Appears in no acute distress or discomfort.    Psych:  Affect and demeanor are appropriate.    Eyes:  Conjunctiva and sclera appear grossly normal.  Eyes track well and EOM seem to be intact.    Ears:  No gross abnormalities.  Hearing adequate for the exam.    Cardiovascular:  Regular rate and rhythm.    Lungs:  Good chest expansion.  Breathing unlabored.    Extremities: Left shoulder is examined. Skin is benign.  No obvious gross abnormalities.  No palpable masses or adenopathy.  Mild posterior tenderness..  Motion is good.  His internal rotation is limited by his body habitus but his motion is full otherwise.  No instability.  Rotator cuff strength seems to be well preserved but his exam is somewhat limited by discomfort.  Good motor and sensory function in the lower arm and hand.  Brisk capillary refill in hand.  Palpable radial pulse.  Good skin turgor.    Imaging: AP, scapular Y and axillary views left shoulder are ordered and reviewed.  No comparison films are available.  He appears to have moderate glenohumeral arthritis.  He has cyst in the glenoid similar to his other side.  On the axillary view there is what appears to be significant joint space narrowing.  He has a small osteophyte off of the caudal aspect of the humeral head.    Assessment/Plan: Left shoulder osteoarthritis    I showed him the x-rays and we discussed the significance of the findings.  We discussed his options.  He would like to try an injection.  The risk, benefits and alternatives were discussed.  He consented and the injection was performed as described below.  He will follow-up on an as-needed basis " for the left shoulder.    Of note, when I last saw him we ordered nerve studies.  Those were canceled due to the Covid pandemic.  He recently got those rescheduled.  He is scheduled to get those done in another month or so.  I told him I will call him once I see the results.    Omid Booker MD    10/21/2020        Large Joint Arthrocentesis: L glenohumeral  Date/Time: 10/21/2020 4:09 PM  Consent given by: patient  Site marked: site marked  Timeout: Immediately prior to procedure a time out was called to verify the correct patient, procedure, equipment, support staff and site/side marked as required   Supporting Documentation  Indications: pain   Procedure Details  Location: shoulder - L glenohumeral  Preparation: Patient was prepped and draped in the usual sterile fashion  Needle gauge: 21G.  Approach: anterior  Medications administered: 2 mL lidocaine (cardiac); 80 mg methylPREDNISolone acetate 80 MG/ML  Patient tolerance: patient tolerated the procedure well with no immediate complications

## 2020-10-28 RX ORDER — TRAMADOL HYDROCHLORIDE 50 MG/1
TABLET ORAL
Qty: 50 TABLET | Refills: 0 | Status: CANCELLED | OUTPATIENT
Start: 2020-10-28

## 2020-10-29 ENCOUNTER — TELEPHONE (OUTPATIENT)
Dept: ORTHOPEDIC SURGERY | Facility: CLINIC | Age: 50
End: 2020-10-29

## 2020-10-29 NOTE — TELEPHONE ENCOUNTER
I have added the other arm to the referral and spoke to Gi lawson to have it changed to 2 extremities instead of one. I also left a message for Jana in scheduling to make sure there is nothing else for us to do on our end.

## 2020-10-29 NOTE — TELEPHONE ENCOUNTER
----- Message from Grant Edge Jr. sent at 10/28/2020  5:21 PM EDT -----  Regarding: Visit Follow-Up Question  Contact: [phone]  MY CHART MESSAGE:    Yes, the numbness and tingling is on my left side from my elbow down to my fingers on my left side also.     Thanks

## 2020-10-29 NOTE — TELEPHONE ENCOUNTER
Okay.  Lets see if they can do bilateral's instead of just the right side.  Thanks.  May have to enter a new order for this…

## 2020-10-30 ENCOUNTER — TELEPHONE (OUTPATIENT)
Dept: ORTHOPEDIC SURGERY | Facility: CLINIC | Age: 50
End: 2020-10-30

## 2020-10-30 DIAGNOSIS — R20.0 NUMBNESS AND TINGLING IN RIGHT HAND: Primary | ICD-10-CM

## 2020-10-30 DIAGNOSIS — R20.2 NUMBNESS AND TINGLING IN LEFT HAND: ICD-10-CM

## 2020-10-30 DIAGNOSIS — R20.2 NUMBNESS AND TINGLING IN RIGHT HAND: Primary | ICD-10-CM

## 2020-10-30 DIAGNOSIS — R20.0 NUMBNESS AND TINGLING IN LEFT HAND: ICD-10-CM

## 2020-11-02 RX ORDER — LEVOTHYROXINE SODIUM 0.12 MG/1
TABLET ORAL
Qty: 90 TABLET | Refills: 1 | Status: SHIPPED | OUTPATIENT
Start: 2020-11-02 | End: 2021-03-12 | Stop reason: DRUGHIGH

## 2020-11-04 ENCOUNTER — HOSPITAL ENCOUNTER (OUTPATIENT)
Dept: INFUSION THERAPY | Facility: HOSPITAL | Age: 50
Discharge: HOME | End: 2020-11-04
Admitting: PSYCHIATRY & NEUROLOGY

## 2020-11-04 DIAGNOSIS — R20.2 NUMBNESS AND TINGLING IN LEFT HAND: ICD-10-CM

## 2020-11-04 DIAGNOSIS — R20.2 NUMBNESS AND TINGLING IN RIGHT HAND: ICD-10-CM

## 2020-11-04 DIAGNOSIS — R20.0 NUMBNESS AND TINGLING IN LEFT HAND: ICD-10-CM

## 2020-11-04 DIAGNOSIS — R20.0 NUMBNESS AND TINGLING IN RIGHT HAND: ICD-10-CM

## 2020-11-04 PROCEDURE — 95911 NRV CNDJ TEST 9-10 STUDIES: CPT

## 2020-11-04 PROCEDURE — 95886 MUSC TEST DONE W/N TEST COMP: CPT

## 2020-11-04 PROCEDURE — 95886 MUSC TEST DONE W/N TEST COMP: CPT | Performed by: PSYCHIATRY & NEUROLOGY

## 2020-11-04 PROCEDURE — 95911 NRV CNDJ TEST 9-10 STUDIES: CPT | Performed by: PSYCHIATRY & NEUROLOGY

## 2020-11-04 NOTE — PROCEDURES
EMG and Nerve Conduction Studies    I.      Instrument used: Neuromax 1002  II.     Please see data sheets for tabular summary of NCS and details on methods, temperatures and lab standards.   III.    EMG muscles tested for upper extremity studies include the deltoid, biceps, triceps, pronator teres, extensor digitorum communis, first dorsal interosseous and abductor pollicis brevis.    IV.   EMG muscles tested for lower extremity studies include the vastus lateralis, tibialis anterior, peroneus longus, medial gastrocnemius and extensor digitorum brevis.    V.    Additional muscles tested as needed.  Paraspinal muscles tested as needed.   VI.   Please see data sheets for tabular summary of EMG findings.   VII. The complete report includes the data sheets.      Indication: Numbness in both upper extremities  History: 50-year-old -American male with onset of pain in the right upper extremity and numbness in the arm and hand with some improvement in the pain only she developed pain in the left upper extremity with numbness in the hand.  He notices some clumsiness of the left hand perhaps more so than on the right.  He does have some degree of neck pain in addition.  Recent hemoglobin A1c was 6% consistent with prediabetes.  The patient is on Eliquis for DVT.      Ht: 177.8 cm  Wt: 143 kg; BMI 45.2  HbA1C:   Lab Results   Component Value Date    HGBA1C 6.00 (H) 06/03/2020     TSH:   Lab Results   Component Value Date    TSH 0.607 06/03/2020       Technical summary:  Nerve conduction studies were obtained in both arms.  Skin temperatures were at least 32 °C measured on the palms.  Needle examination was obtained on selected muscles in both arms.    Results:  1.  Prolonged median sensory latencies bilaterally at 4.1 ms on the left and 4.2 ms on the right with normal amplitudes.  2.  Normal ulnar sensory latencies bilaterally at 3.6 ms each with normal amplitudes.  3.  Normal right radial sensory study.  4.   Prolonged median motor latencies bilaterally at 5.1 ms on the left and 5.2 ms on the right with normal conduction velocities and amplitudes bilaterally.  5.  Slow left ulnar motor conduction velocity in the short segment across the elbow at 41.1 m/s with normal velocity below the elbow.  Normal distal latency and amplitudes.  Slow right ulnar motor conduction velocity in the short segment across the elbow at 37 m/s with normal velocity below the elbow.  Normal distal latency and amplitudes.  6.  Normal left radial motor conduction velocity across the spiral groove with normal distal latency across the radial tunnel.  The amplitudes were normal.  7.  Needle examination of selected muscles in both arms was complex.  There were normal insertional activities in both abductor pollicis brevis and first dorsal interosseous muscles with essentially normal motor units and normal or only questionably abnormal recruitment patterns on both sides.  More proximally there were fibrillations and positive sharp waves in the left extensor indicis with a mild increased number of large motor units increased firing rate and reduced interference pattern.  The left extensor digitorum commonness showed normal insertional activities with normal-appearing motor units but the firing rate was increased and the interference pattern reduced.  The right extensor digitorum communis was normal.  There were 2+ positive sharp waves in the left pronator teres with a question of increased number of large motor units and questionably reduced interference pattern.  The right deltoid showed normal insertional activities with a question of increased number of large motor units and reduced interference pattern.  Remaining muscles tested showed normal insertional activities, motor units and recruitment.  Cervical paraspinals were not studied since the patient is on Eliquis.    Impression:  Complex abnormal study.  The patient has moderate bilateral median  neuropathies at the wrists and moderate bilateral ulnar neuropathies at the elbows.  This pattern is consistent with a more diffuse peripheral neuropathy.    In addition to this there are needle exam changes involving the left extensor digitorum communis and left extensor indicis without abnormalities of the left radial motor study.  This points to a proximal source either in the brachial plexus or at the C8 root level.  Unfortunately other more typical findings for a lower trunk plexopathy or a C8 radiculopathy were not seen.  There were irritative changes in the left pronator teres which are considered an outlier but could point to involvement of the plexus or at the root level such as C6 or C7.  Clinical correlation is suggested.  Study results were discussed with the patient.    Alejandro Hargrove M.D.              Dictated utilizing Dragon dictation.

## 2020-11-11 ENCOUNTER — OFFICE VISIT (OUTPATIENT)
Dept: INTERNAL MEDICINE | Facility: CLINIC | Age: 50
End: 2020-11-11

## 2020-11-11 ENCOUNTER — RESULTS ENCOUNTER (OUTPATIENT)
Dept: INTERNAL MEDICINE | Facility: CLINIC | Age: 50
End: 2020-11-11

## 2020-11-11 VITALS
HEART RATE: 102 BPM | BODY MASS INDEX: 45.1 KG/M2 | OXYGEN SATURATION: 95 % | HEIGHT: 70 IN | DIASTOLIC BLOOD PRESSURE: 96 MMHG | RESPIRATION RATE: 17 BRPM | WEIGHT: 315 LBS | SYSTOLIC BLOOD PRESSURE: 150 MMHG | TEMPERATURE: 98.5 F

## 2020-11-11 DIAGNOSIS — R35.1 NOCTURIA: ICD-10-CM

## 2020-11-11 DIAGNOSIS — R10.9 LEFT FLANK PAIN: ICD-10-CM

## 2020-11-11 DIAGNOSIS — R73.03 PREDIABETES: ICD-10-CM

## 2020-11-11 DIAGNOSIS — E78.2 MIXED HYPERLIPIDEMIA: ICD-10-CM

## 2020-11-11 DIAGNOSIS — I10 BENIGN ESSENTIAL HYPERTENSION: ICD-10-CM

## 2020-11-11 DIAGNOSIS — I82.432 ACUTE DEEP VEIN THROMBOSIS (DVT) OF POPLITEAL VEIN OF LEFT LOWER EXTREMITY (HCC): Primary | ICD-10-CM

## 2020-11-11 DIAGNOSIS — Z00.00 HEALTHCARE MAINTENANCE: ICD-10-CM

## 2020-11-11 DIAGNOSIS — E03.8 OTHER SPECIFIED HYPOTHYROIDISM: ICD-10-CM

## 2020-11-11 DIAGNOSIS — E79.0 HYPERURICEMIA: ICD-10-CM

## 2020-11-11 DIAGNOSIS — Z23 NEEDS FLU SHOT: ICD-10-CM

## 2020-11-11 PROCEDURE — 99214 OFFICE O/P EST MOD 30 MIN: CPT | Performed by: NURSE PRACTITIONER

## 2020-11-11 PROCEDURE — 90686 IIV4 VACC NO PRSV 0.5 ML IM: CPT | Performed by: NURSE PRACTITIONER

## 2020-11-11 PROCEDURE — 90471 IMMUNIZATION ADMIN: CPT | Performed by: NURSE PRACTITIONER

## 2020-11-11 NOTE — PROGRESS NOTES
"Subjective   Grant Edge Jr. is a 50 y.o. male.   Chief Complaint   Patient presents with   • Follow-up     Pt presents here today for a follow up.   • Hypertension   • Hypothyroidism   • Hyperlipidemia       Patient presents for 4-month follow-up.  This is a 50-year-old male.    He has hypertension and takes Toprol-XL 50 mg daily and benazepril 20 mg daily.  He has not been taking hydrochlorothiazide 12.5 mg daily.  Blood pressure is high in the office today at 150/96.  Denies headaches, visual changes, shortness of breath or chest discomfort.  He has not been checking BP regularly at home.    He has chronic DVT, multiple unprovoked in his history and is now taking Eliquis 5 mg twice daily.  He follows with Dr. Cwoart, hematology.  Denies any abnormal bleeding with the Eliquis.    He is prediabetic, last A1c 6.0.    He has hypothyroidism and takes levothyroxine 125 mcg daily reporting good compliance with this medication.    He has had periodic left flank pain for the past week.  This comes and goes very sporadically, not brought on by any certain movement or time of day.  He describes it as \"not severe at all, a twinge of pain.\"  Denies constipation, blood in the stool, dysuria or cloudy urine.  No fever or chills.  Denies any known history of renal lithiasis.    He would like a flu shot today.    Denies development of any other new issues today.       The following portions of the patient's history were reviewed and updated as appropriate: allergies, current medications, past family history, past medical history, past social history, past surgical history and problem list.    Review of Systems   Constitutional: Negative for activity change, chills, fatigue, fever, unexpected weight gain and unexpected weight loss.   HENT: Negative for congestion, hearing loss, postnasal drip, sinus pressure, sneezing, sore throat, swollen glands and tinnitus.    Eyes: Negative for photophobia, pain and visual disturbance. " "  Respiratory: Negative for cough, chest tightness, shortness of breath and wheezing.    Cardiovascular: Negative for chest pain, palpitations and leg swelling.   Gastrointestinal: Negative for abdominal distention, abdominal pain, constipation, diarrhea, nausea and vomiting.   Endocrine: Negative for polydipsia, polyphagia and polyuria.   Genitourinary: Positive for flank pain (  L). Negative for dysuria, frequency, hematuria and urgency.   Neurological: Negative for dizziness, weakness, numbness and headache.   All other systems reviewed and are negative.      Objective    /96 (BP Location: Right arm, Patient Position: Sitting, Cuff Size: Large Adult)   Pulse 102   Temp 98.5 °F (36.9 °C) (Oral)   Resp 17   Ht 177.8 cm (70\")   Wt (!) 147 kg (323 lb)   SpO2 95%   BMI 46.35 kg/m²     Physical Exam  Vitals signs and nursing note reviewed.   Constitutional:       General: He is not in acute distress.     Appearance: Normal appearance. He is well-developed. He is obese. He is not ill-appearing, toxic-appearing or diaphoretic.   HENT:      Head: Normocephalic and atraumatic.      Right Ear: Tympanic membrane, ear canal and external ear normal.      Left Ear: Tympanic membrane, ear canal and external ear normal.      Nose: Nose normal.      Mouth/Throat:      Mouth: Mucous membranes are moist.      Pharynx: Oropharynx is clear.   Eyes:      Pupils: Pupils are equal, round, and reactive to light.   Neck:      Musculoskeletal: Normal range of motion and neck supple.   Cardiovascular:      Rate and Rhythm: Normal rate and regular rhythm.      Pulses: Normal pulses.      Heart sounds: Normal heart sounds.      Comments: No peripheral edema  Pulmonary:      Effort: Pulmonary effort is normal. No respiratory distress.      Breath sounds: Normal breath sounds. No stridor. No wheezing, rhonchi or rales.   Chest:      Chest wall: No tenderness.   Abdominal:      General: Bowel sounds are normal. There is no " distension.      Palpations: Abdomen is soft. There is no mass.      Tenderness: There is no abdominal tenderness. There is no right CVA tenderness, left CVA tenderness, guarding or rebound.      Hernia: No hernia is present.   Musculoskeletal: Normal range of motion.   Skin:     General: Skin is warm and dry.      Capillary Refill: Capillary refill takes less than 2 seconds.   Neurological:      General: No focal deficit present.      Mental Status: He is alert and oriented to person, place, and time. Mental status is at baseline.   Psychiatric:         Mood and Affect: Mood normal.         Behavior: Behavior normal.         Thought Content: Thought content normal.         Judgment: Judgment normal.       Current outpatient and discharge medications have been reconciled for the patient.  Reviewed by: MELANY Bridges      Assessment/Plan   Diagnoses and all orders for this visit:    1. Acute deep vein thrombosis (DVT) of popliteal vein of left lower extremity (CMS/HCC) (Primary)    2. Benign essential hypertension  -     CBC No Differential; Future  -     Comprehensive metabolic panel; Future    3. Mixed hyperlipidemia  -     Lipid panel; Future    4. Prediabetes  -     Hemoglobin A1c; Future    5. Healthcare maintenance    6. Other specified hypothyroidism  -     TSH; Future  -     T4, Free; Future    7. Nocturia  -     PSA DIAGNOSTIC ONLY; Future    8. Needs flu shot  -     FluLaval Quad >6 Months ([id number])    9. Left flank pain  -     Urinalysis With Culture If Indicated - Urine, Clean Catch; Future    10. Hyperuricemia  -     Uric acid; Future    -DVT: Continue with Eliquis.  No abnormal bleeding.  Continue routine hematology follow-ups.    -Hypertension: Uncontrolled today at 150/96.  Routine home monitoring recommended for goal of less than 130/80.  Continue benazepril, Toprol-XL and he will add back hydrochlorothiazide. I asked him to monitor BP daily in a log and we will follow up with a phone visit in  three weeks to see if titration of dosing is needed. Discussed DASH diet.     -HLD: Continue diet modifications. Lipid panel and will adjust tx if needed.     -Prediabetes: A1C last 6.0. Recheck with labs and will adjust tx if needed. Discussed diet modifications.     -Healthcare maintenance: Flu shot today. PSA.     -Hypothyroidism: Continue levothyroxine 125 mcg daily. TSH, free T4 and will adjust tx if needed.     -L flank pain: Urinalysis. Likely musculoskeletal. Will check urinalysis, knows to proceed to ER if pain persists or worsens.     -Hyperuricemia: Recheck of uric acid.  If needed can hold hydrochlorothiazide.    We will contact patient with his lab results and any further recommendations.  Follow-up as needed and I will see him back in 4 months for recheck of chronic conditions/routine health maintenance.

## 2020-11-12 ENCOUNTER — RESULTS ENCOUNTER (OUTPATIENT)
Dept: INTERNAL MEDICINE | Facility: CLINIC | Age: 50
End: 2020-11-12

## 2020-11-12 DIAGNOSIS — E78.2 MIXED HYPERLIPIDEMIA: ICD-10-CM

## 2020-11-12 DIAGNOSIS — R35.1 NOCTURIA: ICD-10-CM

## 2020-11-12 DIAGNOSIS — I10 BENIGN ESSENTIAL HYPERTENSION: ICD-10-CM

## 2020-11-12 DIAGNOSIS — E03.8 OTHER SPECIFIED HYPOTHYROIDISM: ICD-10-CM

## 2020-11-12 DIAGNOSIS — R73.03 PREDIABETES: ICD-10-CM

## 2020-11-12 DIAGNOSIS — E79.0 HYPERURICEMIA: ICD-10-CM

## 2020-11-13 LAB
ALBUMIN SERPL-MCNC: 4.3 G/DL (ref 3.5–5.2)
ALBUMIN/GLOB SERPL: 1.8 G/DL
ALP SERPL-CCNC: 106 U/L (ref 39–117)
ALT SERPL-CCNC: 18 U/L (ref 1–41)
APPEARANCE UR: CLEAR
AST SERPL-CCNC: 17 U/L (ref 1–40)
BACTERIA #/AREA URNS HPF: NORMAL /HPF
BILIRUB SERPL-MCNC: 1.5 MG/DL (ref 0–1.2)
BILIRUB UR QL STRIP: NEGATIVE
BUN SERPL-MCNC: 10 MG/DL (ref 6–20)
BUN/CREAT SERPL: 8.6 (ref 7–25)
CALCIUM SERPL-MCNC: 9.4 MG/DL (ref 8.6–10.5)
CHLORIDE SERPL-SCNC: 98 MMOL/L (ref 98–107)
CHOLEST SERPL-MCNC: 146 MG/DL (ref 0–200)
CO2 SERPL-SCNC: 29.3 MMOL/L (ref 22–29)
COLOR UR: YELLOW
CREAT SERPL-MCNC: 1.16 MG/DL (ref 0.76–1.27)
EPI CELLS #/AREA URNS HPF: NORMAL /HPF (ref 0–10)
ERYTHROCYTE [DISTWIDTH] IN BLOOD BY AUTOMATED COUNT: 13.1 % (ref 12.3–15.4)
GLOBULIN SER CALC-MCNC: 2.4 GM/DL
GLUCOSE SERPL-MCNC: 78 MG/DL (ref 65–99)
GLUCOSE UR QL: NEGATIVE
HBA1C MFR BLD: 6 % (ref 4.8–5.6)
HCT VFR BLD AUTO: 51.6 % (ref 37.5–51)
HDLC SERPL-MCNC: 45 MG/DL (ref 40–60)
HGB BLD-MCNC: 16.4 G/DL (ref 13–17.7)
HGB UR QL STRIP: ABNORMAL
KETONES UR QL STRIP: NEGATIVE
LDLC SERPL CALC-MCNC: 87 MG/DL (ref 0–100)
LEUKOCYTE ESTERASE UR QL STRIP: NEGATIVE
MCH RBC QN AUTO: 29.8 PG (ref 26.6–33)
MCHC RBC AUTO-ENTMCNC: 31.8 G/DL (ref 31.5–35.7)
MCV RBC AUTO: 93.6 FL (ref 79–97)
MICRO URNS: ABNORMAL
MUCOUS THREADS URNS QL MICRO: PRESENT /HPF
NITRITE UR QL STRIP: NEGATIVE
PH UR STRIP: 5.5 [PH] (ref 5–7.5)
PLATELET # BLD AUTO: 257 10*3/MM3 (ref 140–450)
POTASSIUM SERPL-SCNC: 4 MMOL/L (ref 3.5–5.2)
PROT SERPL-MCNC: 6.7 G/DL (ref 6–8.5)
PROT UR QL STRIP: NEGATIVE
PSA SERPL-MCNC: 1.85 NG/ML (ref 0–4)
RBC # BLD AUTO: 5.51 10*6/MM3 (ref 4.14–5.8)
RBC #/AREA URNS HPF: NORMAL /HPF (ref 0–2)
SODIUM SERPL-SCNC: 136 MMOL/L (ref 136–145)
SP GR UR: 1.02 (ref 1–1.03)
T4 FREE SERPL-MCNC: 1.52 NG/DL (ref 0.93–1.7)
TRIGL SERPL-MCNC: 68 MG/DL (ref 0–150)
TSH SERPL DL<=0.005 MIU/L-ACNC: 1.35 UIU/ML (ref 0.27–4.2)
URATE SERPL-MCNC: 6.7 MG/DL (ref 3.4–7)
URINALYSIS REFLEX: ABNORMAL
UROBILINOGEN UR STRIP-MCNC: 0.2 MG/DL (ref 0.2–1)
VLDLC SERPL CALC-MCNC: 14 MG/DL (ref 5–40)
WBC # BLD AUTO: 8.29 10*3/MM3 (ref 3.4–10.8)
WBC #/AREA URNS HPF: NORMAL /HPF (ref 0–5)

## 2020-11-13 NOTE — PROGRESS NOTES
Please notify patient that his blood count is stable with no anemia.  Metabolic panel stable including kidney, liver function and electrolytes.  Cholesterol panel looks stable.  A1c is the same as last check, 6.0, stable.  Thyroid is normal.  PSA is normal.  Uric acid is normal.  Urine looks clear, please let me know if the flank pain persists or worsens.

## 2020-11-16 ENCOUNTER — TELEPHONE (OUTPATIENT)
Dept: ORTHOPEDIC SURGERY | Facility: CLINIC | Age: 50
End: 2020-11-16

## 2020-11-16 DIAGNOSIS — R20.2 NUMBNESS AND TINGLING IN RIGHT HAND: ICD-10-CM

## 2020-11-16 DIAGNOSIS — R20.0 NUMBNESS AND TINGLING IN LEFT HAND: Primary | ICD-10-CM

## 2020-11-16 DIAGNOSIS — R20.0 NUMBNESS AND TINGLING IN RIGHT HAND: ICD-10-CM

## 2020-11-16 DIAGNOSIS — R20.2 NUMBNESS AND TINGLING IN LEFT HAND: Primary | ICD-10-CM

## 2020-11-16 NOTE — TELEPHONE ENCOUNTER
I spoke with him.  We discussed his nerve study results.  He says he is having pain shooting from both arms into his hands.  He also gets some occasional pain in the back of his shoulders which seems to be associated with certain movements of his head and neck.  Denies any neck pain per se though.  I told him his nerve studies do show some questionable evidence for C8 radiculopathy.  Given his symptomatology, I am going to set him up for an MRI of the cervical spine.  I told him I will call him once I see the results.

## 2020-11-24 ENCOUNTER — HOSPITAL ENCOUNTER (OUTPATIENT)
Dept: MRI IMAGING | Facility: HOSPITAL | Age: 50
Discharge: HOME | End: 2020-11-24

## 2020-11-24 DIAGNOSIS — R20.2 NUMBNESS AND TINGLING IN RIGHT HAND: ICD-10-CM

## 2020-11-24 DIAGNOSIS — R20.0 NUMBNESS AND TINGLING IN RIGHT HAND: ICD-10-CM

## 2020-11-24 DIAGNOSIS — R20.2 NUMBNESS AND TINGLING IN LEFT HAND: ICD-10-CM

## 2020-11-24 DIAGNOSIS — R20.0 NUMBNESS AND TINGLING IN LEFT HAND: ICD-10-CM

## 2020-12-15 ENCOUNTER — HOSPITAL ENCOUNTER (OUTPATIENT)
Dept: MRI IMAGING | Facility: HOSPITAL | Age: 50
Discharge: HOME | End: 2020-12-15

## 2020-12-16 RX ORDER — DIAZEPAM 5 MG/1
TABLET ORAL
Qty: 1 TABLET | Refills: 0 | Status: SHIPPED | OUTPATIENT
Start: 2020-12-16 | End: 2021-08-12

## 2020-12-16 NOTE — TELEPHONE ENCOUNTER
Per [facility] Scheduling for MRI [facility] 12/15 - The following patient is claustrophobic & needs to reschedule w/ meds. Can you ask Dr. Booker if he prefers pt to take Vallium before arriving or does patient need Anesthesia? Please advise

## 2021-01-07 ENCOUNTER — APPOINTMENT (OUTPATIENT)
Dept: MRI IMAGING | Facility: HOSPITAL | Age: 51
End: 2021-01-07

## 2021-01-11 ENCOUNTER — APPOINTMENT (OUTPATIENT)
Dept: MRI IMAGING | Facility: HOSPITAL | Age: 51
End: 2021-01-11

## 2021-01-15 DIAGNOSIS — I10 BENIGN ESSENTIAL HYPERTENSION: ICD-10-CM

## 2021-01-15 RX ORDER — METOPROLOL SUCCINATE 50 MG/1
50 TABLET, EXTENDED RELEASE ORAL DAILY
Qty: 90 TABLET | Refills: 0 | Status: SHIPPED | OUTPATIENT
Start: 2021-01-15 | End: 2021-04-23

## 2021-01-25 ENCOUNTER — TELEPHONE (OUTPATIENT)
Dept: ORTHOPEDIC SURGERY | Facility: CLINIC | Age: 51
End: 2021-01-25

## 2021-01-25 NOTE — TELEPHONE ENCOUNTER
PER [facility] SCHEDULING PT CALLED TO CANCEL MRI, EMERGENCY CAME UP AND HE STATED HE IS FEELING BETTER, HE WILL CALL BACK IF NEEDED.

## 2021-01-26 ENCOUNTER — APPOINTMENT (OUTPATIENT)
Dept: MRI IMAGING | Facility: HOSPITAL | Age: 51
End: 2021-01-26

## 2021-03-11 ENCOUNTER — OFFICE VISIT (OUTPATIENT)
Dept: INTERNAL MEDICINE | Facility: CLINIC | Age: 51
End: 2021-03-11

## 2021-03-11 VITALS
BODY MASS INDEX: 44.67 KG/M2 | DIASTOLIC BLOOD PRESSURE: 78 MMHG | WEIGHT: 312 LBS | SYSTOLIC BLOOD PRESSURE: 124 MMHG | HEIGHT: 70 IN | TEMPERATURE: 97.5 F | HEART RATE: 56 BPM | OXYGEN SATURATION: 98 %

## 2021-03-11 DIAGNOSIS — R73.03 PREDIABETES: ICD-10-CM

## 2021-03-11 DIAGNOSIS — E66.01 CLASS 3 SEVERE OBESITY WITH BODY MASS INDEX (BMI) OF 40.0 TO 44.9 IN ADULT, UNSPECIFIED OBESITY TYPE, UNSPECIFIED WHETHER SERIOUS COMORBIDITY PRESENT (HCC): ICD-10-CM

## 2021-03-11 DIAGNOSIS — E03.8 OTHER SPECIFIED HYPOTHYROIDISM: ICD-10-CM

## 2021-03-11 DIAGNOSIS — I10 BENIGN ESSENTIAL HYPERTENSION: Primary | ICD-10-CM

## 2021-03-11 DIAGNOSIS — Z00.00 HEALTHCARE MAINTENANCE: ICD-10-CM

## 2021-03-11 DIAGNOSIS — I82.432 ACUTE DEEP VEIN THROMBOSIS (DVT) OF POPLITEAL VEIN OF LEFT LOWER EXTREMITY (HCC): ICD-10-CM

## 2021-03-11 DIAGNOSIS — R35.1 NOCTURIA: ICD-10-CM

## 2021-03-11 PROCEDURE — 99214 OFFICE O/P EST MOD 30 MIN: CPT | Performed by: NURSE PRACTITIONER

## 2021-03-11 RX ORDER — LEVOCETIRIZINE DIHYDROCHLORIDE 5 MG/1
TABLET, FILM COATED ORAL
COMMUNITY
Start: 2021-02-19 | End: 2022-01-13

## 2021-03-11 NOTE — ASSESSMENT & PLAN NOTE
Following with Dr. Cowart, hematology.  He has had 2 unprovoked DVTs therefore hematology is recommending Eliquis indefinitely.  Continue current therapy and routine follow-ups with hematology.

## 2021-03-11 NOTE — PROGRESS NOTES
"Chief Complaint  Hypertension (4 month follow up), Hyperlipidemia, and Hypothyroidism    Subjective          Grant MCCARTHY Minesh Henao presents to [facility]  Patient presents for 4-month follow up on chronic conditions.  This is a 50-year-old male.    He has hypertension treated with Toprol-XL 50 mg daily, HCTZ 12.5 mg daily and benazepril 20 mg daily reporting good compliance with this regimen and good overall control of blood pressures.    He has chronic DVT following with hematology on Eliquis.  He has had 2 unprovoked DVTs therefore hematology is recommending Eliquis indefinitely.  He is tolerating the Eliquis well, no abnormal bleeding.    He is prediabetic, last A1c 6.0 on 11/12/2020.    He has hypothyroidism treated with levothyroxine 125 mcg daily reporting good compliance with this medication.    He has obesity, BMI is currently 44.8.  He has lost about 11 pounds since last check in November.  He has been trying to exercise more.    Overall states that he is feeling well and denies development of any other new issues today.      Objective   Vital Signs:   /78 (BP Location: Left arm, Patient Position: Sitting, Cuff Size: Adult)   Pulse 56   Temp 97.5 °F (36.4 °C)   Ht 177.8 cm (70\")   Wt (!) 142 kg (312 lb)   SpO2 98%   BMI 44.77 kg/m²     Physical Exam  Vitals and nursing note reviewed.   Constitutional:       General: He is not in acute distress.     Appearance: Normal appearance. He is well-developed. He is obese. He is not ill-appearing, toxic-appearing or diaphoretic.   HENT:      Head: Normocephalic and atraumatic.      Right Ear: Tympanic membrane, ear canal and external ear normal.      Left Ear: Tympanic membrane, ear canal and external ear normal.   Eyes:      Pupils: Pupils are equal, round, and reactive to light.   Neck:      Vascular: No carotid bruit.   Cardiovascular:      Rate and Rhythm: Normal rate and regular rhythm.      Pulses: Normal pulses.      " Heart sounds: Normal heart sounds.      Comments: No peripheral edema  Pulmonary:      Effort: Pulmonary effort is normal. No respiratory distress.      Breath sounds: Normal breath sounds. No stridor. No wheezing, rhonchi or rales.   Chest:      Chest wall: No tenderness.   Abdominal:      General: Bowel sounds are normal. There is no distension.      Palpations: Abdomen is soft.      Tenderness: There is no abdominal tenderness.   Musculoskeletal:         General: Normal range of motion.      Cervical back: Normal range of motion and neck supple. No rigidity or tenderness.   Lymphadenopathy:      Cervical: No cervical adenopathy.   Skin:     General: Skin is warm and dry.      Capillary Refill: Capillary refill takes less than 2 seconds.   Neurological:      General: No focal deficit present.      Mental Status: He is alert and oriented to person, place, and time. Mental status is at baseline.   Psychiatric:         Mood and Affect: Mood normal.         Behavior: Behavior normal.         Thought Content: Thought content normal.         Judgment: Judgment normal.        Result Review :   The following data was reviewed by: MELANY Thornton on 03/11/2021:  Common labs    Common Labsle 6/3/20 6/3/20 6/3/20 6/3/20 7/8/20 7/8/20 11/12/20 11/12/20 11/12/20 11/12/20 11/12/20 11/12/20    1006 1006 1006 1006 1547 1547 1522 1522 1522 1522 1522 1522   Glucose   122 (A)   98  78       BUN   9   13  10       Creatinine   1.13   1.19  1.16       eGFR Non  Am   69   65  67       eGFR  Am   84   79  81       Sodium   136   140  136       Potassium   4.1   3.7  4.0       Chloride   100   103  98       Calcium   9.6   9.5  9.4       Total Protein   7.1     6.7       Albumin   4.60     4.30       Total Bilirubin   0.9     1.5 (A)       Alkaline Phosphatase   91     106       AST (SGOT)   17     17       ALT (SGPT)   19     18       WBC  5.87     8.29        Hemoglobin  15.9     16.4        Hematocrit  47.5     51.6  (A)        Platelets  270     257        Total Cholesterol    152     146      Triglycerides    75     68      HDL Cholesterol    40     45      LDL Cholesterol     97     87      Hemoglobin A1C 6.00 (A)         6.00 (A)     PSA           1.850    Uric Acid     7.8 (A)       6.7   (A) Abnormal value       Comments are available for some flowsheets but are not being displayed.           Data reviewed: Consultant notes Dr. Cowart, office notes, hematology       Current outpatient and discharge medications have been reconciled for the patient.  Reviewed by: MELANY Thornton       Assessment and Plan    Diagnoses and all orders for this visit:    1. Benign essential hypertension (Primary)  Assessment & Plan:  Hypertension is well controlled, continue Toprol-XL, benazepril and hydrochlorothiazide with routine home monitoring.    Orders:  -     CBC No Differential  -     Comprehensive metabolic panel  -     Lipid panel    2. Prediabetes  Assessment & Plan:  Continue healthy diet, increase exercise and decrease carbs, sugars and portion sizes.  Repeat A1c today and we will adjust therapy if needed.    Orders:  -     Hemoglobin A1c    3. Other specified hypothyroidism  Assessment & Plan:  Continue levothyroxine 125 mcg daily.  TSH, free T4 today and we will adjust therapy if needed.    Orders:  -     TSH  -     T4, Free    4. Acute deep vein thrombosis (DVT) of popliteal vein of left lower extremity (CMS/[facility])  Assessment & Plan:  Following with Dr. Cowart, hematology.  He has had 2 unprovoked DVTs therefore hematology is recommending Eliquis indefinitely.  Continue current therapy and routine follow-ups with hematology.      5. Healthcare maintenance    6. Class 3 severe obesity with body mass index (BMI) of 40.0 to 44.9 in adult, unspecified obesity type, unspecified whether serious comorbidity present (CMS/[facility])  Comments:  Increase physical activity for goal of 30 minutes/day at least 5 days/week, regular dietary  modifications.      We will contact patient with lab results and further recommendations.  Follow-up as needed and I will see him back in 4 months for a physical.    Follow Up   Return in about 4 months (around 7/11/2021) for Annual physical.  Patient was given instructions and counseling regarding his condition or for health maintenance advice. Please see specific information pulled into the AVS if appropriate.

## 2021-03-11 NOTE — ASSESSMENT & PLAN NOTE
Hypertension is well controlled, continue Toprol-XL, benazepril and hydrochlorothiazide with routine home monitoring.

## 2021-03-11 NOTE — ASSESSMENT & PLAN NOTE
Continue healthy diet, increase exercise and decrease carbs, sugars and portion sizes.  Repeat A1c today and we will adjust therapy if needed.

## 2021-03-12 DIAGNOSIS — E03.8 OTHER SPECIFIED HYPOTHYROIDISM: Primary | ICD-10-CM

## 2021-03-12 LAB
ALBUMIN SERPL-MCNC: 4.1 G/DL (ref 3.5–5.2)
ALBUMIN/GLOB SERPL: 1.9 G/DL
ALP SERPL-CCNC: 93 U/L (ref 39–117)
ALT SERPL-CCNC: 24 U/L (ref 1–41)
AST SERPL-CCNC: 19 U/L (ref 1–40)
BILIRUB SERPL-MCNC: 0.9 MG/DL (ref 0–1.2)
BUN SERPL-MCNC: 10 MG/DL (ref 6–20)
BUN/CREAT SERPL: 8.7 (ref 7–25)
CALCIUM SERPL-MCNC: 9.1 MG/DL (ref 8.6–10.5)
CHLORIDE SERPL-SCNC: 102 MMOL/L (ref 98–107)
CHOLEST SERPL-MCNC: 157 MG/DL (ref 0–200)
CO2 SERPL-SCNC: 28.8 MMOL/L (ref 22–29)
CREAT SERPL-MCNC: 1.15 MG/DL (ref 0.76–1.27)
ERYTHROCYTE [DISTWIDTH] IN BLOOD BY AUTOMATED COUNT: 13.1 % (ref 12.3–15.4)
GLOBULIN SER CALC-MCNC: 2.2 GM/DL
GLUCOSE SERPL-MCNC: 120 MG/DL (ref 65–99)
HBA1C MFR BLD: 6.3 % (ref 4.8–5.6)
HCT VFR BLD AUTO: 48.2 % (ref 37.5–51)
HDLC SERPL-MCNC: 35 MG/DL (ref 40–60)
HGB BLD-MCNC: 15.8 G/DL (ref 13–17.7)
LDLC SERPL CALC-MCNC: 92 MG/DL (ref 0–100)
MCH RBC QN AUTO: 30.1 PG (ref 26.6–33)
MCHC RBC AUTO-ENTMCNC: 32.8 G/DL (ref 31.5–35.7)
MCV RBC AUTO: 91.8 FL (ref 79–97)
PLATELET # BLD AUTO: 271 10*3/MM3 (ref 140–450)
POTASSIUM SERPL-SCNC: 3.7 MMOL/L (ref 3.5–5.2)
PROT SERPL-MCNC: 6.3 G/DL (ref 6–8.5)
RBC # BLD AUTO: 5.25 10*6/MM3 (ref 4.14–5.8)
SODIUM SERPL-SCNC: 141 MMOL/L (ref 136–145)
T4 FREE SERPL-MCNC: 1.68 NG/DL (ref 0.93–1.7)
TRIGL SERPL-MCNC: 171 MG/DL (ref 0–150)
TSH SERPL DL<=0.005 MIU/L-ACNC: 0.11 UIU/ML (ref 0.27–4.2)
VLDLC SERPL CALC-MCNC: 30 MG/DL (ref 5–40)
WBC # BLD AUTO: 7.42 10*3/MM3 (ref 3.4–10.8)

## 2021-03-12 RX ORDER — LEVOTHYROXINE SODIUM 112 UG/1
112 TABLET ORAL DAILY
Qty: 90 TABLET | Refills: 1 | Status: SHIPPED | OUTPATIENT
Start: 2021-03-12 | End: 2021-09-08

## 2021-03-12 NOTE — PROGRESS NOTES
Good morning Mr. Edge, your labs are back.  Blood count looks good, no anemia, normal white blood cells.  Metabolic panel looks stable including kidney function, liver enzymes and electrolytes.  Cholesterol panel looks good except for slightly elevated triglycerides but this may have been due to the nonfasting sample, please continue with healthy diet choices low in carbs and sugars.  A1c has risen slightly to 6.3, toward the top end of the prediabetic range, please increase exercise and decrease intake of carbohydrates, concentrated sweets and sugars.  We will recheck routinely.  Thyroid labs are indicating that you may be getting slightly too much thyroid hormone.  I am decreasing the levothyroxine dose to 112 mcg daily.  I am going to have my assistant reach out to you to make a 6-week lab appointment for recheck of thyroid hormones post dose adjustment.  Let me know if you have any questions and have a good weekend,MELANY Thornton

## 2021-03-15 ENCOUNTER — TELEPHONE (OUTPATIENT)
Dept: INTERNAL MEDICINE | Facility: CLINIC | Age: 51
End: 2021-03-15

## 2021-03-15 DIAGNOSIS — R35.1 NOCTURIA: Primary | ICD-10-CM

## 2021-03-15 LAB
Lab: NORMAL
PSA SERPL-MCNC: 1.67 NG/ML (ref 0–4)
WRITTEN AUTHORIZATION: NORMAL

## 2021-03-15 NOTE — TELEPHONE ENCOUNTER
Pt advised of labs, Pt well understood.     Pt wanted to know if you could run a PSA. Please advise?    Pt scheduled for repeat labs

## 2021-03-15 NOTE — TELEPHONE ENCOUNTER
----- Message from MELANY Thornton sent at 3/12/2021 11:25 AM EST -----  I sent patient a [employer] message for his lab results but could you please call him to schedule a 6-week lab appointment for recheck of thyroid labs?  Thank you

## 2021-03-16 LAB — REF LAB TEST METHOD: NORMAL

## 2021-03-16 NOTE — PROGRESS NOTES
Good afternoon Mr. Edge, your PSA came back and is normal.  Let me know if you have any questions and have a great day,MELANY Thornton

## 2021-03-22 DIAGNOSIS — R60.0 LEG EDEMA: ICD-10-CM

## 2021-03-22 DIAGNOSIS — I10 ESSENTIAL HYPERTENSION: ICD-10-CM

## 2021-03-22 RX ORDER — HYDROCHLOROTHIAZIDE 12.5 MG/1
12.5 TABLET ORAL DAILY
Qty: 90 TABLET | Refills: 0 | Status: SHIPPED | OUTPATIENT
Start: 2021-03-22 | End: 2021-07-07

## 2021-03-25 DIAGNOSIS — I82.432 ACUTE DEEP VEIN THROMBOSIS (DVT) OF POPLITEAL VEIN OF LEFT LOWER EXTREMITY (HCC): ICD-10-CM

## 2021-03-27 RX ORDER — APIXABAN 5 MG/1
TABLET, FILM COATED ORAL
Qty: 180 TABLET | Refills: 1 | Status: SHIPPED | OUTPATIENT
Start: 2021-03-27 | End: 2021-03-31

## 2021-03-31 DIAGNOSIS — I82.432 ACUTE DEEP VEIN THROMBOSIS (DVT) OF POPLITEAL VEIN OF LEFT LOWER EXTREMITY (HCC): Primary | ICD-10-CM

## 2021-04-07 DIAGNOSIS — I10 ESSENTIAL HYPERTENSION: ICD-10-CM

## 2021-04-07 RX ORDER — BENAZEPRIL HYDROCHLORIDE 20 MG/1
20 TABLET ORAL DAILY
Qty: 90 TABLET | Refills: 0 | Status: SHIPPED | OUTPATIENT
Start: 2021-04-07 | End: 2021-07-06

## 2021-04-16 ENCOUNTER — APPOINTMENT (OUTPATIENT)
Dept: SLEEP MEDICINE | Facility: HOSPITAL | Age: 51
End: 2021-04-16

## 2021-04-23 DIAGNOSIS — I10 BENIGN ESSENTIAL HYPERTENSION: ICD-10-CM

## 2021-04-23 RX ORDER — METOPROLOL SUCCINATE 50 MG/1
50 TABLET, EXTENDED RELEASE ORAL DAILY
Qty: 90 TABLET | Refills: 0 | Status: SHIPPED | OUTPATIENT
Start: 2021-04-23 | End: 2021-07-26

## 2021-04-30 ENCOUNTER — OFFICE VISIT (OUTPATIENT)
Dept: SLEEP MEDICINE | Facility: HOSPITAL | Age: 51
End: 2021-04-30

## 2021-04-30 VITALS
DIASTOLIC BLOOD PRESSURE: 84 MMHG | SYSTOLIC BLOOD PRESSURE: 129 MMHG | HEIGHT: 70 IN | HEART RATE: 85 BPM | BODY MASS INDEX: 43.95 KG/M2 | WEIGHT: 307 LBS | OXYGEN SATURATION: 97 %

## 2021-04-30 DIAGNOSIS — E66.01 OBESITY, MORBID, BMI 40.0-49.9 (HCC): ICD-10-CM

## 2021-04-30 DIAGNOSIS — G47.33 OBSTRUCTIVE SLEEP APNEA: Primary | ICD-10-CM

## 2021-04-30 PROCEDURE — G0463 HOSPITAL OUTPT CLINIC VISIT: HCPCS

## 2021-06-24 ENCOUNTER — APPOINTMENT (OUTPATIENT)
Dept: OTHER | Facility: HOSPITAL | Age: 51
End: 2021-06-24

## 2021-07-06 DIAGNOSIS — R60.0 LEG EDEMA: ICD-10-CM

## 2021-07-06 DIAGNOSIS — I10 ESSENTIAL HYPERTENSION: ICD-10-CM

## 2021-07-07 RX ORDER — BENAZEPRIL HYDROCHLORIDE 20 MG/1
20 TABLET ORAL DAILY
Qty: 90 TABLET | Refills: 1 | Status: SHIPPED | OUTPATIENT
Start: 2021-07-07 | End: 2022-01-26

## 2021-07-07 RX ORDER — HYDROCHLOROTHIAZIDE 12.5 MG/1
12.5 TABLET ORAL DAILY
Qty: 90 TABLET | Refills: 1 | Status: SHIPPED | OUTPATIENT
Start: 2021-07-07 | End: 2022-02-10

## 2021-07-15 ENCOUNTER — APPOINTMENT (OUTPATIENT)
Dept: OTHER | Facility: HOSPITAL | Age: 51
End: 2021-07-15

## 2021-07-15 DIAGNOSIS — J30.2 SEASONAL ALLERGIES: ICD-10-CM

## 2021-07-16 ENCOUNTER — TELEPHONE (OUTPATIENT)
Dept: INTERNAL MEDICINE | Facility: CLINIC | Age: 51
End: 2021-07-16

## 2021-07-16 RX ORDER — MONTELUKAST SODIUM 10 MG/1
10 TABLET ORAL NIGHTLY
Qty: 90 TABLET | Refills: 1 | Status: SHIPPED | OUTPATIENT
Start: 2021-07-16 | End: 2022-01-26

## 2021-07-16 NOTE — TELEPHONE ENCOUNTER
Last OV 03/11  Canceled today silvia, please see phone encou from today.  Made silvia 08/12 with you.

## 2021-07-16 NOTE — TELEPHONE ENCOUNTER
Caller: Grant Edge Jr.    Relationship to patient: Self    Best call back number: [phone] (H)    Chief complaint: YEARLY    Type of visit: PHYSICAL    Requested date: 07/23-07/30     If rescheduling, when is the original appointment: 07/16/21    Additional notes:

## 2021-07-23 DIAGNOSIS — I10 BENIGN ESSENTIAL HYPERTENSION: ICD-10-CM

## 2021-07-26 RX ORDER — METOPROLOL SUCCINATE 50 MG/1
50 TABLET, EXTENDED RELEASE ORAL DAILY
Qty: 90 TABLET | Refills: 1 | Status: SHIPPED | OUTPATIENT
Start: 2021-07-26 | End: 2022-05-13

## 2021-07-29 ENCOUNTER — OFFICE VISIT (OUTPATIENT)
Dept: ONCOLOGY | Facility: CLINIC | Age: 51
End: 2021-07-29

## 2021-07-29 ENCOUNTER — LAB (OUTPATIENT)
Dept: OTHER | Facility: HOSPITAL | Age: 51
End: 2021-07-29

## 2021-07-29 VITALS
HEIGHT: 70 IN | BODY MASS INDEX: 42.82 KG/M2 | RESPIRATION RATE: 17 BRPM | TEMPERATURE: 96.9 F | OXYGEN SATURATION: 97 % | DIASTOLIC BLOOD PRESSURE: 92 MMHG | WEIGHT: 299.1 LBS | SYSTOLIC BLOOD PRESSURE: 146 MMHG | HEART RATE: 79 BPM

## 2021-07-29 DIAGNOSIS — I82.432 ACUTE DEEP VEIN THROMBOSIS (DVT) OF POPLITEAL VEIN OF LEFT LOWER EXTREMITY (HCC): Primary | ICD-10-CM

## 2021-07-29 DIAGNOSIS — I82.432 ACUTE DEEP VEIN THROMBOSIS (DVT) OF POPLITEAL VEIN OF LEFT LOWER EXTREMITY (HCC): ICD-10-CM

## 2021-07-29 LAB
BASOPHILS # BLD AUTO: 0.05 10*3/MM3 (ref 0–0.2)
BASOPHILS NFR BLD AUTO: 0.8 % (ref 0–1.5)
DEPRECATED RDW RBC AUTO: 46.5 FL (ref 37–54)
EOSINOPHIL # BLD AUTO: 0.08 10*3/MM3 (ref 0–0.4)
EOSINOPHIL NFR BLD AUTO: 1.2 % (ref 0.3–6.2)
ERYTHROCYTE [DISTWIDTH] IN BLOOD BY AUTOMATED COUNT: 14.2 % (ref 12.3–15.4)
HCT VFR BLD AUTO: 51.9 % (ref 37.5–51)
HGB BLD-MCNC: 17.4 G/DL (ref 13–17.7)
IMM GRANULOCYTES # BLD AUTO: 0.03 10*3/MM3 (ref 0–0.05)
IMM GRANULOCYTES NFR BLD AUTO: 0.5 % (ref 0–0.5)
LYMPHOCYTES # BLD AUTO: 1.86 10*3/MM3 (ref 0.7–3.1)
LYMPHOCYTES NFR BLD AUTO: 28.1 % (ref 19.6–45.3)
MCH RBC QN AUTO: 30.2 PG (ref 26.6–33)
MCHC RBC AUTO-ENTMCNC: 33.5 G/DL (ref 31.5–35.7)
MCV RBC AUTO: 90.1 FL (ref 79–97)
MONOCYTES # BLD AUTO: 0.61 10*3/MM3 (ref 0.1–0.9)
MONOCYTES NFR BLD AUTO: 9.2 % (ref 5–12)
NEUTROPHILS NFR BLD AUTO: 3.99 10*3/MM3 (ref 1.7–7)
NEUTROPHILS NFR BLD AUTO: 60.2 % (ref 42.7–76)
NRBC BLD AUTO-RTO: 0 /100 WBC (ref 0–0.2)
PLATELET # BLD AUTO: 228 10*3/MM3 (ref 140–450)
PMV BLD AUTO: 10.1 FL (ref 6–12)
RBC # BLD AUTO: 5.76 10*6/MM3 (ref 4.14–5.8)
WBC # BLD AUTO: 6.62 10*3/MM3 (ref 3.4–10.8)

## 2021-07-29 PROCEDURE — 85025 COMPLETE CBC W/AUTO DIFF WBC: CPT | Performed by: INTERNAL MEDICINE

## 2021-07-29 PROCEDURE — 99214 OFFICE O/P EST MOD 30 MIN: CPT | Performed by: INTERNAL MEDICINE

## 2021-07-29 PROCEDURE — 36415 COLL VENOUS BLD VENIPUNCTURE: CPT

## 2021-08-12 ENCOUNTER — OFFICE VISIT (OUTPATIENT)
Dept: INTERNAL MEDICINE | Facility: CLINIC | Age: 51
End: 2021-08-12

## 2021-08-12 ENCOUNTER — APPOINTMENT (OUTPATIENT)
Dept: WOMENS IMAGING | Facility: HOSPITAL | Age: 51
End: 2021-08-12

## 2021-08-12 VITALS
TEMPERATURE: 98.1 F | SYSTOLIC BLOOD PRESSURE: 150 MMHG | DIASTOLIC BLOOD PRESSURE: 87 MMHG | HEART RATE: 77 BPM | RESPIRATION RATE: 18 BRPM | HEIGHT: 70 IN | OXYGEN SATURATION: 96 % | BODY MASS INDEX: 42.8 KG/M2 | WEIGHT: 299 LBS

## 2021-08-12 DIAGNOSIS — E03.8 OTHER SPECIFIED HYPOTHYROIDISM: Chronic | ICD-10-CM

## 2021-08-12 DIAGNOSIS — M25.571 PAIN IN JOINT INVOLVING RIGHT ANKLE AND FOOT: ICD-10-CM

## 2021-08-12 DIAGNOSIS — I82.432 ACUTE DEEP VEIN THROMBOSIS (DVT) OF POPLITEAL VEIN OF LEFT LOWER EXTREMITY (HCC): Chronic | ICD-10-CM

## 2021-08-12 DIAGNOSIS — I10 BENIGN ESSENTIAL HYPERTENSION: Chronic | ICD-10-CM

## 2021-08-12 DIAGNOSIS — Z00.00 HEALTH CARE MAINTENANCE: Chronic | ICD-10-CM

## 2021-08-12 DIAGNOSIS — Z12.11 COLON CANCER SCREENING: ICD-10-CM

## 2021-08-12 DIAGNOSIS — E79.0 HYPERURICEMIA: ICD-10-CM

## 2021-08-12 DIAGNOSIS — Z00.00 ANNUAL PHYSICAL EXAM: Primary | ICD-10-CM

## 2021-08-12 DIAGNOSIS — R73.03 PREDIABETES: Chronic | ICD-10-CM

## 2021-08-12 LAB
ALBUMIN SERPL-MCNC: 4.1 G/DL (ref 3.5–5.2)
ALBUMIN/GLOB SERPL: 1.5 G/DL
ALP SERPL-CCNC: 91 U/L (ref 39–117)
ALT SERPL-CCNC: 21 U/L (ref 1–41)
AST SERPL-CCNC: 24 U/L (ref 1–40)
BILIRUB SERPL-MCNC: 0.8 MG/DL (ref 0–1.2)
BUN SERPL-MCNC: 10 MG/DL (ref 6–20)
BUN/CREAT SERPL: 7.6 (ref 7–25)
CALCIUM SERPL-MCNC: 9.3 MG/DL (ref 8.6–10.5)
CHLORIDE SERPL-SCNC: 103 MMOL/L (ref 98–107)
CHOLEST SERPL-MCNC: 167 MG/DL (ref 0–200)
CO2 SERPL-SCNC: 28.4 MMOL/L (ref 22–29)
CREAT SERPL-MCNC: 1.31 MG/DL (ref 0.76–1.27)
GLOBULIN SER CALC-MCNC: 2.8 GM/DL
GLUCOSE SERPL-MCNC: 87 MG/DL (ref 65–99)
HBA1C MFR BLD: 6.1 % (ref 4.8–5.6)
HDLC SERPL-MCNC: 39 MG/DL (ref 40–60)
LDLC SERPL CALC-MCNC: 108 MG/DL (ref 0–100)
POTASSIUM SERPL-SCNC: 3.8 MMOL/L (ref 3.5–5.2)
PROT SERPL-MCNC: 6.9 G/DL (ref 6–8.5)
SODIUM SERPL-SCNC: 143 MMOL/L (ref 136–145)
T4 FREE SERPL-MCNC: 1.43 NG/DL (ref 0.93–1.7)
TRIGL SERPL-MCNC: 108 MG/DL (ref 0–150)
TSH SERPL DL<=0.005 MIU/L-ACNC: 1.24 UIU/ML (ref 0.27–4.2)
URATE SERPL-MCNC: 7.6 MG/DL (ref 3.4–7)
VLDLC SERPL CALC-MCNC: 20 MG/DL (ref 5–40)

## 2021-08-12 PROCEDURE — 73610 X-RAY EXAM OF ANKLE: CPT | Performed by: NURSE PRACTITIONER

## 2021-08-12 PROCEDURE — 73610 X-RAY EXAM OF ANKLE: CPT | Performed by: RADIOLOGY

## 2021-08-12 PROCEDURE — 73630 X-RAY EXAM OF FOOT: CPT | Performed by: RADIOLOGY

## 2021-08-12 PROCEDURE — 99396 PREV VISIT EST AGE 40-64: CPT | Performed by: NURSE PRACTITIONER

## 2021-08-12 PROCEDURE — 73630 X-RAY EXAM OF FOOT: CPT | Performed by: NURSE PRACTITIONER

## 2021-08-12 NOTE — ASSESSMENT & PLAN NOTE
Continue benazepril, Toprol-XL and HCTZ.  I have asked him to check his home blood pressures regularly, goal of less than 130/80 discussed and if he sees readings higher than this consistently he will notify me.

## 2021-08-12 NOTE — ASSESSMENT & PLAN NOTE
Stable, continue chronic anticoagulation, now on Xarelto.  Continue routine hematology follow-ups.

## 2021-08-12 NOTE — PATIENT INSTRUCTIONS
Please monitor regular home blood pressures, notify Patricia for consistent readings greater than 130/80.     Zoster Vaccine, Recombinant injection  What is this medicine?  ZOSTER VACCINE (ZOS ter vak SEEN) is a vaccine used to reduce the risk of getting shingles. This vaccine is not used to treat shingles or nerve pain from shingles.  This medicine may be used for other purposes; ask your health care provider or pharmacist if you have questions.  COMMON BRAND NAME(S): SHINGRIX  What should I tell my health care provider before I take this medicine?  They need to know if you have any of these conditions:  · cancer  · immune system problems  · an unusual or allergic reaction to Zoster vaccine, other medications, foods, dyes, or preservatives  · pregnant or trying to get pregnant  · breast-feeding  How should I use this medicine?  This vaccine is injected into a muscle. It is given by a health care provider.  A copy of Vaccine Information Statements will be given before each vaccination. Be sure to read this information carefully each time. This sheet may change often.  Talk to your health care provider about the use of this vaccine in children. This vaccine is not approved for use in children.  Overdosage: If you think you have taken too much of this medicine contact a poison control center or emergency room at once.  NOTE: This medicine is only for you. Do not share this medicine with others.  What if I miss a dose?  Keep appointments for follow-up (booster) doses. It is important not to miss your dose. Call your health care provider if you are unable to keep an appointment.  What may interact with this medicine?  · medicines that suppress your immune system  · medicines to treat cancer  · steroid medicines like prednisone or cortisone  This list may not describe all possible interactions. Give your health care provider a list of all the medicines, herbs, non-prescription drugs, or dietary supplements you use. Also  tell them if you smoke, drink alcohol, or use illegal drugs. Some items may interact with your medicine.  What should I watch for while using this medicine?  Visit your health care provider regularly.  This vaccine, like all vaccines, may not fully protect everyone.  What side effects may I notice from receiving this medicine?  Side effects that you should report to your doctor or health care professional as soon as possible:  · allergic reactions (skin rash, itching or hives; swelling of the face, lips, or tongue)  · trouble breathing  Side effects that usually do not require medical attention (report these to your doctor or health care professional if they continue or are bothersome):  · chills  · headache  · fever  · nausea  · pain, redness, or irritation at site where injected  · tiredness  · vomiting  This list may not describe all possible side effects. Call your doctor for medical advice about side effects. You may report side effects to FDA at 0-908-FDA-8640.  Where should I keep my medicine?  This vaccine is only given by a health care provider. It will not be stored at home.  NOTE: This sheet is a summary. It may not cover all possible information. If you have questions about this medicine, talk to your doctor, pharmacist, or health care provider.  © 2021 [employer] (2021-01-22 16:23:07)

## 2021-08-12 NOTE — ASSESSMENT & PLAN NOTE
Continue with routine dental, vision exams.    He is due for colorectal cancer screening which I ordered-colonoscopy.

## 2021-08-13 ENCOUNTER — TELEPHONE (OUTPATIENT)
Dept: GASTROENTEROLOGY | Facility: CLINIC | Age: 51
End: 2021-08-13

## 2021-08-13 RX ORDER — METHYLPREDNISOLONE 4 MG/1
TABLET ORAL
Qty: 21 TABLET | Refills: 0 | Status: SHIPPED | OUTPATIENT
Start: 2021-08-13 | End: 2022-01-13

## 2021-08-13 NOTE — PROGRESS NOTES
Good afternoon Mr. Edge, your labs are back.  Metabolic panel overall looks stable.  Kidney function mildly declined, please watch intake of NSAIDs and be sure to hydrate fully each day.  We will recheck at your next 3-month follow-up.  Electrolytes and liver enzymes are normal.  Cholesterol panel looks stable overall, improvement in triglycerides since last check.  Normal total cholesterol.  Mild elevation in LDL cholesterol, continue with healthy diet low in fats.  Thyroid numbers are stable.  A1c has declined slightly to 6.1, continue with healthy diet interventions.  Your uric acid level is elevated.  The pain in your foot could be related to hyperuricemia or a gout flare.  I will send in a steroid taper to your pharmacy to try to calm this down.  I will get you the results of x-rays as soon as I have them.  Let me know how your symptoms progress and if you have any questions.  Have a great day,MELANY Thornton

## 2021-08-17 NOTE — PROGRESS NOTES
Good morning Mr. Edge, your xrays are back- there is generalized soft tissue swelling throughout the ankle and foot which may be related to the elevated uric acid levels and pain. Are you having any pain in the second toe? This toe was not well seen on the xray due to artifact (movement) therefore if you are having any pain specifically in this area, let me know and we can get an orthopedic doctor involved if needed.Let me know how you responded to the steroids and have a great day,   MELANY Thornton

## 2021-09-08 DIAGNOSIS — E03.8 OTHER SPECIFIED HYPOTHYROIDISM: ICD-10-CM

## 2021-09-08 RX ORDER — LEVOTHYROXINE SODIUM 112 UG/1
112 TABLET ORAL DAILY
Qty: 90 TABLET | Refills: 1 | Status: SHIPPED | OUTPATIENT
Start: 2021-09-08 | End: 2022-03-14

## 2021-10-19 DIAGNOSIS — I82.432 ACUTE DEEP VEIN THROMBOSIS (DVT) OF POPLITEAL VEIN OF LEFT LOWER EXTREMITY (HCC): ICD-10-CM

## 2021-10-20 RX ORDER — RIVAROXABAN 20 MG/1
TABLET, FILM COATED ORAL
Qty: 90 TABLET | Refills: 1 | Status: SHIPPED | OUTPATIENT
Start: 2021-10-20 | End: 2022-03-14 | Stop reason: SDUPTHER

## 2021-10-28 ENCOUNTER — TELEMEDICINE (OUTPATIENT)
Dept: FAMILY MEDICINE CLINIC | Facility: TELEHEALTH | Age: 51
End: 2021-10-28

## 2021-10-28 DIAGNOSIS — J01.00 ACUTE MAXILLARY SINUSITIS, RECURRENCE NOT SPECIFIED: Primary | ICD-10-CM

## 2021-10-28 DIAGNOSIS — R05.9 COUGH: ICD-10-CM

## 2021-10-28 PROCEDURE — 99213 OFFICE O/P EST LOW 20 MIN: CPT | Performed by: NURSE PRACTITIONER

## 2021-10-28 RX ORDER — DEXTROMETHORPHAN HYDROBROMIDE AND PROMETHAZINE HYDROCHLORIDE 15; 6.25 MG/5ML; MG/5ML
5 SYRUP ORAL NIGHTLY PRN
Qty: 75 ML | Refills: 0 | Status: SHIPPED | OUTPATIENT
Start: 2021-10-28 | End: 2022-01-13

## 2021-10-28 RX ORDER — BENZONATATE 200 MG/1
200 CAPSULE ORAL 2 TIMES DAILY PRN
Qty: 21 CAPSULE | Refills: 0 | Status: SHIPPED | OUTPATIENT
Start: 2021-10-28 | End: 2022-01-13

## 2021-10-28 RX ORDER — AMOXICILLIN AND CLAVULANATE POTASSIUM 875; 125 MG/1; MG/1
1 TABLET, FILM COATED ORAL 2 TIMES DAILY
Qty: 20 TABLET | Refills: 0 | Status: SHIPPED | OUTPATIENT
Start: 2021-10-28 | End: 2021-11-07

## 2021-10-28 NOTE — PATIENT INSTRUCTIONS
Sinusitis, Adult  Sinusitis is inflammation of your sinuses. Sinuses are hollow spaces in the bones around your face. Your sinuses are located:  · Around your eyes.  · In the middle of your forehead.  · Behind your nose.  · In your cheekbones.  Mucus normally drains out of your sinuses. When your nasal tissues become inflamed or swollen, mucus can become trapped or blocked. This allows bacteria, viruses, and fungi to grow, which leads to infection. Most infections of the sinuses are caused by a virus.  Sinusitis can develop quickly. It can last for up to 4 weeks (acute) or for more than 12 weeks (chronic). Sinusitis often develops after a cold.  What are the causes?  This condition is caused by anything that creates swelling in the sinuses or stops mucus from draining. This includes:  · Allergies.  · Asthma.  · Infection from bacteria or viruses.  · Deformities or blockages in your nose or sinuses.  · Abnormal growths in the nose (nasal polyps).  · Pollutants, such as chemicals or irritants in the air.  · Infection from fungi (rare).  What increases the risk?  You are more likely to develop this condition if you:  · Have a weak body defense system (immune system).  · Do a lot of swimming or diving.  · Overuse nasal sprays.  · Smoke.  What are the signs or symptoms?  The main symptoms of this condition are pain and a feeling of pressure around the affected sinuses. Other symptoms include:  · Stuffy nose or congestion.  · Thick drainage from your nose.  · Swelling and warmth over the affected sinuses.  · Headache.  · Upper toothache.  · A cough that may get worse at night.  · Extra mucus that collects in the throat or the back of the nose (postnasal drip).  · Decreased sense of smell and taste.  · Fatigue.  · A fever.  · Sore throat.  · Bad breath.  How is this diagnosed?  This condition is diagnosed based on:  · Your symptoms.  · Your medical history.  · A physical exam.  · Tests to find out if your condition is  acute or chronic. This may include:  ? Checking your nose for nasal polyps.  ? Viewing your sinuses using a device that has a light (endoscope).  ? Testing for allergies or bacteria.  ? Imaging tests, such as an MRI or CT scan.  In rare cases, a bone biopsy may be done to rule out more serious types of fungal sinus disease.  How is this treated?  Treatment for sinusitis depends on the cause and whether your condition is chronic or acute.  · If caused by a virus, your symptoms should go away on their own within 10 days. You may be given medicines to relieve symptoms. They include:  ? Medicines that shrink swollen nasal passages (topical intranasal decongestants).  ? Medicines that treat allergies (antihistamines).  ? A spray that eases inflammation of the nostrils (topical intranasal corticosteroids).  ? Rinses that help get rid of thick mucus in your nose (nasal saline washes).  · If caused by bacteria, your health care provider may recommend waiting to see if your symptoms improve. Most bacterial infections will get better without antibiotic medicine. You may be given antibiotics if you have:  ? A severe infection.  ? A weak immune system.  · If caused by narrow nasal passages or nasal polyps, you may need to have surgery.  Follow these instructions at home:  Medicines  · Take, use, or apply over-the-counter and prescription medicines only as told by your health care provider. These may include nasal sprays.  · If you were prescribed an antibiotic medicine, take it as told by your health care provider. Do not stop taking the antibiotic even if you start to feel better.  Hydrate and humidify    · Drink enough fluid to keep your urine pale yellow. Staying hydrated will help to thin your mucus.  · Use a cool mist humidifier to keep the humidity level in your home above 50%.  · Inhale steam for 10-15 minutes, 3-4 times a day, or as told by your health care provider. You can do this in the bathroom while a hot shower is  running.  · Limit your exposure to cool or dry air.    Rest  · Rest as much as possible.  · Sleep with your head raised (elevated).  · Make sure you get enough sleep each night.  General instructions    · Apply a warm, moist washcloth to your face 3-4 times a day or as told by your health care provider. This will help with discomfort.  · Wash your hands often with soap and water to reduce your exposure to germs. If soap and water are not available, use hand .  · Do not smoke. Avoid being around people who are smoking (secondhand smoke).  · Keep all follow-up visits as told by your health care provider. This is important.    Contact a health care provider if:  · You have a fever.  · Your symptoms get worse.  · Your symptoms do not improve within 10 days.  Get help right away if:  · You have a severe headache.  · You have persistent vomiting.  · You have severe pain or swelling around your face or eyes.  · You have vision problems.  · You develop confusion.  · Your neck is stiff.  · You have trouble breathing.  Summary  · Sinusitis is soreness and inflammation of your sinuses. Sinuses are hollow spaces in the bones around your face.  · This condition is caused by nasal tissues that become inflamed or swollen. The swelling traps or blocks the flow of mucus. This allows bacteria, viruses, and fungi to grow, which leads to infection.  · If you were prescribed an antibiotic medicine, take it as told by your health care provider. Do not stop taking the antibiotic even if you start to feel better.  · Keep all follow-up visits as told by your health care provider. This is important.  This information is not intended to replace advice given to you by your health care provider. Make sure you discuss any questions you have with your health care provider.  Document Revised: 05/20/2019 Document Reviewed: 05/20/2019  [employer] Patient Education © 2021 [employer]      Cough, Adult  Coughing is a reflex that clears your  throat and your airways (respiratory system). Coughing helps to heal and protect your lungs. It is normal to cough occasionally, but a cough that happens with other symptoms or lasts a long time may be a sign of a condition that needs treatment. An acute cough may only last 2-3 weeks, while a chronic cough may last 8 or more weeks.  Coughing is commonly caused by:  · Infection of the respiratory systemby viruses or bacteria.  · Breathing in substances that irritate your lungs.  · Allergies.  · Asthma.  · Mucus that runs down the back of your throat (postnasal drip).  · Smoking.  · Acid backing up from the stomach into the esophagus (gastroesophageal reflux).  · Certain medicines.  · Chronic lung problems.  · Other medical conditions such as heart failure or a blood clot in the lung (pulmonary embolism).  Follow these instructions at home:  Medicines  · Take over-the-counter and prescription medicines only as told by your health care provider.  · Talk with your health care provider before you take a cough suppressant medicine.  Lifestyle    · Avoid cigarette smoke. Do not use any products that contain nicotine or tobacco, such as cigarettes, e-cigarettes, and chewing tobacco. If you need help quitting, ask your health care provider.  · Drink enough fluid to keep your urine pale yellow.  · Avoid caffeine.  · Do not drink alcohol if your health care provider tells you not to drink.    General instructions    · Pay close attention to changes in your cough. Tell your health care provider about them.  · Always cover your mouth when you cough.  · Avoid things that make you cough, such as perfume, candles, cleaning products, or campfire or tobacco smoke.  · If the air is dry, use a cool mist vaporizer or humidifier in your bedroom or your home to help loosen secretions.  · If your cough is worse at night, try to sleep in a semi-upright position.  · Rest as needed.  · Keep all follow-up visits as told by your health care  provider. This is important.    Contact a health care provider if you:  · Have new symptoms.  · Cough up pus.  · Have a cough that does not get better after 2-3 weeks or gets worse.  · Cannot control your cough with cough suppressant medicines and you are losing sleep.  · Have pain that gets worse or pain that is not helped with medicine.  · Have a fever.  · Have unexplained weight loss.  · Have night sweats.  Get help right away if:  · You cough up blood.  · You have difficulty breathing.  · Your heartbeat is very fast.  These symptoms may represent a serious problem that is an emergency. Do not wait to see if the symptoms will go away. Get medical help right away. Call your local emergency services (911 in the [place] Do not drive yourself to the hospital.  Summary  · Coughing is a reflex that clears your throat and your airways. It is normal to cough occasionally, but a cough that happens with other symptoms or lasts a long time may be a sign of a condition that needs treatment.  · Take over-the-counter and prescription medicines only as told by your health care provider.  · Always cover your mouth when you cough.  · Contact a health care provider if you have new symptoms or a cough that does not get better after 2-3 weeks or gets worse.  This information is not intended to replace advice given to you by your health care provider. Make sure you discuss any questions you have with your health care provider.  Document Revised: 01/06/2020 Document Reviewed: 01/06/2020  [employer] Patient Education © 2021 [employer]

## 2021-10-28 NOTE — PROGRESS NOTES
You have chosen to receive care through a telehealth visit.  Do you consent to use a video/audio connection for your medical care today? Yes     CHIEF COMPLAINT  No chief complaint on file.        HPI  Grant Edge Jr. is a 51 y.o. male  presents with complaint of 5+ day history of nasal congestion with clear and yellow discharge, mild sore throat, PND, occasional productive cough with yellow sputum, cough is persistent and keeps him up at night, wheezing.  Denies fever, shortness of breath     Taking mucinex and delsym with little relief    COVID-19 test was negative on Tuesday    Review of Systems   Constitutional: Negative for fatigue and fever.   HENT: Positive for congestion, postnasal drip, sinus pressure, sinus pain and sore throat.    Respiratory: Positive for cough and wheezing. Negative for chest tightness.    Neurological: Positive for headaches.   All other systems reviewed and are negative.      Past Medical History:   Diagnosis Date   • Abnormal ECG    • Allergic    • Arthritis    • Chest pain    • DVT (deep venous thrombosis) (CMS/[facility])    • GERD (gastroesophageal reflux disease)    • Hyperlipidemia    • Hypertension    • Hypokalemia    • Myocardial infarction (CMS/[facility])    • Obesity    • Simple goiter    • Sinus bradycardia    • Vitamin D deficiency        Family History   Problem Relation Age of Onset   • Diabetes Father    • Heart disease Father    • Arthritis Father    • Hyperlipidemia Father    • Hypertension Father        Social History     Socioeconomic History   • Marital status:    Tobacco Use   • Smoking status: Never Smoker   • Smokeless tobacco: Never Used   Substance and Sexual Activity   • Alcohol use: No   • Drug use: No   • Sexual activity: Yes     Partners: Female     Birth control/protection: None         There were no vitals taken for this visit.    PHYSICAL EXAM  Physical Exam   Constitutional: He is oriented to person, place, and time. He appears well-developed and  well-nourished. He does not have a sickly appearance. He does not appear ill.   HENT:   Head: Normocephalic and atraumatic.   Bilateral maxillary sinus tenderness   Pulmonary/Chest: Effort normal.  No respiratory distress.  Neurological: He is alert and oriented to person, place, and time.         Diagnoses and all orders for this visit:    1. Acute maxillary sinusitis, recurrence not specified (Primary)  -     amoxicillin-clavulanate (AUGMENTIN) 875-125 MG per tablet; Take 1 tablet by mouth 2 (Two) Times a Day for 10 days.  Dispense: 20 tablet; Refill: 0    2. Cough  -     benzonatate (TESSALON) 200 MG capsule; Take 1 capsule by mouth 2 (Two) Times a Day As Needed for Cough.  Dispense: 21 capsule; Refill: 0  -     promethazine-dextromethorphan (PROMETHAZINE-DM) 6.25-15 MG/5ML syrup; Take 5 mL by mouth At Night As Needed for Cough.  Dispense: 75 mL; Refill: 0    --all medications as prescribed  --tylenol or ibuprofen for pain; may continue mucinex; stop delsym; increase fluid intake  --if no improvement in 5-7 days, will need follow-up for further evaluation      FOLLOW-UP  As discussed during visit with PCP/[facility] if no improvement or Urgent Care/Emergency Department if worsening of symptoms    Patient verbalizes understanding of medication dosage, comfort measures, instructions for treatment and follow-up.    Ruth Ann Miller, MELANY  10/28/2021  12:50 EDT    This visit was performed via Telehealth.  This patient has been instructed to follow-up with their primary care provider if their symptoms worsen or the treatment provided does not resolve their illness.

## 2021-11-12 ENCOUNTER — OFFICE VISIT (OUTPATIENT)
Dept: INTERNAL MEDICINE | Facility: CLINIC | Age: 51
End: 2021-11-12

## 2021-11-12 VITALS
HEART RATE: 113 BPM | TEMPERATURE: 98.5 F | OXYGEN SATURATION: 98 % | HEIGHT: 70 IN | BODY MASS INDEX: 43.52 KG/M2 | DIASTOLIC BLOOD PRESSURE: 80 MMHG | WEIGHT: 304 LBS | SYSTOLIC BLOOD PRESSURE: 140 MMHG

## 2021-11-12 DIAGNOSIS — I10 BENIGN ESSENTIAL HYPERTENSION: ICD-10-CM

## 2021-11-12 DIAGNOSIS — R73.03 PREDIABETES: ICD-10-CM

## 2021-11-12 DIAGNOSIS — E03.8 OTHER SPECIFIED HYPOTHYROIDISM: ICD-10-CM

## 2021-11-12 DIAGNOSIS — R05.9 COUGH: Primary | ICD-10-CM

## 2021-11-12 PROCEDURE — 99214 OFFICE O/P EST MOD 30 MIN: CPT | Performed by: NURSE PRACTITIONER

## 2021-11-12 NOTE — ASSESSMENT & PLAN NOTE
Hypertension is stable, continue benazepril, HCTZ and Toprol-XL, routine home monitoring for less than 130/80 along with DASH diet.

## 2021-11-12 NOTE — ASSESSMENT & PLAN NOTE
Continue well-balanced diet low in carbs, sugars.  Repeat A1c today and we will adjust therapy if needed.

## 2021-11-12 NOTE — PROGRESS NOTES
"Chief Complaint  Cough, Nasal Congestion (mucus ), and Fatigue (Trouble sleeping due to cough / congestion.)    Subjective          Grant MCCARTHY Minesh Henao presents to [facility]  Patient presents for reevaluation of cough.  This is a 51-year-old male.  He was seen by an urgent care telehealth provider on 10/20/2021 and treated with Augmentin for sinusitis.  Reports that he is feeling better although he still has some thick mucus draining from his throat that makes it hard to lie down for sleep at times.  He is not feeling short of breath or having any chest discomfort.  He gets a  COVID-19 test weekly for his job and these have been negative.  He has had his COVID-19 booster.  No fever or chills.  He completed the Augmentin in full.    He has hypertension and endorses good compliance and efficacy with HCTZ 12.5 mg daily, Toprol-XL 50 mg daily and benazepril 20 mg daily.    He has hypothyroidism endorsing good compliance with levothyroxine 112 mcg daily.    He had an elevated creatinine level of 1.31 on 8/12/2021 and is due for recheck of this today.    He requests a flu shot today but unfortunately we are out of the office and he is encouraged to pursue this at his pharmacy soon.    Overall reports that he is doing well and denies development of any other new issues today.      Objective   Vital Signs:   /80   Pulse 113   Temp 98.5 °F (36.9 °C) (Oral)   Ht 177.8 cm (70\")   Wt (!) 138 kg (304 lb)   SpO2 98%   BMI 43.62 kg/m²     Physical Exam  Vitals and nursing note reviewed.   Constitutional:       General: He is not in acute distress.     Appearance: Normal appearance. He is well-developed. He is obese. He is not ill-appearing, toxic-appearing or diaphoretic.   HENT:      Head: Normocephalic and atraumatic.      Right Ear: Tympanic membrane, ear canal and external ear normal.      Left Ear: Tympanic membrane, ear canal and external ear normal.   Eyes:      Pupils: Pupils are " equal, round, and reactive to light.   Neck:      Vascular: No carotid bruit.   Cardiovascular:      Rate and Rhythm: Normal rate and regular rhythm.      Pulses: Normal pulses.      Heart sounds: Normal heart sounds.      Comments: No peripheral edema  Pulmonary:      Effort: Pulmonary effort is normal. No respiratory distress.      Breath sounds: Normal breath sounds. No stridor. No wheezing, rhonchi or rales.   Chest:      Chest wall: No tenderness.   Abdominal:      General: Bowel sounds are normal. There is no distension.      Palpations: Abdomen is soft. There is no mass.      Tenderness: There is no abdominal tenderness. There is no right CVA tenderness, left CVA tenderness, guarding or rebound.      Hernia: No hernia is present.   Musculoskeletal:         General: Normal range of motion.      Cervical back: Normal range of motion and neck supple. No rigidity or tenderness.   Lymphadenopathy:      Cervical: No cervical adenopathy.   Skin:     General: Skin is warm and dry.      Capillary Refill: Capillary refill takes less than 2 seconds.   Neurological:      General: No focal deficit present.      Mental Status: He is alert and oriented to person, place, and time. Mental status is at baseline.   Psychiatric:         Mood and Affect: Mood normal.         Behavior: Behavior normal.         Thought Content: Thought content normal.         Judgment: Judgment normal.        Result Review :   The following data was reviewed by: MELANY Thornton on 11/12/2021:  Common labs    Common Labsle 3/11/21 3/11/21 3/11/21 3/11/21 3/11/21 7/29/21 8/12/21 8/12/21 8/12/21 8/12/21    1558 1558 1558 1558 1558  1558 1558 1558 1558   Glucose  120 (A)     87      BUN  10     10      Creatinine  1.15     1.31 (A)      eGFR Non  Am  67     58 (A)      eGFR  Am  82     70      Sodium  141     143      Potassium  3.7     3.8      Chloride  102     103      Calcium  9.1     9.3      Total Protein  6.3     6.9       Albumin  4.10     4.10      Total Bilirubin  0.9     0.8      Alkaline Phosphatase  93     91      AST (SGOT)  19     24      ALT (SGPT)  24     21      WBC 7.42     6.62       Hemoglobin 15.8     17.4       Hematocrit 48.2     51.9 (A)       Platelets 271     228       Total Cholesterol   157     167     Triglycerides   171 (A)     108     HDL Cholesterol   35 (A)     39 (A)     LDL Cholesterol    92     108 (A)     Hemoglobin A1C    6.30 (A)     6.10 (A)    PSA     1.670        Uric Acid          7.6 (A)   (A) Abnormal value       Comments are available for some flowsheets but are not being displayed.           Current outpatient and discharge medications have been reconciled for the patient.  Reviewed by: MELANY Thornton           Assessment and Plan    Diagnoses and all orders for this visit:    1. Cough (Primary)  Comments:  Add a daily antihistamine.  Continue Mucinex, increasing fluids.  Chest x-ray is ordered.  Orders:  -     XR Chest PA & Lateral (In Office); Future    2. Other specified hypothyroidism  Assessment & Plan:  Continue levothyroxine 112 mcg daily.  TSH, free T4 today and we will adjust therapy if needed.    Orders:  -     TSH  -     T4, Free    3. Benign essential hypertension  Assessment & Plan:  Hypertension is stable, continue benazepril, HCTZ and Toprol-XL, routine home monitoring for less than 130/80 along with DASH diet.    Orders:  -     Basic Metabolic Panel    4. Prediabetes  Assessment & Plan:  Continue well-balanced diet low in carbs, sugars.  Repeat A1c today and we will adjust therapy if needed.    Orders:  -     Hemoglobin A1c    We will contact patient with lab results and any further recommendations.  Follow-up as needed and I will see him back in 4 months for recheck of chronic conditions.    Follow Up   Return in about 4 years (around 11/12/2025).  Patient was given instructions and counseling regarding his condition or for health maintenance advice. Please see specific  information pulled into the AVS if appropriate.

## 2021-11-13 LAB
BUN SERPL-MCNC: 11 MG/DL (ref 6–24)
BUN/CREAT SERPL: 9 (ref 9–20)
CALCIUM SERPL-MCNC: 9.2 MG/DL (ref 8.7–10.2)
CHLORIDE SERPL-SCNC: 101 MMOL/L (ref 96–106)
CO2 SERPL-SCNC: 25 MMOL/L (ref 20–29)
CREAT SERPL-MCNC: 1.2 MG/DL (ref 0.76–1.27)
GLUCOSE SERPL-MCNC: 102 MG/DL (ref 65–99)
HBA1C MFR BLD: 6.3 % (ref 4.8–5.6)
POTASSIUM SERPL-SCNC: 3.6 MMOL/L (ref 3.5–5.2)
SODIUM SERPL-SCNC: 141 MMOL/L (ref 134–144)
T4 FREE SERPL-MCNC: 1.55 NG/DL (ref 0.82–1.77)
TSH SERPL DL<=0.005 MIU/L-ACNC: 0.32 UIU/ML (ref 0.45–4.5)

## 2021-11-15 ENCOUNTER — HOSPITAL ENCOUNTER (OUTPATIENT)
Dept: GENERAL RADIOLOGY | Facility: HOSPITAL | Age: 51
Discharge: HOME | End: 2021-11-15
Admitting: NURSE PRACTITIONER

## 2021-11-15 DIAGNOSIS — R05.9 COUGH: ICD-10-CM

## 2021-11-15 PROCEDURE — 71046 X-RAY EXAM CHEST 2 VIEWS: CPT

## 2021-11-16 DIAGNOSIS — E03.8 OTHER SPECIFIED HYPOTHYROIDISM: Primary | ICD-10-CM

## 2021-11-16 NOTE — PROGRESS NOTES
I sent this patient a castaclip message with his lab results but please give him a call to get him scheduled for a nonfasting lab appointment in about 4 weeks for recheck of thyroid levels.

## 2021-11-16 NOTE — PROGRESS NOTES
Good afternoon Mr. Edge, your chest x-ray is back looking clear, no evidence of pneumonia or fluid in the chest.  Please let me know if the cough does not continue to clear over the next few weeks.  Please add a daily antihistamine as we discussed along with increasing fluids and continue with Mucinex.  Have a wonderful day,MELANY Thornton

## 2021-11-16 NOTE — PROGRESS NOTES
Hi Mr. Edge, lab results are back.  Metabolic panel is normal including kidney function, electrolytes and liver enzymes.  A1c went up slightly to 6.3, we need to prevent this from going above 6.5 as this level or higher would be considered truly diabetic.  Please make an effort to really watch your intake of carbs and sugars in the diet.  Thyroid numbers came back slightly off and I would like to recheck them in about 4 weeks, nonfasting and please avoid any biotin containing vitamins prior to the blood draw for about 2 days.  I will have the office call you to schedule that lab appointment for about 1 month from now.  Continue current levothyroxine dose at this time.  Let me know if you have any questions and have a great day,MELANY Thornton

## 2021-12-28 RX ORDER — ESOMEPRAZOLE MAGNESIUM 40 MG/1
40 CAPSULE, DELAYED RELEASE ORAL DAILY PRN
Qty: 90 CAPSULE | Refills: 1 | Status: SHIPPED | OUTPATIENT
Start: 2021-12-28 | End: 2023-02-09 | Stop reason: SDUPTHER

## 2022-01-13 ENCOUNTER — LAB (OUTPATIENT)
Dept: OTHER | Facility: HOSPITAL | Age: 52
End: 2022-01-13

## 2022-01-13 DIAGNOSIS — I82.432 ACUTE DEEP VEIN THROMBOSIS (DVT) OF POPLITEAL VEIN OF LEFT LOWER EXTREMITY: ICD-10-CM

## 2022-01-13 LAB
BASOPHILS # BLD AUTO: 0.07 10*3/MM3 (ref 0–0.2)
BASOPHILS NFR BLD AUTO: 0.8 % (ref 0–1.5)
DEPRECATED RDW RBC AUTO: 46.8 FL (ref 37–54)
EOSINOPHIL # BLD AUTO: 0.12 10*3/MM3 (ref 0–0.4)
EOSINOPHIL NFR BLD AUTO: 1.4 % (ref 0.3–6.2)
ERYTHROCYTE [DISTWIDTH] IN BLOOD BY AUTOMATED COUNT: 14.2 % (ref 12.3–15.4)
HCT VFR BLD AUTO: 51.5 % (ref 37.5–51)
HGB BLD-MCNC: 17.4 G/DL (ref 13–17.7)
IMM GRANULOCYTES # BLD AUTO: 0.08 10*3/MM3 (ref 0–0.05)
IMM GRANULOCYTES NFR BLD AUTO: 0.9 % (ref 0–0.5)
LYMPHOCYTES # BLD AUTO: 2.45 10*3/MM3 (ref 0.7–3.1)
LYMPHOCYTES NFR BLD AUTO: 27.8 % (ref 19.6–45.3)
MCH RBC QN AUTO: 30.4 PG (ref 26.6–33)
MCHC RBC AUTO-ENTMCNC: 33.8 G/DL (ref 31.5–35.7)
MCV RBC AUTO: 89.9 FL (ref 79–97)
MONOCYTES # BLD AUTO: 0.93 10*3/MM3 (ref 0.1–0.9)
MONOCYTES NFR BLD AUTO: 10.5 % (ref 5–12)
NEUTROPHILS NFR BLD AUTO: 5.17 10*3/MM3 (ref 1.7–7)
NEUTROPHILS NFR BLD AUTO: 58.6 % (ref 42.7–76)
NRBC BLD AUTO-RTO: 0 /100 WBC (ref 0–0.2)
PLATELET # BLD AUTO: 253 10*3/MM3 (ref 140–450)
PMV BLD AUTO: 10.4 FL (ref 6–12)
RBC # BLD AUTO: 5.73 10*6/MM3 (ref 4.14–5.8)
WBC NRBC COR # BLD: 8.82 10*3/MM3 (ref 3.4–10.8)

## 2022-01-13 PROCEDURE — 85025 COMPLETE CBC W/AUTO DIFF WBC: CPT | Performed by: INTERNAL MEDICINE

## 2022-01-13 PROCEDURE — 36415 COLL VENOUS BLD VENIPUNCTURE: CPT

## 2022-01-25 DIAGNOSIS — I10 ESSENTIAL HYPERTENSION: ICD-10-CM

## 2022-01-25 DIAGNOSIS — J30.2 SEASONAL ALLERGIES: ICD-10-CM

## 2022-01-26 RX ORDER — MONTELUKAST SODIUM 10 MG/1
10 TABLET ORAL NIGHTLY
Qty: 90 TABLET | Refills: 1 | Status: SHIPPED | OUTPATIENT
Start: 2022-01-26 | End: 2022-08-15

## 2022-01-26 RX ORDER — BENAZEPRIL HYDROCHLORIDE 20 MG/1
20 TABLET ORAL DAILY
Qty: 90 TABLET | Refills: 1 | Status: SHIPPED | OUTPATIENT
Start: 2022-01-26 | End: 2022-08-01 | Stop reason: SDUPTHER

## 2022-02-10 DIAGNOSIS — I10 ESSENTIAL HYPERTENSION: ICD-10-CM

## 2022-02-10 DIAGNOSIS — R60.0 LEG EDEMA: ICD-10-CM

## 2022-02-10 RX ORDER — HYDROCHLOROTHIAZIDE 12.5 MG/1
12.5 TABLET ORAL DAILY
Qty: 90 TABLET | Refills: 1 | Status: SHIPPED | OUTPATIENT
Start: 2022-02-10 | End: 2022-08-01 | Stop reason: SDUPTHER

## 2022-02-15 ENCOUNTER — PRE-PROCEDURE SCREENING (OUTPATIENT)
Dept: GASTROENTEROLOGY | Facility: CLINIC | Age: 52
End: 2022-02-15

## 2022-03-12 DIAGNOSIS — E03.8 OTHER SPECIFIED HYPOTHYROIDISM: ICD-10-CM

## 2022-03-14 ENCOUNTER — OFFICE VISIT (OUTPATIENT)
Dept: INTERNAL MEDICINE | Facility: CLINIC | Age: 52
End: 2022-03-14

## 2022-03-14 VITALS
HEART RATE: 91 BPM | WEIGHT: 296 LBS | RESPIRATION RATE: 18 BRPM | HEIGHT: 70 IN | TEMPERATURE: 98.8 F | SYSTOLIC BLOOD PRESSURE: 149 MMHG | OXYGEN SATURATION: 95 % | BODY MASS INDEX: 42.37 KG/M2 | DIASTOLIC BLOOD PRESSURE: 90 MMHG

## 2022-03-14 DIAGNOSIS — Z12.5 SCREENING FOR PROSTATE CANCER: ICD-10-CM

## 2022-03-14 DIAGNOSIS — K21.9 GASTROESOPHAGEAL REFLUX DISEASE WITHOUT ESOPHAGITIS: Chronic | ICD-10-CM

## 2022-03-14 DIAGNOSIS — E66.01 CLASS 3 SEVERE OBESITY DUE TO EXCESS CALORIES WITHOUT SERIOUS COMORBIDITY WITH BODY MASS INDEX (BMI) OF 40.0 TO 44.9 IN ADULT: Chronic | ICD-10-CM

## 2022-03-14 DIAGNOSIS — E03.8 OTHER SPECIFIED HYPOTHYROIDISM: Chronic | ICD-10-CM

## 2022-03-14 DIAGNOSIS — Z00.00 HEALTH CARE MAINTENANCE: Chronic | ICD-10-CM

## 2022-03-14 DIAGNOSIS — J30.2 SEASONAL ALLERGIES: Chronic | ICD-10-CM

## 2022-03-14 DIAGNOSIS — I82.402 RECURRENT ACUTE DEEP VEIN THROMBOSIS (DVT) OF LEFT LOWER EXTREMITY: Chronic | ICD-10-CM

## 2022-03-14 DIAGNOSIS — R73.03 PREDIABETES: Chronic | ICD-10-CM

## 2022-03-14 DIAGNOSIS — B37.2 YEAST DERMATITIS: ICD-10-CM

## 2022-03-14 DIAGNOSIS — I10 BENIGN ESSENTIAL HYPERTENSION: Primary | Chronic | ICD-10-CM

## 2022-03-14 PROBLEM — E66.813 CLASS 3 SEVERE OBESITY DUE TO EXCESS CALORIES WITHOUT SERIOUS COMORBIDITY WITH BODY MASS INDEX (BMI) OF 40.0 TO 44.9 IN ADULT: Chronic | Status: ACTIVE | Noted: 2022-03-14

## 2022-03-14 PROBLEM — E66.813 CLASS 3 SEVERE OBESITY DUE TO EXCESS CALORIES WITHOUT SERIOUS COMORBIDITY WITH BODY MASS INDEX (BMI) OF 40.0 TO 44.9 IN ADULT: Status: ACTIVE | Noted: 2022-03-14

## 2022-03-14 PROCEDURE — 99214 OFFICE O/P EST MOD 30 MIN: CPT | Performed by: NURSE PRACTITIONER

## 2022-03-14 RX ORDER — LEVOTHYROXINE SODIUM 112 UG/1
112 TABLET ORAL DAILY
Qty: 90 TABLET | Refills: 1 | Status: SHIPPED | OUTPATIENT
Start: 2022-03-14 | End: 2022-09-16

## 2022-03-14 RX ORDER — NYSTATIN 100000 [USP'U]/G
POWDER TOPICAL 3 TIMES DAILY
Qty: 30 G | Refills: 1 | Status: SHIPPED | OUTPATIENT
Start: 2022-03-14 | End: 2022-11-07

## 2022-03-14 NOTE — PATIENT INSTRUCTIONS
Please check home blood pressures 2-3 times weekly and notify Patricia if readings are consistently higher than 140/80.

## 2022-03-14 NOTE — PROGRESS NOTES
"Chief Complaint  Hypertension (Pt presents here today for a follow up for health maintenance.), Hypothyroidism, Diabetes, and Hyperlipidemia    Subjective          Grant FABIO Edge Jr. presents to [facility]  Patient presents for a 4-month follow-up on chronic conditions.  This is a 51-year-old male.    He noticed an irritating, beefy redness beneath the skin fold of the left breast tissue about 1 week ago.  Irritated to the touch, no drainage.    He has seasonal allergies which have been well controlled with Singulair.    He has a history of 2 unprovoked DVTs, most recently left lower extremity and follows with hematology, Dr. Cowart who recommended indefinite anticoagulation due to history of 2 unprovoked DVTs.  He takes Xarelto daily with no abnormal bleeding and endorses good compliance with it.    He has GERD and reports that as needed Nexium controls this well along with avoiding trigger foods.    He has hypertension and although his blood pressure is elevated in the office today at 149/90 he endorses stable home readings meeting goal of less than 140/80 consistently with HCTZ 12.5 mg daily, benazepril 20 mg daily and Toprol-XL 50 mg daily.  He endorses good compliance with this regimen.    He is prediabetic, last A1c on 11/12/2021 was 6.3.    He has hypothyroidism and endorses good compliance with levothyroxine 112 mcg daily.    Overall reports that he is doing well and denies development of other new issues today.      Objective   Vital Signs:   /90 (BP Location: Right arm, Patient Position: Sitting, Cuff Size: Large Adult)   Pulse 91   Temp 98.8 °F (37.1 °C)   Resp 18   Ht 177.8 cm (70\")   Wt 134 kg (296 lb)   SpO2 95%   BMI 42.47 kg/m²     Physical Exam  Vitals and nursing note reviewed.   Constitutional:       General: He is not in acute distress.     Appearance: Normal appearance. He is well-developed. He is obese. He is not ill-appearing, toxic-appearing or " diaphoretic.   HENT:      Head: Normocephalic and atraumatic.      Right Ear: Tympanic membrane, ear canal and external ear normal.      Left Ear: Tympanic membrane, ear canal and external ear normal.   Eyes:      Pupils: Pupils are equal, round, and reactive to light.   Neck:      Vascular: No carotid bruit.   Cardiovascular:      Rate and Rhythm: Normal rate and regular rhythm.      Pulses: Normal pulses.      Heart sounds: Normal heart sounds.      Comments: No peripheral edema  Pulmonary:      Effort: Pulmonary effort is normal. No respiratory distress.      Breath sounds: Normal breath sounds. No stridor. No wheezing, rhonchi or rales.   Chest:      Chest wall: No tenderness.   Abdominal:      General: Bowel sounds are normal. There is no distension.      Palpations: Abdomen is soft. There is no mass.      Tenderness: There is no abdominal tenderness. There is no right CVA tenderness, left CVA tenderness, guarding or rebound.      Hernia: No hernia is present.   Musculoskeletal:         General: Normal range of motion.      Cervical back: Normal range of motion and neck supple. No rigidity or tenderness.   Lymphadenopathy:      Cervical: No cervical adenopathy.   Skin:     General: Skin is warm and dry.      Capillary Refill: Capillary refill takes less than 2 seconds.   Neurological:      General: No focal deficit present.      Mental Status: He is alert and oriented to person, place, and time. Mental status is at baseline.   Psychiatric:         Mood and Affect: Mood normal.         Behavior: Behavior normal.         Thought Content: Thought content normal.         Judgment: Judgment normal.        Result Review :   The following data was reviewed by: MELANY Thornton on 03/14/2022:  Common labs    Common Labsle 8/12/21 8/12/21 8/12/21 8/12/21 11/12/21 11/12/21 1/13/22    1558 1558 1558 1558 1535 1535    Glucose 87    102 (A)     BUN 10    11     Creatinine 1.31 (A)    1.20     eGFR Non  Am 58 (A)     70     eGFR African Am 70    80     Sodium 143    141     Potassium 3.8    3.6     Chloride 103    101     Calcium 9.3    9.2     Total Protein 6.9         Albumin 4.10         Total Bilirubin 0.8         Alkaline Phosphatase 91         AST (SGOT) 24         ALT (SGPT) 21         WBC       8.82   Hemoglobin       17.4   Hematocrit       51.5 (A)   Platelets       253   Total Cholesterol  167        Triglycerides  108        HDL Cholesterol  39 (A)        LDL Cholesterol   108 (A)        Hemoglobin A1C   6.10 (A)   6.3 (A)    Uric Acid    7.6 (A)      (A) Abnormal value       Comments are available for some flowsheets but are not being displayed.           Current outpatient and discharge medications have been reconciled for the patient.  Reviewed by: MELANY Thornton           Assessment and Plan    Diagnoses and all orders for this visit:    1. Benign essential hypertension (Primary)  Assessment & Plan:  Hypertension is stable, home readings have been stable.  I have asked him to continue regular home monitoring, goal of less than 140/80 along with DASH diet.  Continue HCTZ, benazepril and Toprol-XL.    Orders:  -     CBC No Differential  -     Comprehensive metabolic panel  -     Lipid panel    2. Recurrent acute deep vein thrombosis (DVT) of left lower extremity (HCC)  Assessment & Plan:  Continue Xarelto, routine hematology visits.    Orders:  -     rivaroxaban (Xarelto) 20 MG tablet; Take 1 tablet by mouth Daily.  Dispense: 90 tablet; Refill: 1    3. Other specified hypothyroidism  Assessment & Plan:  Continue levothyroxine 112 mcg daily.  TSH, free T4 today and we will adjust therapy if needed.    Orders:  -     TSH  -     T4, Free    4. Prediabetes  Assessment & Plan:  Continue well-balanced diet low in carbs and sugars along with regular exercise.  Repeat A1c today and we will adjust therapy if needed.    Orders:  -     Hemoglobin A1c    5. Health care maintenance  Assessment & Plan:  Regular dental,  vision exams are advised.    Up-to-date on COVID-19 including booster and he is going to send us a [employer] message with the date of his booster so that we can document this.    Regular flu shots are advised.    Screening PSA today.    Orders:  -     PSA SCREENING    6. Gastroesophageal reflux disease without esophagitis  Assessment & Plan:  Stable with as needed Nexium and avoiding trigger foods, continue current therapy.      7. Seasonal allergies  Assessment & Plan:  Stable, continue Singulair.       8. Screening for prostate cancer  -     PSA SCREENING    9. Yeast dermatitis  Comments:  Treating with nystatin powder, he will call back within 2 weeks if unresolved.  Discussed keeping the area clean and dry.  Orders:  -     nystatin (MYCOSTATIN) 467808 UNIT/GM powder; Apply  topically to the appropriate area as directed 3 (Three) Times a Day.  Dispense: 30 g; Refill: 1    10. Class 3 severe obesity due to excess calories without serious comorbidity with body mass index (BMI) of 40.0 to 44.9 in adult (HCC)  Assessment & Plan:  Patient's (Body mass index is 42.47 kg/m².)  Improving, he has lost about 8 pounds since January 2022 intentionally.  I endorsed continuing with a well-balanced diet low in fats, carbs and sugars and regular exercise for goal of 30 minutes/day 5 days/week.      We will contact patient with lab results and any further recommendations.  Follow-up as needed and I will see him back in 4 months for recheck of chronic conditions/routine health maintenance.    Follow Up   Return in about 4 months (around 7/14/2022).  Patient was given instructions and counseling regarding his condition or for health maintenance advice. Please see specific information pulled into the AVS if appropriate.

## 2022-03-14 NOTE — ASSESSMENT & PLAN NOTE
Continue well-balanced diet low in carbs and sugars along with regular exercise.  Repeat A1c today and we will adjust therapy if needed.

## 2022-03-14 NOTE — ASSESSMENT & PLAN NOTE
Patient's (Body mass index is 42.47 kg/m².)  Improving, he has lost about 8 pounds since January 2022 intentionally.  I endorsed continuing with a well-balanced diet low in fats, carbs and sugars and regular exercise for goal of 30 minutes/day 5 days/week.

## 2022-03-14 NOTE — ASSESSMENT & PLAN NOTE
Hypertension is stable, home readings have been stable.  I have asked him to continue regular home monitoring, goal of less than 140/80 along with DASH diet.  Continue HCTZ, benazepril and Toprol-XL.

## 2022-03-14 NOTE — ASSESSMENT & PLAN NOTE
Regular dental, vision exams are advised.    Up-to-date on COVID-19 including booster and he is going to send us a [employer] message with the date of his booster so that we can document this.    Regular flu shots are advised.    Screening PSA today.

## 2022-03-15 LAB
ALBUMIN SERPL-MCNC: 4.1 G/DL (ref 3.8–4.9)
ALBUMIN/GLOB SERPL: 1.5 {RATIO} (ref 1.2–2.2)
ALP SERPL-CCNC: 102 IU/L (ref 44–121)
ALT SERPL-CCNC: 19 IU/L (ref 0–44)
AST SERPL-CCNC: 17 IU/L (ref 0–40)
BILIRUB SERPL-MCNC: 0.8 MG/DL (ref 0–1.2)
BUN SERPL-MCNC: 11 MG/DL (ref 6–24)
BUN/CREAT SERPL: 8 (ref 9–20)
CALCIUM SERPL-MCNC: 9.4 MG/DL (ref 8.7–10.2)
CHLORIDE SERPL-SCNC: 103 MMOL/L (ref 96–106)
CHOLEST SERPL-MCNC: 142 MG/DL (ref 100–199)
CO2 SERPL-SCNC: 25 MMOL/L (ref 20–29)
CREAT SERPL-MCNC: 1.34 MG/DL (ref 0.76–1.27)
EGFR GENE MUT ANL BLD/T: 64 ML/MIN/1.73
ERYTHROCYTE [DISTWIDTH] IN BLOOD BY AUTOMATED COUNT: 12.7 % (ref 11.6–15.4)
GLOBULIN SER CALC-MCNC: 2.8 G/DL (ref 1.5–4.5)
GLUCOSE SERPL-MCNC: 117 MG/DL (ref 65–99)
HBA1C MFR BLD: 6 % (ref 4.8–5.6)
HCT VFR BLD AUTO: 48.7 % (ref 37.5–51)
HDLC SERPL-MCNC: 37 MG/DL
HGB BLD-MCNC: 16.2 G/DL (ref 13–17.7)
LDLC SERPL CALC-MCNC: 86 MG/DL (ref 0–99)
MCH RBC QN AUTO: 30.1 PG (ref 26.6–33)
MCHC RBC AUTO-ENTMCNC: 33.3 G/DL (ref 31.5–35.7)
MCV RBC AUTO: 90 FL (ref 79–97)
PLATELET # BLD AUTO: 271 X10E3/UL (ref 150–450)
POTASSIUM SERPL-SCNC: 3.9 MMOL/L (ref 3.5–5.2)
PROT SERPL-MCNC: 6.9 G/DL (ref 6–8.5)
PSA SERPL-MCNC: 1.9 NG/ML (ref 0–4)
RBC # BLD AUTO: 5.39 X10E6/UL (ref 4.14–5.8)
SODIUM SERPL-SCNC: 143 MMOL/L (ref 134–144)
T4 FREE SERPL-MCNC: 1.01 NG/DL (ref 0.82–1.77)
TRIGL SERPL-MCNC: 103 MG/DL (ref 0–149)
TSH SERPL DL<=0.005 MIU/L-ACNC: 1.35 UIU/ML (ref 0.45–4.5)
VLDLC SERPL CALC-MCNC: 19 MG/DL (ref 5–40)
WBC # BLD AUTO: 7.2 X10E3/UL (ref 3.4–10.8)

## 2022-03-16 NOTE — PROGRESS NOTES
Good morning Mr. Edge, labs are back. No anemia, normal white cells and platelets. Kidney function slightly declined since last check but stable overall- please hydrate well and ensure not taking NSAIDs. Electrolytes and liver enzymes are normal. Cholesterol panel overall well controlled. Thyroid numbers stable. A1C declined slightly to 6.0 down from last check of 6.3- prediabetic range, please continue watching intake of carbs and sugars in the diet. PSA is normal. Let me know of any questions and we will recheck routinely. Continue current medication regimen. Have a great day,  MELANY Thornton

## 2022-03-23 ENCOUNTER — APPOINTMENT (OUTPATIENT)
Dept: GENERAL RADIOLOGY | Facility: HOSPITAL | Age: 52
End: 2022-03-23

## 2022-03-23 ENCOUNTER — HOSPITAL ENCOUNTER (EMERGENCY)
Facility: HOSPITAL | Age: 52
Discharge: HOME | End: 2022-03-23
Attending: EMERGENCY MEDICINE | Admitting: EMERGENCY MEDICINE

## 2022-03-23 VITALS
OXYGEN SATURATION: 98 % | TEMPERATURE: 98.4 F | DIASTOLIC BLOOD PRESSURE: 89 MMHG | HEIGHT: 69 IN | RESPIRATION RATE: 20 BRPM | HEART RATE: 88 BPM | SYSTOLIC BLOOD PRESSURE: 140 MMHG | BODY MASS INDEX: 43.71 KG/M2

## 2022-03-23 DIAGNOSIS — M25.461 EFFUSION OF RIGHT KNEE: Primary | ICD-10-CM

## 2022-03-23 PROCEDURE — 73560 X-RAY EXAM OF KNEE 1 OR 2: CPT

## 2022-03-23 PROCEDURE — 99282 EMERGENCY DEPT VISIT SF MDM: CPT

## 2022-03-23 NOTE — ED PROVIDER NOTES
MD ATTESTATION NOTE  I wore full protective equipment throughout this patient encounter including a N95 face mask, googles, gown and gloves. Hand hygiene was performed before donning protective equipment and after removal when leaving the room.    The RICARDO and I have discussed this patient's history, physical exam, and treatment plan. I have reviewed the documentation and personally had a face to face interaction with the patient. I affirm the RICARDO documentation and agree with their diagnostics, findings, treatment, plan, and disposition.    I provided a substantive portion of the care of this patient.  I personally performed the physical exam, in its entirety.  The attached note describes my personal findings.    Grant Edge Jr. is a 51 y.o. male who presents to the ED c/o right knee pain.  Patient complains of right knee pain for last for 5 days.  Patient denies any falls or injury, no strain or inciting event.  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  Patient complains of dull pain in the anterior knee, worsened with movement, improved with rest.  Patient does endorse some swelling in his knee, denies any redness or warmth, no swelling or pain in his calf or lower leg.  Patient reports history of arthritis in the knee, has had received steroid injections, previously evaluated by orthopedist.    On exam:  General: NAD.  Head: NCAT.  ENT: nares patent, no scleral icterus  Neck: Supple, trachea midline.  Cardiac: regular rate and rhythm.  Lungs: normal effort.  Abdomen: Soft, NTTP.   Extremities: Moves all extremities well  Right knee: No redness warmth or induration, patient has trace swelling with effusion, knee has full range of motion, no pain with passive range of motion, negative anterior/posterior drawer, no medial or lateral ligamentous laxity, no pain swelling or tenderness in the calf, no palpable cords, negative Homans.   Neurovascular intact distally.  Neuro: alert, MAEW, follows commands  Psych: calm, cooperative  Skin: Warm, dry.    Medical Decision Making:  After the initial H&P, I discussed pertinent information from history and physical exam with patient/family.  Discussed differential diagnosis.  Discussed plan for ED evaluation/work-up/treatment.  All questions answered.  Patient/family is agreeable with plan.    ED Course as of 03/23/22 1612   Wed Mar 23, 2022   1314 Patient presents with atraumatic 4 to 5-day history of right knee pain.  On exam there is a moderate effusion however no erythema or warmth.  Differential diagnoses include not limited to inflammatory arthritis, septic joint, gouty arthritis, internal derangement. [EE]   1406 Right knee films interpreted myself showed no acute fracture or malalignment.  There is a small knee effusion.  Mild degenerative changes. [EE]   1421 I have updated patient on x-ray findings.  I suspect his pain is likely secondary to arthritic changes.  No evidence of gout, septic joint, DVT.  He will ice and rest the knee. [EE]      ED Course User Index  [EE] Royce Moreland, PA       Diagnosis  Final diagnoses:   Effusion of right knee        Ming Thompson MD  03/23/22 1612

## 2022-03-23 NOTE — DISCHARGE INSTRUCTIONS
Tylenol for pain.    Ice and elevate knee.  Minimal weightbearing.    Return to ER for any fever worsening pain.

## 2022-03-23 NOTE — ED PROVIDER NOTES
EMERGENCY DEPARTMENT ENCOUNTER    Room Number:  A03/03  Date of encounter:  3/23/2022  PCP: Patricia Abdi APRN  Historian: Patient      I used full protective equipment while examining this patient.  This includes face mask, gloves and protective eyewear.  I washed my hands before entering the room and immediately upon leaving the room      HPI:  Chief Complaint: Knee pain  A complete HPI/ROS/PMH/PSH/SH/FH are unobtainable due to: Nothing    Context: Grant Edge Jr. is a 51 y.o. male who presents to the ED c/o atraumatic knee pain for the past 5 days.  He states the pain came on gradually and is progressive become worse.  He denies any known trauma, falls, twisting motions.  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  He describes the pain is achy, dull, diffusely located to the anterior knee.  The pain is worse with ambulating and movement.  He denies any fevers, chills, redness to the knee.  He states he has had a prior history of arthritis in the right knee and had steroid injections in the past.  He is on Eliquis for history of DVT.  He denies any calf pain.    Review of Medical Records  I reviewed patient's last internal medicine office visit from 3/14/2022.  Patient being treated for hypertension, DVT, obesity.    PAST MEDICAL HISTORY  Active Ambulatory Problems     Diagnosis Date Noted   • Abdominal abscess 03/07/2016   • Benign essential hypertension 03/07/2016   • Benign prostatic hyperplasia with urinary obstruction 03/07/2016   • Atypical chest pain 03/07/2016   • Degeneration of intervertebral disc of lumbar region 03/07/2016   • Gastroesophageal reflux disease with esophagitis 03/07/2016   • Primary hypertriglyceridemia 03/07/2016   • Gastroesophageal reflux disease 03/07/2016   • Hematochezia 03/07/2016   • Hematuria 03/07/2016   • Hyperlipidemia 03/07/2016   • Hypertension 03/07/2016   • Hypokalemia 03/07/2016   • Hypothyroidism 03/07/2016   • Insomnia 03/07/2016    • Arthralgia of hip 03/07/2016   • Chronic low back pain 03/07/2016   • Lumbar radiculopathy 03/07/2016   • Morbid obesity (HCC) 03/07/2016   • Nonvenomous insect bite with infection 03/07/2016   • Osteoarthritis of hip 03/07/2016   • Pleurodynia 03/07/2016   • Sinus tachycardia 03/07/2016   • Vitamin D deficiency 03/07/2016   • Right knee pain 03/07/2016   • Pain of lower extremity 03/07/2016   • Bilateral headaches 03/07/2016   • Health care maintenance 08/22/2016   • Old anterior myocardial infarction 08/22/2016   • EKG, abnormal 08/22/2016   • History of hepatic disease 04/21/2015   • Essential hypertension 10/12/2016   • Abnormal stress test 10/12/2016   • Moderate single current episode of major depressive disorder ([facility]) 07/05/2017   • Prediabetes 12/05/2019   • Recurrent acute deep vein thrombosis (DVT) of left lower extremity ([facility]) 06/03/2020   • Hyperuricemia 07/10/2020   • H/O gastroesophageal reflux (GERD) 04/21/2015   • Seasonal allergies 07/12/2012   • Class 3 severe obesity due to excess calories without serious comorbidity with body mass index (BMI) of 40.0 to 44.9 in adult ([facility]) 03/14/2022     Resolved Ambulatory Problems     Diagnosis Date Noted   • Acute bronchitis 03/07/2016   • Seasonal allergic rhinitis 03/07/2016   • Cough 03/07/2016   • Shortness of breath 03/07/2016   • Green stool 03/07/2016   • Left lower quadrant pain 03/07/2016   • Chest pain on breathing 08/22/2016     Past Medical History:   Diagnosis Date   • Abnormal ECG    • Allergic    • Arthritis    • Chest pain    • DVT (deep venous thrombosis) ([facility])    • GERD (gastroesophageal reflux disease)    • Myocardial infarction ([facility])    • Obesity    • Simple goiter    • Sinus bradycardia          PAST SURGICAL HISTORY  Past Surgical History:   Procedure Laterality Date   • CARDIAC CATHETERIZATION N/A 10/18/2016    Procedure: Left Heart Cath;  Surgeon: Daniel Rosas MD;  Location: [facility] IN[facility]AS[facility] LOCATION;  Service:    •  CHOLECYSTECTOMY     • JOINT REPLACEMENT     • SALIVARY GLAND SURGERY     • THYROIDECTOMY  2013   • TONSILLECTOMY     • TOTAL HIP ARTHROPLASTY REVISION Left 2015         FAMILY HISTORY  Family History   Problem Relation Age of Onset   • Diabetes Father    • Heart disease Father    • Arthritis Father    • Hyperlipidemia Father    • Hypertension Father          SOCIAL HISTORY  Social History     Socioeconomic History   • Marital status:    Tobacco Use   • Smoking status: Never Smoker   • Smokeless tobacco: Never Used   Substance and Sexual Activity   • Alcohol use: No   • Drug use: No   • Sexual activity: Yes     Partners: Female     Birth control/protection: None         ALLERGIES  Patient has no known allergies.        REVIEW OF SYSTEMS  All systems reviewed and negative except for those discussed in HPI.       PHYSICAL EXAM    I have reviewed the triage vital signs and nursing notes.    ED Triage Vitals   Temp Heart Rate Resp BP SpO2   03/23/22 1251 03/23/22 1251 03/23/22 1251 03/23/22 1253 03/23/22 1251   98.4 °F (36.9 °C) 88 20 144/92 98 %      Temp src Heart Rate Source Patient Position BP Location FiO2 (%)   -- -- -- -- --              Physical Exam  GENERAL: Alert, oriented, not distressed  HENT: head atraumatic, no nuchal rigidity  EYES: no scleral icterus, EOMI  CV: regular rhythm, regular rate, no murmur  RESPIRATORY: normal effort, CTA  ABDOMEN: soft, nontender  MUSCULOSKELETAL: Mild diffuse tenderness and swelling to right knee.  No significant warmth or erythema.  No laxity to right knee.  Right calf nontender.  Neurovascular intact distally.  Full painless range of motion of right knee.  NEURO: alert, moves all extremities, follows commands  SKIN: warm, dry    RADIOLOGY  XR Knee 1 or 2 View Right    Result Date: 3/23/2022  RIGHT KNEE, 2 VIEWS  HISTORY: Right knee pain and swelling  COMPARISON: 05/04/2017  FINDINGS: There is a small knee joint effusion. No evidence of fracture. Mild degenerative  changes are noted of the femoral condyles.      Small knee joint effusion  This report was finalized on 3/23/2022 2:00 PM by Dr. Ki Hargrove M.D.        I ordered the above noted radiological studies. Reviewed by me and discussed with radiologist.  See dictation for official radiology interpretation.    PROGRESS, DATA ANALYSIS, CONSULTS, AND MEDICAL DECISION MAKING    All labs have been independently reviewed by me.  All radiology studies have been reviewed by me and discussed with radiologist dictating the report.   EKG's independently viewed and interpreted by me.  Discussion below represents my analysis of pertinent findings related to patient's condition, differential diagnosis, treatment plan and final disposition.    I have discussed case with Dr. Thompson, emergency room physician.  He has performed his own bedside examination and agrees with treatment plan.    ED Course as of 03/23/22 1957   Wed Mar 23, 2022   1314 Patient presents with atraumatic 4 to 5-day history of right knee pain.  On exam there is a moderate effusion however no erythema or warmth.  Differential diagnoses include not limited to inflammatory arthritis, septic joint, gouty arthritis, internal derangement. [EE]   1406 Right knee films interpreted myself showed no acute fracture or malalignment.  There is a small knee effusion.  Mild degenerative changes. [EE]   1421 I have updated patient on x-ray findings.  I suspect his pain is likely secondary to arthritic changes.  No evidence of gout, septic joint, DVT.  He will ice and rest the knee. [EE]      ED Course User Index  [EE] Royce Moreland PA       AS OF 19:57 EDT VITALS:    BP - 140/89  HR - 88  TEMP - 98.4 °F (36.9 °C)  O2 SATS - 98%        DIAGNOSIS  Final diagnoses:   Effusion of right knee         DISPOSITION  Discharged      Dictated utilizing Dragon dictation     Royce Moreland PA  03/23/22 1957

## 2022-03-23 NOTE — ED NOTES
Patient from home via PV; c/o right anterior part of knee pain. No known injury, but has had pain and swelling the last two days. Patient ambulatory without assistance.

## 2022-04-29 ENCOUNTER — APPOINTMENT (OUTPATIENT)
Dept: SLEEP MEDICINE | Facility: HOSPITAL | Age: 52
End: 2022-04-29

## 2022-05-13 DIAGNOSIS — I10 BENIGN ESSENTIAL HYPERTENSION: ICD-10-CM

## 2022-05-13 RX ORDER — METOPROLOL SUCCINATE 50 MG/1
50 TABLET, EXTENDED RELEASE ORAL DAILY
Qty: 90 TABLET | Refills: 1 | Status: SHIPPED | OUTPATIENT
Start: 2022-05-13 | End: 2022-11-14

## 2022-05-23 ENCOUNTER — APPOINTMENT (OUTPATIENT)
Dept: SLEEP MEDICINE | Facility: HOSPITAL | Age: 52
End: 2022-05-23

## 2022-06-03 ENCOUNTER — OFFICE VISIT (OUTPATIENT)
Dept: ORTHOPEDIC SURGERY | Facility: CLINIC | Age: 52
End: 2022-06-03

## 2022-06-03 VITALS — TEMPERATURE: 97.7 F | BODY MASS INDEX: 45.16 KG/M2 | HEIGHT: 69 IN | WEIGHT: 304.9 LBS

## 2022-06-03 DIAGNOSIS — M17.10 ARTHRITIS OF KNEE: ICD-10-CM

## 2022-06-03 DIAGNOSIS — M25.511 RIGHT SHOULDER PAIN, UNSPECIFIED CHRONICITY: ICD-10-CM

## 2022-06-03 DIAGNOSIS — R20.2 NUMBNESS AND TINGLING IN BOTH HANDS: ICD-10-CM

## 2022-06-03 DIAGNOSIS — R52 PAIN: Primary | ICD-10-CM

## 2022-06-03 DIAGNOSIS — M16.10 ARTHRITIS, HIP: ICD-10-CM

## 2022-06-03 DIAGNOSIS — M25.512 LEFT SHOULDER PAIN, UNSPECIFIED CHRONICITY: ICD-10-CM

## 2022-06-03 DIAGNOSIS — R20.0 NUMBNESS AND TINGLING IN BOTH HANDS: ICD-10-CM

## 2022-06-03 PROCEDURE — 99214 OFFICE O/P EST MOD 30 MIN: CPT | Performed by: ORTHOPAEDIC SURGERY

## 2022-06-03 PROCEDURE — 73502 X-RAY EXAM HIP UNI 2-3 VIEWS: CPT | Performed by: ORTHOPAEDIC SURGERY

## 2022-06-03 PROCEDURE — 20610 DRAIN/INJ JOINT/BURSA W/O US: CPT | Performed by: ORTHOPAEDIC SURGERY

## 2022-06-03 PROCEDURE — 73030 X-RAY EXAM OF SHOULDER: CPT | Performed by: ORTHOPAEDIC SURGERY

## 2022-06-03 RX ADMIN — LIDOCAINE HYDROCHLORIDE 2 ML: 20 INJECTION, SOLUTION EPIDURAL; INFILTRATION; INTRACAUDAL; PERINEURAL at 15:43

## 2022-06-03 RX ADMIN — METHYLPREDNISOLONE ACETATE 160 MG: 80 INJECTION, SUSPENSION INTRA-ARTICULAR; INTRALESIONAL; INTRAMUSCULAR; SOFT TISSUE at 15:43

## 2022-06-03 NOTE — PROGRESS NOTES
Chief complaint: Right shoulder pain, bilateral upper extremity numbness and tingling, right hip pain    Mr. Edge follows up today for multiple issues.  The reason the appointment was initially made was his right shoulder.  This is a new issue to me.  I have previously treated him for left shoulder osteoarthritis.  He is now having similar problems on the right side.  Pain is moderate, constant and aching.  The pain is worse with activity and somewhat better with anti-inflammatories.    He is still having numbness and tingling in both arms and hands.  He did get the nerve conduction studies done.  He presents today with those available for review.    Last, he is starting to have hip and thigh pain on the right side.  He has a history of left hip arthritis for which he had a total hip previously.    Right shoulder is examined.  Skin is benign.  Mild to moderate tenderness anteriorly over the rotator interval.  No effusion.  No increased warmth.  He has good motion.  Mild discomfort with resisted abduction and forward elevation but his strength is 5 out of 5.    Right hip is just briefly examined.  I would say his motion is overall pretty good.  Rotation is maybe a little limited relative to the other side.  He does have a positive Stinchfield maneuver.    AP, scapular Y and axillary views right shoulder are ordered and reviewed to evaluate his complaint of shoulder pain.  No comparison films are available.  He appears to have moderate glenohumeral osteoarthritis with joint space narrowing, osteophyte formation, subchondral sclerosis and subchondral cyst formation.  Acromiohumeral interval measures normal.    AP and lateral views of the right hip are ordered and reviewed to evaluate his complaint of hip pain.  He appears to have mild to moderate osteoarthritis of the right hip with joint space narrowing and osteophyte formation.    Nerve studies are reviewed.  Findings are listed below.    Impression:  Complex  abnormal study.  The patient has moderate bilateral median neuropathies at the wrists and moderate bilateral ulnar neuropathies at the elbows.  This pattern is consistent with a more diffuse peripheral neuropathy.     In addition to this there are needle exam changes involving the left extensor digitorum communis and left extensor indicis without abnormalities of the left radial motor study.  This points to a proximal source either in the brachial plexus or at the C8 root level.  Unfortunately other more typical findings for a lower trunk plexopathy or a C8 radiculopathy were not seen.  There were irritative changes in the left pronator teres which are considered an outlier but could point to involvement of the plexus or at the root level such as C6 or C7.     Assessment: 1.  Right shoulder osteoarthritis 2.  Right hip osteoarthritis 3.  Bilateral upper extremity peripheral neuropathy with cubital and carpal tunnel syndromes    Plan: For his shoulder, we discussed all treatment options available to him at this time.  He elected to try a glenohumeral injection.  The risk, benefits and alternatives were discussed including the elevated risk with anticoagulation.  He acknowledged understanding and consented.  The injection was performed as described below.    The nerve studies do show findings consistent with carpal and cubital tunnel syndrome but also suggest peripheral neuropathy and possibly a cervical etiology.  I told him it is a confusing study to me and I do not think this is a straightforward issue.  While I commonly treat carpal and cubital tunnel syndromes, I think he is best served by seeing a hand specialist to see if that surgery is indicated or could likely help him.  I am going to refer him to [facility].    Last, for his hip, I have referred him back to Dr. Swift for further management.    Omid Booker MD    Large Joint Arthrocentesis: R glenohumeral  Date/Time: 6/3/2022 3:43 PM  Consent  given by: patient  Site marked: site marked  Timeout: Immediately prior to procedure a time out was called to verify the correct patient, procedure, equipment, support staff and site/side marked as required   Supporting Documentation  Indications: pain   Procedure Details  Location: shoulder - R glenohumeral  Preparation: Patient was prepped and draped in the usual sterile fashion  Needle size: 25 G  Approach: anterior  Medications administered: 160 mg methylPREDNISolone acetate 80 MG/ML; 2 mL lidocaine PF 2% 2 %  Patient tolerance: patient tolerated the procedure well with no immediate complications

## 2022-06-06 RX ORDER — METHYLPREDNISOLONE ACETATE 80 MG/ML
160 INJECTION, SUSPENSION INTRA-ARTICULAR; INTRALESIONAL; INTRAMUSCULAR; SOFT TISSUE
Status: COMPLETED | OUTPATIENT
Start: 2022-06-03 | End: 2022-06-03

## 2022-06-06 RX ORDER — LIDOCAINE HYDROCHLORIDE 20 MG/ML
2 INJECTION, SOLUTION EPIDURAL; INFILTRATION; INTRACAUDAL; PERINEURAL
Status: COMPLETED | OUTPATIENT
Start: 2022-06-03 | End: 2022-06-03

## 2022-06-16 ENCOUNTER — OFFICE VISIT (OUTPATIENT)
Dept: ONCOLOGY | Facility: CLINIC | Age: 52
End: 2022-06-16

## 2022-06-16 ENCOUNTER — LAB (OUTPATIENT)
Dept: OTHER | Facility: HOSPITAL | Age: 52
End: 2022-06-16

## 2022-06-16 VITALS
HEART RATE: 73 BPM | SYSTOLIC BLOOD PRESSURE: 137 MMHG | TEMPERATURE: 97.3 F | WEIGHT: 305.2 LBS | OXYGEN SATURATION: 95 % | HEIGHT: 69 IN | BODY MASS INDEX: 45.2 KG/M2 | RESPIRATION RATE: 18 BRPM | DIASTOLIC BLOOD PRESSURE: 85 MMHG

## 2022-06-16 DIAGNOSIS — I82.432 ACUTE DEEP VEIN THROMBOSIS (DVT) OF POPLITEAL VEIN OF LEFT LOWER EXTREMITY: ICD-10-CM

## 2022-06-16 DIAGNOSIS — Z79.01 CURRENT USE OF LONG TERM ANTICOAGULATION: Primary | ICD-10-CM

## 2022-06-16 LAB
BASOPHILS # BLD AUTO: 0.07 10*3/MM3 (ref 0–0.2)
BASOPHILS NFR BLD AUTO: 0.8 % (ref 0–1.5)
DEPRECATED RDW RBC AUTO: 50.4 FL (ref 37–54)
EOSINOPHIL # BLD AUTO: 0.09 10*3/MM3 (ref 0–0.4)
EOSINOPHIL NFR BLD AUTO: 1 % (ref 0.3–6.2)
ERYTHROCYTE [DISTWIDTH] IN BLOOD BY AUTOMATED COUNT: 14.8 % (ref 12.3–15.4)
HCT VFR BLD AUTO: 48.3 % (ref 37.5–51)
HGB BLD-MCNC: 15.9 G/DL (ref 13–17.7)
IMM GRANULOCYTES # BLD AUTO: 0.06 10*3/MM3 (ref 0–0.05)
IMM GRANULOCYTES NFR BLD AUTO: 0.6 % (ref 0–0.5)
LYMPHOCYTES # BLD AUTO: 2.25 10*3/MM3 (ref 0.7–3.1)
LYMPHOCYTES NFR BLD AUTO: 24.3 % (ref 19.6–45.3)
MCH RBC QN AUTO: 30.2 PG (ref 26.6–33)
MCHC RBC AUTO-ENTMCNC: 32.9 G/DL (ref 31.5–35.7)
MCV RBC AUTO: 91.8 FL (ref 79–97)
MONOCYTES # BLD AUTO: 1 10*3/MM3 (ref 0.1–0.9)
MONOCYTES NFR BLD AUTO: 10.8 % (ref 5–12)
NEUTROPHILS NFR BLD AUTO: 5.78 10*3/MM3 (ref 1.7–7)
NEUTROPHILS NFR BLD AUTO: 62.5 % (ref 42.7–76)
NRBC BLD AUTO-RTO: 0 /100 WBC (ref 0–0.2)
PLATELET # BLD AUTO: 242 10*3/MM3 (ref 140–450)
PMV BLD AUTO: 9.9 FL (ref 6–12)
RBC # BLD AUTO: 5.26 10*6/MM3 (ref 4.14–5.8)
WBC NRBC COR # BLD: 9.25 10*3/MM3 (ref 3.4–10.8)

## 2022-06-16 PROCEDURE — 85025 COMPLETE CBC W/AUTO DIFF WBC: CPT | Performed by: INTERNAL MEDICINE

## 2022-06-16 PROCEDURE — 36415 COLL VENOUS BLD VENIPUNCTURE: CPT

## 2022-06-16 PROCEDURE — 99214 OFFICE O/P EST MOD 30 MIN: CPT | Performed by: INTERNAL MEDICINE

## 2022-06-16 NOTE — PROGRESS NOTES
Subjective   Grant Edge Jr. is a 51 y.o. male. Referred for Left LE DVT     History of Present Illness   Mr. Edge is a 49-year-old male with history of hypertension, previous history of provoked DVT following total hip replacement about 6 to 7 years ago presents for further evaluation and management of left lower extremity DVT.  He noticed bilateral lower extremity swelling about 2 weeks ago following which he presented to the emergency room.  Bilateral lower extremity Doppler was performed on 5/30/2020 on which an acute left lower extremity DVT was noted in the popliteal vein.  All other veins appeared normal.  He was started on Eliquis.  He was seen by Patricia Peter on 6/3/2020 and started on hydrochlorothiazide for bilateral lower extremity edema.  He has also been referred to us for further management of the DVT.  He denies any history of tobacco use, denies any recent travel.  Denies any recent changes in his health besides the bilateral lower extremity swelling.  No significant weight loss, fatigue, night sweats, lymphadenopathy, cough, dyspnea, nausea, vomiting, hematemesis, melena, hematochezia.  Last colonoscopy was about 5 to 6 years ago in which there was a benign polyp.    Interval history  Mr. Edge presents to the clinic today for follow-up.  He has been compliant with Xarelto 20 mg daily.  Tolerating that well.  No extra bleeding or bruising.  Weight has not changed significantly.  He reports walking but not on a daily basis.  Denies any lower extremity pain swelling discomfort, dyspnea, chest pain, lightheadedness dizziness or palpitations.    The following portions of the patient's history were reviewed and updated as appropriate: allergies, current medications, past family history, past medical history, past social history, past surgical history and problem list.    Past Medical History:   Diagnosis Date   • Abnormal ECG    • Allergic    • Arthritis    • Chest pain    • DVT (deep venous  thrombosis) (HCC)    • GERD (gastroesophageal reflux disease)    • Hyperlipidemia    • Hypertension    • Hypokalemia    • Myocardial infarction (HCC)    • Obesity    • Simple goiter    • Sinus bradycardia    • Vitamin D deficiency     hypothyroidism  Following total thyroidectomy    Past Surgical History:   Procedure Laterality Date   • CARDIAC CATHETERIZATION N/A 10/18/2016    Procedure: Left Heart Cath;  Surgeon: Daniel Rosas MD;  Location:  [facility];  Service:    • CHOLECYSTECTOMY     • JOINT REPLACEMENT     • SALIVARY GLAND SURGERY     • THYROIDECTOMY  2013   • TONSILLECTOMY     • TOTAL HIP ARTHROPLASTY REVISION Left 2015        Family History   Problem Relation Age of Onset   • Diabetes Father    • Heart disease Father    • Arthritis Father    • Hyperlipidemia Father    • Hypertension Father    Maternal aunt - blood clots   Maternal aunt - lupus     Social History     Socioeconomic History   • Marital status:    Tobacco Use   • Smoking status: Never Smoker   • Smokeless tobacco: Never Used   Substance and Sexual Activity   • Alcohol use: No   • Drug use: No   • Sexual activity: Yes     Partners: Female     Birth control/protection: None      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   HENT: Negative for congestion, drooling, facial swelling, mouth sores, sore throat and trouble swallowing.    Eyes: Negative for blurred vision, double vision and visual disturbance.   Respiratory: Negative for apnea, cough, chest tightness, shortness of breath, wheezing and stridor.    Cardiovascular: Negative for chest pain, palpitations and leg swelling.   Gastrointestinal: Negative for abdominal distention, constipation, diarrhea, nausea, vomiting and indigestion.   Endocrine: Negative  for cold intolerance and heat intolerance.   Genitourinary: Negative for dysuria, hematuria and urgency.   Musculoskeletal: Negative for arthralgias, back pain and myalgias.   Skin: Negative for color change, dry skin, skin lesions and wound.   Neurological: Negative for dizziness, seizures, syncope, facial asymmetry, weakness, light-headedness and confusion.   Hematological: Negative for adenopathy. Does not bruise/bleed easily.   Psychiatric/Behavioral: Negative for agitation, sleep disturbance and stress. The patient is not nervous/anxious.      Review of systems as mentioned in the HPI    Objective   There were no vitals taken for this visit.   Physical Exam   Constitutional: He is oriented to person, place, and time. He appears well-developed and well-nourished. He is obese.  HENT:   Head: Normocephalic.   Eyes: No scleral icterus.   Cardiovascular: Normal rate and regular rhythm.   Pulmonary/Chest: Effort normal and breath sounds normal. No respiratory distress.   Abdominal: Normal appearance.   Musculoskeletal: Normal range of motion.   Neurological: He is alert and oriented to person, place, and time.   Psychiatric: His behavior is normal. Judgment and thought content normal.         No visits with results within 30 Day(s) from this visit.   Latest known visit with results is:   Office Visit on 03/14/2022   Component Date Value Ref Range Status   • WBC 03/14/2022 7.2  3.4 - 10.8 x10E3/uL Final   • RBC 03/14/2022 5.39  4.14 - 5.80 x10E6/uL Final   • Hemoglobin 03/14/2022 16.2  13.0 - 17.7 g/dL Final   • Hematocrit 03/14/2022 48.7  37.5 - 51.0 % Final   • MCV 03/14/2022 90  79 - 97 fL Final   • MCH 03/14/2022 30.1  26.6 - 33.0 pg Final   • MCHC 03/14/2022 33.3  31.5 - 35.7 g/dL Final   • RDW 03/14/2022 12.7  11.6 - 15.4 % Final   • Platelets 03/14/2022 271  150 - 450 x10E3/uL Final   • Glucose 03/14/2022 117 (A) 65 - 99 mg/dL Final   • BUN 03/14/2022 11  6 - 24 mg/dL Final   • Creatinine 03/14/2022 1.34 (A)  0.76 - 1.27 mg/dL Final   • EGFR Result 03/14/2022 64  >59 mL/min/1.73 Final   • BUN/Creatinine Ratio 03/14/2022 8 (A) 9 - 20 Final   • Sodium 03/14/2022 143  134 - 144 mmol/L Final   • Potassium 03/14/2022 3.9  3.5 - 5.2 mmol/L Final   • Chloride 03/14/2022 103  96 - 106 mmol/L Final   • Total CO2 03/14/2022 25  20 - 29 mmol/L Final   • Calcium 03/14/2022 9.4  8.7 - 10.2 mg/dL Final   • Total Protein 03/14/2022 6.9  6.0 - 8.5 g/dL Final   • Albumin 03/14/2022 4.1  3.8 - 4.9 g/dL Final   • Globulin 03/14/2022 2.8  1.5 - 4.5 g/dL Final   • A/G Ratio 03/14/2022 1.5  1.2 - 2.2 Final   • Total Bilirubin 03/14/2022 0.8  0.0 - 1.2 mg/dL Final   • Alkaline Phosphatase 03/14/2022 102  44 - 121 IU/L Final   • AST (SGOT) 03/14/2022 17  0 - 40 IU/L Final   • ALT (SGPT) 03/14/2022 19  0 - 44 IU/L Final   • Total Cholesterol 03/14/2022 142  100 - 199 mg/dL Final   • Triglycerides 03/14/2022 103  0 - 149 mg/dL Final   • HDL Cholesterol 03/14/2022 37 (A) >39 mg/dL Final   • VLDL Cholesterol Hugh 03/14/2022 19  5 - 40 mg/dL Final   • LDL Chol Calc (NIH) 03/14/2022 86  0 - 99 mg/dL Final   • TSH 03/14/2022 1.350  0.450 - 4.500 uIU/mL Final   • Free T4 03/14/2022 1.01  0.82 - 1.77 ng/dL Final   • Hemoglobin A1C 03/14/2022 6.0 (A) 4.8 - 5.6 % Final    Comment:          Prediabetes: 5.7 - 6.4           Diabetes: >6.4           Glycemic control for adults with diabetes: <7.0     • PSA 03/14/2022 1.9  0.0 - 4.0 ng/mL Final    Comment: Roche ECLIA methodology.  According to the American U[employer] Association, Serum PSA should  decrease and remain at undetectable levels after radical  prostatectomy. The AUA defines biochemical recurrence as an initial  PSA value 0.2 ng/mL or greater followed by a subsequent confirmatory  PSA value 0.2 ng/mL or greater.  Values obtained with different assay methods or kits cannot be used  interchangeably. Results cannot be interpreted as absolute evidence  of the presence or absence of malignant disease.           XR Shoulder 2+ View Right    Result Date: 6/3/2022  Ordering physician's impression is located in the Encounter Note dated 06/03/22. X-ray performed in the DR room.     XR Hip With or Without Pelvis 2 - 3 View Right    Result Date: 6/3/2022  Ordering physician's impression is located in the Encounter Note dated 06/03/22. X-ray performed in the DR room.      I reviewed his left lower extremity Doppler and noted to have left lower extremity DVT.  CBC from 5/30/2020 reviewed and noted to have normal WBC, hemoglobin, platelet count.  CMP from 5/30/2020 reviewed by me creatinine mildly elevated at 1.31    Reviewed thrombo-failure work-up and documented below  Beta-2 glycoprotein and anticardiolipin antibody negative  Protein S antigen free normal  Protein S functional normal  Antithrombin III level normal  Prothrombin gene mutation negative  Factor V Leiden mutation negative.    CBC 6/16/2020 reviewed and within normal limits    Assessment & Plan      Mr. Edge is a 51-year-old -American male with a previous history of left lower extremity DVT following a left hip replacement surgery, now with newly diagnosed left lower extremity DVT.     *DVT-  .  This is his second episode of DVT which is unprovoked.  His the first episode was provoked secondary to surgery.  Given that he has had 2 DVTs so far and the most recent one being unprovoked thrombophilia work-up has been performed and all labs are negative.  Reviewed the thrombophilia work-up with Mr. Edge.  Given that he has had 1 provoked DVT in the past but now the most recent one is unprovoked, although thrombophilia work-up is negative would recommend long-term anticoagulation to prevent any further DVTs and PEs.  Eliquis no longer covered by insurance and had switched to Xarelto  Tolerating that well.  No active bleeding or bruising  No evidence of new or recurrent DVT  Continue Xarelto for life  He is tolerating Xarelto fairly well without any new signs  or symptoms suggestive of recurrent DVT or PE.  Again counseled on the risks of DVT with obesity.    Also recommend age appropriate ubgthidwy-ei-ol-date on prostate cancer screening.  He is past due for colonoscopy.  Explained the importance of recommended screening and risk of DVT and PE with underlying malignancies.  Discussed once again today the need for up-to-date colonoscopy.  Patient wishes to schedule this himself as he previously saw Dr. James.  He received a phone call from the gastroenterology office however he has to schedule his colonoscopy.  Explained to him that he would have to hold the Xarelto 2 days prior to the procedure.    *Bilateral lower extremity swelling-could be secondary to amlodipine.  Resolved    *Hypertension-on hydrochlorothiazide, benazepril, metoprolol.  Blood pressure 137/85    *Hypothyroidism-continue Synthroid, stable    *Obesity-previously discussed with Mr. Edge that obesity is a risk factor for DVT.  Recommend regular exercise and healthy diet.  BMI 45    *Follow-up in 6 months with APRN in 1 year with myself

## 2022-06-20 ENCOUNTER — APPOINTMENT (OUTPATIENT)
Dept: SLEEP MEDICINE | Facility: HOSPITAL | Age: 52
End: 2022-06-20

## 2022-06-21 ENCOUNTER — OFFICE VISIT (OUTPATIENT)
Dept: ORTHOPEDIC SURGERY | Facility: CLINIC | Age: 52
End: 2022-06-21

## 2022-06-21 VITALS — TEMPERATURE: 97.1 F | WEIGHT: 305 LBS | HEIGHT: 69 IN | BODY MASS INDEX: 45.18 KG/M2

## 2022-06-21 DIAGNOSIS — M16.11 PRIMARY OSTEOARTHRITIS OF RIGHT HIP: Primary | ICD-10-CM

## 2022-06-21 PROCEDURE — 99213 OFFICE O/P EST LOW 20 MIN: CPT | Performed by: ORTHOPAEDIC SURGERY

## 2022-06-21 NOTE — PROGRESS NOTES
Patient: Grant Edge Jr.  YOB: 1970 51 y.o. male  Medical Record Number: [id number]    Chief Complaints:   Chief Complaint   Patient presents with   • Right Hip - Pain     New Problem         History of Present Illness:Grant Edge Jr. is a 51 y.o. male who presents with right hip and anterior thigh region pain which is progressed over the last few months.  It is beginning to limit him.  The pain is now causing her considerable limp and discomfort with basic activities of daily living.  His left total hip replacement is doing well.    Allergies: No Known Allergies    Medications:   Current Outpatient Medications   Medication Sig Dispense Refill   • azelastine (ASTELIN) 0.1 % nasal spray 2 sprays into the nostril(s) as directed by provider 2 (Two) Times a Day. Use in each nostril as directed 30 mL 2   • benazepril (LOTENSIN) 20 MG tablet TAKE 1 TABLET BY MOUTH DAILY 90 tablet 1   • Breo Ellipta 100-25 MCG/INH inhaler      • esomeprazole (nexIUM) 40 MG capsule Take 1 capsule by mouth Daily As Needed (Acid reflux symptoms). 90 capsule 1   • fluticasone (FLONASE) 50 MCG/ACT nasal spray 2 sprays into the nostril(s) as directed by provider Daily. 15.8 mL 1   • levothyroxine (SYNTHROID, LEVOTHROID) 112 MCG tablet TAKE 1 TABLET BY MOUTH DAILY 90 tablet 1   • metoprolol succinate XL (TOPROL-XL) 50 MG 24 hr tablet TAKE 1 TABLET BY MOUTH DAILY 90 tablet 1   • montelukast (SINGULAIR) 10 MG tablet TAKE 1 TABLET BY MOUTH EVERY NIGHT 90 tablet 1   • rivaroxaban (Xarelto) 20 MG tablet Take 1 tablet by mouth Daily. 90 tablet 1   • hydroCHLOROthiazide (HYDRODIURIL) 12.5 MG tablet TAKE 1 TABLET BY MOUTH DAILY 90 tablet 1   • nystatin (MYCOSTATIN) 867745 UNIT/GM powder Apply  topically to the appropriate area as directed 3 (Three) Times a Day. 30 g 1     No current facility-administered medications for this visit.         The following portions of the patient's history were reviewed and updated as appropriate:  "allergies, current medications, past family history, past medical history, past social history, past surgical history and problem list.    Review of Systems:   A 14 point review of systems was performed. All systems negative except pertinent positives/negative listed in HPI above    Physical Exam:   Vitals:    06/21/22 1513   Temp: 97.1 °F (36.2 °C)   Weight: (!) 138 kg (305 lb)   Height: 175.3 cm (69\")       General: A and O x 3, ASA, NAD    SCLERA:    Normal    DENTITION:   Normal  Hip:  right    LEG ALIGNMENT:     Neutral        LEG LENGTH DISCREPANCY   :    none    GAIT:     Antalgic    SKIN:     No abnormality    RANGE OF MOTION:     Limited by joint irritability    STRENGTH:     Limited by joint irratibility    DISTAL PULSES:    Paplable    DISTAL SENSATION :   Intact    LYMPHATICS:     No   lymphadenopathy    OTHER:          +   Stinchfeld test      -    log roll      -   Tenderness to palpation trochanteric bursa        Radiology:  Xrays 2views right  hip(AP bilateral hips, and lateral of the hip) taken on 6/3/2022 were reviewed demonstrating  moderate joint space narrowing peripheral osteophyte formation consistent with hip osteoarthritis.    Assessment/Plan: Right hip and leg pain.  I believe this is primarily result of hip osteoarthritis.  I am going to send him for right hip fluoroscopy guided injection for both diagnostic and therapeutic purposes.  He will call me back as needed      Lionel Swift MD  6/21/2022  "

## 2022-06-29 ENCOUNTER — HOSPITAL ENCOUNTER (OUTPATIENT)
Dept: GENERAL RADIOLOGY | Facility: HOSPITAL | Age: 52
Discharge: HOME | End: 2022-06-29
Admitting: ORTHOPAEDIC SURGERY

## 2022-06-29 DIAGNOSIS — M16.11 PRIMARY OSTEOARTHRITIS OF RIGHT HIP: ICD-10-CM

## 2022-06-29 PROCEDURE — 25010000002 METHYLPREDNISOLONE PER 125 MG: Performed by: ORTHOPAEDIC SURGERY

## 2022-06-29 PROCEDURE — 77002 NEEDLE LOCALIZATION BY XRAY: CPT

## 2022-06-29 PROCEDURE — 25010000002 IOPAMIDOL 61 % SOLUTION: Performed by: ORTHOPAEDIC SURGERY

## 2022-06-29 PROCEDURE — 0 LIDOCAINE 1 % SOLUTION: Performed by: ORTHOPAEDIC SURGERY

## 2022-06-29 RX ORDER — LIDOCAINE HYDROCHLORIDE 10 MG/ML
10 INJECTION, SOLUTION INFILTRATION; PERINEURAL ONCE
Status: COMPLETED | OUTPATIENT
Start: 2022-06-29 | End: 2022-06-29

## 2022-06-29 RX ORDER — METHYLPREDNISOLONE SODIUM SUCCINATE 125 MG/2ML
80 INJECTION, POWDER, LYOPHILIZED, FOR SOLUTION INTRAMUSCULAR; INTRAVENOUS
Status: COMPLETED | OUTPATIENT
Start: 2022-06-29 | End: 2022-06-29

## 2022-06-29 RX ORDER — BUPIVACAINE HYDROCHLORIDE 2.5 MG/ML
10 INJECTION, SOLUTION EPIDURAL; INFILTRATION; INTRACAUDAL ONCE
Status: COMPLETED | OUTPATIENT
Start: 2022-06-29 | End: 2022-06-29

## 2022-06-29 RX ADMIN — METHYLPREDNISOLONE SODIUM SUCCINATE 80 MG: 125 INJECTION, POWDER, LYOPHILIZED, FOR SOLUTION INTRAMUSCULAR; INTRAVENOUS at 13:44

## 2022-06-29 RX ADMIN — BUPIVACAINE HYDROCHLORIDE 5 ML: 2.5 INJECTION, SOLUTION EPIDURAL; INFILTRATION; INTRACAUDAL; PERINEURAL at 13:44

## 2022-06-29 RX ADMIN — IOPAMIDOL 0.5 ML: 612 INJECTION, SOLUTION INTRATHECAL at 13:44

## 2022-06-29 RX ADMIN — LIDOCAINE HYDROCHLORIDE 3 ML: 10 INJECTION, SOLUTION INFILTRATION; PERINEURAL at 13:44

## 2022-07-20 DIAGNOSIS — I82.402 RECURRENT ACUTE DEEP VEIN THROMBOSIS (DVT) OF LEFT LOWER EXTREMITY: Chronic | ICD-10-CM

## 2022-07-20 RX ORDER — RIVAROXABAN 20 MG/1
TABLET, FILM COATED ORAL
Qty: 90 TABLET | Refills: 1 | Status: SHIPPED | OUTPATIENT
Start: 2022-07-20 | End: 2023-01-20

## 2022-08-01 ENCOUNTER — OFFICE VISIT (OUTPATIENT)
Dept: INTERNAL MEDICINE | Facility: CLINIC | Age: 52
End: 2022-08-01

## 2022-08-01 ENCOUNTER — TELEPHONE (OUTPATIENT)
Dept: ORTHOPEDIC SURGERY | Facility: CLINIC | Age: 52
End: 2022-08-01

## 2022-08-01 VITALS
OXYGEN SATURATION: 95 % | TEMPERATURE: 97.7 F | HEIGHT: 69 IN | HEART RATE: 90 BPM | DIASTOLIC BLOOD PRESSURE: 85 MMHG | SYSTOLIC BLOOD PRESSURE: 136 MMHG | BODY MASS INDEX: 45.91 KG/M2 | WEIGHT: 310 LBS | RESPIRATION RATE: 19 BRPM

## 2022-08-01 DIAGNOSIS — E78.2 MIXED HYPERLIPIDEMIA: Chronic | ICD-10-CM

## 2022-08-01 DIAGNOSIS — I82.402 RECURRENT ACUTE DEEP VEIN THROMBOSIS (DVT) OF LEFT LOWER EXTREMITY: Chronic | ICD-10-CM

## 2022-08-01 DIAGNOSIS — R73.03 PREDIABETES: Chronic | ICD-10-CM

## 2022-08-01 DIAGNOSIS — I10 BENIGN ESSENTIAL HYPERTENSION: Primary | Chronic | ICD-10-CM

## 2022-08-01 DIAGNOSIS — E66.01 MORBID OBESITY: ICD-10-CM

## 2022-08-01 DIAGNOSIS — E03.8 OTHER SPECIFIED HYPOTHYROIDISM: Chronic | ICD-10-CM

## 2022-08-01 PROCEDURE — 99214 OFFICE O/P EST MOD 30 MIN: CPT | Performed by: NURSE PRACTITIONER

## 2022-08-01 RX ORDER — BENAZEPRIL HYDROCHLORIDE 20 MG/1
20 TABLET ORAL DAILY
Qty: 90 TABLET | Refills: 1 | Status: SHIPPED | OUTPATIENT
Start: 2022-08-01 | End: 2022-11-07 | Stop reason: DRUGHIGH

## 2022-08-01 RX ORDER — HYDROCHLOROTHIAZIDE 12.5 MG/1
12.5 TABLET ORAL DAILY
Qty: 90 TABLET | Refills: 1 | Status: SHIPPED | OUTPATIENT
Start: 2022-08-01 | End: 2022-11-07 | Stop reason: SDUPTHER

## 2022-08-01 NOTE — ASSESSMENT & PLAN NOTE
Continue Xarelto per the direction of hematology with routine follow-ups with hematology, Dr. Cowart.

## 2022-08-01 NOTE — PROGRESS NOTES
"Chief Complaint  Hypertension (6 month follow up)    Subjective        Grant MCCARTHY Minesh Henao presents to [facility]  Patient presents for a 4-month follow-up on chronic conditions.  This is a 51-year-old male.    He is following with Dr. Swift, orthopedics for osteoarthritis of the right hip.  This is worsening.    He has prediabetes with last A1c on 3/14/2022 being 6.0.    He has a history of recurrent DVT maintained on Xarelto followed by hematology.  Denies any abnormal bleeding.    He has a history of hyperlipidemia which has been largely dietary controlled.    He has hypothyroidism endorsing good compliance with levothyroxine 112 mcg daily.    He has hypertension endorsing good compliance and efficacy with HCTZ 12.5 mg daily, benazepril 20 mg daily and Toprol-XL 50 mg daily.    His PSA was normal as of 3/14/2022.    Otherwise reports that he is doing well and denies any new complaints today.      Objective   Vital Signs:  /85 (BP Location: Left arm, Patient Position: Sitting, Cuff Size: Large Adult)   Pulse 90   Temp 97.7 °F (36.5 °C)   Resp 19   Ht 175.3 cm (69\")   Wt (!) 141 kg (310 lb)   SpO2 95%   BMI 45.78 kg/m²   Estimated body mass index is 45.78 kg/m² as calculated from the following:    Height as of this encounter: 175.3 cm (69\").    Weight as of this encounter: 141 kg (310 lb).          Physical Exam  Vitals and nursing note reviewed.   Constitutional:       General: He is not in acute distress.     Appearance: Normal appearance. He is well-developed. He is obese. He is not ill-appearing, toxic-appearing or diaphoretic.   HENT:      Head: Normocephalic and atraumatic.      Right Ear: External ear normal.      Left Ear: External ear normal.   Eyes:      Pupils: Pupils are equal, round, and reactive to light.   Neck:      Vascular: No carotid bruit.   Cardiovascular:      Rate and Rhythm: Normal rate and regular rhythm.      Pulses: Normal pulses.      Heart " sounds: Normal heart sounds.      Comments: No peripheral edema  Pulmonary:      Effort: Pulmonary effort is normal. No respiratory distress.      Breath sounds: Normal breath sounds. No stridor. No wheezing, rhonchi or rales.   Chest:      Chest wall: No tenderness.   Abdominal:      General: Bowel sounds are normal. There is no distension.      Palpations: Abdomen is soft. There is no mass.      Tenderness: There is no abdominal tenderness. There is no right CVA tenderness, left CVA tenderness, guarding or rebound.      Hernia: No hernia is present.   Musculoskeletal:         General: Normal range of motion.      Cervical back: Normal range of motion and neck supple.   Lymphadenopathy:      Cervical: No cervical adenopathy.   Skin:     General: Skin is warm and dry.      Capillary Refill: Capillary refill takes less than 2 seconds.   Neurological:      General: No focal deficit present.      Mental Status: He is alert and oriented to person, place, and time. Mental status is at baseline.   Psychiatric:         Mood and Affect: Mood normal.         Behavior: Behavior normal.         Thought Content: Thought content normal.         Judgment: Judgment normal.        Result Review :  The following data was reviewed by: MELANY Thornton on 08/01/2022:  Common labs    Common Labsle 1/13/22 3/14/22 3/14/22 3/14/22 3/14/22 3/14/22 6/16/22     1534 1534 1534 1534 1534    Glucose   117 (A)       BUN   11       Creatinine   1.34 (A)       Sodium   143       Potassium   3.9       Chloride   103       Calcium   9.4       Total Protein   6.9       Albumin   4.1       Total Bilirubin   0.8       Alkaline Phosphatase   102       AST (SGOT)   17       ALT (SGPT)   19       WBC 8.82 7.2     9.25   Hemoglobin 17.4 16.2     15.9   Hematocrit 51.5 (A) 48.7     48.3   Platelets 253 271     242   Total Cholesterol    142      Triglycerides    103      HDL Cholesterol    37 (A)      LDL Cholesterol     86      Hemoglobin A1C     6.0  (A)     PSA      1.9    (A) Abnormal value       Comments are available for some flowsheets but are not being displayed.           Current outpatient and discharge medications have been reconciled for the patient.  Reviewed by: MELANY Thornton           Assessment and Plan   Diagnoses and all orders for this visit:    1. Benign essential hypertension (Primary)  Assessment & Plan:  Hypertension is stable, continue benazepril, HCTZ and Toprol-XL, routine home monitoring for goal of less than 140/80 and DASH diet.    Orders:  -     CBC No Differential; Future  -     Comprehensive metabolic panel; Future  -     benazepril (LOTENSIN) 20 MG tablet; Take 1 tablet by mouth Daily.  Dispense: 90 tablet; Refill: 1  -     hydroCHLOROthiazide (HYDRODIURIL) 12.5 MG tablet; Take 1 tablet by mouth Daily.  Dispense: 90 tablet; Refill: 1    2. Mixed hyperlipidemia  Assessment & Plan:  Continue well-balanced diet low in fats.  Repeat lipid panel today and we will adjust therapy if needed.    Orders:  -     Lipid panel; Future    3. Prediabetes  Assessment & Plan:  Continue well-balanced diet low in carbs and sugars.  Repeat A1c today and we will adjust therapy if needed.    Orders:  -     Hemoglobin A1c; Future    4. Other specified hypothyroidism  Assessment & Plan:  Continue levothyroxine 112 mcg daily.  TSH, free T4 today and we will adjust therapy if needed.    Orders:  -     TSH; Future  -     T4, Free; Future    5. Recurrent acute deep vein thrombosis (DVT) of left lower extremity (HCC)  Assessment & Plan:  Continue Xarelto per the direction of hematology with routine follow-ups with hematology, Dr. Cowart.      6. Morbid obesity (HCC)  Assessment & Plan:  Patient's (Body mass index is 45.78 kg/m².)  Regular exercise is recommended, goal of 30 minutes/day 4 to 5 days/week along with well-balanced diet.           We will contact patient with lab results and any further recommendations.  Follow-up as needed and I will see  him back in 4 months for recheck of chronic conditions/routine health maintenance.    Follow Up   Return in about 4 months (around 12/1/2022).  Patient was given instructions and counseling regarding his condition or for health maintenance advice. Please see specific information pulled into the AVS if appropriate.       Answers for HPI/ROS submitted by the patient on 7/15/2022  What is the primary reason for your visit?: Other  Please describe your symptoms.: Routine  Have you had these symptoms before?: No  How long have you been having these symptoms?: Greater than 2 weeks

## 2022-08-01 NOTE — ASSESSMENT & PLAN NOTE
Patient's (Body mass index is 45.78 kg/m².)  Regular exercise is recommended, goal of 30 minutes/day 4 to 5 days/week along with well-balanced diet.

## 2022-08-01 NOTE — TELEPHONE ENCOUNTER
----- Message from Grant Edge Jr. sent at 7/31/2022 10:50 PM EDT -----  Regarding: Prescription For Pain  Hello. I want to see if you could call me something in for the pain with my hip? The pain level has flared up about the last 2 weeks. I’ve been taking Tylenol but it is not helping.   Thanks

## 2022-08-01 NOTE — ASSESSMENT & PLAN NOTE
Hypertension is stable, continue benazepril, HCTZ and Toprol-XL, routine home monitoring for goal of less than 140/80 and DASH diet.

## 2022-08-01 NOTE — ASSESSMENT & PLAN NOTE
Continue well-balanced diet low in carbs and sugars.  Repeat A1c today and we will adjust therapy if needed.

## 2022-08-02 DIAGNOSIS — E03.8 OTHER SPECIFIED HYPOTHYROIDISM: Chronic | ICD-10-CM

## 2022-08-02 DIAGNOSIS — R73.03 PREDIABETES: Chronic | ICD-10-CM

## 2022-08-02 DIAGNOSIS — E78.2 MIXED HYPERLIPIDEMIA: Chronic | ICD-10-CM

## 2022-08-02 DIAGNOSIS — I10 BENIGN ESSENTIAL HYPERTENSION: Chronic | ICD-10-CM

## 2022-08-02 NOTE — TELEPHONE ENCOUNTER
We do not recommend narcotic for treatment of osteoarthritis, Tylenol or NSAID therapy is best if he can tolerate one or the other. Otherwise, he would need to consult with his PCP as we only prescribe pain medication for post operative patients and acute fractures.

## 2022-08-04 LAB
ALBUMIN SERPL-MCNC: 4 G/DL (ref 3.8–4.9)
ALBUMIN/GLOB SERPL: 1.7 {RATIO} (ref 1.2–2.2)
ALP SERPL-CCNC: 97 IU/L (ref 44–121)
ALT SERPL-CCNC: 16 IU/L (ref 0–44)
AST SERPL-CCNC: 19 IU/L (ref 0–40)
BILIRUB SERPL-MCNC: 1.2 MG/DL (ref 0–1.2)
BUN SERPL-MCNC: 12 MG/DL (ref 6–24)
BUN/CREAT SERPL: 10 (ref 9–20)
CALCIUM SERPL-MCNC: 9.4 MG/DL (ref 8.7–10.2)
CHLORIDE SERPL-SCNC: 102 MMOL/L (ref 96–106)
CHOLEST SERPL-MCNC: 155 MG/DL (ref 100–199)
CO2 SERPL-SCNC: 25 MMOL/L (ref 20–29)
CREAT SERPL-MCNC: 1.22 MG/DL (ref 0.76–1.27)
EGFRCR SERPLBLD CKD-EPI 2021: 72 ML/MIN/1.73
ERYTHROCYTE [DISTWIDTH] IN BLOOD BY AUTOMATED COUNT: 13.3 % (ref 11.6–15.4)
GLOBULIN SER CALC-MCNC: 2.4 G/DL (ref 1.5–4.5)
GLUCOSE SERPL-MCNC: 95 MG/DL (ref 65–99)
HBA1C MFR BLD: 6.1 % (ref 4.8–5.6)
HCT VFR BLD AUTO: 49 % (ref 37.5–51)
HDLC SERPL-MCNC: 51 MG/DL
HGB BLD-MCNC: 16.5 G/DL (ref 13–17.7)
LDLC SERPL CALC-MCNC: 92 MG/DL (ref 0–99)
MCH RBC QN AUTO: 31 PG (ref 26.6–33)
MCHC RBC AUTO-ENTMCNC: 33.7 G/DL (ref 31.5–35.7)
MCV RBC AUTO: 92 FL (ref 79–97)
PLATELET # BLD AUTO: 230 X10E3/UL (ref 150–450)
POTASSIUM SERPL-SCNC: 4.5 MMOL/L (ref 3.5–5.2)
PROT SERPL-MCNC: 6.4 G/DL (ref 6–8.5)
RBC # BLD AUTO: 5.33 X10E6/UL (ref 4.14–5.8)
SODIUM SERPL-SCNC: 140 MMOL/L (ref 134–144)
T4 FREE SERPL-MCNC: 1.23 NG/DL (ref 0.82–1.77)
TRIGL SERPL-MCNC: 57 MG/DL (ref 0–149)
TSH SERPL DL<=0.005 MIU/L-ACNC: 1.17 UIU/ML (ref 0.45–4.5)
VLDLC SERPL CALC-MCNC: 12 MG/DL (ref 5–40)
WBC # BLD AUTO: 6 X10E3/UL (ref 3.4–10.8)

## 2022-08-05 DIAGNOSIS — G89.29 CHRONIC PAIN OF RIGHT HIP: Primary | ICD-10-CM

## 2022-08-05 DIAGNOSIS — M25.551 CHRONIC PAIN OF RIGHT HIP: Primary | ICD-10-CM

## 2022-08-05 DIAGNOSIS — M16.11 OSTEOARTHRITIS OF RIGHT HIP, UNSPECIFIED OSTEOARTHRITIS TYPE: ICD-10-CM

## 2022-08-05 NOTE — PROGRESS NOTES
Batsheva BossScotty Edge, your labs are back.  Thyroid numbers are normal.  Cholesterol panel is well controlled.  Kidney function, liver enzymes and electrolytes look great and blood count is normal with no anemia and normal white blood cells and platelets.  Your A1c has risen slightly since last check up to 6.1 in the prediabetic range.  Please watch intake of carbs and sugars in the diet to prevent progression to an A1c of 6.5 or greater which would be considered true diabetes.  Let me know if you have any questions and have a great day,    MELANY Thornton

## 2022-08-08 NOTE — PROGRESS NOTES
I sent patient a TrackBill message for his lab results but could you please call him to schedule a 6-week lab appointment for recheck of thyroid labs?  Thank you PT Evaluation     Today's date: 2022  Patient name: Jess Hui  : 1960  MRN: [id number]  Referring provider: Ines Recio DO  Dx:   Encounter Diagnosis     ICD-10-CM    1  Complex regional pain syndrome type 1 of right upper extremity  G90 [id number] Ambulatory Referral to PT/OT Hand Therapy   2  Dupuytren contracture  M72 [id number] Ambulatory Referral to PT/OT Hand Therapy                  Assessment  Assessment details: Mercedez Banks is a 59 yo female presenting with complaints of R wrist pain  Pt demonstrates nodules of 3rd and 4th fingers consistent with early stages of Dupuytren's Contracture  Pt also demonstrates decreased R wrist AROM, R wrist PROM is painful but within functional limits, decreased R wrist/ strength compared to uninvolved side and decreased RUE strength in C5-7 myotomal pattern leading to limitations with opening envelopes at work, driving and ADLs  Unable to rule out CRPS vs cervical radiculopathy, continue to assess as sx's evolve  Pt would benefit from skilled physical therapy interventions in order to address the stated impairments, decrease pain with function and increase activity tolerance in order to improve quality of life  No further referral appears necessary at this time based on examination results  Prognosis is good given HEP compliance and PT 1-2/wk   Positive prognostic indicators include positive attitude toward recovery  Please contact me if you have any questions or recommendations  Thank you for the opportunity to share in Camron's care      Impairments: abnormal muscle firing, abnormal muscle tone, abnormal or restricted ROM, activity intolerance, impaired physical strength, lacks appropriate home exercise program, pain with function and poor posture     Symptom irritability: highBarriers to therapy: None  Understanding of Dx/Px/POC: good   Prognosis: good    Goals  Short Term Goals:  Pt will report decreased levels of pain by at least 2 subjective ratings in 4 weeks  Pt will demonstrate improved ROM by at least 10 degrees in 4 weeks  Pt will demonstrate improved strength by ½ grade in 4 weeks  Pt will be able to perform 1 hour of fine motor activities in order to improve working tolerance in 4 weeks    Long Term Goals:  Pt will be independent with HEP in 8 weeks  Pt will be pain free with ADL's in 8 weeks  Pt will be able to open 100 envelopes without pain in 8 weeks      Plan  Patient would benefit from: skilled physical therapy  Referral necessary: No  Planned therapy interventions: body mechanics training, functional ROM exercises, graded activity, graded exercise, home exercise program, joint mobilization, manual therapy, neuromuscular re-education, patient education, postural training, strengthening, stretching, therapeutic activities and therapeutic exercise  Frequency: 1x week  Duration in weeks: 12  Plan of Care beginning date: 2022  Plan of Care expiration date: 10/31/2022  Treatment plan discussed with: patient        Subjective Evaluation    History of Present Illness  Date of onset: 2022  Mechanism of injury: trauma  Mechanism of injury: Pt reports back in April she lost her balance at work and bumped her R wrist into a doorway causing immediate onset of burning pain  Imaging and EMG studies revealed no tissue injuries  Pt diagnosed with CRPS by referring MD  Pt subjectively reports recurring swelling and varying burning pain  Pt also demonstrates nodules of 3rd and 4th fingers consistent with early phases of Dupuytren's contractures  Pt reports difficulty performing fine motor skill activities required of her job  Reports difficulty performing ADLs like washing dishes and lifting water cup             Recurrent probem    Quality of life: fair    Pain  Current pain ratin  At best pain rating: 3  At worst pain ratin  Location: Anterior aspect of R wrist  Quality: burning  Relieving factors: rest  Aggravating factors: keyboarding and lifting  Progression: no change    Social Support  Lives with: adult children    Employment status: working  Hand dominance: right      Diagnostic Tests  X-ray: normal  EMG: normal  Treatments  Previous treatment: immobilization  Patient Goals  Patient goals for therapy: decreased pain, decreased edema, increased motion, increased strength, independence with ADLs/IADLs, return to sport/leisure activities and return to work          Objective     Neurological Testing     Sensation     Wrist/Hand   Left   Intact: light touch    Right   Intact: light touch    Active Range of Motion   Cervical/Thoracic Spine       Cervical  Subcranial protraction:  WFL   Subcranial retraction:   Restriction level: moderate  Flexion:  WFL  Extension:  Restriction level: moderate  Left lateral flexion:  Restriction level: minimal  Right lateral flexion:  Restriction level minimal  Left rotation:  Restriction level: minimal  Right rotation:  Restriction level: minimal    Left Wrist   Normal active range of motion    Right Wrist   Wrist flexion: 15 degrees with pain  Wrist extension: 30 degrees with pain    Passive Range of Motion     Left Wrist   Normal passive range of motion    Right Wrist   Normal passive range of motion  Wrist extension: with pain    Strength/Myotome Testing   Cervical Spine     Left   Interossei strength (t1): 4  Levator scapulae (C4): 4    Right   Interossei strength (t1): 4  Levator scapulae (C4): 4    Left Shoulder     Planes of Motion   Abduction: 4     Isolated Muscles   Levator scapulae: 4     Right Shoulder     Planes of Motion   Abduction: 3+     Isolated Muscles   Levator scapulae: 4     Left Elbow   Flexion: 4  Extension: 4    Right Elbow   Flexion: 3+  Extension: 3+    Left Wrist/Hand   Normal wrist strength  Wrist extension: 4  Wrist flexion: 4  Thumb extension: 4    Right Wrist/Hand   Wrist extension: 4-  Wrist flexion: 4-  Thumb extension: 4             Precautions: COPD      Manuals 8/8 Dupuytren's STM 5 min            R wrist PROM             Median n glide                          Neuro Re-Ed             Median n glide                                                                                           Ther Ex             R wrist flex/ext AROM 2x10            3rd 4th finger ext PROM 1x30            Wrist flex/ext curls  1#                                                                            Ther Activity                                       Gait Training                                       Modalities

## 2022-08-14 DIAGNOSIS — J30.2 SEASONAL ALLERGIES: ICD-10-CM

## 2022-08-15 RX ORDER — MONTELUKAST SODIUM 10 MG/1
10 TABLET ORAL NIGHTLY
Qty: 90 TABLET | Refills: 1 | Status: SHIPPED | OUTPATIENT
Start: 2022-08-15 | End: 2023-02-13 | Stop reason: SDUPTHER

## 2022-09-07 ENCOUNTER — OFFICE VISIT (OUTPATIENT)
Dept: PAIN MEDICINE | Facility: CLINIC | Age: 52
End: 2022-09-07

## 2022-09-07 VITALS
BODY MASS INDEX: 46.65 KG/M2 | HEART RATE: 106 BPM | SYSTOLIC BLOOD PRESSURE: 148 MMHG | RESPIRATION RATE: 20 BRPM | DIASTOLIC BLOOD PRESSURE: 93 MMHG | HEIGHT: 69 IN | WEIGHT: 315 LBS | TEMPERATURE: 97.7 F | OXYGEN SATURATION: 97 %

## 2022-09-07 DIAGNOSIS — M25.551 RIGHT HIP PAIN: ICD-10-CM

## 2022-09-07 DIAGNOSIS — M16.11 PRIMARY OSTEOARTHRITIS OF RIGHT HIP: ICD-10-CM

## 2022-09-07 DIAGNOSIS — M54.16 LUMBAR RADICULOPATHY: Primary | ICD-10-CM

## 2022-09-07 PROCEDURE — 99214 OFFICE O/P EST MOD 30 MIN: CPT | Performed by: NURSE PRACTITIONER

## 2022-09-07 RX ORDER — GABAPENTIN 300 MG/1
300 CAPSULE ORAL 3 TIMES DAILY
Qty: 90 CAPSULE | Refills: 0 | Status: SHIPPED | OUTPATIENT
Start: 2022-09-07 | End: 2022-09-27 | Stop reason: SDUPTHER

## 2022-09-07 NOTE — PROGRESS NOTES
CHIEF COMPLAINT  New pt referred to our office by Patricia Abdi APRN for right hip pain. Pt sts pain started 5 months ago. He sts he noticed numbness in right thigh. He sts he had imaging right hip xray 6/2022, then had FLUOROSCOPICALLY GUIDED RIGHT HIP STEROID AND ANALGESIC INJECTION 6/29/22. Pt sts pain improved for a month then pain returned to baseline. Pt sts is a  for [employer]. Pt sts currently taking otc tylenol for pain. Pt sts currently on xarelto. 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 Pt sts never went to PT.     Subjective   Grant Edge Jr. is a 52 y.o. male.   He presents to the office for evaluation of right hip pain. He was referred here by MELANY Thornton.     His right hip pain started in May or April of this year without inciting event. Today his pain is 5/10VAS in severity.  He describes his pain as intermittent aching, throbbing pain that is primarily in his right anterior thigh radiating into his knee and at times times into his anterior shin. He denies any groin pain or lateral hip pain. He denies any back pain.  His pain is worsened by position change (going from sitting to standing), lying down, and with activity; it is improved by nothing in particular.     He works full time as a  for [employer] which is a fairly physical job. He does not drink alcohol, no history of tobacco use, he denies any history of illicit drug use including marijuana. He does have a family history of arthritis. He denies any family history of drug or alcohol addiction.      He has a history of Left EMILY. He does state today     Not a candidate for NSAIDs due to Xarelto--Hx of Blood clots. He developed blood clots following his left EMILY.     Past pain medications: Unsure what medication he was on in the past.      Current pain medications: Tylenol Arthritis.     Past therapies:  Physical Therapy: None  Chiropractor: None  Massage Therapy: None  TENS: None  Related surgery:  None  Past pain management:  [facility]     Previous Injections:     Right hip injection on 6/29/2022  Effect of Injection (%): moderate relief  Length of Relief: ~1 month    LYNNETTE solorzano 3 ~9-10 years ago--1st two helped, 3rd did not help--was then referred to ortho and underwent Left EMILY.     Hip Pain   The pain is present in the left hip (left thigh). The quality of the pain is described as aching. The pain is at a severity of 5/10. The pain has been worsening since onset. Associated symptoms include numbness (right thigh). The symptoms are aggravated by movement and weight bearing (lying down). Treatments tried: PT.      PEG Assessment   What number best describes your pain on average in the past week?6  What number best describes how, during the past week, pain has interfered with your enjoyment of life?6  What number best describes how, during the past week, pain has interfered with your general activity?  6      Current Outpatient Medications:   •  azelastine (ASTELIN) 0.1 % nasal spray, 2 sprays into the nostril(s) as directed by provider 2 (Two) Times a Day. Use in each nostril as directed, Disp: 30 mL, Rfl: 2  •  benazepril (LOTENSIN) 20 MG tablet, Take 1 tablet by mouth Daily., Disp: 90 tablet, Rfl: 1  •  Breo Ellipta 100-25 MCG/INH inhaler, , Disp: , Rfl:   •  esomeprazole (nexIUM) 40 MG capsule, Take 1 capsule by mouth Daily As Needed (Acid reflux symptoms)., Disp: 90 capsule, Rfl: 1  •  fluticasone (FLONASE) 50 MCG/ACT nasal spray, 2 sprays into the nostril(s) as directed by provider Daily., Disp: 15.8 mL, Rfl: 1  •  hydroCHLOROthiazide (HYDRODIURIL) 12.5 MG tablet, Take 1 tablet by mouth Daily., Disp: 90 tablet, Rfl: 1  •  levothyroxine (SYNTHROID, LEVOTHROID) 112 MCG tablet, TAKE 1 TABLET BY MOUTH DAILY, Disp: 90 tablet, Rfl: 1  •  metoprolol succinate XL (TOPROL-XL) 50 MG 24 hr tablet, TAKE 1 TABLET BY MOUTH DAILY, Disp: 90 tablet, Rfl: 1  •  montelukast (SINGULAIR) 10 MG tablet, TAKE 1 TABLET  "BY MOUTH EVERY NIGHT, Disp: 90 tablet, Rfl: 1  •  nystatin (MYCOSTATIN) 212847 UNIT/GM powder, Apply  topically to the appropriate area as directed 3 (Three) Times a Day., Disp: 30 g, Rfl: 1  •  Xarelto 20 MG tablet, TAKE 1 TABLET BY MOUTH DAILY, Disp: 90 tablet, Rfl: 1  •  gabapentin (NEURONTIN) 300 MG capsule, Take 1 capsule by mouth 3 (Three) Times a Day., Disp: 90 capsule, Rfl: 0    The following portions of the patient's history were reviewed and updated as appropriate: allergies, current medications, past family history, past medical history, past social history, past surgical history and problem list.      REVIEW OF PERTINENT MEDICAL DATA    Radiology:  Xrays 2views right  hip(AP bilateral hips, and lateral of the hip) taken on 6/3/2022 were reviewed demonstrating  moderate joint space narrowing peripheral osteophyte formation consistent with hip osteoarthritis.--Interpreted by Dr. Lionel Swift on 6/21/2022    Review of Systems   Constitutional: Negative for activity change, fatigue and fever.   HENT: Negative for congestion.    Respiratory: Negative for cough and shortness of breath.    Cardiovascular: Negative for chest pain.   Gastrointestinal: Negative for constipation and diarrhea.   Genitourinary: Negative for difficulty urinating.   Musculoskeletal: Positive for arthralgias (right hip).   Neurological: Positive for weakness (right leg) and numbness (right thigh). Negative for dizziness, light-headedness and headaches.   Psychiatric/Behavioral: Positive for sleep disturbance (occ). Negative for agitation and suicidal ideas. The patient is not nervous/anxious.      Vitals:    09/07/22 1510   BP: 148/93   Pulse: 106   Resp: 20   Temp: 97.7 °F (36.5 °C)   SpO2: 97%   Weight: (!) 145 kg (320 lb)   Height: 175.3 cm (69\")   PainSc:   5   PainLoc: Hip  Comment: right     Objective   Physical Exam  Vitals and nursing note reviewed.   Constitutional:       Appearance: Normal appearance. He is well-developed. "   Eyes:      General: Lids are normal.   Cardiovascular:      Rate and Rhythm: Normal rate.   Pulmonary:      Effort: Pulmonary effort is normal.   Musculoskeletal:      Lumbar back: No tenderness. Normal range of motion. Positive right straight leg raise test. Negative left straight leg raise test.      Right hip: No tenderness.      Comments:   No pain with internal or external rotation of the right hip.  Right thigh pain present with adduction, no pain with abduction.    Neurological:      Mental Status: He is alert and oriented to person, place, and time.   Psychiatric:         Speech: Speech normal.         Behavior: Behavior normal.         Judgment: Judgment normal.       Assessment & Plan   Diagnoses and all orders for this visit:    1. Lumbar radiculopathy (Primary)  -     Cancel: MRI Lumbar Spine Without Contrast; Future  -     MRI Lumbar Spine Without Contrast; Future    2. Right hip pain    3. Primary osteoarthritis of right hip    Other orders  -     gabapentin (NEURONTIN) 300 MG capsule; Take 1 capsule by mouth 3 (Three) Times a Day.  Dispense: 90 capsule; Refill: 0      --- Update Lumbar MRI to r/o any lumbar involvement as his pain does seem to have a radicular component along the L3 dermatome.   --- Trial Gabapentin 300 mg with slow titration to TID.   --- Consider LESI pending Lumbar MRI results.   --- Follow-up 1 month or sooner if needed.      GEOVANNA REPORT    As the clinician, I personally reviewed the GEOVANNA from 9/7/2022 while the patient was in the office today.    Dictated utilizing Dragon dictation.     This document is intended for medical expert use only. Reading of this document by patients and/or patient's family without participating medical staff guidance may result in misinterpretation and unintended morbidity.   Any interpretation of such data is the responsibility of the patient and/or family member responsible for the patient in concert with their primary or specialist providers,  not to be left for sources of online searches such as [employer], [employer] or similar queries. Relying on these approaches to knowledge may result in misinterpretation, misguided goals of care and even death should patients or family members try recommendations outside of the realm of professional medical care in a supervised way.

## 2022-09-07 NOTE — PATIENT INSTRUCTIONS
Take gabapentin nightly x 7 nights, then increased to twice a day x 7 days, then increase to three times a day.

## 2022-09-13 ENCOUNTER — OFFICE VISIT (OUTPATIENT)
Dept: ORTHOPEDIC SURGERY | Facility: CLINIC | Age: 52
End: 2022-09-13

## 2022-09-13 VITALS — WEIGHT: 315 LBS | TEMPERATURE: 96.8 F | BODY MASS INDEX: 45.1 KG/M2 | HEIGHT: 70 IN

## 2022-09-13 DIAGNOSIS — M16.11 PRIMARY OSTEOARTHRITIS OF RIGHT HIP: Primary | ICD-10-CM

## 2022-09-13 PROCEDURE — 99214 OFFICE O/P EST MOD 30 MIN: CPT | Performed by: ORTHOPAEDIC SURGERY

## 2022-09-13 NOTE — PROGRESS NOTES
Patient: Grant Edge Jr.  YOB: 1970 52 y.o. male  Medical Record Number: [id number]    Chief Complaint:   Chief Complaint   Patient presents with   • Right Hip - Follow-up       History of Present Illness:Grant Edge Jr. is a 52 y.o. male who presents for follow-up of right hip and thigh pain which has progressed over the last month or 2.  He has had known arthritis of the hip.  He had a fluoroscopy guided injection of roughly 3 months ago but he says at that time it was not hurting him severely enough to really know if it is helped much.  He is also seeing neurosurgery and has an MRI pending      Allergies: No Known Allergies    Medications:   Current Outpatient Medications   Medication Sig Dispense Refill   • azelastine (ASTELIN) 0.1 % nasal spray 2 sprays into the nostril(s) as directed by provider 2 (Two) Times a Day. Use in each nostril as directed 30 mL 2   • benazepril (LOTENSIN) 20 MG tablet Take 1 tablet by mouth Daily. 90 tablet 1   • Breo Ellipta 100-25 MCG/INH inhaler      • esomeprazole (nexIUM) 40 MG capsule Take 1 capsule by mouth Daily As Needed (Acid reflux symptoms). 90 capsule 1   • fluticasone (FLONASE) 50 MCG/ACT nasal spray 2 sprays into the nostril(s) as directed by provider Daily. 15.8 mL 1   • gabapentin (NEURONTIN) 300 MG capsule Take 1 capsule by mouth 3 (Three) Times a Day. 90 capsule 0   • hydroCHLOROthiazide (HYDRODIURIL) 12.5 MG tablet Take 1 tablet by mouth Daily. 90 tablet 1   • levothyroxine (SYNTHROID, LEVOTHROID) 112 MCG tablet TAKE 1 TABLET BY MOUTH DAILY 90 tablet 1   • metoprolol succinate XL (TOPROL-XL) 50 MG 24 hr tablet TAKE 1 TABLET BY MOUTH DAILY 90 tablet 1   • montelukast (SINGULAIR) 10 MG tablet TAKE 1 TABLET BY MOUTH EVERY NIGHT 90 tablet 1   • nystatin (MYCOSTATIN) 521566 UNIT/GM powder Apply  topically to the appropriate area as directed 3 (Three) Times a Day. 30 g 1   • Xarelto 20 MG tablet TAKE 1 TABLET BY MOUTH DAILY 90 tablet 1     No current  "facility-administered medications for this visit.         The following portions of the patient's history were reviewed and updated as appropriate: allergies, current medications, past family history, past medical history, past social history, past surgical history and problem list.    Review of Systems:   A 14 point review of systems was performed. All systems negative except pertinent positives/negative listed in HPI above    Physical Exam:   Vitals:    09/13/22 1253   Temp: 96.8 °F (36 °C)   TempSrc: Temporal   Weight: (!) 146 kg (322 lb 4.8 oz)   Height: 177.8 cm (70\")       General: A and O x 3, ASA, NAD    SCLERA:    Normal    DENTITION:   Normal  Irritability with flexion internal rotation of the hip and he walks with slight antalgic gait.  He is obese with a large overhanging abdominal pannus over the hip region.  There is minimal trochanteric tenderness.  He is intact to light touch with palpable distal pulses.    Radiology:    Xrays 2views right hip (AP bilateral hips and lateral hip) taken previously demonstrating moderate to severe joint space narrowing      Assessment/Plan:  Right hip osteoarthritis he does have pain in the hip and thigh region which is very likely a result of hip arthritis.  I want to set him up for another fluoroscopy guided right hip injection given his increase in pain we will see how he responds to that.  Additionally he is undergoing work-up of the lumbar spine including an MRI to evaluate for lumbar radiculopathy as a contributing cause.  His BMI is currently 46.25 and I explained to him that he needs to begin working on it now because I feel he would likely need surgery down the road but a BMI of 46 would preclude that.  He understands that surgery is a last option we will try with nonsurgical measures and when he gets his MRI of his lumbar spine he will call back I like to review that as well.  I am not the ordering physician but I would like to be involved given the overlap " between hip and back.      Lionel Swift MD  9/13/2022

## 2022-09-16 DIAGNOSIS — E03.8 OTHER SPECIFIED HYPOTHYROIDISM: ICD-10-CM

## 2022-09-16 RX ORDER — LEVOTHYROXINE SODIUM 112 UG/1
112 TABLET ORAL DAILY
Qty: 90 TABLET | Refills: 1 | Status: SHIPPED | OUTPATIENT
Start: 2022-09-16 | End: 2023-03-21

## 2022-09-19 NOTE — PROGRESS NOTES
MRI of his lumbar spine shows some mild narrowing as well as a disc bulge which may be contributing to his pain. Could consider LESI for both diagnostic and therapeutic benefit. If he would like to be seen sooner to discuss his appointment can be moved up. Thanks

## 2022-09-20 NOTE — PROGRESS NOTES
Patient has a guided fluoroscopy of his right hip with Dr. Swift. He wants to know if you think he should keep this appointment. Please advise.

## 2022-09-22 ENCOUNTER — HOSPITAL ENCOUNTER (OUTPATIENT)
Dept: GENERAL RADIOLOGY | Facility: HOSPITAL | Age: 52
Discharge: HOME | End: 2022-09-22
Admitting: ORTHOPAEDIC SURGERY

## 2022-09-22 DIAGNOSIS — M16.11 PRIMARY OSTEOARTHRITIS OF RIGHT HIP: ICD-10-CM

## 2022-09-22 PROCEDURE — 0 LIDOCAINE 1 % SOLUTION: Performed by: ORTHOPAEDIC SURGERY

## 2022-09-22 PROCEDURE — 25010000002 METHYLPREDNISOLONE PER 125 MG: Performed by: ORTHOPAEDIC SURGERY

## 2022-09-22 PROCEDURE — 25010000002 IOPAMIDOL 61 % SOLUTION: Performed by: ORTHOPAEDIC SURGERY

## 2022-09-22 PROCEDURE — 77002 NEEDLE LOCALIZATION BY XRAY: CPT

## 2022-09-22 RX ORDER — BUPIVACAINE HYDROCHLORIDE 2.5 MG/ML
10 INJECTION, SOLUTION EPIDURAL; INFILTRATION; INTRACAUDAL ONCE
Status: COMPLETED | OUTPATIENT
Start: 2022-09-22 | End: 2022-09-22

## 2022-09-22 RX ORDER — METHYLPREDNISOLONE SODIUM SUCCINATE 125 MG/2ML
80 INJECTION, POWDER, LYOPHILIZED, FOR SOLUTION INTRAMUSCULAR; INTRAVENOUS
Status: COMPLETED | OUTPATIENT
Start: 2022-09-22 | End: 2022-09-22

## 2022-09-22 RX ORDER — LIDOCAINE HYDROCHLORIDE 10 MG/ML
10 INJECTION, SOLUTION INFILTRATION; PERINEURAL ONCE
Status: COMPLETED | OUTPATIENT
Start: 2022-09-22 | End: 2022-09-22

## 2022-09-22 RX ADMIN — IOPAMIDOL 1 ML: 612 INJECTION, SOLUTION INTRATHECAL at 14:03

## 2022-09-22 RX ADMIN — BUPIVACAINE HYDROCHLORIDE 5 ML: 2.5 INJECTION, SOLUTION EPIDURAL; INFILTRATION; INTRACAUDAL; PERINEURAL at 14:03

## 2022-09-22 RX ADMIN — METHYLPREDNISOLONE SODIUM SUCCINATE 80 MG: 125 INJECTION, POWDER, LYOPHILIZED, FOR SOLUTION INTRAMUSCULAR; INTRAVENOUS at 14:03

## 2022-09-22 RX ADMIN — LIDOCAINE HYDROCHLORIDE 2 ML: 10 INJECTION, SOLUTION INFILTRATION; PERINEURAL at 14:03

## 2022-09-27 ENCOUNTER — OFFICE VISIT (OUTPATIENT)
Dept: PAIN MEDICINE | Facility: CLINIC | Age: 52
End: 2022-09-27

## 2022-09-27 ENCOUNTER — TELEPHONE (OUTPATIENT)
Dept: PAIN MEDICINE | Facility: CLINIC | Age: 52
End: 2022-09-27

## 2022-09-27 VITALS
OXYGEN SATURATION: 98 % | BODY MASS INDEX: 45.1 KG/M2 | SYSTOLIC BLOOD PRESSURE: 145 MMHG | TEMPERATURE: 96.9 F | HEART RATE: 89 BPM | WEIGHT: 315 LBS | RESPIRATION RATE: 20 BRPM | DIASTOLIC BLOOD PRESSURE: 84 MMHG | HEIGHT: 70 IN

## 2022-09-27 DIAGNOSIS — M54.16 LUMBAR RADICULOPATHY: Primary | ICD-10-CM

## 2022-09-27 DIAGNOSIS — M16.11 PRIMARY OSTEOARTHRITIS OF RIGHT HIP: ICD-10-CM

## 2022-09-27 DIAGNOSIS — M25.551 RIGHT HIP PAIN: ICD-10-CM

## 2022-09-27 PROCEDURE — 99214 OFFICE O/P EST MOD 30 MIN: CPT | Performed by: NURSE PRACTITIONER

## 2022-09-27 RX ORDER — GABAPENTIN 300 MG/1
300 CAPSULE ORAL 3 TIMES DAILY
Qty: 90 CAPSULE | Refills: 0 | Status: SHIPPED | OUTPATIENT
Start: 2022-09-27 | End: 2022-11-07

## 2022-09-27 NOTE — PROGRESS NOTES
CHIEF COMPLAINT  3 week hip pain evaluation  Patient in office reports fluctuating pain since last OV .     Subjective   Grant Edge Jr. is a 52 y.o. male  who presents for follow-up.  He has a history of right lower extremity pain.  He is here to discuss his lumbar MRI results. Discussed that he does have a disc bulge at L5-S1 with mild foraminal narrowing.  This could be contributing to his right lateral leg pain.  Discussed that we could consider a right L5 TFESI for diagnostic purposes.  If this does not provide relief to his pain then his pain is likely coming from his hip.  He may also have a component of meralgia paresthetica, but his pain does radiate past the knee which is not likely to occur in the setting of meralgia paresthetica.    He underwent a right hip injection with Dr. Swift on 9/20/2022.  He has not seen improvement with this yet, discussed that this could take time.    He continues with Gabapentin 300 mg TID. He reports a dry cough, discussed this is likely not related. He denies any side effects and states that he has seen some improvement in his pain.     Previous Procedures:  Right hip injection on 6/29/2022  Effect of Injection (%): moderate relief  Length of Relief: ~1 month     LESI x 3 ~9-10 years ago--1st two helped, 3rd did not help--was then referred to ortho and underwent Left EMILY.     Hip Pain   The pain is present in the right hip (radiating into the right lateral thigh below the knee. He denies any back pain). The quality of the pain is described as aching. The pain is at a severity of 3/10. The pain has been worsening since onset. Pertinent negatives include no numbness. The symptoms are aggravated by movement and weight bearing (lying down). Treatments tried: PT, Gabapentin, right hip injection.      PEG Assessment   What number best describes your pain on average in the past week?7  What number best describes how, during the past week, pain has interfered with your enjoyment  "of life?7  What number best describes how, during the past week, pain has interfered with your general activity?  7    Review of Pertinent Medical Data ---    Office visit from 9/13/2022 with Dr. Swift reviewed.  Patient presents with follow-up for right hip and thigh pain.  He has known arthritis of the hip.  He had a injection approximately 3 months ago but at that time it was not hurting him severely enough to know if it helped.  His pain in his hip and thigh region is likely result of his hip arthritis.  Surgery is the last option for his hip.  When he gets his MRI of his lumbar spine he will call.        The following portions of the patient's history were reviewed and updated as appropriate: allergies, current medications, past family history, past medical history, past social history, past surgical history and problem list.    Review of Systems   Constitutional: Negative for activity change, fatigue and fever.   HENT: Negative for congestion.    Eyes: Negative for visual disturbance.   Respiratory: Negative for cough and chest tightness.    Cardiovascular: Negative for chest pain.   Gastrointestinal: Negative for abdominal pain, constipation and diarrhea.   Genitourinary: Negative for difficulty urinating and dysuria.   Neurological: Positive for weakness (right side). Negative for dizziness, light-headedness, numbness and headaches.   Psychiatric/Behavioral: Positive for sleep disturbance (pain). Negative for agitation and suicidal ideas. The patient is not nervous/anxious.      --  The aforementioned information the Chief Complaint section and above subjective data including any HPI data, and also the Review of Systems data, has been personally reviewed and affirmed.  --    Vitals:    09/27/22 1524   BP: 145/84   BP Location: Right arm   Patient Position: Sitting   Pulse: 89   Resp: 20   Temp: 96.9 °F (36.1 °C)   SpO2: 98%   Weight: (!) 144 kg (318 lb 6.4 oz)   Height: 177.8 cm (70\")   PainSc:   3 "   PainLoc: Comment: hip     Objective   Physical Exam  Vitals and nursing note reviewed.   Constitutional:       Appearance: Normal appearance. He is well-developed.   Eyes:      General: Lids are normal.   Cardiovascular:      Rate and Rhythm: Normal rate.   Pulmonary:      Effort: Pulmonary effort is normal.   Musculoskeletal:      Cervical back: Normal range of motion.      Lumbar back: No tenderness. Normal range of motion. Positive right straight leg raise test.   Neurological:      Mental Status: He is alert and oriented to person, place, and time.   Psychiatric:         Attention and Perception: Attention normal.         Mood and Affect: Mood normal.         Speech: Speech normal.         Behavior: Behavior normal.         Judgment: Judgment normal.       Assessment & Plan   Diagnoses and all orders for this visit:    1. Lumbar radiculopathy (Primary)    2. Right hip pain    3. Primary osteoarthritis of right hip    Other orders  -     gabapentin (NEURONTIN) 300 MG capsule; Take 1 capsule by mouth 3 (Three) Times a Day.  Dispense: 90 capsule; Refill: 0      --- Right L5 TFESI (Dr. Sun). Will need approval from his hematologist to hold Xarelto x 3 days prior to procedure. Discussed that if his pain improves from his hip injection prior to completing a right L5 TFESI then I recommend holding off on this.     Reviewed the procedure at length with the patient.  Included in the review was expectations, complications, risk and benefits.The procedure was described in detail and the risks, benefits and alternatives were discussed with the patient (including but not limited to: bleeding, infection, nerve damage, worsening of pain, inability to perform injection, paralysis, seizures, coma, no pain relief and death) who agreed to proceed.  Discussed the potential for sedation if warranted/wanted.  The procedure will plan to be performed at [facility] with fluoroscopic guidance(unless ultrasound  is indicated) and could potentially have steroids and contrast dye used. Questions were answered and in a way the patient could understand.  Patient verbalized understanding and wishes to proceed.  This intervention will be ordered.  Discussed with patient that all procedures are part of a multimodal plan of care and include either formal PT or a home exercise program.  Patient has no evidence of coagulopathy or current infection.    --- Discussed that if his Right L5 TFESI is diagnostically negative then I recommend he continue to work with Dr. Swift with regards to his right hip pain.   --- Continue Gabapentin 300 mg TID.    --- Follow-up after procedure.      GEOVANNA REPORT    As the clinician, I personally reviewed the GEOVANNA from 9/27/2022 while the patient was in the office today.    History and physical exam exhibit continued safe and appropriate use of controlled substances.    Dictated utilizing Dragon dictation.     This document is intended for medical expert use only. Reading of this document by patients and/or patient's family without participating medical staff guidance may result in misinterpretation and unintended morbidity.   Any interpretation of such data is the responsibility of the patient and/or family member responsible for the patient in concert with their primary or specialist providers, not to be left for sources of online searches such as [employer] or similar queries. Relying on these approaches to knowledge may result in misinterpretation, misguided goals of care and even death should patients or family members try recommendations outside of the realm of professional medical care in a supervised way.    Patient remained masked during entire encounter. No cough present. I donned a mask and eye protection throughout entire visit. Prior to donning mask and eye protection, hand hygiene was performed, as well as when it was doffed.  I was closer than 6 feet, but not for an extended period  of time. No obvious exposure to any bodily fluids.

## 2022-09-27 NOTE — TELEPHONE ENCOUNTER
Dr. Cowart,     I am requesting clearance to hold Mr. Flanagan's xarelto x 3 days prior to a Lumbar Epidural. He would resume his Xarelto 24 hours after his procedure.     Thank you,   Rosey Yost, APRN

## 2022-09-30 ENCOUNTER — PREP FOR SURGERY (OUTPATIENT)
Dept: SURGERY | Facility: SURGERY CENTER | Age: 52
End: 2022-09-30

## 2022-09-30 DIAGNOSIS — M54.16 LUMBAR RADICULOPATHY: Primary | ICD-10-CM

## 2022-09-30 RX ORDER — SODIUM CHLORIDE 0.9 % (FLUSH) 0.9 %
10 SYRINGE (ML) INJECTION AS NEEDED
Status: CANCELLED | OUTPATIENT
Start: 2022-09-30

## 2022-09-30 RX ORDER — SODIUM CHLORIDE 0.9 % (FLUSH) 0.9 %
10 SYRINGE (ML) INJECTION EVERY 12 HOURS SCHEDULED
Status: CANCELLED | OUTPATIENT
Start: 2022-09-30

## 2022-10-06 ENCOUNTER — TRANSCRIBE ORDERS (OUTPATIENT)
Dept: SURGERY | Facility: SURGERY CENTER | Age: 52
End: 2022-10-06

## 2022-10-06 DIAGNOSIS — Z41.9 SURGERY, ELECTIVE: Primary | ICD-10-CM

## 2022-10-25 ENCOUNTER — OFFICE VISIT (OUTPATIENT)
Dept: PAIN MEDICINE | Facility: CLINIC | Age: 52
End: 2022-10-25

## 2022-10-25 VITALS
TEMPERATURE: 97.7 F | WEIGHT: 315 LBS | HEART RATE: 88 BPM | SYSTOLIC BLOOD PRESSURE: 159 MMHG | RESPIRATION RATE: 20 BRPM | OXYGEN SATURATION: 97 % | DIASTOLIC BLOOD PRESSURE: 95 MMHG | HEIGHT: 70 IN | BODY MASS INDEX: 45.1 KG/M2

## 2022-10-25 DIAGNOSIS — M16.11 PRIMARY OSTEOARTHRITIS OF RIGHT HIP: ICD-10-CM

## 2022-10-25 DIAGNOSIS — M25.551 RIGHT HIP PAIN: ICD-10-CM

## 2022-10-25 DIAGNOSIS — M54.16 LUMBAR RADICULOPATHY: Primary | ICD-10-CM

## 2022-10-25 PROCEDURE — 99214 OFFICE O/P EST MOD 30 MIN: CPT | Performed by: NURSE PRACTITIONER

## 2022-10-25 RX ORDER — PREGABALIN 75 MG/1
75 CAPSULE ORAL 2 TIMES DAILY
Qty: 60 CAPSULE | Refills: 1 | Status: SHIPPED | OUTPATIENT
Start: 2022-10-25 | End: 2023-02-27 | Stop reason: ALTCHOICE

## 2022-10-25 NOTE — PROGRESS NOTES
CHIEF COMPLAINT  1 month hip pain follow up  Patient in office reports fluctuating pain over the last month.    Subjective   Grant Edge Jr. is a 52 y.o. male  who presents for follow-up.  He has a history of hip pain.  He completed a hip injection on 9/22/2022 performed by Dr. Swift. He states that he did not see much relief with this.     He is scheduled for a Right L5 LTFESI with  Dr. Sun on 11/5/2022.     He is maintained on Gabapentin 300 mg TID. This is helpful to his pain, but he has seen weight gain with this. We will stop the Gabapentin and trial Lyica 75 mg BID.  Discussed that if this is not helpful but he does not have continued weight gain then we can titrate his dosage up.     Previous Procedures:  Right hip injection on 6/29/2022  Effect of Injection (%): moderate relief  Length of Relief: ~1 month     LESI x 3 ~9-10 years ago--1st two helped, 3rd did not help--was then referred to ortho and underwent Left EMILY.     Hip Pain   The pain is present in the right hip (radiating into the right lateral thigh below the knee. He denies any back pain). The quality of the pain is described as aching. The pain is at a severity of 3/10. The pain has been worsening since onset. Pertinent negatives include no numbness. The symptoms are aggravated by movement and weight bearing (lying down). Treatments tried: PT, Gabapentin, right hip injection.      PEG Assessment   What number best describes your pain on average in the past week?7  What number best describes how, during the past week, pain has interfered with your enjoyment of life?2  What number best describes how, during the past week, pain has interfered with your general activity?  3    The following portions of the patient's history were reviewed and updated as appropriate: allergies, current medications, past family history, past medical history, past social history, past surgical history and problem list.    Review of Systems   Constitutional: Negative  "for activity change, fatigue and fever.   HENT: Negative for congestion.    Eyes: Negative for visual disturbance.   Respiratory: Negative for cough and chest tightness.    Cardiovascular: Negative for chest pain.   Gastrointestinal: Negative for abdominal pain, constipation and diarrhea.   Genitourinary: Negative for difficulty urinating and dysuria.   Neurological: Negative for dizziness, weakness, light-headedness, numbness and headaches.   Psychiatric/Behavioral: Negative for agitation, sleep disturbance and suicidal ideas. The patient is not nervous/anxious.      --  The aforementioned information the Chief Complaint section and above subjective data including any HPI data, and also the Review of Systems data, has been personally reviewed and affirmed.  --    Vitals:    10/25/22 1528   BP: 159/95   BP Location: Right arm   Patient Position: Sitting   Cuff Size: Large Adult   Pulse: 88   Resp: 20   Temp: 97.7 °F (36.5 °C)   TempSrc: Temporal   SpO2: 97%   Weight: (!) 148 kg (327 lb 3.2 oz)   Height: 177.8 cm (70\")   PainSc:   3   PainLoc: Comment: hip     Objective   Physical Exam  Vitals and nursing note reviewed.   Constitutional:       Appearance: Normal appearance. He is well-developed.   Eyes:      General: Lids are normal.   Cardiovascular:      Rate and Rhythm: Normal rate.   Pulmonary:      Effort: Pulmonary effort is normal.   Musculoskeletal:      Lumbar back: No tenderness. Normal range of motion. Positive right straight leg raise test.   Neurological:      Mental Status: He is alert and oriented to person, place, and time.   Psychiatric:         Attention and Perception: Attention normal.         Mood and Affect: Mood normal.         Speech: Speech normal.         Behavior: Behavior normal.         Judgment: Judgment normal.       Assessment & Plan   Diagnoses and all orders for this visit:    1. Lumbar radiculopathy (Primary)    2. Right hip pain    3. Primary osteoarthritis of right hip      --- " Proceed with previously ordered Right L5 TFESI. Patient reminded to hold Xarelto x 3 days prior to procedure.   --- Stop Gabapentin  --- Start Lyrica 75 mg BID  --- Follow-up 5 weeks (approximately 1 month after procedure).      GEOVANNA REPORT    As the clinician, I personally reviewed the GEOVANNA from 10/25/2022 while the patient was in the office today.    Dictated utilizing Dragon dictation.     This document is intended for medical expert use only. Reading of this document by patients and/or patient's family without participating medical staff guidance may result in misinterpretation and unintended morbidity.   Any interpretation of such data is the responsibility of the patient and/or family member responsible for the patient in concert with their primary or specialist providers, not to be left for sources of online searches such as [employer], [employer] or similar queries. Relying on these approaches to knowledge may result in misinterpretation, misguided goals of care and even death should patients or family members try recommendations outside of the realm of professional medical care in a supervised way.    Patient remained masked during entire encounter. No cough present. I donned a mask and eye protection throughout entire visit. Prior to donning mask and eye protection, hand hygiene was performed, as well as when it was doffed.  I was closer than 6 feet, but not for an extended period of time. No obvious exposure to any bodily fluids.

## 2022-11-02 NOTE — DISCHARGE INSTRUCTIONS
[facility] - Post-procedure Instructions          --  While there are no absolute restrictions, it is recommended that you do not perform strenuous activity today. In the morning, you may resume your level of activity as before your block.    --  If you have a band-aid at your injection site, please remove it later today. Observe the area for any redness, swelling, pus-like drainage, or a temperature over 101°. If any of these symptoms occur, please call your doctor at [phone]. If after office hours, leave a message and the on-call provider will return your call.    --  Ice may be applied to your injection site. It is recommended you avoid direct heat (heating pad; hot tub) for 1-2 days.    --  Call [facility] at [phone] if you experience persistent headache, persistent bleeding from the injection site, or severe pain not relieved by heat or oral medication.    --  Do not make important decisions today.    --  Due to the effects of the block and/or the I.V. Sedation, DO NOT drive or operate hazardous machinery for 12 hours.  Local anesthetics may cause numbness after procedure and precautions must be taken with regards to operating equipment as well as with walking, even if ambulating with assistance of another person or with an assistive device.    --  Do not drink alcohol for 12 hours.    -- You may return to work tomorrow, or as directed by your referring doctor.    --  Occasionally you may notice a slight increase in your pain after the procedure. This should start to improve within the next 24-48 hours. Radiofrequency ablation procedure pain may last 3-4 weeks.    --  It may take as long as 3-4 days before you notice a gradual improvement in your pain and/or other symptoms.    -- You may continue to take your prescribed pain medication as needed.    --  Some normal possible side effects of steroid use could include fluid retention, increased blood sugar, dull headache,  increased sweating, increased appetite, mood swings and flushing.    --  Diabetics are recommended to watch their blood glucose level closely for 24-48 hours after the injection.    --  Must stay in PACU for 20 min upon arrival and prove no leg weakness before being discharged.    --  IN THE EVENT OF A LIFE THREATENING EMERGENCY, (CHEST PAIN, BREATHING DIFFICULTIES, PARALYSIS…) YOU SHOULD GO TO YOUR NEAREST EMERGENCY ROOM.    --  You should be contacted by our office within 2-3 days to schedule follow up or next appointment date.  If not contacted within 7 days, please call the office at [phone]    Resume Xarelto in 24 hours.

## 2022-11-04 ENCOUNTER — HOSPITAL ENCOUNTER (OUTPATIENT)
Dept: GENERAL RADIOLOGY | Facility: SURGERY CENTER | Age: 52
Setting detail: HOSPITAL OUTPATIENT SURGERY
End: 2022-11-04

## 2022-11-04 ENCOUNTER — HOSPITAL ENCOUNTER (OUTPATIENT)
Facility: SURGERY CENTER | Age: 52
Setting detail: HOSPITAL OUTPATIENT SURGERY
Discharge: HOME | End: 2022-11-04
Attending: ANESTHESIOLOGY | Admitting: ANESTHESIOLOGY

## 2022-11-04 VITALS
WEIGHT: 315 LBS | DIASTOLIC BLOOD PRESSURE: 91 MMHG | HEART RATE: 77 BPM | TEMPERATURE: 98.7 F | RESPIRATION RATE: 16 BRPM | BODY MASS INDEX: 45.1 KG/M2 | HEIGHT: 70 IN | OXYGEN SATURATION: 97 % | SYSTOLIC BLOOD PRESSURE: 173 MMHG

## 2022-11-04 DIAGNOSIS — M54.16 LUMBAR RADICULOPATHY: ICD-10-CM

## 2022-11-04 DIAGNOSIS — Z41.9 SURGERY, ELECTIVE: ICD-10-CM

## 2022-11-04 PROCEDURE — 77002 NEEDLE LOCALIZATION BY XRAY: CPT

## 2022-11-04 PROCEDURE — 64483 NJX AA&/STRD TFRM EPI L/S 1: CPT | Performed by: ANESTHESIOLOGY

## 2022-11-04 PROCEDURE — 76000 FLUOROSCOPY <1 HR PHYS/QHP: CPT

## 2022-11-04 PROCEDURE — 25010000002 IOPAMIDOL 61 % SOLUTION 30 ML VIAL: Performed by: ANESTHESIOLOGY

## 2022-11-04 PROCEDURE — 25010000002 DEXAMETHASONE SODIUM PHOSPHATE 100 MG/10ML SOLUTION 10 ML VIAL: Performed by: ANESTHESIOLOGY

## 2022-11-04 RX ORDER — SODIUM CHLORIDE 0.9 % (FLUSH) 0.9 %
10 SYRINGE (ML) INJECTION AS NEEDED
Status: DISCONTINUED | OUTPATIENT
Start: 2022-11-04 | End: 2022-11-04 | Stop reason: HOSPADM

## 2022-11-04 RX ORDER — SODIUM CHLORIDE 0.9 % (FLUSH) 0.9 %
10 SYRINGE (ML) INJECTION EVERY 12 HOURS SCHEDULED
Status: DISCONTINUED | OUTPATIENT
Start: 2022-11-04 | End: 2022-11-04 | Stop reason: HOSPADM

## 2022-11-04 NOTE — OP NOTE
Lumbar Transforaminal Epidural Steroid Injection Right L5-S1 Levels  [facility]    PREOPERATIVE DIAGNOSIS:  Lumbar radicular pain, Lumbar Radiculopathy    POSTOPERATIVE DIAGNOSIS:  Same as preop diagnosis    PROCEDURE:    1. CPT 26459 --  Diagnostic & Therapeutic Transforaminal Epidural Steroid Injection at the L5 level, on the right     PRE-PROCEDURE DISCUSSION WITH PATIENT:    Risks and complications were discussed with the patient prior to starting the procedure and informed consent was obtained.  We discussed various topics including but not limited to bleeding, infection, injury, nerve injury, paralysis, coma, death, postprocedural painful flare-up, postprocedural site soreness, and a lack of pain relief.  We discussed the diagnostic aspect of transforaminal epidural / selective nerve root blockade.    SURGEON:  La Sun MD    SEDATION:  Patient declined administration of moderate sedation    ANESTHETIC:  0.5% bupivacaine  STEROID:  15mg dexamethasone    DESCRIPTON OF PROCEDURE:  After obtaining informed consent, an I.V. was not started in the preoperative area. The patient taken to the operating room and was placed in the prone position with a pillow under the abdomen.  All pressure points were well padded.  EKG, blood pressure, and pulse oximeter were monitored.  The lumbar area was prepped with Chloraprep and draped in a sterile fashion.     The AP fluoroscopic image was used to visualize the L5 vertebral body.  The superior endplate was squared off radiographically.  The image was then obliqued towards the patient's right side to maximize visualization of the pedicle. The skin and subcutaneous tissue at the 6 o'clock position of the pedicle was anesthetized with 1% lidocaine. A 22-gauge spinal needle was then advanced percutaneously through the anesthetized skin tract under fluoroscopic guidance in AP and lateral views until the needle tip lie in the melissa-lateral aspect of the  epidural space.  After negative aspiration of the needle for blood or CSF, a volume of 1 mL of Isovue was injected producing good epidural spread with no evidence of loculation, vascular run-off, or intrathecal spread. Subsequently, a volume of 3 mL of injectate was administered without resistance.  The needle was removed intact.  Vital signs were stable throughout.      The total volume injected consisted of 15 mg of dexamethasone with 0.5 mL of 0.5% bupivacaine and preservative-free normal saline.       ESTIMATED BLOOD LOSS:  <5 mL  SPECIMENS:  none    COMPLICATIONS:   No complications were noted., There was no indication of vascular uptake on live injection of contrast dye. and There was no indication of intrathecal uptake on live injection of contrast dye.    TOLERANCE & DISCHARGE CONDITION:    The patient tolerated the procedure well.  The patient was transported to the recovery area without difficulties.  The patient was discharged to home under the care of family in stable and satisfactory condition.    PLAN OF CARE:  1. The patient was given our standard instruction sheet.  2. The patient will Return to clinic 1-2 wks.  3. The patient will resume all medications as per the medication reconciliation sheet.

## 2022-11-07 ENCOUNTER — OFFICE VISIT (OUTPATIENT)
Dept: INTERNAL MEDICINE | Facility: CLINIC | Age: 52
End: 2022-11-07

## 2022-11-07 VITALS
HEIGHT: 70 IN | HEART RATE: 84 BPM | TEMPERATURE: 98 F | BODY MASS INDEX: 45.1 KG/M2 | DIASTOLIC BLOOD PRESSURE: 88 MMHG | SYSTOLIC BLOOD PRESSURE: 146 MMHG | WEIGHT: 315 LBS | OXYGEN SATURATION: 97 %

## 2022-11-07 DIAGNOSIS — I10 BENIGN ESSENTIAL HYPERTENSION: Primary | ICD-10-CM

## 2022-11-07 DIAGNOSIS — J30.2 SEASONAL ALLERGIES: Chronic | ICD-10-CM

## 2022-11-07 DIAGNOSIS — Z23 NEED FOR INFLUENZA VACCINATION: ICD-10-CM

## 2022-11-07 PROCEDURE — 90686 IIV4 VACC NO PRSV 0.5 ML IM: CPT | Performed by: NURSE PRACTITIONER

## 2022-11-07 PROCEDURE — 90471 IMMUNIZATION ADMIN: CPT | Performed by: NURSE PRACTITIONER

## 2022-11-07 PROCEDURE — 99213 OFFICE O/P EST LOW 20 MIN: CPT | Performed by: NURSE PRACTITIONER

## 2022-11-07 RX ORDER — HYDROCHLOROTHIAZIDE 12.5 MG/1
12.5 TABLET ORAL DAILY
Qty: 90 TABLET | Refills: 1 | Status: SHIPPED | OUTPATIENT
Start: 2022-11-07 | End: 2023-02-27

## 2022-11-07 RX ORDER — BENAZEPRIL HYDROCHLORIDE 40 MG/1
40 TABLET, FILM COATED ORAL DAILY
Qty: 90 TABLET | Refills: 1 | Status: SHIPPED | OUTPATIENT
Start: 2022-11-07

## 2022-11-07 NOTE — PROGRESS NOTES
"Chief Complaint  Blood Pressure Check and Hypertension    Subjective        Grant Edge JrScotty presents to [facility]  History of Present Illness  Patient presents for elevated blood pressure.  This is a 52-year-old male.    Currently for hypertension he takes benazepril 20 mg daily and Toprol-XL 50 mg daily.  He has been prescribed HCTZ 12.5 mg in the past but takes this moreso on an as-needed basis.  He had an epidural injection performed 3 days ago and noticed that his blood pressure was high at that visit at 173/91.  Since then he has been checking his own blood pressure at home and it has been running between 160-170 systolically over 90s to 100s diastolically.  Denies headaches, vision changes, shortness of breath and chest discomfort.    Otherwise with the exception of some exacerbation of seasonal allergies intermittently over the past few months he reports that he is doing well.  Denies development of other new issues today.  Hypertension  This is a new problem. The current episode started more than 1 year ago. The problem is unchanged. The problem is uncontrolled. Pertinent negatives include no anxiety, blurred vision, chest pain, headaches, malaise/fatigue, orthopnea, palpitations, peripheral edema, PND, shortness of breath or sweats. Agents associated with hypertension include no associated agents and thyroid hormones. Risk factors for coronary artery disease include family history and obesity. There are no compliance problems.        Objective   Vital Signs:  /88 (BP Location: Left arm, Patient Position: Sitting, Cuff Size: Adult)   Pulse 84   Temp 98 °F (36.7 °C) (Infrared)   Ht 177.8 cm (70\")   Wt (!) 152 kg (334 lb)   SpO2 97%   BMI 47.92 kg/m²   Estimated body mass index is 47.92 kg/m² as calculated from the following:    Height as of this encounter: 177.8 cm (70\").    Weight as of this encounter: 152 kg (334 lb).          Physical Exam  Vitals and nursing " note reviewed.   Constitutional:       General: He is not in acute distress.     Appearance: Normal appearance. He is well-developed. He is obese. He is not ill-appearing, toxic-appearing or diaphoretic.   HENT:      Head: Normocephalic and atraumatic.      Right Ear: External ear normal.      Left Ear: External ear normal.   Eyes:      Pupils: Pupils are equal, round, and reactive to light.   Cardiovascular:      Rate and Rhythm: Normal rate and regular rhythm.      Pulses: Normal pulses.      Heart sounds: Normal heart sounds.      Comments: No peripheral pitting edema  Pulmonary:      Effort: Pulmonary effort is normal. No respiratory distress.      Breath sounds: Normal breath sounds. No stridor. No wheezing, rhonchi or rales.   Chest:      Chest wall: No tenderness.   Abdominal:      General: Bowel sounds are normal. There is no distension.      Palpations: Abdomen is soft.      Tenderness: There is no abdominal tenderness.   Musculoskeletal:         General: Normal range of motion.      Cervical back: Normal range of motion and neck supple.   Skin:     General: Skin is warm and dry.      Capillary Refill: Capillary refill takes less than 2 seconds.   Neurological:      General: No focal deficit present.      Mental Status: He is alert and oriented to person, place, and time. Mental status is at baseline.   Psychiatric:         Mood and Affect: Mood normal.         Behavior: Behavior normal.         Thought Content: Thought content normal.         Judgment: Judgment normal.        Result Review :  The following data was reviewed by: MELANY Thornton on 11/07/2022:  Common labs    Common Labs 3/14/22 3/14/22 3/14/22 3/14/22 3/14/22 6/16/22 8/3/22 8/3/22 8/3/22 8/3/22    1534 1534 1534 1534 1534  1139 1139 1139 1139   Glucose  117 (A)       95    BUN  11       12    Creatinine  1.34 (A)       1.22    Sodium  143       140    Potassium  3.9       4.5    Chloride  103       102    Calcium  9.4       9.4     Total Protein  6.9       6.4    Albumin  4.1       4.0    Total Bilirubin  0.8       1.2    Alkaline Phosphatase  102       97    AST (SGOT)  17       19    ALT (SGPT)  19       16    WBC 7.2     9.25    6.0   Hemoglobin 16.2     15.9    16.5   Hematocrit 48.7     48.3    49.0   Platelets 271     242    230   Total Cholesterol   142     155     Triglycerides   103     57     HDL Cholesterol   37 (A)     51     LDL Cholesterol    86     92     Hemoglobin A1C    6.0 (A)   6.1 (A)      PSA     1.9        (A) Abnormal value       Comments are available for some flowsheets but are not being displayed.           Current outpatient and discharge medications have been reconciled for the patient.  Reviewed by: MELANY Thornton           Assessment and Plan   Diagnoses and all orders for this visit:    1. Benign essential hypertension (Primary)  Assessment & Plan:  Hypertension is uncontrolled.  I will increase his benazepril to 40 mg daily, start taking the HCTZ 12.5 mg daily consistently, continue Toprol-XL 50 mg daily.  Discussed DASH diet and regular exercise.  We will follow-up in 2 weeks in the office to go over his home blood pressure log to ensure therapeutic decline and discuss any further need for antihypertensive titration.    Orders:  -     benazepril (LOTENSIN) 40 MG tablet; Take 1 tablet by mouth Daily.  Dispense: 90 tablet; Refill: 1  -     hydroCHLOROthiazide (HYDRODIURIL) 12.5 MG tablet; Take 1 tablet by mouth Daily.  Dispense: 90 tablet; Refill: 1    2. Seasonal allergies  Assessment & Plan:  Add a daily antihistamine. Continue Singulair, Mucinex PRN. He plans to follow up with ENT.           Follow-up as needed and I will see him back in 2 weeks.    Follow Up   Return in about 2 weeks (around 11/21/2022), or if symptoms worsen or fail to improve, for Next scheduled follow up.  Patient was given instructions and counseling regarding his condition or for health maintenance advice. Please see specific  information pulled into the AVS if appropriate.       Answers for HPI/ROS submitted by the patient on 11/7/2022  What is the primary reason for your visit?: High Blood Pressure

## 2022-11-07 NOTE — ASSESSMENT & PLAN NOTE
Hypertension is uncontrolled.  I will increase his benazepril to 40 mg daily, start taking the HCTZ 12.5 mg daily consistently, continue Toprol-XL 50 mg daily.  Discussed DASH diet and regular exercise.  We will follow-up in 2 weeks in the office to go over his home blood pressure log to ensure therapeutic decline and discuss any further need for antihypertensive titration.

## 2022-11-14 DIAGNOSIS — I10 BENIGN ESSENTIAL HYPERTENSION: ICD-10-CM

## 2022-11-14 RX ORDER — METOPROLOL SUCCINATE 50 MG/1
50 TABLET, EXTENDED RELEASE ORAL DAILY
Qty: 90 TABLET | Refills: 0 | Status: SHIPPED | OUTPATIENT
Start: 2022-11-14 | End: 2022-11-21 | Stop reason: SDUPTHER

## 2022-11-15 ENCOUNTER — OFFICE VISIT (OUTPATIENT)
Dept: PAIN MEDICINE | Facility: CLINIC | Age: 52
End: 2022-11-15

## 2022-11-15 VITALS
HEART RATE: 98 BPM | HEIGHT: 70 IN | SYSTOLIC BLOOD PRESSURE: 134 MMHG | RESPIRATION RATE: 18 BRPM | TEMPERATURE: 97.7 F | OXYGEN SATURATION: 95 % | DIASTOLIC BLOOD PRESSURE: 84 MMHG | WEIGHT: 315 LBS | BODY MASS INDEX: 45.1 KG/M2

## 2022-11-15 DIAGNOSIS — M25.551 RIGHT HIP PAIN: ICD-10-CM

## 2022-11-15 DIAGNOSIS — M16.11 PRIMARY OSTEOARTHRITIS OF RIGHT HIP: ICD-10-CM

## 2022-11-15 DIAGNOSIS — M54.16 LUMBAR RADICULOPATHY: Primary | ICD-10-CM

## 2022-11-15 PROCEDURE — 99213 OFFICE O/P EST LOW 20 MIN: CPT | Performed by: NURSE PRACTITIONER

## 2022-11-15 NOTE — PROGRESS NOTES
"CHIEF COMPLAINT  PROCEDURE FOLLOW UP Right L5 LUMBAR/SACRAL TRANSFORAMINAL EPIDURAL 11/04/2022  Patient in office reports  80% pain relief treatment  continues with ongoing effect \" every now and then I have numbness in my  right thigh\".       Subjective   Grant Edge Jr. is a 52 y.o. male  who presents to the office for follow-up of procedure.  He completed a Right L5 TFESI  on  11/4/2022 performed by Dr. Sun for management of right hip and leg pain. Patient reports 70-80% ONGOING relief from the procedure.     Today his pain is 1/10VAS in severity. He states every now and then he will still feel numbness in his upper right thigh, as far as his pain this has calmed down. He continues with Lyrica 75 mg BID.  This has been helpful as well. He denies any side effects. He was switched to Lyrica because of concern for weight gain with Gabapentin. He has continued to gain weight since changing to Lyrica.     Previous Procedures:  9/20/2022-right hip injection performed Dr. Swift-no relief  Right hip injection on 6/29/2022  Effect of Injection (%): moderate relief  Length of Relief: ~1 month     LESI x 3 ~9-10 years ago--1st two helped, 3rd did not help--was then referred to ortho and underwent Left EMILY.     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 He denies any back pain). The quality of the pain is described as aching. The pain is at a severity of 1/10. The pain has been improving since onset. Associated symptoms include numbness (right thigh). The symptoms are aggravated by movement and weight bearing (lying down). Treatments tried: PT, Gabapentin, right hip injection.      PEG Assessment   What number best describes your pain on average in the past week?2  What number best describes how, during the past week, pain has interfered with your enjoyment of life?0  What number best describes how, " "during the past week, pain has interfered with your general activity?  0    The following portions of the patient's history were reviewed and updated as appropriate: allergies, current medications, past family history, past medical history, past social history, past surgical history and problem list.    Review of Systems   Constitutional: Negative for activity change, fatigue and fever.   HENT: Negative for congestion.    Eyes: Negative for visual disturbance.   Respiratory: Negative for cough and chest tightness.    Cardiovascular: Negative for chest pain.   Gastrointestinal: Negative for abdominal pain, constipation and diarrhea.   Genitourinary: Negative for difficulty urinating and dysuria.   Neurological: Positive for numbness (right thigh). Negative for dizziness, weakness, light-headedness and headaches.   Psychiatric/Behavioral: Negative for agitation, sleep disturbance and suicidal ideas. The patient is not nervous/anxious.      --  The aforementioned information the Chief Complaint section and above subjective data including any HPI data, and also the Review of Systems data, has been personally reviewed and affirmed.  --     Vitals:    11/15/22 1514   BP: 134/84   BP Location: Left arm   Patient Position: Sitting   Cuff Size: Large Adult   Pulse: 98   Resp: 18   Temp: 97.7 °F (36.5 °C)   TempSrc: Temporal   SpO2: 95%   Weight: (!) 150 kg (331 lb 9.6 oz)   Height: 177.8 cm (70\")   PainSc:   1   PainLoc: Comment: HIP     Objective   Physical Exam  Vitals and nursing note reviewed.   Constitutional:       Appearance: Normal appearance. He is well-developed. He is obese.   Eyes:      General: Lids are normal.   Cardiovascular:      Rate and Rhythm: Normal rate.   Pulmonary:      Effort: Pulmonary effort is normal.   Neurological:      Mental Status: He is alert and oriented to person, place, and time.   Psychiatric:         Attention and Perception: Attention normal.         Mood and Affect: Mood normal.       "   Speech: Speech normal.         Behavior: Behavior normal.         Judgment: Judgment normal.       Assessment & Plan   Diagnoses and all orders for this visit:    1. Lumbar radiculopathy (Primary)    2. Right hip pain    3. Primary osteoarthritis of right hip      --- Decrease Lyrica to nightly x 10 nights, then every other night x 10 nights then stop. If pain worsens significantly then we can resume this medication.   --- May repeat Right L5 TFESI PRN.   --- Follow-up if pain returns/worsens.      GEOVANNA REPORT    As the clinician, I personally reviewed the GEOVANNA from 11/15/2022 while the patient was in the office today.    History and physical exam exhibit continued safe and appropriate use of controlled substances.    Dictated utilizing Dragon dictation.      Patient remained masked during entire encounter. No cough present. I donned a mask and eye protection throughout entire visit. Prior to donning mask and eye protection, hand hygiene was performed, as well as when it was doffed.  I was closer than 6 feet, but not for an extended period of time. No obvious exposure to any bodily fluids.

## 2022-11-21 ENCOUNTER — OFFICE VISIT (OUTPATIENT)
Dept: INTERNAL MEDICINE | Facility: CLINIC | Age: 52
End: 2022-11-21

## 2022-11-21 VITALS
TEMPERATURE: 98 F | OXYGEN SATURATION: 95 % | HEIGHT: 70 IN | WEIGHT: 315 LBS | DIASTOLIC BLOOD PRESSURE: 90 MMHG | HEART RATE: 94 BPM | BODY MASS INDEX: 45.1 KG/M2 | SYSTOLIC BLOOD PRESSURE: 140 MMHG

## 2022-11-21 DIAGNOSIS — Z87.09 HISTORY OF ASTHMA: Primary | ICD-10-CM

## 2022-11-21 DIAGNOSIS — I10 BENIGN ESSENTIAL HYPERTENSION: ICD-10-CM

## 2022-11-21 PROCEDURE — 99214 OFFICE O/P EST MOD 30 MIN: CPT | Performed by: NURSE PRACTITIONER

## 2022-11-21 RX ORDER — METOPROLOL SUCCINATE 50 MG/1
75 TABLET, EXTENDED RELEASE ORAL DAILY
Qty: 145 TABLET | Refills: 0 | Status: SHIPPED | OUTPATIENT
Start: 2022-11-21

## 2022-11-21 RX ORDER — FLUTICASONE FUROATE AND VILANTEROL 100; 25 UG/1; UG/1
1 POWDER RESPIRATORY (INHALATION)
Qty: 28 EACH | Refills: 0 | Status: SHIPPED | OUTPATIENT
Start: 2022-11-21 | End: 2023-01-06 | Stop reason: SDUPTHER

## 2022-11-21 NOTE — PROGRESS NOTES
Chief Complaint  Follow-up (2 weeks) and Hypertension    Subjective        Grant Edge  presents to [facility]  History of Present Illness  Patient presents for a 2-week follow-up on hypertension.  This is a 52-year-old male who I saw last on 11/7/2022 at which time blood pressure was running high.  I increased his benazepril from 20 mg daily to 40 mg daily, continue Toprol-XL 50 mg daily and HCTZ 12.5 mg daily.  He presents today for follow-up with home blood pressure readings which are unfortunately still ranging between 148-160 systolic over  diastolic.  No symptoms with high blood pressure.  Denies headaches, vision changes, shortness of breath or chest discomfort.    He endorses a chronic cough for the past couple of months.  This waxes and wanes.  At the last visit I had asked him to add a daily antihistamine and he has not had a chance to do that yet.  He continues to take Mucinex as needed along with fluids and daily Singulair.  He has a history of asthma and wonders whether he should add back his Breo or an inhaler.  He reports in the morning he often wakes up with some mild wheezing.  He is not feeling outright short of breath, no fevers, chills or chest pain.    Denies development of other new issues today.  Hypertension  The current episode started more than 1 year ago. The problem has been gradually improving since onset. The problem is controlled. Pertinent negatives include no anxiety, blurred vision, chest pain, headaches, malaise/fatigue, neck pain, orthopnea, palpitations, peripheral edema, PND, shortness of breath or sweats. Agents associated with hypertension include decongestants and thyroid hormones. Risk factors for coronary artery disease include obesity. There are no compliance problems.        Objective   Vital Signs:  /90 (BP Location: Left arm, Patient Position: Sitting, Cuff Size: Adult)   Pulse 94   Temp 98 °F (36.7 °C) (Infrared)   Ht  "177.8 cm (70\")   Wt (!) 150 kg (331 lb)   SpO2 95%   BMI 47.49 kg/m²   Estimated body mass index is 47.49 kg/m² as calculated from the following:    Height as of this encounter: 177.8 cm (70\").    Weight as of this encounter: 150 kg (331 lb).          Physical Exam  Vitals and nursing note reviewed.   Constitutional:       General: He is not in acute distress.     Appearance: Normal appearance. He is well-developed. He is not ill-appearing, toxic-appearing or diaphoretic.   HENT:      Head: Normocephalic and atraumatic.      Right Ear: External ear normal.      Left Ear: External ear normal.   Eyes:      Pupils: Pupils are equal, round, and reactive to light.   Cardiovascular:      Rate and Rhythm: Normal rate and regular rhythm.      Pulses: Normal pulses.      Heart sounds: Normal heart sounds.      Comments: No peripheral edema  Pulmonary:      Effort: Pulmonary effort is normal. No respiratory distress.      Breath sounds: Normal breath sounds. No stridor. No wheezing, rhonchi or rales.   Chest:      Chest wall: No tenderness.   Abdominal:      General: Bowel sounds are normal. There is no distension.      Palpations: Abdomen is soft.      Tenderness: There is no abdominal tenderness.   Musculoskeletal:         General: Normal range of motion.      Cervical back: Normal range of motion and neck supple.   Skin:     General: Skin is warm and dry.      Capillary Refill: Capillary refill takes less than 2 seconds.   Neurological:      General: No focal deficit present.      Mental Status: He is alert and oriented to person, place, and time. Mental status is at baseline.   Psychiatric:         Mood and Affect: Mood normal.         Behavior: Behavior normal.         Thought Content: Thought content normal.         Judgment: Judgment normal.        Result Review :  The following data was reviewed by: MELANY Thornton on 11/21/2022:  Common labs    Common Labs 3/14/22 3/14/22 3/14/22 3/14/22 3/14/22 6/16/22 " 8/3/22 8/3/22 8/3/22 8/3/22    1534 1534 1534 1534 1534  1139 1139 1139 1139   Glucose  117 (A)       95    BUN  11       12    Creatinine  1.34 (A)       1.22    Sodium  143       140    Potassium  3.9       4.5    Chloride  103       102    Calcium  9.4       9.4    Total Protein  6.9       6.4    Albumin  4.1       4.0    Total Bilirubin  0.8       1.2    Alkaline Phosphatase  102       97    AST (SGOT)  17       19    ALT (SGPT)  19       16    WBC 7.2     9.25    6.0   Hemoglobin 16.2     15.9    16.5   Hematocrit 48.7     48.3    49.0   Platelets 271     242    230   Total Cholesterol   142     155     Triglycerides   103     57     HDL Cholesterol   37 (A)     51     LDL Cholesterol    86     92     Hemoglobin A1C    6.0 (A)   6.1 (A)      PSA     1.9        (A) Abnormal value       Comments are available for some flowsheets but are not being displayed.           Data reviewed: Office Visit with Patricia Abdi APRN (11/07/2022)     Current outpatient and discharge medications have been reconciled for the patient.  Reviewed by: MELANY Thornton           Assessment and Plan   Diagnoses and all orders for this visit:    1. History of asthma (Primary)  Comments:  Add back Breo.  Add the daily antihistamine.  If unimproved at our follow-up in 2 to 3 weeks we will get a chest x-ray.  Orders:  -     Fluticasone Furoate-Vilanterol (Breo Ellipta) 100-25 MCG/ACT aerosol powder ; Inhale 1 puff Daily.  Dispense: 28 each; Refill: 0    2. Benign essential hypertension  Assessment & Plan:  Hypertension is still uncontrolled.  Continue benazepril 40 mg daily, HCTZ 12.5 mg daily and increase Toprol-XL from 50 mg daily to 75 mg daily.  Discussed decreasing caffeine and sodium in the diet.  He will continue to keep a log of daily home blood pressures for follow-up in 2 to 3 weeks.    Orders:  -     metoprolol succinate XL (TOPROL-XL) 50 MG 24 hr tablet; Take 1.5 tablets by mouth Daily.  Dispense: 145 tablet; Refill:  0         Follow-up as needed and I will see him back in 2 to 3 weeks for recheck of hypertension and chronic cough.    Follow Up   Return in about 3 weeks (around 12/12/2022).  Patient was given instructions and counseling regarding his condition or for health maintenance advice. Please see specific information pulled into the AVS if appropriate.       Answers for HPI/ROS submitted by the patient on 11/21/2022  What is the primary reason for your visit?: High Blood Pressure

## 2022-11-21 NOTE — ASSESSMENT & PLAN NOTE
Hypertension is still uncontrolled.  Continue benazepril 40 mg daily, HCTZ 12.5 mg daily and increase Toprol-XL from 50 mg daily to 75 mg daily.  Discussed decreasing caffeine and sodium in the diet.  He will continue to keep a log of daily home blood pressures for follow-up in 2 to 3 weeks.

## 2022-12-12 ENCOUNTER — OFFICE VISIT (OUTPATIENT)
Dept: INTERNAL MEDICINE | Facility: CLINIC | Age: 52
End: 2022-12-12

## 2022-12-12 VITALS
OXYGEN SATURATION: 96 % | DIASTOLIC BLOOD PRESSURE: 78 MMHG | HEART RATE: 96 BPM | TEMPERATURE: 96.9 F | SYSTOLIC BLOOD PRESSURE: 120 MMHG | BODY MASS INDEX: 45.1 KG/M2 | WEIGHT: 315 LBS | HEIGHT: 70 IN

## 2022-12-12 DIAGNOSIS — Z87.09 HISTORY OF ASTHMA: ICD-10-CM

## 2022-12-12 DIAGNOSIS — R05.2 SUBACUTE COUGH: ICD-10-CM

## 2022-12-12 DIAGNOSIS — I82.402 RECURRENT ACUTE DEEP VEIN THROMBOSIS (DVT) OF LEFT LOWER EXTREMITY: Chronic | ICD-10-CM

## 2022-12-12 DIAGNOSIS — I10 BENIGN ESSENTIAL HYPERTENSION: Chronic | ICD-10-CM

## 2022-12-12 DIAGNOSIS — R73.03 PREDIABETES: Chronic | ICD-10-CM

## 2022-12-12 DIAGNOSIS — E66.01 MORBID OBESITY: Chronic | ICD-10-CM

## 2022-12-12 DIAGNOSIS — E03.8 OTHER SPECIFIED HYPOTHYROIDISM: Chronic | ICD-10-CM

## 2022-12-12 DIAGNOSIS — E78.2 MIXED HYPERLIPIDEMIA: Primary | Chronic | ICD-10-CM

## 2022-12-12 PROCEDURE — 99214 OFFICE O/P EST MOD 30 MIN: CPT | Performed by: NURSE PRACTITIONER

## 2022-12-12 NOTE — ASSESSMENT & PLAN NOTE
Patient's (Body mass index is 47.78 kg/m².)  Worsening based on BMI comparisons.  Regular exercise, goal of 30 minutes/day 4 to 5 days/week along with well-balanced diet.

## 2022-12-12 NOTE — PROGRESS NOTES
Chief Complaint  Follow-up, Hypertension, and Asthma    Subjective        Grant Edge  presents to [facility] PRIMARY [facility]ARE  History of Present Illness  Patient presents for his 4-month follow-up today.  This is a 52-year-old male.    At his last visit last month, November 2022 we increased his Toprol-XL to 75 mg daily, continued HCTZ 12.5 mg daily and benazepril 40 mg daily for hypertension.  Reports that his home readings fluctuate, he often takes them in the evenings following work.  Here in the office today blood pressure is well controlled at 120/78.  Denies headaches, vision changes, shortness of breath or chest discomfort.    History of chronic recurrent DVT following with Dr. Cowart, hematology, appointment later this week with hematology for CBC and follow-up.  Maintained on Xarelto and denies abnormal bleeding.    He has hypothyroidism and takes levothyroxine 112 mcg daily with good compliance.    He has prediabetes as of last A1c 6.1 on 8/3/2022.    History of asthma and recently at his November 2022 appointment we added Breo for maintenance inhaler back.  He has had a chronic, lingering cough for the past several weeks, productive at times and he does take Mucinex when needed which he feels helps clear any congestion.  No outright shortness of breath.  He has been adding a daily antihistamine as well.  He feels that the cough is slightly improved since adding the Breo but persistent.    Otherwise doing well and denies development of other new issues today.  Hypertension  This is a recurrent problem. The current episode started more than 1 month ago. The problem has been gradually improving since onset. The problem is controlled. Pertinent negatives include no anxiety, blurred vision, chest pain, headaches, malaise/fatigue, neck pain, orthopnea, palpitations, peripheral edema, PND, shortness of breath or sweats. Agents associated with hypertension include decongestants and thyroid  "hormones. Risk factors for coronary artery disease include obesity. There are no compliance problems.        Objective   Vital Signs:  /78 (BP Location: Left arm, Patient Position: Sitting, Cuff Size: Large Adult)   Pulse 96   Temp 96.9 °F (36.1 °C) (Temporal)   Ht 177.8 cm (70\")   Wt (!) 151 kg (333 lb)   SpO2 96%   BMI 47.78 kg/m²   Estimated body mass index is 47.78 kg/m² as calculated from the following:    Height as of this encounter: 177.8 cm (70\").    Weight as of this encounter: 151 kg (333 lb).          Physical Exam  Vitals and nursing note reviewed.   Constitutional:       General: He is not in acute distress.     Appearance: He is well-developed. He is obese. He is not ill-appearing, toxic-appearing or diaphoretic.   HENT:      Head: Normocephalic and atraumatic.      Right Ear: External ear normal.      Left Ear: External ear normal.   Eyes:      Pupils: Pupils are equal, round, and reactive to light.   Cardiovascular:      Rate and Rhythm: Normal rate and regular rhythm.      Pulses: Normal pulses.      Heart sounds: Normal heart sounds.      Comments: No peripheral pitting edema  Pulmonary:      Effort: Pulmonary effort is normal. No respiratory distress.      Breath sounds: Normal breath sounds. No stridor. No wheezing, rhonchi or rales.   Chest:      Chest wall: No tenderness.   Abdominal:      General: Bowel sounds are normal. There is no distension.      Palpations: Abdomen is soft. There is no mass.      Tenderness: There is no abdominal tenderness. There is no right CVA tenderness, left CVA tenderness, guarding or rebound.      Hernia: No hernia is present.   Musculoskeletal:         General: Normal range of motion.      Cervical back: Normal range of motion and neck supple.   Skin:     General: Skin is warm and dry.      Capillary Refill: Capillary refill takes less than 2 seconds.   Neurological:      General: No focal deficit present.      Mental Status: He is alert and oriented " to person, place, and time. Mental status is at baseline.   Psychiatric:         Mood and Affect: Mood normal.         Behavior: Behavior normal.         Thought Content: Thought content normal.         Judgment: Judgment normal.        Result Review :  The following data was reviewed by: MELANY Thornton on 12/12/2022:  Common labs    Common Labs 3/14/22 3/14/22 3/14/22 3/14/22 3/14/22 6/16/22 8/3/22 8/3/22 8/3/22 8/3/22    1534 1534 1534 1534 1534  1139 1139 1139 1139   Glucose  117 (A)       95    BUN  11       12    Creatinine  1.34 (A)       1.22    Sodium  143       140    Potassium  3.9       4.5    Chloride  103       102    Calcium  9.4       9.4    Total Protein  6.9       6.4    Albumin  4.1       4.0    Total Bilirubin  0.8       1.2    Alkaline Phosphatase  102       97    AST (SGOT)  17       19    ALT (SGPT)  19       16    WBC 7.2     9.25    6.0   Hemoglobin 16.2     15.9    16.5   Hematocrit 48.7     48.3    49.0   Platelets 271     242    230   Total Cholesterol   142     155     Triglycerides   103     57     HDL Cholesterol   37 (A)     51     LDL Cholesterol    86     92     Hemoglobin A1C    6.0 (A)   6.1 (A)      PSA     1.9        (A) Abnormal value       Comments are available for some flowsheets but are not being displayed.           Current outpatient and discharge medications have been reconciled for the patient.  Reviewed by: MELANY Thornton           Assessment and Plan   Diagnoses and all orders for this visit:    1. Mixed hyperlipidemia (Primary)  Assessment & Plan:  Continue well-balanced low-fat diet, lipids stable as of 8/3/2022 lipid panel.      2. Benign essential hypertension  Assessment & Plan:  His blood pressure today is very well controlled at 120/78.  He is getting some higher home readings although these are taken in the evenings after work when he is stressed.  I have asked him to bring his home blood pressure cuff in for his next appointment so that it can be  checked against a manual reading.  I have asked that he take his blood pressure in the mornings on the days he does not work if possible to get a comparison to the evening readings.  He will ensure that he is sitting with feet flat on the floor at rest for 5 to 10 minutes prior to taking his blood pressure.  Continue Toprol-XL 75 mg daily, benazepril 40 mg daily and HCTZ 12.5 mg daily, goal less than 140/80, DASH diet and regular exercise discussed.    Orders:  -     Basic Metabolic Panel    3. Other specified hypothyroidism  Assessment & Plan:  Continue levothyroxine 112 mcg daily.  TSH, free T4 today we will adjust therapy if needed.    Orders:  -     TSH  -     T4, Free    4. Recurrent acute deep vein thrombosis (DVT) of left lower extremity (HCC)  Assessment & Plan:  Continue with hematology, continue on Xarelto per the direction of hematology.      5. Prediabetes  Assessment & Plan:  Continue low glycemic diet, regular exercise.  A1c today and we will adjust therapy if needed.    Orders:  -     Hemoglobin A1c    6. Morbid obesity (HCC)  Assessment & Plan:  Patient's (Body mass index is 47.78 kg/m².)  Worsening based on BMI comparisons.  Regular exercise, goal of 30 minutes/day 4 to 5 days/week along with well-balanced diet.      7. History of asthma  Assessment & Plan:  Cough is improving although not completely gone and we will get a chest x-ray to ensure clear lung fields today.  Continue daily antihistamine and Breo for maintenance.    Orders:  -     XR Chest PA & Lateral; Future    8. Subacute cough  -     XR Chest PA & Lateral; Future         We will contact patient with lab and imaging results and further recommendations.  Follow-up as needed and he has an appointment next month to establish care with MELANY Duncan.    Follow Up   Return if symptoms worsen or fail to improve.  Patient was given instructions and counseling regarding his condition or for health maintenance advice. Please see specific  information pulled into the AVS if appropriate.       Answers for HPI/ROS submitted by the patient on 12/12/2022  What is the primary reason for your visit?: High Blood Pressure

## 2022-12-12 NOTE — ASSESSMENT & PLAN NOTE
Cough is improving although not completely gone and we will get a chest x-ray to ensure clear lung fields today.  Continue daily antihistamine and Breo for maintenance.

## 2022-12-12 NOTE — ASSESSMENT & PLAN NOTE
His blood pressure today is very well controlled at 120/78.  He is getting some higher home readings although these are taken in the evenings after work when he is stressed.  I have asked him to bring his home blood pressure cuff in for his next appointment so that it can be checked against a manual reading.  I have asked that he take his blood pressure in the mornings on the days he does not work if possible to get a comparison to the evening readings.  He will ensure that he is sitting with feet flat on the floor at rest for 5 to 10 minutes prior to taking his blood pressure.  Continue Toprol-XL 75 mg daily, benazepril 40 mg daily and HCTZ 12.5 mg daily, goal less than 140/80, DASH diet and regular exercise discussed.

## 2022-12-13 LAB
BUN SERPL-MCNC: 10 MG/DL (ref 6–20)
BUN/CREAT SERPL: 8.2 (ref 7–25)
CALCIUM SERPL-MCNC: 9.1 MG/DL (ref 8.6–10.5)
CHLORIDE SERPL-SCNC: 102 MMOL/L (ref 98–107)
CO2 SERPL-SCNC: 27.4 MMOL/L (ref 22–29)
CREAT SERPL-MCNC: 1.22 MG/DL (ref 0.76–1.27)
EGFRCR SERPLBLD CKD-EPI 2021: 71.3 ML/MIN/1.73
GLUCOSE SERPL-MCNC: 97 MG/DL (ref 65–99)
HBA1C MFR BLD: 6.3 % (ref 4.8–5.6)
POTASSIUM SERPL-SCNC: 3.7 MMOL/L (ref 3.5–5.2)
SODIUM SERPL-SCNC: 141 MMOL/L (ref 136–145)
T4 FREE SERPL-MCNC: 1.24 NG/DL (ref 0.93–1.7)
TSH SERPL DL<=0.005 MIU/L-ACNC: 1.92 UIU/ML (ref 0.27–4.2)

## 2022-12-13 NOTE — PROGRESS NOTES
Good afternoon Mr. Edge, labs are back.  Your metabolic panel is stable, electrolytes, kidney function look good.  Your thyroid numbers are stable.  A1c has risen slightly to 6.3, this is still in the prediabetic range but closer to true diabetic range which is at 6.5 or greater, please watch intake of carbs and sugars in the diet along with regular exercise to help lower the A1c.  Let me know if you have any questions and have a great day,    MELANY Thornton

## 2022-12-13 NOTE — PROGRESS NOTES
Subjective   Grant Edge Jr. is a 52 y.o. male. Referred for Left LE DVT     History of Present Illness   Mr. Edge is a 49-year-old male with history of hypertension, previous history of provoked DVT following total hip replacement about 6 to 7 years ago presents for further evaluation and management of left lower extremity DVT.  He noticed bilateral lower extremity swelling about 2 weeks ago following which he presented to the emergency room.  Bilateral lower extremity Doppler was performed on 5/30/2020 on which an acute left lower extremity DVT was noted in the popliteal vein.  All other veins appeared normal.  He was started on Eliquis.  He was seen by Patricia Peter on 6/3/2020 and started on hydrochlorothiazide for bilateral lower extremity edema.  He has also been referred to us for further management of the DVT.  He denies any history of tobacco use, denies any recent travel.  Denies any recent changes in his health besides the bilateral lower extremity swelling.  No significant weight loss, fatigue, night sweats, lymphadenopathy, cough, dyspnea, nausea, vomiting, hematemesis, melena, hematochezia.  Last colonoscopy was about 5 to 6 years ago in which there was a benign polyp.    Interval history  Patient returns today for 6-month follow-up.  He continues on Xarelto 20 mg daily.  He reports being compliant with Xarelto.  Tolerating Xarelto well without signs or symptoms of bleeding.  Weight has not changed significantly, reports walking but not on a daily basis.  Denies unilateral lower extremity swelling, pain or erythema to the lower extremities, dyspnea, chest pain, palpitations.  Denies signs or symptoms of bleeding.    The following portions of the patient's history were reviewed and updated as appropriate: allergies, current medications, past family history, past medical history, past social history, past surgical history and problem list.    Past Medical History:   Diagnosis Date   • Abnormal ECG    •  "Allergic    • Arthritis    • Chest pain    • CTS (carpal tunnel syndrome) A few months.   • DVT (deep venous thrombosis) (HCC)    • GERD (gastroesophageal reflux disease)    • Hip arthrosis    • Hyperlipidemia    • Hypertension    • Hypokalemia    • Knee swelling    • Myocardial infarction (HCC)    • Obesity    • Periarthritis of shoulder    • Simple goiter    • Sinus bradycardia    • Vitamin D deficiency     hypothyroidism  Following total thyroidectomy    Past Surgical History:   Procedure Laterality Date   • CARDIAC CATHETERIZATION N/A 10/18/2016    Procedure: Left Heart Cath;  Surgeon: Daniel Rosas MD;  Location:  [facility];  Service:    • CHOLECYSTECTOMY     • EPIDURAL Right 11/4/2022    Procedure: Right L5 LUMBAR/SACRAL TRANSFORAMINAL EPIDURAL. Hold xarelto x 3 days prior to procedure;  Surgeon: La Sun MD;  Location: [facility] MAIN OR;  Service: Pain Management;  Laterality: Right;   • JOINT REPLACEMENT     • SALIVARY GLAND SURGERY     • THYROIDECTOMY  2013   • TONSILLECTOMY     • TOTAL HIP ARTHROPLASTY REVISION Left 2015        Family History   Problem Relation Age of Onset   • Diabetes Father    • Heart disease Father    • Arthritis Father    • Hyperlipidemia Father    • Hypertension Father    Maternal aunt - blood clots   Maternal aunt - lupus     Social History     Socioeconomic History   • Marital status:    Tobacco Use   • Smoking status: Never   • Smokeless tobacco: Never   Substance and Sexual Activity   • Alcohol use: No   • Drug use: No   • Sexual activity: Yes     Partners: Female     Birth control/protection: None      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 rate 16, height 177.8 cm (70\"), weight (!) 152 kg (334 lb 3.2 oz), SpO2 96 %.     Physical Exam   Constitutional: " He is oriented to person, place, and time. He appears well-developed and well-nourished. He is obese.  HENT:   Head: Normocephalic.   Eyes: No scleral icterus.   Cardiovascular: Normal rate and regular rhythm.   Pulmonary/Chest: Effort normal and breath sounds normal. No respiratory distress.   Abdominal: Normal appearance.   Musculoskeletal: Normal range of motion.   Neurological: He is alert and oriented to person, place, and time.   Psychiatric: His behavior is normal. Judgment and thought content normal.         Lab on 12/15/2022   Component Date Value Ref Range Status   • WBC 12/15/2022 8.25  3.40 - 10.80 10*3/mm3 Final   • RBC 12/15/2022 5.23  4.14 - 5.80 10*6/mm3 Final   • Hemoglobin 12/15/2022 15.9  13.0 - 17.7 g/dL Final   • Hematocrit 12/15/2022 47.8  37.5 - 51.0 % Final   • MCV 12/15/2022 91.4  79.0 - 97.0 fL Final   • MCH 12/15/2022 30.4  26.6 - 33.0 pg Final   • MCHC 12/15/2022 33.3  31.5 - 35.7 g/dL Final   • RDW 12/15/2022 13.9  12.3 - 15.4 % Final   • RDW-SD 12/15/2022 47.2  37.0 - 54.0 fl Final   • MPV 12/15/2022 9.7  6.0 - 12.0 fL Final   • Platelets 12/15/2022 228  140 - 450 10*3/mm3 Final   • Neutrophil % 12/15/2022 61.9  42.7 - 76.0 % Final   • Lymphocyte % 12/15/2022 24.2  19.6 - 45.3 % Final   • Monocyte % 12/15/2022 10.1  5.0 - 12.0 % Final   • Eosinophil % 12/15/2022 2.1  0.3 - 6.2 % Final   • Basophil % 12/15/2022 1.0  0.0 - 1.5 % Final   • Immature Grans % 12/15/2022 0.7 (H)  0.0 - 0.5 % Final   • Neutrophils, Absolute 12/15/2022 5.11  1.70 - 7.00 10*3/mm3 Final   • Lymphocytes, Absolute 12/15/2022 2.00  0.70 - 3.10 10*3/mm3 Final   • Monocytes, Absolute 12/15/2022 0.83  0.10 - 0.90 10*3/mm3 Final   • Eosinophils, Absolute 12/15/2022 0.17  0.00 - 0.40 10*3/mm3 Final   • Basophils, Absolute 12/15/2022 0.08  0.00 - 0.20 10*3/mm3 Final   • Immature Grans, Absolute 12/15/2022 0.06 (H)  0.00 - 0.05 10*3/mm3 Final   • nRBC 12/15/2022 0.0  0.0 - 0.2 /100 WBC Final   Office Visit on 12/12/2022    Component Date Value Ref Range Status   • Glucose 12/12/2022 97  65 - 99 mg/dL Final   • BUN 12/12/2022 10  6 - 20 mg/dL Final   • Creatinine 12/12/2022 1.22  0.76 - 1.27 mg/dL Final   • EGFR Result 12/12/2022 71.3  >60.0 mL/min/1.73 Final    Comment: National Kidney Foundation and American Society of  Nephrology (ASN) Task Force recommended calculation based  on the Chronic Kidney Disease Epidemiology Collaboration  (CKD-EPI) equation refit without adjustment for race.  GFR Normal >60  Chronic Kidney Disease <60  Kidney Failure <15     • BUN/Creatinine Ratio 12/12/2022 8.2  7.0 - 25.0 Final   • Sodium 12/12/2022 141  136 - 145 mmol/L Final   • Potassium 12/12/2022 3.7  3.5 - 5.2 mmol/L Final   • Chloride 12/12/2022 102  98 - 107 mmol/L Final   • Total CO2 12/12/2022 27.4  22.0 - 29.0 mmol/L Final   • Calcium 12/12/2022 9.1  8.6 - 10.5 mg/dL Final   • TSH 12/12/2022 1.920  0.270 - 4.200 uIU/mL Final   • Free T4 12/12/2022 1.24  0.93 - 1.70 ng/dL Final    Results may be falsely increased if patient taking Biotin.   • Hemoglobin A1C 12/12/2022 6.30 (H)  4.80 - 5.60 % Final    Comment: Hemoglobin A1C Ranges:  Increased Risk for Diabetes  5.7% to 6.4%  Diabetes                     >= 6.5%  Diabetic Goal                < 7.0%          XR Chest PA & Lateral    Result Date: 12/15/2022  1. No acute process.  This report was finalized on 12/15/2022 12:15 PM by Dr. Timur Silva M.D.       I reviewed his left lower extremity Doppler and noted to have left lower extremity DVT.  CBC from 5/30/2020 reviewed and noted to have normal WBC, hemoglobin, platelet count.  CMP from 5/30/2020 reviewed by me creatinine mildly elevated at 1.31    Reviewed thrombo-failure work-up and documented below  Beta-2 glycoprotein and anticardiolipin antibody negative  Protein S antigen free normal  Protein S functional normal  Antithrombin III level normal  Prothrombin gene mutation negative  Factor V Leiden mutation negative.    CBC 6/16/2020  reviewed and within normal limits    Assessment & Plan      Mr. Edge is a 51-year-old -American male with a previous history of left lower extremity DVT following a left hip replacement surgery, now with newly diagnosed left lower extremity DVT.     *DVT  · This is his second episode of DVT which is unprovoked.    · His the first episode was provoked secondary to surgery.    · Given that he has had 2 DVTs so far and the most recent one being unprovoked thrombophilia work-up has been performed and all labs are negative.    · Reviewed the thrombophilia work-up with Mr. Edge.  Given that he has had 1 provoked DVT in the past but now the most recent one is unprovoked, although thrombophilia work-up is negative would recommend long-term anticoagulation to prevent any further DVTs and PEs.  · Eliquis no longer covered by insurance and had switched to Xarelto.  Tolerating that well.  · Plan lifelong anticoagulation with Xarelto.  · Counseled on the risks of DVT associated with obesity.  · 12/15/2022: Continues on Xarelto 20 mg daily, tolerating without signs or symptoms of bleeding.  No evidence of recurrent DVT on exam today.    *Health Maintenance  · Also recommend age appropriate [id number]-date on prostate cancer screening.    · He is past due for colonoscopy.  Explained the importance of recommended screening and risk of DVT and PE with underlying malignancies.    · Discussed once again today the need for up-to-date colonoscopy.  Patient wishes to schedule this himself as he previously saw Dr. James.  He received a phone call from the gastroenterology office however he has to schedule his colonoscopy.  · Explained to him that he would have to hold the Xarelto 2 days prior to the procedure.    *Bilateral lower extremity swelling-could be secondary to amlodipine.  Resolved    *Hypertension-on hydrochlorothiazide, benazepril, metoprolol.  Blood pressure 138/87    *Hypothyroidism-continue Synthroid,  stable    *Obesity-previously discussed with Mr. Edge that obesity is a risk factor for DVT.  Recommend regular exercise and healthy diet.  BMI 48    PLAN:   · Continue Xarelto 20 mg daily-plan lifelong anticoagulation.  · Return in 6 months for MD follow-up.  · Call/return sooner if needed.    Margaret Herrera, APRN  12/15/22

## 2022-12-15 ENCOUNTER — LAB (OUTPATIENT)
Dept: OTHER | Facility: HOSPITAL | Age: 52
End: 2022-12-15

## 2022-12-15 ENCOUNTER — OFFICE VISIT (OUTPATIENT)
Dept: ONCOLOGY | Facility: CLINIC | Age: 52
End: 2022-12-15

## 2022-12-15 ENCOUNTER — HOSPITAL ENCOUNTER (OUTPATIENT)
Dept: GENERAL RADIOLOGY | Facility: HOSPITAL | Age: 52
Discharge: HOME | End: 2022-12-15
Admitting: NURSE PRACTITIONER

## 2022-12-15 VITALS
OXYGEN SATURATION: 96 % | DIASTOLIC BLOOD PRESSURE: 87 MMHG | HEIGHT: 70 IN | HEART RATE: 85 BPM | RESPIRATION RATE: 16 BRPM | TEMPERATURE: 96.9 F | WEIGHT: 315 LBS | SYSTOLIC BLOOD PRESSURE: 138 MMHG | BODY MASS INDEX: 45.1 KG/M2

## 2022-12-15 DIAGNOSIS — I82.432 ACUTE DEEP VEIN THROMBOSIS (DVT) OF POPLITEAL VEIN OF LEFT LOWER EXTREMITY: ICD-10-CM

## 2022-12-15 DIAGNOSIS — Z79.01 CURRENT USE OF LONG TERM ANTICOAGULATION: ICD-10-CM

## 2022-12-15 DIAGNOSIS — I82.432 ACUTE DEEP VEIN THROMBOSIS (DVT) OF POPLITEAL VEIN OF LEFT LOWER EXTREMITY: Primary | ICD-10-CM

## 2022-12-15 DIAGNOSIS — Z87.09 HISTORY OF ASTHMA: ICD-10-CM

## 2022-12-15 DIAGNOSIS — R05.2 SUBACUTE COUGH: ICD-10-CM

## 2022-12-15 LAB
BASOPHILS # BLD AUTO: 0.08 10*3/MM3 (ref 0–0.2)
BASOPHILS NFR BLD AUTO: 1 % (ref 0–1.5)
DEPRECATED RDW RBC AUTO: 47.2 FL (ref 37–54)
EOSINOPHIL # BLD AUTO: 0.17 10*3/MM3 (ref 0–0.4)
EOSINOPHIL NFR BLD AUTO: 2.1 % (ref 0.3–6.2)
ERYTHROCYTE [DISTWIDTH] IN BLOOD BY AUTOMATED COUNT: 13.9 % (ref 12.3–15.4)
HCT VFR BLD AUTO: 47.8 % (ref 37.5–51)
HGB BLD-MCNC: 15.9 G/DL (ref 13–17.7)
IMM GRANULOCYTES # BLD AUTO: 0.06 10*3/MM3 (ref 0–0.05)
IMM GRANULOCYTES NFR BLD AUTO: 0.7 % (ref 0–0.5)
LYMPHOCYTES # BLD AUTO: 2 10*3/MM3 (ref 0.7–3.1)
LYMPHOCYTES NFR BLD AUTO: 24.2 % (ref 19.6–45.3)
MCH RBC QN AUTO: 30.4 PG (ref 26.6–33)
MCHC RBC AUTO-ENTMCNC: 33.3 G/DL (ref 31.5–35.7)
MCV RBC AUTO: 91.4 FL (ref 79–97)
MONOCYTES # BLD AUTO: 0.83 10*3/MM3 (ref 0.1–0.9)
MONOCYTES NFR BLD AUTO: 10.1 % (ref 5–12)
NEUTROPHILS NFR BLD AUTO: 5.11 10*3/MM3 (ref 1.7–7)
NEUTROPHILS NFR BLD AUTO: 61.9 % (ref 42.7–76)
NRBC BLD AUTO-RTO: 0 /100 WBC (ref 0–0.2)
PLATELET # BLD AUTO: 228 10*3/MM3 (ref 140–450)
PMV BLD AUTO: 9.7 FL (ref 6–12)
RBC # BLD AUTO: 5.23 10*6/MM3 (ref 4.14–5.8)
WBC NRBC COR # BLD: 8.25 10*3/MM3 (ref 3.4–10.8)

## 2022-12-15 PROCEDURE — 36415 COLL VENOUS BLD VENIPUNCTURE: CPT

## 2022-12-15 PROCEDURE — 99213 OFFICE O/P EST LOW 20 MIN: CPT | Performed by: NURSE PRACTITIONER

## 2022-12-15 PROCEDURE — 71046 X-RAY EXAM CHEST 2 VIEWS: CPT

## 2022-12-15 PROCEDURE — 85025 COMPLETE CBC W/AUTO DIFF WBC: CPT | Performed by: INTERNAL MEDICINE

## 2022-12-15 NOTE — PROGRESS NOTES
Hi Mr. Edge, your chest xray is back and normal, no evidence of pneumonia or other abnormalities. Let us know of any questions and have a great day,   MELANY Thornton

## 2023-01-06 ENCOUNTER — OFFICE VISIT (OUTPATIENT)
Dept: INTERNAL MEDICINE | Facility: CLINIC | Age: 53
End: 2023-01-06
Payer: COMMERCIAL

## 2023-01-06 VITALS
HEIGHT: 70 IN | OXYGEN SATURATION: 98 % | HEART RATE: 93 BPM | BODY MASS INDEX: 45.1 KG/M2 | SYSTOLIC BLOOD PRESSURE: 134 MMHG | WEIGHT: 315 LBS | TEMPERATURE: 98 F | DIASTOLIC BLOOD PRESSURE: 80 MMHG

## 2023-01-06 DIAGNOSIS — J45.909 MODERATE ASTHMA, UNSPECIFIED WHETHER COMPLICATED, UNSPECIFIED WHETHER PERSISTENT: ICD-10-CM

## 2023-01-06 DIAGNOSIS — R09.89 CHEST CONGESTION: Primary | ICD-10-CM

## 2023-01-06 DIAGNOSIS — Z00.00 ENCOUNTER FOR MEDICAL EXAMINATION TO ESTABLISH CARE: ICD-10-CM

## 2023-01-06 PROCEDURE — 99213 OFFICE O/P EST LOW 20 MIN: CPT

## 2023-01-06 RX ORDER — AZITHROMYCIN 250 MG/1
TABLET, FILM COATED ORAL
Qty: 6 TABLET | Refills: 0 | Status: SHIPPED | OUTPATIENT
Start: 2023-01-06 | End: 2023-02-27

## 2023-01-06 RX ORDER — FLUTICASONE FUROATE AND VILANTEROL 100; 25 UG/1; UG/1
1 POWDER RESPIRATORY (INHALATION)
Qty: 28 EACH | Refills: 0 | Status: SHIPPED | OUTPATIENT
Start: 2023-01-06 | End: 2023-01-31

## 2023-01-06 RX ORDER — ALBUTEROL SULFATE 90 UG/1
2 AEROSOL, METERED RESPIRATORY (INHALATION) EVERY 4 HOURS PRN
Qty: 8 G | Refills: 2 | Status: SHIPPED | OUTPATIENT
Start: 2023-01-06

## 2023-01-06 NOTE — PATIENT INSTRUCTIONS
Start azithromycin and take as directed. Restart Breo. Take 2 puffs of albuterol as needed every 4 hours for wheezing. Stay hydrated with water. Increase activity as tolerated. Follow-up in 3 months.

## 2023-01-06 NOTE — PROGRESS NOTES
"Chief Complaint  Establish Care, Hypertension, and Hypothyroidism    Subjective        Grant Edge . presents to [facility] PRIMARY [facility]ARE  History of Present Illness  52-year-old male presenting with chest congestion, hip pain and to establish care previous patient of MELANY Thornton.  Hip pain has been managed with an epidural which she had back in November.  Seen in urgent care on 12/19 for cough symptoms.  Cough has been improving while on amoxicillin and taking Mucinex DM.        Objective   Vital Signs:  /80   Pulse 93   Temp 98 °F (36.7 °C)   Ht 177.8 cm (70\")   Wt (!) 153 kg (338 lb)   SpO2 98%   BMI 48.50 kg/m²   Estimated body mass index is 48.5 kg/m² as calculated from the following:    Height as of this encounter: 177.8 cm (70\").    Weight as of this encounter: 153 kg (338 lb).          Physical Exam  Vitals reviewed.   Constitutional:       Appearance: Normal appearance.   Cardiovascular:      Rate and Rhythm: Normal rate.      Pulses: Normal pulses.      Heart sounds: Normal heart sounds.   Pulmonary:      Effort: Pulmonary effort is normal.      Breath sounds: Wheezing present.   Musculoskeletal:         General: Normal range of motion.      Cervical back: Normal range of motion.   Skin:     General: Skin is warm and dry.      Capillary Refill: Capillary refill takes less than 2 seconds.   Neurological:      General: No focal deficit present.      Mental Status: He is alert and oriented to person, place, and time.   Psychiatric:         Mood and Affect: Mood normal.         Behavior: Behavior normal.         Thought Content: Thought content normal.         Judgment: Judgment normal.        Result Review :    Common labs    Common Labs 8/3/22 8/3/22 8/3/22 8/3/22 12/12/22 12/12/22 12/15/22    1139 1139 1139 1139 1532 1532    Glucose   95  97     BUN   12  10     Creatinine   1.22  1.22     Sodium   140  141     Potassium   4.5  3.7     Chloride   102  102   "   Calcium   9.4  9.1     Total Protein   6.4       Albumin   4.0       Total Bilirubin   1.2       Alkaline Phosphatase   97       AST (SGOT)   19       ALT (SGPT)   16       WBC    6.0   8.25   Hemoglobin    16.5   15.9   Hematocrit    49.0   47.8   Platelets    230   228   Total Cholesterol  155        Triglycerides  57        HDL Cholesterol  51        LDL Cholesterol   92        Hemoglobin A1C 6.1 (A)     6.30 (A)    (A) Abnormal value       Comments are available for some flowsheets but are not being displayed.           Current Outpatient Medications on File Prior to Visit   Medication Sig Dispense Refill   • azelastine (ASTELIN) 0.1 % nasal spray 2 sprays into the nostril(s) as directed by provider 2 (Two) Times a Day. Use in each nostril as directed 30 mL 2   • benazepril (LOTENSIN) 40 MG tablet Take 1 tablet by mouth Daily. 90 tablet 1   • fluticasone (FLONASE) 50 MCG/ACT nasal spray 2 sprays into the nostril(s) as directed by provider Daily. 15.8 mL 1   • hydroCHLOROthiazide (HYDRODIURIL) 12.5 MG tablet Take 1 tablet by mouth Daily. 90 tablet 1   • levothyroxine (SYNTHROID, LEVOTHROID) 112 MCG tablet TAKE 1 TABLET BY MOUTH DAILY 90 tablet 1   • Loratadine (CLARITIN PO) Take  by mouth.     • metoprolol succinate XL (TOPROL-XL) 50 MG 24 hr tablet Take 1.5 tablets by mouth Daily. 145 tablet 0   • montelukast (SINGULAIR) 10 MG tablet TAKE 1 TABLET BY MOUTH EVERY NIGHT 90 tablet 1   • pregabalin (LYRICA) 75 MG capsule Take 1 capsule by mouth 2 (Two) Times a Day. 60 capsule 1   • Pseudoephedrine-guaiFENesin (MUCINEX D PO) Take  by mouth.       No current facility-administered medications on file prior to visit.                 Assessment and Plan   Diagnoses and all orders for this visit:    1. Chest congestion (Primary)  -     azithromycin (Zithromax Z-Jono) 250 MG tablet; Take 2 tablets by mouth on day 1, then 1 tablet daily on days 2-5  Dispense: 6 tablet; Refill: 0    2. Moderate asthma, unspecified whether  complicated, unspecified whether persistent  Comments:  Add back Breo.   Orders:  -     Discontinue: Fluticasone Furoate-Vilanterol (Breo Ellipta) 100-25 MCG/ACT aerosol powder ; Inhale 1 puff Daily.  Dispense: 28 each; Refill: 0  -     albuterol sulfate  (90 Base) MCG/ACT inhaler; Inhale 2 puffs Every 4 (Four) Hours As Needed for Wheezing.  Dispense: 8 g; Refill: 2    3. Encounter for medical examination to establish care      Patient Instructions   Start azithromycin and take as directed. Restart Breo. Take 2 puffs of albuterol as needed every 4 hours for wheezing. Stay hydrated with water. Increase activity as tolerated. Follow-up in 3 months.            Follow Up   Return in about 3 months (around 4/6/2023) for Recheck.  Patient was given instructions and counseling regarding his condition or for health maintenance advice. Please see specific information pulled into the AVS if appropriate.

## 2023-01-17 ENCOUNTER — PATIENT MESSAGE (OUTPATIENT)
Dept: INTERNAL MEDICINE | Facility: CLINIC | Age: 53
End: 2023-01-17
Payer: COMMERCIAL

## 2023-01-19 DIAGNOSIS — I82.402 RECURRENT ACUTE DEEP VEIN THROMBOSIS (DVT) OF LEFT LOWER EXTREMITY: Chronic | ICD-10-CM

## 2023-01-20 RX ORDER — RIVAROXABAN 20 MG/1
TABLET, FILM COATED ORAL
Qty: 90 TABLET | Refills: 1 | Status: SHIPPED | OUTPATIENT
Start: 2023-01-20

## 2023-01-31 ENCOUNTER — TELEPHONE (OUTPATIENT)
Dept: INTERNAL MEDICINE | Facility: CLINIC | Age: 53
End: 2023-01-31
Payer: COMMERCIAL

## 2023-01-31 DIAGNOSIS — Z87.09 HISTORY OF ASTHMA: Primary | ICD-10-CM

## 2023-01-31 RX ORDER — FLUTICASONE PROPIONATE AND SALMETEROL 100; 50 UG/1; UG/1
1 POWDER RESPIRATORY (INHALATION) 2 TIMES DAILY
Qty: 14 EACH | Refills: 2 | Status: SHIPPED | OUTPATIENT
Start: 2023-01-31

## 2023-02-01 NOTE — TELEPHONE ENCOUNTER
He is asking if he is to do same direction as previous, use the Advair with 15 mins between and then use rescue inhaler?

## 2023-02-09 RX ORDER — ESOMEPRAZOLE MAGNESIUM 40 MG/1
40 CAPSULE, DELAYED RELEASE ORAL DAILY PRN
Qty: 30 CAPSULE | Refills: 0 | Status: SHIPPED | OUTPATIENT
Start: 2023-02-09 | End: 2023-02-09

## 2023-02-09 RX ORDER — ESOMEPRAZOLE MAGNESIUM 40 MG/1
CAPSULE, DELAYED RELEASE ORAL
Qty: 90 CAPSULE | Refills: 0 | Status: SHIPPED | OUTPATIENT
Start: 2023-02-09

## 2023-02-13 DIAGNOSIS — J30.2 SEASONAL ALLERGIES: ICD-10-CM

## 2023-02-13 RX ORDER — MONTELUKAST SODIUM 10 MG/1
10 TABLET ORAL NIGHTLY
Qty: 90 TABLET | Refills: 1 | Status: SHIPPED | OUTPATIENT
Start: 2023-02-13

## 2023-02-27 ENCOUNTER — OFFICE VISIT (OUTPATIENT)
Dept: INTERNAL MEDICINE | Facility: CLINIC | Age: 53
End: 2023-02-27
Payer: COMMERCIAL

## 2023-02-27 VITALS
SYSTOLIC BLOOD PRESSURE: 134 MMHG | WEIGHT: 315 LBS | HEIGHT: 70 IN | DIASTOLIC BLOOD PRESSURE: 64 MMHG | TEMPERATURE: 97.5 F | BODY MASS INDEX: 45.1 KG/M2 | OXYGEN SATURATION: 96 % | HEART RATE: 97 BPM

## 2023-02-27 DIAGNOSIS — I82.402 RECURRENT ACUTE DEEP VEIN THROMBOSIS (DVT) OF LEFT LOWER EXTREMITY: Chronic | ICD-10-CM

## 2023-02-27 DIAGNOSIS — I87.303 STASIS EDEMA OF BOTH LOWER EXTREMITIES: Primary | ICD-10-CM

## 2023-02-27 DIAGNOSIS — I10 BENIGN ESSENTIAL HYPERTENSION: Chronic | ICD-10-CM

## 2023-02-27 PROCEDURE — 99214 OFFICE O/P EST MOD 30 MIN: CPT | Performed by: NURSE PRACTITIONER

## 2023-02-27 RX ORDER — POTASSIUM CHLORIDE 750 MG/1
10 TABLET, FILM COATED, EXTENDED RELEASE ORAL DAILY
Qty: 14 TABLET | Refills: 0 | Status: SHIPPED | OUTPATIENT
Start: 2023-02-27 | End: 2023-03-20

## 2023-02-27 RX ORDER — FUROSEMIDE 20 MG/1
20 TABLET ORAL DAILY
Qty: 14 TABLET | Refills: 0 | Status: SHIPPED | OUTPATIENT
Start: 2023-02-27 | End: 2023-03-20

## 2023-02-27 NOTE — ASSESSMENT & PLAN NOTE
Will stop hctz  Start lasix and 10 beverly potassium     Low sodium diet: less than 2 grams daily  Elevate lower extremities higher than heart when in bed at night and during the day for >30 mins.     Avoid socks with tight bands around ankles: resume knee high compression socks: apply in am, off in pm.

## 2023-02-27 NOTE — ASSESSMENT & PLAN NOTE
Hypertension is improving with treatment.  Continue current treatment regimen.  Dietary sodium restriction.  Weight loss.  Continue current medications.  Blood pressure will be reassessed in 2 weeks.

## 2023-02-27 NOTE — PROGRESS NOTES
Chief Complaint  Foot Swelling and Leg Swelling (Couple of weeks )     Subjective:      History of Present Illness {CC  Problem List  Visit  Diagnosis   Encounters  Notes  Medications  Labs  Result Review Imaging  Media :23}     Grant Edge Jr. is a patient of Jim Joseph APRN and presents to [facility] for     Foot and leg swelling:  Equal bl lower extremities   Worsening last few weeks.     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  Then another DVT (RIGHT)  5/30/2020: he has been seen by hematology: 12/15/2022: advised life long AC. Continues xarelto (has not missed any doses)     + stress test in the past: 2016: heart cath 10/18/2016:   Cardiac catheterization (10/18/2016 9:08 AM)  Normal coronary arteries  No CP, SOA      He is seen by pain mgt: seems he recently restarted gabapentin. States edema started prior to that. No longer taking - also states has not been taking lyrica.     He states when he elevates: edema improves  Used to wear compression socks, but no longer using.     Turkey breast sandwich and Dizzy whiz for dinner.     He was in a recliner over the wk end.       I have reviewed patient's medical history, any new submitted information provided by patient or medical assistant and updated medical record.      Objective:      Physical Exam  Constitutional:       Appearance: He is obese.   Neck:      Vascular: No JVD.   Cardiovascular:      Rate and Rhythm: Normal rate.      Pulses: Normal pulses.   Pulmonary:      Effort: Pulmonary effort is normal.      Breath sounds: Normal breath sounds.      Comments: No C/W: e/u   Musculoskeletal:      Right lower leg: Edema present.      Left lower leg: Edema present.      Comments: Socks pushed down to ankles, tight tourniquet type fit     Neurological:      Mental Status: He is oriented to person, place, and time.        Result Review  Data Reviewed:{ Labs  Result Review  Imaging   "Med Tab  Media :23}     The following data was reviewed by: Simi Beltran III, NP-C on 02/27/2023  Common labs    Common Labs 8/3/22 8/3/22 8/3/22 8/3/22 12/12/22 12/12/22 12/15/22    1139 1139 1139 1139 1532 1532    Glucose   95  97     BUN   12  10     Creatinine   1.22  1.22     Sodium   140  141     Potassium   4.5  3.7     Chloride   102  102     Calcium   9.4  9.1     Total Protein   6.4       Albumin   4.0       Total Bilirubin   1.2       Alkaline Phosphatase   97       AST (SGOT)   19       ALT (SGPT)   16       WBC    6.0   8.25   Hemoglobin    16.5   15.9   Hematocrit    49.0   47.8   Platelets    230   228   Total Cholesterol  155        Triglycerides  57        HDL Cholesterol  51        LDL Cholesterol   92        Hemoglobin A1C 6.1 (A)     6.30 (A)    (A) Abnormal value       Comments are available for some flowsheets but are not being displayed.                  Vital Signs:   /64 (BP Location: Left arm, Patient Position: Sitting, Cuff Size: Adult)   Pulse 97   Temp 97.5 °F (36.4 °C) (Temporal)   Ht 177.8 cm (70\")   Wt (!) 158 kg (348 lb)   SpO2 96%   BMI 49.93 kg/m²         Requested Prescriptions     Signed Prescriptions Disp Refills   • furosemide (Lasix) 20 MG tablet 14 tablet 0     Sig: Take 1 tablet by mouth Daily.   • potassium chloride 10 MEQ CR tablet 14 tablet 0     Sig: Take 1 tablet by mouth Daily.       Routine medications provided by this office will also be refilled via pharmacy request.       Current Outpatient Medications:   •  benazepril (LOTENSIN) 40 MG tablet, Take 1 tablet by mouth Daily., Disp: 90 tablet, Rfl: 1  •  esomeprazole (nexIUM) 40 MG capsule, TAKE 1 CAPSULE BY MOUTH DAILY AS NEEDED FOR ACID REFLUX OR SYMPTOMS, Disp: 90 capsule, Rfl: 0  •  fluticasone (FLONASE) 50 MCG/ACT nasal spray, 2 sprays into the nostril(s) as directed by provider Daily., Disp: 15.8 mL, Rfl: 1  •  Fluticasone-Salmeterol (ADVAIR/WIXELA) 100-50 MCG/ACT DISKUS, Inhale 1 " puff 2 (Two) Times a Day., Disp: 14 each, Rfl: 2  •  levothyroxine (SYNTHROID, LEVOTHROID) 112 MCG tablet, TAKE 1 TABLET BY MOUTH DAILY, Disp: 90 tablet, Rfl: 1  •  Loratadine (CLARITIN PO), Take  by mouth., Disp: , Rfl:   •  metoprolol succinate XL (TOPROL-XL) 50 MG 24 hr tablet, Take 1.5 tablets by mouth Daily., Disp: 145 tablet, Rfl: 0  •  montelukast (SINGULAIR) 10 MG tablet, Take 1 tablet by mouth Every Night., Disp: 90 tablet, Rfl: 1  •  Pseudoephedrine-guaiFENesin (MUCINEX D PO), Take  by mouth., Disp: , Rfl:   •  Xarelto 20 MG tablet, TAKE 1 TABLET BY MOUTH DAILY, Disp: 90 tablet, Rfl: 1  •  albuterol sulfate  (90 Base) MCG/ACT inhaler, Inhale 2 puffs Every 4 (Four) Hours As Needed for Wheezing., Disp: 8 g, Rfl: 2  •  azelastine (ASTELIN) 0.1 % nasal spray, 2 sprays into the nostril(s) as directed by provider 2 (Two) Times a Day. Use in each nostril as directed, Disp: 30 mL, Rfl: 2  •  furosemide (Lasix) 20 MG tablet, Take 1 tablet by mouth Daily., Disp: 14 tablet, Rfl: 0  •  potassium chloride 10 MEQ CR tablet, Take 1 tablet by mouth Daily., Disp: 14 tablet, Rfl: 0     Assessment and Plan:      Assessment and Plan {CC Problem List  Visit Diagnosis  ROS  Review (Popup)  Health Maintenance  Quality  BestPractice  Medications  SmartSets  SnapShot Encounters  Media :23}     Problem List Items Addressed This Visit        Cardiac and Vasculature    Benign essential hypertension (Chronic)    Current Assessment & Plan     Hypertension is improving with treatment.  Continue current treatment regimen.  Dietary sodium restriction.  Weight loss.  Continue current medications.  Blood pressure will be reassessed in 2 weeks.         Relevant Medications    furosemide (Lasix) 20 MG tablet    Stasis edema of both lower extremities - Primary (Chronic)    Current Assessment & Plan     Will stop hctz  Start lasix and 10 beverly potassium     Low sodium diet: less than 2 grams daily  Elevate lower extremities  higher than heart when in bed at night and during the day for >30 mins.     Avoid socks with tight bands around ankles: resume knee high compression socks: apply in am, off in pm.          Relevant Medications    furosemide (Lasix) 20 MG tablet    potassium chloride 10 MEQ CR tablet       Coag and Thromboembolic    Recurrent acute deep vein thrombosis (DVT) of left lower extremity (HCC) (Chronic)    Overview     Following with Dr. Cowart, hematology.  He has had 2 unprovoked DVTs therefore hematology is recommending Eliquis indefinitely.            Follow Up {Instructions Charge Capture  Follow-up Communications :23}     Return in about 2 weeks (around 3/13/2023), or if symptoms worsen or fail to improve.    Follow up with PCP as scheduled.     Patient was given instructions and counseling regarding his condition or for health maintenance advice. Please see specific information pulled into the AVS if appropriate.    Dragon disclaimer:   Much of this encounter note is an electronic transcription/translation of spoken language to printed text. The electronic translation of spoken language may permit erroneous, or at times, nonsensical words or phrases to be inadvertently transcribed; Although I have reviewed the note for such errors, some may still exist.     Additional Patient Counseling:       There are no Patient Instructions on file for this visit.

## 2023-03-20 ENCOUNTER — OFFICE VISIT (OUTPATIENT)
Dept: INTERNAL MEDICINE | Facility: CLINIC | Age: 53
End: 2023-03-20
Payer: COMMERCIAL

## 2023-03-20 VITALS
WEIGHT: 315 LBS | SYSTOLIC BLOOD PRESSURE: 134 MMHG | BODY MASS INDEX: 45.1 KG/M2 | TEMPERATURE: 98.2 F | DIASTOLIC BLOOD PRESSURE: 66 MMHG | HEIGHT: 70 IN | HEART RATE: 80 BPM | OXYGEN SATURATION: 99 %

## 2023-03-20 DIAGNOSIS — E78.2 MIXED HYPERLIPIDEMIA: Primary | Chronic | ICD-10-CM

## 2023-03-20 DIAGNOSIS — Z00.00 HEALTH CARE MAINTENANCE: ICD-10-CM

## 2023-03-20 DIAGNOSIS — Z12.5 SCREENING PSA (PROSTATE SPECIFIC ANTIGEN): ICD-10-CM

## 2023-03-20 DIAGNOSIS — I87.303 STASIS EDEMA OF BOTH LOWER EXTREMITIES: ICD-10-CM

## 2023-03-20 DIAGNOSIS — R73.03 PREDIABETES: ICD-10-CM

## 2023-03-20 DIAGNOSIS — I10 PRIMARY HYPERTENSION: Chronic | ICD-10-CM

## 2023-03-20 RX ORDER — FUROSEMIDE 20 MG/1
20 TABLET ORAL DAILY
Qty: 30 TABLET | Refills: 1 | Status: SHIPPED | OUTPATIENT
Start: 2023-03-20 | End: 2023-03-21 | Stop reason: SDUPTHER

## 2023-03-20 RX ORDER — POTASSIUM CHLORIDE 750 MG/1
10 TABLET, FILM COATED, EXTENDED RELEASE ORAL DAILY
Qty: 30 TABLET | Refills: 1 | Status: SHIPPED | OUTPATIENT
Start: 2023-03-20

## 2023-03-20 NOTE — PATIENT INSTRUCTIONS
Take furosemide 20 mg daily. Take potassium daily. Decrease salt intake. Labs today. Referral to cardiology placed to investigate leg swelling and possible congestive heart failure. Scheduling center to call with appointment. Keep upcoming appointment.

## 2023-03-20 NOTE — PROGRESS NOTES
"Chief Complaint  Leg Swelling (Follow up on Legs and feet swelling)    Subjective        Grant Edge  presents to [facility]IMARY [facility]  History of Present Illness  52-year-old male presenting for ingrown toenail and leg swelling follow-up.  Has been wearing compression socks but needs ones with the toes out.  States has some wheezing when getting up in the morning.  Has not made active changes in his diet.  His last Lasix was taken on Monday.  Taking benazepril and metoprolol as prescribed.    Leg Swelling        Objective   Vital Signs:  /66   Pulse 80   Temp 98.2 °F (36.8 °C)   Ht 177.8 cm (70\")   Wt (!) 158 kg (348 lb)   SpO2 99%   BMI 49.93 kg/m²   Estimated body mass index is 49.93 kg/m² as calculated from the following:    Height as of this encounter: 177.8 cm (70\").    Weight as of this encounter: 158 kg (348 lb).             Physical Exam  Vitals reviewed.   Constitutional:       Appearance: Normal appearance.   Cardiovascular:      Rate and Rhythm: Normal rate and regular rhythm.      Pulses: Normal pulses.      Heart sounds: Normal heart sounds.   Pulmonary:      Effort: Pulmonary effort is normal.      Breath sounds: Normal breath sounds.   Musculoskeletal:         General: Swelling present. Normal range of motion.      Cervical back: Normal range of motion.      Right lower le+ Pitting Edema present.      Left lower le+ Pitting Edema present.   Skin:     General: Skin is warm and dry.      Capillary Refill: Capillary refill takes less than 2 seconds.   Neurological:      General: No focal deficit present.      Mental Status: He is alert and oriented to person, place, and time.   Psychiatric:         Mood and Affect: Mood normal.         Behavior: Behavior normal.         Thought Content: Thought content normal.         Judgment: Judgment normal.        Result Review :    Common labs    Common Labs 12/12/22 12/12/22 12/15/22 3/20/23 3/20/23 3/20/23 3/20/23 " 3/20/23    1532 1532  1625 1625 1625 1625 1625   Glucose 97   98       BUN 10   11       Creatinine 1.22   1.24       Sodium 141   143       Potassium 3.7   4.7       Chloride 102   105       Calcium 9.1   9.6       Total Protein    6.7       Albumin    4.3       Total Bilirubin    1.3 (A)       Alkaline Phosphatase    86       AST (SGOT)    7       ALT (SGPT)    21       WBC   8.25  6.90      Hemoglobin   15.9  16.0      Hematocrit   47.8  47.8      Platelets   228  243      Total Cholesterol       158    Triglycerides       106    HDL Cholesterol       40    LDL Cholesterol        99    Hemoglobin A1C  6.30 (A)    6.30 (A)     PSA        1.610   (A) Abnormal value       Comments are available for some flowsheets but are not being displayed.               Current Outpatient Medications on File Prior to Visit   Medication Sig Dispense Refill   • albuterol sulfate  (90 Base) MCG/ACT inhaler Inhale 2 puffs Every 4 (Four) Hours As Needed for Wheezing. 8 g 2   • azelastine (ASTELIN) 0.1 % nasal spray 2 sprays into the nostril(s) as directed by provider 2 (Two) Times a Day. Use in each nostril as directed 30 mL 2   • benazepril (LOTENSIN) 40 MG tablet Take 1 tablet by mouth Daily. 90 tablet 1   • esomeprazole (nexIUM) 40 MG capsule TAKE 1 CAPSULE BY MOUTH DAILY AS NEEDED FOR ACID REFLUX OR SYMPTOMS 90 capsule 0   • fluticasone (FLONASE) 50 MCG/ACT nasal spray 2 sprays into the nostril(s) as directed by provider Daily. 15.8 mL 1   • Fluticasone-Salmeterol (ADVAIR/WIXELA) 100-50 MCG/ACT DISKUS Inhale 1 puff 2 (Two) Times a Day. 14 each 2   • Loratadine (CLARITIN PO) Take  by mouth.     • metoprolol succinate XL (TOPROL-XL) 50 MG 24 hr tablet Take 1.5 tablets by mouth Daily. 145 tablet 0   • montelukast (SINGULAIR) 10 MG tablet Take 1 tablet by mouth Every Night. 90 tablet 1   • Pseudoephedrine-guaiFENesin (MUCINEX D PO) Take  by mouth.     • Xarelto 20 MG tablet TAKE 1 TABLET BY MOUTH DAILY 90 tablet 1     No  current facility-administered medications on file prior to visit.                Assessment and Plan   Diagnoses and all orders for this visit:    1. Mixed hyperlipidemia (Primary)  -     Cancel: Lipid Panel With / Chol / HDL Ratio  -     Lipid Panel With / Chol / HDL Ratio    2. Stasis edema of both lower extremities  -     potassium chloride 10 MEQ CR tablet; Take 1 tablet by mouth Daily.  Dispense: 30 tablet; Refill: 1  -     Discontinue: furosemide (Lasix) 20 MG tablet; Take 1 tablet by mouth Daily.  Dispense: 30 tablet; Refill: 1  -     Cancel: Basic Metabolic Panel  -     Cancel: Urinalysis With Microscopic If Indicated (No Culture) - Urine, Clean Catch  -     Ambulatory Referral to Cardiology  -     Comprehensive Metabolic Panel  -     Urinalysis With Microscopic If Indicated (No Culture) - Urine, Clean Catch    3. Primary hypertension    4. Health care maintenance  -     Cancel: Basic Metabolic Panel  -     Cancel: Urinalysis With Microscopic If Indicated (No Culture) - Urine, Clean Catch  -     Cancel: Comprehensive Metabolic Panel  -     Cancel: Hemoglobin A1c  -     Cancel: TSH  -     Cancel: T4, Free  -     Cancel: Lipid Panel With / Chol / HDL Ratio  -     Comprehensive Metabolic Panel  -     CBC & Differential  -     Hemoglobin A1c  -     Urinalysis With Microscopic If Indicated (No Culture) - Urine, Clean Catch  -     TSH  -     T4, Free  -     Lipid Panel With / Chol / HDL Ratio  -     PSA Screen    5. Prediabetes  -     Comprehensive Metabolic Panel  -     Hemoglobin A1c    6. Screening PSA (prostate specific antigen)  -     PSA Screen    Other orders  -     Microscopic Examination -        Patient Instructions   Take furosemide 20 mg daily. Take potassium daily. Decrease salt intake. Labs today. Referral to cardiology placed to investigate leg swelling and possible congestive heart failure. Scheduling center to call with appointment. Keep upcoming appointment.            Follow Up   Return for Next  scheduled follow up.  Patient was given instructions and counseling regarding his condition or for health maintenance advice. Please see specific information pulled into the AVS if appropriate.

## 2023-03-21 DIAGNOSIS — E03.8 OTHER SPECIFIED HYPOTHYROIDISM: ICD-10-CM

## 2023-03-21 DIAGNOSIS — I87.303 STASIS EDEMA OF BOTH LOWER EXTREMITIES: ICD-10-CM

## 2023-03-21 LAB
ALBUMIN SERPL-MCNC: 4.3 G/DL (ref 3.5–5.2)
ALBUMIN/GLOB SERPL: 1.8 G/DL
ALP SERPL-CCNC: 86 U/L (ref 39–117)
ALT SERPL-CCNC: 21 U/L (ref 1–41)
APPEARANCE UR: CLEAR
AST SERPL-CCNC: 7 U/L (ref 1–40)
BACTERIA #/AREA URNS HPF: ABNORMAL /HPF
BASOPHILS # BLD AUTO: 0.05 10*3/MM3 (ref 0–0.2)
BASOPHILS NFR BLD AUTO: 0.7 % (ref 0–1.5)
BILIRUB SERPL-MCNC: 1.3 MG/DL (ref 0–1.2)
BILIRUB UR QL STRIP: NEGATIVE
BUN SERPL-MCNC: 11 MG/DL (ref 6–20)
BUN/CREAT SERPL: 8.9 (ref 7–25)
CALCIUM SERPL-MCNC: 9.6 MG/DL (ref 8.6–10.5)
CASTS URNS MICRO: ABNORMAL
CHLORIDE SERPL-SCNC: 105 MMOL/L (ref 98–107)
CHOLEST SERPL-MCNC: 158 MG/DL (ref 0–200)
CHOLEST/HDLC SERPL: 3.95 {RATIO}
CO2 SERPL-SCNC: 30.6 MMOL/L (ref 22–29)
COLOR UR: YELLOW
CREAT SERPL-MCNC: 1.24 MG/DL (ref 0.76–1.27)
EGFRCR SERPLBLD CKD-EPI 2021: 70 ML/MIN/1.73
EOSINOPHIL # BLD AUTO: 0.15 10*3/MM3 (ref 0–0.4)
EOSINOPHIL NFR BLD AUTO: 2.2 % (ref 0.3–6.2)
EPI CELLS #/AREA URNS HPF: ABNORMAL /HPF
ERYTHROCYTE [DISTWIDTH] IN BLOOD BY AUTOMATED COUNT: 13.1 % (ref 12.3–15.4)
GLOBULIN SER CALC-MCNC: 2.4 GM/DL
GLUCOSE SERPL-MCNC: 98 MG/DL (ref 65–99)
GLUCOSE UR QL STRIP: NEGATIVE
HBA1C MFR BLD: 6.3 % (ref 4.8–5.6)
HCT VFR BLD AUTO: 47.8 % (ref 37.5–51)
HDLC SERPL-MCNC: 40 MG/DL (ref 40–60)
HGB BLD-MCNC: 16 G/DL (ref 13–17.7)
HGB UR QL STRIP: ABNORMAL
IMM GRANULOCYTES # BLD AUTO: 0.04 10*3/MM3 (ref 0–0.05)
IMM GRANULOCYTES NFR BLD AUTO: 0.6 % (ref 0–0.5)
KETONES UR QL STRIP: NEGATIVE
LDLC SERPL CALC-MCNC: 99 MG/DL (ref 0–100)
LEUKOCYTE ESTERASE UR QL STRIP: NEGATIVE
LYMPHOCYTES # BLD AUTO: 1.9 10*3/MM3 (ref 0.7–3.1)
LYMPHOCYTES NFR BLD AUTO: 27.5 % (ref 19.6–45.3)
MCH RBC QN AUTO: 30.7 PG (ref 26.6–33)
MCHC RBC AUTO-ENTMCNC: 33.5 G/DL (ref 31.5–35.7)
MCV RBC AUTO: 91.7 FL (ref 79–97)
MONOCYTES # BLD AUTO: 0.69 10*3/MM3 (ref 0.1–0.9)
MONOCYTES NFR BLD AUTO: 10 % (ref 5–12)
NEUTROPHILS # BLD AUTO: 4.07 10*3/MM3 (ref 1.7–7)
NEUTROPHILS NFR BLD AUTO: 59 % (ref 42.7–76)
NITRITE UR QL STRIP: NEGATIVE
NRBC BLD AUTO-RTO: 0 /100 WBC (ref 0–0.2)
PH UR STRIP: 7 [PH] (ref 5–8)
PLATELET # BLD AUTO: 243 10*3/MM3 (ref 140–450)
POTASSIUM SERPL-SCNC: 4.7 MMOL/L (ref 3.5–5.2)
PROT SERPL-MCNC: 6.7 G/DL (ref 6–8.5)
PROT UR QL STRIP: NEGATIVE
PSA SERPL-MCNC: 1.61 NG/ML (ref 0–4)
RBC # BLD AUTO: 5.21 10*6/MM3 (ref 4.14–5.8)
RBC #/AREA URNS HPF: ABNORMAL /HPF
SODIUM SERPL-SCNC: 143 MMOL/L (ref 136–145)
SP GR UR STRIP: 1.02 (ref 1–1.03)
T4 FREE SERPL-MCNC: 1.24 NG/DL (ref 0.93–1.7)
TRIGL SERPL-MCNC: 106 MG/DL (ref 0–150)
TSH SERPL DL<=0.005 MIU/L-ACNC: 3.65 UIU/ML (ref 0.27–4.2)
UROBILINOGEN UR STRIP-MCNC: ABNORMAL MG/DL
VLDLC SERPL CALC-MCNC: 19 MG/DL (ref 5–40)
WBC # BLD AUTO: 6.9 10*3/MM3 (ref 3.4–10.8)
WBC #/AREA URNS HPF: ABNORMAL /HPF

## 2023-03-21 RX ORDER — FUROSEMIDE 20 MG/1
20 TABLET ORAL DAILY
Qty: 90 TABLET | Refills: 1 | Status: SHIPPED | OUTPATIENT
Start: 2023-03-21 | End: 2023-04-07

## 2023-03-21 RX ORDER — LEVOTHYROXINE SODIUM 112 UG/1
112 TABLET ORAL DAILY
Qty: 90 TABLET | Refills: 1 | Status: SHIPPED | OUTPATIENT
Start: 2023-03-21

## 2023-03-21 RX ORDER — LEVOTHYROXINE SODIUM 112 UG/1
112 TABLET ORAL DAILY
Qty: 90 TABLET | Refills: 0 | Status: SHIPPED | OUTPATIENT
Start: 2023-03-21 | End: 2023-03-21 | Stop reason: SDUPTHER

## 2023-03-21 NOTE — PROGRESS NOTES
Metabolic panel is unremarkable.  Slightly elevated bilirubin present.  Blood sugar within normal limits in a nonfasting lab.  We will continue to monitor in the future.    CBC is unremarkable.  No signs of infection or anemia.    Hemoglobin A1c remains stable at 6.3.  Recommend decrease in sugar and carbohydrate intake.  Continue to increase physical activity as tolerated.  We will continue to monitor and potentially recommend medication treatment if hemoglobin A1c increases.    Urinalysis unremarkable.    Thyroid levels are within normal limits.  Not currently taking prescribed levothyroxine 112 mcg.  We will discuss thyroid medication levels at next appointment in April.    Cholesterol levels are within normal limits.  We will continue to monitor in the future.    PSA is within normal limits.  No sign of prostate cancer at this time.

## 2023-04-06 ENCOUNTER — OFFICE VISIT (OUTPATIENT)
Dept: INTERNAL MEDICINE | Facility: CLINIC | Age: 53
End: 2023-04-06
Payer: COMMERCIAL

## 2023-04-06 VITALS
WEIGHT: 315 LBS | HEART RATE: 80 BPM | SYSTOLIC BLOOD PRESSURE: 134 MMHG | HEIGHT: 70 IN | DIASTOLIC BLOOD PRESSURE: 80 MMHG | OXYGEN SATURATION: 95 % | BODY MASS INDEX: 45.1 KG/M2

## 2023-04-06 DIAGNOSIS — E03.8 OTHER SPECIFIED HYPOTHYROIDISM: Chronic | ICD-10-CM

## 2023-04-06 DIAGNOSIS — I10 BENIGN ESSENTIAL HYPERTENSION: Chronic | ICD-10-CM

## 2023-04-06 DIAGNOSIS — M10.9 ACUTE GOUT OF RIGHT ANKLE, UNSPECIFIED CAUSE: Primary | ICD-10-CM

## 2023-04-06 DIAGNOSIS — E78.2 MIXED HYPERLIPIDEMIA: Chronic | ICD-10-CM

## 2023-04-06 LAB
BUN SERPL-MCNC: 13 MG/DL (ref 6–20)
BUN/CREAT SERPL: 8.6 (ref 7–25)
CALCIUM SERPL-MCNC: 9.4 MG/DL (ref 8.6–10.5)
CHLORIDE SERPL-SCNC: 106 MMOL/L (ref 98–107)
CO2 SERPL-SCNC: 30.1 MMOL/L (ref 22–29)
CREAT SERPL-MCNC: 1.51 MG/DL (ref 0.76–1.27)
EGFRCR SERPLBLD CKD-EPI 2021: 55.2 ML/MIN/1.73
GLUCOSE SERPL-MCNC: 96 MG/DL (ref 65–99)
POTASSIUM SERPL-SCNC: 4.3 MMOL/L (ref 3.5–5.2)
SODIUM SERPL-SCNC: 143 MMOL/L (ref 136–145)
URATE SERPL-MCNC: 7 MG/DL (ref 3.4–7)

## 2023-04-06 PROCEDURE — 99214 OFFICE O/P EST MOD 30 MIN: CPT

## 2023-04-06 RX ORDER — METHYLPREDNISOLONE 4 MG/1
TABLET ORAL
Qty: 21 TABLET | Refills: 0 | Status: SHIPPED | OUTPATIENT
Start: 2023-04-06 | End: 2023-04-21

## 2023-04-06 NOTE — PATIENT INSTRUCTIONS
Continue medications as prescribed. Start medrol dosepak and take as directed. Labs today. Stay hydrated with water.  Follow-up in 4 months for recheck.

## 2023-04-07 DIAGNOSIS — I87.303 STASIS EDEMA OF BOTH LOWER EXTREMITIES: ICD-10-CM

## 2023-04-07 RX ORDER — FUROSEMIDE 20 MG/1
20 TABLET ORAL EVERY OTHER DAY
Qty: 90 TABLET | Refills: 1 | Status: SHIPPED | OUTPATIENT
Start: 2023-04-07

## 2023-04-07 NOTE — PROGRESS NOTES
Uric acid levels are within normal limits. Unlikely that current symptoms are related to gout.    Kidneys are impaired. Recommend decreasing furosemide use to every other day. Stay hydrated with water.

## 2023-04-19 DIAGNOSIS — I10 BENIGN ESSENTIAL HYPERTENSION: ICD-10-CM

## 2023-04-19 RX ORDER — METOPROLOL SUCCINATE 50 MG/1
75 TABLET, EXTENDED RELEASE ORAL DAILY
Qty: 145 TABLET | Refills: 0 | Status: SHIPPED | OUTPATIENT
Start: 2023-04-19

## 2023-04-21 ENCOUNTER — OFFICE VISIT (OUTPATIENT)
Dept: CARDIOLOGY | Facility: CLINIC | Age: 53
End: 2023-04-21
Payer: COMMERCIAL

## 2023-04-21 VITALS
SYSTOLIC BLOOD PRESSURE: 128 MMHG | WEIGHT: 315 LBS | HEART RATE: 84 BPM | DIASTOLIC BLOOD PRESSURE: 90 MMHG | BODY MASS INDEX: 45.1 KG/M2 | HEIGHT: 70 IN | OXYGEN SATURATION: 97 %

## 2023-04-21 DIAGNOSIS — R06.09 DOE (DYSPNEA ON EXERTION): Primary | ICD-10-CM

## 2023-04-21 DIAGNOSIS — R60.0 BILATERAL LEG EDEMA: ICD-10-CM

## 2023-04-21 RX ORDER — GABAPENTIN 300 MG/1
CAPSULE ORAL
COMMUNITY

## 2023-04-21 NOTE — PROGRESS NOTES
Subjective:     Encounter Date:04/21/2023      Patient ID: Grant Edge Jr. is a 52 y.o. male.    Chief Complaint:  Lower extremity edema    HPI:   52 y.o. male prior DVT on Xarelto, hypothyroidism who presents for initial evaluation of lower extremity edema.  Patient notes that he has had increasing extremity edema over the past month or so.  He also reports mild shortness of breath with dyspnea exertion particular with walking up flights of stairs.  Otherwise, he denies any chest pain, palpitations, orthopnea, PND.  He was seen by his primary care who placed him on Lasix and he notes significant improvement in his lower extremity edema though some still present.      With respect to his past history, he previously saw Dr. Rosas and had an abnormal nuclear stress test with subsequent normal left heart catheterization in 2016.  He also had a normal echocardiogram at that time.    FH:  Father- MI  Uncle- CABG    The following portions of the patient's history were reviewed and updated as appropriate: allergies, current medications, past family history, past medical history, past social history, past surgical history and problem list.     REVIEW OF SYSTEMS:   All systems reviewed.  Pertinent positives identified in HPI.  All other systems are negative.    Past Medical History:   Diagnosis Date   • Abnormal ECG    • Allergic    • Arthritis    • Chest pain    • CTS (carpal tunnel syndrome) A few months.   • DVT (deep venous thrombosis)    • GERD (gastroesophageal reflux disease)    • Hip arthrosis    • Hyperlipidemia    • Hypertension    • Hypokalemia    • Knee swelling    • Myocardial infarction    • Obesity    • Periarthritis of shoulder    • Simple goiter    • Sinus bradycardia    • Vitamin D deficiency        Family History   Problem Relation Age of Onset   • Diabetes Father    • Heart disease Father    • Arthritis Father    • Hyperlipidemia Father    • Hypertension Father        Social History      Socioeconomic History   • Marital status:    Tobacco Use   • Smoking status: Never   • Smokeless tobacco: Never   Vaping Use   • Vaping Use: Never used   Substance and Sexual Activity   • Alcohol use: No   • Drug use: No   • Sexual activity: Yes     Partners: Female     Birth control/protection: None       No Known Allergies    Past Surgical History:   Procedure Laterality Date   • CARDIAC CATHETERIZATION N/A 10/18/2016    Procedure: Left Heart Cath;  Surgeon: Daniel Rosas MD;  Location:  [facility];  Service:    • CHOLECYSTECTOMY     • EPIDURAL Right 11/4/2022    Procedure: Right L5 LUMBAR/SACRAL TRANSFORAMINAL EPIDURAL. Hold xarelto x 3 days prior to procedure;  Surgeon: La Sun MD;  Location: [facility] [place] MAIN OR;  Service: Pain Management;  Laterality: Right;   • JOINT REPLACEMENT     • SALIVARY GLAND SURGERY     • THYROIDECTOMY  2013   • TONSILLECTOMY     • TOTAL HIP ARTHROPLASTY REVISION Left 2015         ECG 12 Lead    Date/Time: 4/21/2023 2:50 PM  Performed by: Giovani Urbano MD  Authorized by: Giovani Urbano MD   Comparison: compared with previous ECG from 1/6/2020  Similar to previous ECG  Rhythm: sinus rhythm  Rate: normal  QRS axis: normal  Other findings: non-specific ST-T wave changes    Clinical impression: non-specific ECG               Objective:         Vitals:    04/21/23 1416   BP: 128/90   Pulse: 84   SpO2: 97%       PHYSICAL EXAM:  GEN: obese, well appearing, in NAD   HEENT: NCAT, EOMI, moist mucus membranes   Respiratory: CTAB, no wheezes, rales or rhonchi  CV: normal rate, regular rhythm, normal S1, S2, no murmurs, rubs, gallops, +2 radial pulses b/l  GI: soft, nontender, nondistended  MSK: 1+ edema bilaterally  Skin: no rash, warm, dry  Heme/Lymph: no bruising or bleeding  Psych: organized thought, normal behavior and affect  Neuro: Alert and Oriented x 3, grossly normal motor function        Assessment:         (R06.09) BERG (dyspnea on exertion) - Plan:  Adult Transthoracic Echo Complete w/ Color, Spectral and Contrast if Necessary Per Protocol    (R60.0) Bilateral leg edema - Plan: Adult Transthoracic Echo Complete w/ Color, Spectral and Contrast if Necessary Per Protocol    52 y.o. male prior DVT on Xarelto, hypothyroidism who presents for initial evaluation of lower extremity edema.         Plan:       #BERG/LE edema  1+ edema bilaterally.  Patient with prior echocardiogram, and left heart catheterization normal in 2016.  He does also have mild dyspnea on exertion.  Symptoms do not appear to be cardiac in origin however will obtain echocardiogram for further assessment.    Jim Joseph, thank you very much for referring this kind patient to me. Please call me with any questions or concerns. I will see the patient again in the office pending his echocardiogram.         Giovani Urbano MD  04/21/23  [facility]    Outpatient Encounter Medications as of 4/21/2023   Medication Sig Dispense Refill   • albuterol sulfate  (90 Base) MCG/ACT inhaler Inhale 2 puffs Every 4 (Four) Hours As Needed for Wheezing. 8 g 2   • azelastine (ASTELIN) 0.1 % nasal spray 2 sprays into the nostril(s) as directed by provider 2 (Two) Times a Day. Use in each nostril as directed 30 mL 2   • benazepril (LOTENSIN) 40 MG tablet Take 1 tablet by mouth Daily. 90 tablet 1   • esomeprazole (nexIUM) 40 MG capsule TAKE 1 CAPSULE BY MOUTH DAILY AS NEEDED FOR ACID REFLUX OR SYMPTOMS 90 capsule 0   • fluticasone (FLONASE) 50 MCG/ACT nasal spray 2 sprays into the nostril(s) as directed by provider Daily. 15.8 mL 1   • Fluticasone-Salmeterol (ADVAIR/WIXELA) 100-50 MCG/ACT DISKUS Inhale 1 puff 2 (Two) Times a Day. 14 each 2   • furosemide (Lasix) 20 MG tablet Take 1 tablet by mouth Every Other Day. 90 tablet 1   • gabapentin (NEURONTIN) 300 MG capsule      • levothyroxine (SYNTHROID, LEVOTHROID) 112 MCG tablet Take 1 tablet by mouth Daily. 90 tablet 1   • Loratadine (CLARITIN PO) Take  by  mouth.     • metoprolol succinate XL (TOPROL-XL) 50 MG 24 hr tablet Take 1.5 tablets by mouth Daily. 145 tablet 0   • montelukast (SINGULAIR) 10 MG tablet Take 1 tablet by mouth Every Night. 90 tablet 1   • potassium chloride 10 MEQ CR tablet Take 1 tablet by mouth Daily. 30 tablet 1   • Pseudoephedrine-guaiFENesin (MUCINEX D PO) Take  by mouth.     • Xarelto 20 MG tablet TAKE 1 TABLET BY MOUTH DAILY 90 tablet 1   • [DISCONTINUED] methylPREDNISolone (MEDROL) 4 MG dose pack Take as directed on package instructions. 21 tablet 0     No facility-administered encounter medications on file as of 4/21/2023.

## 2023-04-25 NOTE — PROGRESS NOTES
"Chief Complaint  Hyperlipidemia, Hypertension, and Hypothyroidism    Subjective        Grant Edge Jr. presents to [facility] PRIMARY [facility]ARE  History of Present Illness  52-year-old male presenting for recheck of gout, hyperlipidemia, hypertension and hypothyroidism.  Patient taking medications as prescribed and tolerating well.  No new concerns related to hyperlipidemia, hypertension or hypothyroidism.  Has not made significant changes in diet or exercise.  Is currently having pain in his right ankle and concerned about possible gout.      Objective   Vital Signs:  /80   Pulse 80   Ht 177.8 cm (70\")   Wt (!) 158 kg (348 lb)   SpO2 95%   BMI 49.93 kg/m²   Estimated body mass index is 49.93 kg/m² as calculated from the following:    Height as of this encounter: 177.8 cm (70\").    Weight as of this encounter: 158 kg (348 lb).             Physical Exam  Vitals reviewed.   Constitutional:       Appearance: Normal appearance. He is obese.   Musculoskeletal:         General: Swelling and tenderness present. Normal range of motion.      Cervical back: Normal range of motion.   Skin:     General: Skin is warm and dry.      Capillary Refill: Capillary refill takes less than 2 seconds.   Neurological:      General: No focal deficit present.      Mental Status: He is alert and oriented to person, place, and time.   Psychiatric:         Mood and Affect: Mood normal.         Behavior: Behavior normal.         Thought Content: Thought content normal.         Judgment: Judgment normal.        Result Review :    Common labs        12/15/2022    15:08 3/20/2023    16:25 4/6/2023    16:17   Common Labs   Glucose  98   96     BUN  11   13     Creatinine  1.24   1.51     Sodium  143   143     Potassium  4.7   4.3     Chloride  105   106     Calcium  9.6   9.4     Total Protein  6.7      Albumin  4.3      Total Bilirubin  1.3      Alkaline Phosphatase  86      AST (SGOT)  7      ALT (SGPT)  21      WBC 8.25 "   6.90      Hemoglobin 15.9   16.0      Hematocrit 47.8   47.8      Platelets 228   243      Total Cholesterol  158      Triglycerides  106      HDL Cholesterol  40      LDL Cholesterol   99      Hemoglobin A1C  6.30      PSA  1.610      Uric Acid   7.0         Current Outpatient Medications on File Prior to Visit   Medication Sig Dispense Refill   • albuterol sulfate  (90 Base) MCG/ACT inhaler Inhale 2 puffs Every 4 (Four) Hours As Needed for Wheezing. 8 g 2   • azelastine (ASTELIN) 0.1 % nasal spray 2 sprays into the nostril(s) as directed by provider 2 (Two) Times a Day. Use in each nostril as directed 30 mL 2   • benazepril (LOTENSIN) 40 MG tablet Take 1 tablet by mouth Daily. 90 tablet 1   • esomeprazole (nexIUM) 40 MG capsule TAKE 1 CAPSULE BY MOUTH DAILY AS NEEDED FOR ACID REFLUX OR SYMPTOMS 90 capsule 0   • fluticasone (FLONASE) 50 MCG/ACT nasal spray 2 sprays into the nostril(s) as directed by provider Daily. 15.8 mL 1   • Fluticasone-Salmeterol (ADVAIR/WIXELA) 100-50 MCG/ACT DISKUS Inhale 1 puff 2 (Two) Times a Day. 14 each 2   • levothyroxine (SYNTHROID, LEVOTHROID) 112 MCG tablet Take 1 tablet by mouth Daily. 90 tablet 1   • Loratadine (CLARITIN PO) Take  by mouth.     • montelukast (SINGULAIR) 10 MG tablet Take 1 tablet by mouth Every Night. 90 tablet 1   • potassium chloride 10 MEQ CR tablet Take 1 tablet by mouth Daily. 30 tablet 1   • Pseudoephedrine-guaiFENesin (MUCINEX D PO) Take  by mouth.     • Xarelto 20 MG tablet TAKE 1 TABLET BY MOUTH DAILY 90 tablet 1     No current facility-administered medications on file prior to visit.              Assessment and Plan   Diagnoses and all orders for this visit:    1. Acute gout of right ankle, unspecified cause (Primary)  -     Uric acid  -     Discontinue: methylPREDNISolone (MEDROL) 4 MG dose pack; Take as directed on package instructions.  Dispense: 21 tablet; Refill: 0  -     Basic Metabolic Panel    2. Mixed hyperlipidemia    3. Benign  essential hypertension    4. Other specified hypothyroidism      Patient Instructions   Continue medications as prescribed. Start medrol dosepak and take as directed. Labs today. Stay hydrated with water.  Follow-up in 4 months for recheck.              Follow Up   Return in about 4 months (around 8/6/2023) for Recheck.  Patient was given instructions and counseling regarding his condition or for health maintenance advice. Please see specific information pulled into the AVS if appropriate.

## 2023-04-29 ENCOUNTER — HOSPITAL ENCOUNTER (OUTPATIENT)
Facility: HOSPITAL | Age: 53
Setting detail: OBSERVATION
Discharge: HOME | End: 2023-05-04
Attending: EMERGENCY MEDICINE | Admitting: INTERNAL MEDICINE
Payer: COMMERCIAL

## 2023-04-29 ENCOUNTER — APPOINTMENT (OUTPATIENT)
Dept: GENERAL RADIOLOGY | Facility: HOSPITAL | Age: 53
End: 2023-04-29
Payer: COMMERCIAL

## 2023-04-29 DIAGNOSIS — R70.0 ELEVATED SED RATE: ICD-10-CM

## 2023-04-29 DIAGNOSIS — R79.82 ELEVATED C-REACTIVE PROTEIN (CRP): ICD-10-CM

## 2023-04-29 DIAGNOSIS — M25.561 ACUTE PAIN OF RIGHT KNEE: Primary | ICD-10-CM

## 2023-04-29 LAB
ALBUMIN SERPL-MCNC: 3.9 G/DL (ref 3.5–5.2)
ALBUMIN/GLOB SERPL: 1.3 G/DL
ALP SERPL-CCNC: 79 U/L (ref 39–117)
ALT SERPL W P-5'-P-CCNC: 27 U/L (ref 1–41)
ANION GAP SERPL CALCULATED.3IONS-SCNC: 9.9 MMOL/L (ref 5–15)
AST SERPL-CCNC: 22 U/L (ref 1–40)
BASOPHILS # BLD AUTO: 0.04 10*3/MM3 (ref 0–0.2)
BASOPHILS NFR BLD AUTO: 0.5 % (ref 0–1.5)
BILIRUB SERPL-MCNC: 1.7 MG/DL (ref 0–1.2)
BUN SERPL-MCNC: 11 MG/DL (ref 6–20)
BUN/CREAT SERPL: 8.9 (ref 7–25)
CALCIUM SPEC-SCNC: 9.4 MG/DL (ref 8.6–10.5)
CHLORIDE SERPL-SCNC: 105 MMOL/L (ref 98–107)
CO2 SERPL-SCNC: 27.1 MMOL/L (ref 22–29)
CREAT SERPL-MCNC: 1.24 MG/DL (ref 0.76–1.27)
CRP SERPL-MCNC: 7.19 MG/DL (ref 0–0.5)
D-LACTATE SERPL-SCNC: 1.3 MMOL/L (ref 0.5–2)
DEPRECATED RDW RBC AUTO: 45.4 FL (ref 37–54)
EGFRCR SERPLBLD CKD-EPI 2021: 70 ML/MIN/1.73
EOSINOPHIL # BLD AUTO: 0.1 10*3/MM3 (ref 0–0.4)
EOSINOPHIL NFR BLD AUTO: 1.2 % (ref 0.3–6.2)
ERYTHROCYTE [DISTWIDTH] IN BLOOD BY AUTOMATED COUNT: 13.3 % (ref 12.3–15.4)
ERYTHROCYTE [SEDIMENTATION RATE] IN BLOOD: 24 MM/HR (ref 0–20)
GLOBULIN UR ELPH-MCNC: 3 GM/DL
GLUCOSE SERPL-MCNC: 104 MG/DL (ref 65–99)
HCT VFR BLD AUTO: 46.6 % (ref 37.5–51)
HGB BLD-MCNC: 15.3 G/DL (ref 13–17.7)
IMM GRANULOCYTES # BLD AUTO: 0.07 10*3/MM3 (ref 0–0.05)
IMM GRANULOCYTES NFR BLD AUTO: 0.9 % (ref 0–0.5)
LYMPHOCYTES # BLD AUTO: 1.12 10*3/MM3 (ref 0.7–3.1)
LYMPHOCYTES NFR BLD AUTO: 13.7 % (ref 19.6–45.3)
MCH RBC QN AUTO: 30.5 PG (ref 26.6–33)
MCHC RBC AUTO-ENTMCNC: 32.8 G/DL (ref 31.5–35.7)
MCV RBC AUTO: 92.8 FL (ref 79–97)
MONOCYTES # BLD AUTO: 1.05 10*3/MM3 (ref 0.1–0.9)
MONOCYTES NFR BLD AUTO: 12.9 % (ref 5–12)
NEUTROPHILS NFR BLD AUTO: 5.79 10*3/MM3 (ref 1.7–7)
NEUTROPHILS NFR BLD AUTO: 70.8 % (ref 42.7–76)
NRBC BLD AUTO-RTO: 0 /100 WBC (ref 0–0.2)
PLATELET # BLD AUTO: 205 10*3/MM3 (ref 140–450)
PMV BLD AUTO: 9.9 FL (ref 6–12)
POTASSIUM SERPL-SCNC: 4 MMOL/L (ref 3.5–5.2)
PROT SERPL-MCNC: 6.9 G/DL (ref 6–8.5)
RBC # BLD AUTO: 5.02 10*6/MM3 (ref 4.14–5.8)
SODIUM SERPL-SCNC: 142 MMOL/L (ref 136–145)
WBC NRBC COR # BLD: 8.17 10*3/MM3 (ref 3.4–10.8)

## 2023-04-29 PROCEDURE — G0378 HOSPITAL OBSERVATION PER HR: HCPCS

## 2023-04-29 PROCEDURE — 83605 ASSAY OF LACTIC ACID: CPT | Performed by: PHYSICIAN ASSISTANT

## 2023-04-29 PROCEDURE — 85025 COMPLETE CBC W/AUTO DIFF WBC: CPT | Performed by: PHYSICIAN ASSISTANT

## 2023-04-29 PROCEDURE — 86140 C-REACTIVE PROTEIN: CPT | Performed by: PHYSICIAN ASSISTANT

## 2023-04-29 PROCEDURE — 94640 AIRWAY INHALATION TREATMENT: CPT

## 2023-04-29 PROCEDURE — 85652 RBC SED RATE AUTOMATED: CPT | Performed by: PHYSICIAN ASSISTANT

## 2023-04-29 PROCEDURE — 87040 BLOOD CULTURE FOR BACTERIA: CPT | Performed by: PHYSICIAN ASSISTANT

## 2023-04-29 PROCEDURE — 80053 COMPREHEN METABOLIC PANEL: CPT | Performed by: PHYSICIAN ASSISTANT

## 2023-04-29 PROCEDURE — 99284 EMERGENCY DEPT VISIT MOD MDM: CPT

## 2023-04-29 PROCEDURE — 73560 X-RAY EXAM OF KNEE 1 OR 2: CPT

## 2023-04-29 RX ORDER — GABAPENTIN 300 MG/1
300 CAPSULE ORAL DAILY
Status: DISCONTINUED | OUTPATIENT
Start: 2023-04-30 | End: 2023-05-04 | Stop reason: HOSPADM

## 2023-04-29 RX ORDER — ACETAMINOPHEN 650 MG/1
650 SUPPOSITORY RECTAL EVERY 4 HOURS PRN
Status: DISCONTINUED | OUTPATIENT
Start: 2023-04-29 | End: 2023-05-04 | Stop reason: HOSPADM

## 2023-04-29 RX ORDER — LEVOTHYROXINE SODIUM 112 UG/1
112 TABLET ORAL
Status: DISCONTINUED | OUTPATIENT
Start: 2023-04-30 | End: 2023-05-04 | Stop reason: HOSPADM

## 2023-04-29 RX ORDER — POTASSIUM CHLORIDE 750 MG/1
10 TABLET, FILM COATED, EXTENDED RELEASE ORAL DAILY
Status: DISCONTINUED | OUTPATIENT
Start: 2023-04-30 | End: 2023-04-30

## 2023-04-29 RX ORDER — FUROSEMIDE 20 MG/1
20 TABLET ORAL EVERY OTHER DAY
Status: DISCONTINUED | OUTPATIENT
Start: 2023-04-29 | End: 2023-04-30

## 2023-04-29 RX ORDER — MONTELUKAST SODIUM 10 MG/1
10 TABLET ORAL NIGHTLY
Status: DISCONTINUED | OUTPATIENT
Start: 2023-04-29 | End: 2023-05-04 | Stop reason: HOSPADM

## 2023-04-29 RX ORDER — ONDANSETRON 2 MG/ML
4 INJECTION INTRAMUSCULAR; INTRAVENOUS EVERY 6 HOURS PRN
Status: DISCONTINUED | OUTPATIENT
Start: 2023-04-29 | End: 2023-05-04 | Stop reason: HOSPADM

## 2023-04-29 RX ORDER — CETIRIZINE HYDROCHLORIDE 10 MG/1
10 TABLET ORAL DAILY
Status: DISCONTINUED | OUTPATIENT
Start: 2023-04-30 | End: 2023-05-04 | Stop reason: HOSPADM

## 2023-04-29 RX ORDER — ACETAMINOPHEN 160 MG/5ML
650 SOLUTION ORAL EVERY 4 HOURS PRN
Status: DISCONTINUED | OUTPATIENT
Start: 2023-04-29 | End: 2023-05-04 | Stop reason: HOSPADM

## 2023-04-29 RX ORDER — ACETAMINOPHEN 325 MG/1
650 TABLET ORAL EVERY 4 HOURS PRN
Status: DISCONTINUED | OUTPATIENT
Start: 2023-04-29 | End: 2023-05-04 | Stop reason: HOSPADM

## 2023-04-29 RX ORDER — LISINOPRIL 40 MG/1
40 TABLET ORAL
Status: DISCONTINUED | OUTPATIENT
Start: 2023-04-30 | End: 2023-05-04 | Stop reason: HOSPADM

## 2023-04-29 RX ORDER — ALBUTEROL SULFATE 2.5 MG/3ML
2.5 SOLUTION RESPIRATORY (INHALATION) EVERY 6 HOURS PRN
Status: DISCONTINUED | OUTPATIENT
Start: 2023-04-29 | End: 2023-05-04 | Stop reason: HOSPADM

## 2023-04-29 RX ORDER — PANTOPRAZOLE SODIUM 40 MG/1
40 TABLET, DELAYED RELEASE ORAL
Status: DISCONTINUED | OUTPATIENT
Start: 2023-04-30 | End: 2023-05-04 | Stop reason: HOSPADM

## 2023-04-29 RX ORDER — BUDESONIDE AND FORMOTEROL FUMARATE DIHYDRATE 160; 4.5 UG/1; UG/1
2 AEROSOL RESPIRATORY (INHALATION)
Status: DISCONTINUED | OUTPATIENT
Start: 2023-04-29 | End: 2023-05-04 | Stop reason: HOSPADM

## 2023-04-29 RX ORDER — HYDROCODONE BITARTRATE AND ACETAMINOPHEN 5; 325 MG/1; MG/1
1 TABLET ORAL EVERY 6 HOURS PRN
Status: DISCONTINUED | OUTPATIENT
Start: 2023-04-29 | End: 2023-05-04 | Stop reason: HOSPADM

## 2023-04-29 RX ADMIN — HYDROCODONE BITARTRATE AND ACETAMINOPHEN 1 TABLET: 5; 325 TABLET ORAL at 23:27

## 2023-04-29 RX ADMIN — FUROSEMIDE 20 MG: 20 TABLET ORAL at 23:28

## 2023-04-29 RX ADMIN — BUDESONIDE AND FORMOTEROL FUMARATE DIHYDRATE 2 PUFF: 160; 4.5 AEROSOL RESPIRATORY (INHALATION) at 23:29

## 2023-04-29 RX ADMIN — MONTELUKAST SODIUM 10 MG: 10 TABLET, FILM COATED ORAL at 23:27

## 2023-04-29 NOTE — PROGRESS NOTES
Clinical Pharmacy Services: Medication History    Grant Edge Jr. is a 52 y.o. male presenting to [facility] for   Chief Complaint   Patient presents with   • Knee Pain   • Cough       He  has a past medical history of Abnormal ECG, Allergic, Arthritis, Chest pain, CTS (carpal tunnel syndrome) (A few months.), DVT (deep venous thrombosis), GERD (gastroesophageal reflux disease), Hip arthrosis, Hyperlipidemia, Hypertension, Hypokalemia, Knee swelling, Myocardial infarction, Obesity, Periarthritis of shoulder, Simple goiter, Sinus bradycardia, and Vitamin D deficiency.    Allergies as of 04/29/2023   • (No Known Allergies)       Medication information was obtained from: Patient  Pharmacy and Phone Number:     Prior to Admission Medications     Prescriptions Last Dose Informant Patient Reported? Taking?    albuterol sulfate  (90 Base) MCG/ACT inhaler 4/29/2023 Self No Yes    Inhale 2 puffs Every 4 (Four) Hours As Needed for Wheezing.    azelastine (ASTELIN) 0.1 % nasal spray Past Week Self No Yes    2 sprays into the nostril(s) as directed by provider 2 (Two) Times a Day. Use in each nostril as directed    Patient taking differently:  2 sprays into the nostril(s) as directed by provider As Needed. Use in each nostril as directed    benazepril (LOTENSIN) 40 MG tablet 4/29/2023 Self No Yes    Take 1 tablet by mouth Daily.    esomeprazole (nexIUM) 40 MG capsule 4/29/2023 Self No Yes    TAKE 1 CAPSULE BY MOUTH DAILY AS NEEDED FOR ACID REFLUX OR SYMPTOMS    fluticasone (FLONASE) 50 MCG/ACT nasal spray Past Week Self No Yes    2 sprays into the nostril(s) as directed by provider Daily.    Patient taking differently:  2 sprays into the nostril(s) as directed by provider As Needed.    Fluticasone-Salmeterol (ADVAIR/WIXELA) 100-50 MCG/ACT DISKUS 4/29/2023 Self No Yes    Inhale 1 puff 2 (Two) Times a Day.    furosemide (Lasix) 20 MG tablet 4/27/2023 Self No Yes    Take 1 tablet by mouth Every Other Day.     gabapentin (NEURONTIN) 300 MG capsule 4/28/2023 Self Yes Yes    Take 1 capsule by mouth Daily.    levothyroxine (SYNTHROID, LEVOTHROID) 112 MCG tablet 4/29/2023 Self No Yes    Take 1 tablet by mouth Daily.    Loratadine (CLARITIN PO) Past Week Self Yes Yes    Take 10 mg by mouth Daily As Needed.    metoprolol succinate XL (TOPROL-XL) 50 MG 24 hr tablet 4/29/2023 Self No Yes    Take 1.5 tablets by mouth Daily.    montelukast (SINGULAIR) 10 MG tablet Past Week Self No Yes    Take 1 tablet by mouth Every Night.    potassium chloride 10 MEQ CR tablet 4/29/2023 Self No Yes    Take 1 tablet by mouth Daily.    Pseudoephedrine-guaiFENesin (MUCINEX D PO) 4/28/2023 Self Yes Yes    Take 1 tablet by mouth Daily.    Xarelto 20 MG tablet 4/28/2023 Self No Yes    TAKE 1 TABLET BY MOUTH DAILY    Patient taking differently:  1 tablet Daily.            Medication notes:   This medication list is complete to the best of my knowledge as of 4/29/2023    Please call if questions.    Fay Maloney  Medication History Technician  [phone]    4/29/2023 18:25 EDT

## 2023-04-29 NOTE — ED PROVIDER NOTES
EMERGENCY DEPARTMENT ENCOUNTER    Room Number:  06/06  Date seen:  4/29/2023  PCP: Jim Joseph APRN    Spoken Language:  English  Language interpretation services not needed     HPI:  Chief Complaint: right knee pain    A complete HPI/ROS/PMH/PSH/SH/FH are unobtainable due to: n/a    Context: Grant Edge Jr. is a 52 y.o. male presenting to the emergency department complaining of right knee pain.  The history is being obtained by the patient, and by review of the medical chart.  The patient states that he began having right knee pain yesterday.  He denies any specific injury or accident which onset his pain.  The patient is anticoagulated on Xarelto due to a history of DVTs.  He states that his most recent dose of Xarelto was last night.  Patient is unsure if he has had a fever, but at triage his temperature was 100.3F.  The patient denies headache, cough, sore throat, chest pain, shortness of breath, abdominal pain, nausea, vomiting or diarrhea.    PAST MEDICAL HISTORY  Active Ambulatory Problems     Diagnosis Date Noted   • Abdominal abscess 03/07/2016   • Benign essential hypertension 03/07/2016   • Benign prostatic hyperplasia with urinary obstruction 03/07/2016   • Atypical chest pain 03/07/2016   • Degeneration of intervertebral disc of lumbar region 03/07/2016   • Gastroesophageal reflux disease with esophagitis 03/07/2016   • Primary hypertriglyceridemia 03/07/2016   • Gastroesophageal reflux disease 03/07/2016   • Hematochezia 03/07/2016   • Hematuria 03/07/2016   • Hyperlipidemia 03/07/2016   • Hypertension 03/07/2016   • Hypokalemia 03/07/2016   • Hypothyroidism 03/07/2016   • Insomnia 03/07/2016   • Arthralgia of hip 03/07/2016   • Chronic low back pain 03/07/2016   • Lumbar radiculopathy 03/07/2016   • Morbid obesity (HCC) 03/07/2016   • Nonvenomous insect bite with infection 03/07/2016   • Osteoarthritis of hip 03/07/2016   • Pleurodynia 03/07/2016   • Sinus tachycardia 03/07/2016   • Vitamin D  deficiency 03/07/2016   • Right knee pain 03/07/2016   • Pain of lower extremity 03/07/2016   • Bilateral headaches 03/07/2016   • Health care maintenance 08/22/2016   • Old anterior myocardial infarction 08/22/2016   • EKG, abnormal 08/22/2016   • History of hepatic disease 04/21/2015   • Essential hypertension 10/12/2016   • Abnormal stress test 10/12/2016   • Moderate single current episode of major depressive disorder 07/05/2017   • Prediabetes 12/05/2019   • Recurrent acute deep vein thrombosis (DVT) of left lower extremity (HCC) 06/03/2020   • Hyperuricemia 07/10/2020   • H/O gastroesophageal reflux (GERD) 04/21/2015   • Seasonal allergies 07/12/2012   • Class 3 severe obesity due to excess calories without serious comorbidity with body mass index (BMI) of 40.0 to 44.9 in adult 03/14/2022   • Right hip pain 09/07/2022   • History of asthma 12/12/2022   • Stasis edema of both lower extremities 02/27/2023     Resolved Ambulatory Problems     Diagnosis Date Noted   • Acute bronchitis 03/07/2016   • Seasonal allergic rhinitis 03/07/2016   • Cough 03/07/2016   • Shortness of breath 03/07/2016   • Green stool 03/07/2016   • Left lower quadrant pain 03/07/2016   • Chest pain on breathing 08/22/2016     Past Medical History:   Diagnosis Date   • Abnormal ECG    • Allergic    • Arthritis    • Chest pain    • CTS (carpal tunnel syndrome) A few months.   • DVT (deep venous thrombosis)    • GERD (gastroesophageal reflux disease)    • Hip arthrosis    • Knee swelling    • Myocardial infarction    • Obesity    • Periarthritis of shoulder    • Simple goiter    • Sinus bradycardia        PAST SURGICAL HISTORY  Past Surgical History:   Procedure Laterality Date   • CARDIAC CATHETERIZATION N/A 10/18/2016    Procedure: Left Heart Cath;  Surgeon: Daniel Rosas MD;  Location: [facility] CATH INVASIVE LOCATION;  Service:    • CHOLECYSTECTOMY     • EPIDURAL Right 11/4/2022    Procedure: Right L5 LUMBAR/SACRAL TRANSFORAMINAL  EPIDURAL. Hold xarelto x 3 days prior to procedure;  Surgeon: La Sun MD;  Location[place] [facility] [place];  Service: Pain Management;  Laterality: Right;   • JOINT REPLACEMENT     • SALIVARY GLAND SURGERY     • THYROIDECTOMY  2013   • TONSILLECTOMY     • TOTAL HIP ARTHROPLASTY REVISION Left 2015       FAMILY HISTORY  Family History   Problem Relation Age of Onset   • Diabetes Father    • Heart disease Father    • Arthritis Father    • Hyperlipidemia Father    • Hypertension Father        SOCIAL HISTORY  Social History     Socioeconomic History   • Marital status:    Tobacco Use   • Smoking status: Never   • Smokeless tobacco: Never   Vaping Use   • Vaping Use: Never used   Substance and Sexual Activity   • Alcohol use: No   • Drug use: No   • Sexual activity: Yes     Partners: Female     Birth control/protection: None     ALLERGIES  Patient has no known allergies.    REVIEW OF SYSTEMS  All systems reviewed and negative except for those discussed in HPI.     PHYSICAL EXAM  ED Triage Vitals   Temp Heart Rate Resp BP SpO2   04/29/23 1424 04/29/23 1424 04/29/23 1424 04/29/23 1456 04/29/23 1424   100.3 °F (37.9 °C) 111 18 142/86 96 %      Temp src Heart Rate Source Patient Position BP Location FiO2 (%)   -- 04/29/23 1424 -- -- --    Monitor          Physical Exam  Vitals and nursing note reviewed.   Constitutional:       Appearance: Normal appearance.   HENT:      Head: Normocephalic and atraumatic.      Mouth/Throat:      Mouth: Mucous membranes are moist.   Eyes:      Extraocular Movements: Extraocular movements intact.      Pupils: Pupils are equal, round, and reactive to light.   Cardiovascular:      Rate and Rhythm: Normal rate and regular rhythm.   Pulmonary:      Effort: Pulmonary effort is normal.      Breath sounds: Normal breath sounds.   Musculoskeletal:      Cervical back: Normal range of motion and neck supple.      Comments: The right knee has a palpable joint effusion.  It is not overtly warm or  erythematous.  The patient has significant pain with any flexion of the knee.   Neurological:      Mental Status: He is alert and oriented to person, place, and time. Mental status is at baseline.   Psychiatric:         Mood and Affect: Mood normal.         Behavior: Behavior normal.         Thought Content: Thought content normal.         Judgment: Judgment normal.         LAB RESULTS  Recent Results (from the past 24 hour(s))   Comprehensive Metabolic Panel    Collection Time: 04/29/23  3:14 PM    Specimen: Blood   Result Value Ref Range    Glucose 104 (H) 65 - 99 mg/dL    BUN 11 6 - 20 mg/dL    Creatinine 1.24 0.76 - 1.27 mg/dL    Sodium 142 136 - 145 mmol/L    Potassium 4.0 3.5 - 5.2 mmol/L    Chloride 105 98 - 107 mmol/L    CO2 27.1 22.0 - 29.0 mmol/L    Calcium 9.4 8.6 - 10.5 mg/dL    Total Protein 6.9 6.0 - 8.5 g/dL    Albumin 3.9 3.5 - 5.2 g/dL    ALT (SGPT) 27 1 - 41 U/L    AST (SGOT) 22 1 - 40 U/L    Alkaline Phosphatase 79 39 - 117 U/L    Total Bilirubin 1.7 (H) 0.0 - 1.2 mg/dL    Globulin 3.0 gm/dL    A/G Ratio 1.3 g/dL    BUN/Creatinine Ratio 8.9 7.0 - 25.0    Anion Gap 9.9 5.0 - 15.0 mmol/L    eGFR 70.0 >60.0 mL/min/1.73   C-reactive Protein    Collection Time: 04/29/23  3:14 PM    Specimen: Blood   Result Value Ref Range    C-Reactive Protein 7.19 (H) 0.00 - 0.50 mg/dL   Sedimentation Rate    Collection Time: 04/29/23  3:14 PM    Specimen: Blood   Result Value Ref Range    Sed Rate 24 (H) 0 - 20 mm/hr   CBC Auto Differential    Collection Time: 04/29/23  3:14 PM    Specimen: Blood   Result Value Ref Range    WBC 8.17 3.40 - 10.80 10*3/mm3    RBC 5.02 4.14 - 5.80 10*6/mm3    Hemoglobin 15.3 13.0 - 17.7 g/dL    Hematocrit 46.6 37.5 - 51.0 %    MCV 92.8 79.0 - 97.0 fL    MCH 30.5 26.6 - 33.0 pg    MCHC 32.8 31.5 - 35.7 g/dL    RDW 13.3 12.3 - 15.4 %    RDW-SD 45.4 37.0 - 54.0 fl    MPV 9.9 6.0 - 12.0 fL    Platelets 205 140 - 450 10*3/mm3    Neutrophil % 70.8 42.7 - 76.0 %    Lymphocyte % 13.7 (L) 19.6 -  45.3 %    Monocyte % 12.9 (H) 5.0 - 12.0 %    Eosinophil % 1.2 0.3 - 6.2 %    Basophil % 0.5 0.0 - 1.5 %    Immature Grans % 0.9 (H) 0.0 - 0.5 %    Neutrophils, Absolute 5.79 1.70 - 7.00 10*3/mm3    Lymphocytes, Absolute 1.12 0.70 - 3.10 10*3/mm3    Monocytes, Absolute 1.05 (H) 0.10 - 0.90 10*3/mm3    Eosinophils, Absolute 0.10 0.00 - 0.40 10*3/mm3    Basophils, Absolute 0.04 0.00 - 0.20 10*3/mm3    Immature Grans, Absolute 0.07 (H) 0.00 - 0.05 10*3/mm3    nRBC 0.0 0.0 - 0.2 /100 WBC   Lactic Acid, Plasma    Collection Time: 04/29/23  3:14 PM    Specimen: Blood   Result Value Ref Range    Lactate 1.3 0.5 - 2.0 mmol/L       RADIOLOGY  Imaging Results (Last 24 Hours)     Procedure Component Value Units Date/Time    XR Knee 1 or 2 View Right [[id number]] Collected: 04/29/23 1537     Updated: 04/29/23 1602    Narrative:      TWO-VIEW RIGHT KNEE     HISTORY: Knee pain. No trauma.     FINDINGS: There are mild-to-moderate changes predominantly at the  patellofemoral compartment as also noted on the recent exam dated  03/23/2022. There appears to be a small to moderate joint effusion that  is larger on today's examination.     This report was finalized on 4/29/2023 3:38 PM by Dr. Leonid Petersen M.D.             PROCEDURES  Procedures  None    MEDICATIONS GIVEN IN ER  Medications - No data to display    MEDICAL DECISION MAKING, PROGRESS, and CONSULTS  Vital signs and nursing notes have all been reviewed by me.    All laboratory results have been independently interpreted by me.      Orders placed during this visit:  Orders Placed This Encounter   Procedures   • Blood Culture - Blood,   • Blood Culture - Blood,   • XR Knee 1 or 2 View Right   • Comprehensive Metabolic Panel   • C-reactive Protein   • Sedimentation Rate   • CBC Auto Differential   • Lactic Acid, Plasma   • Ortho (on-call MD unless specified)   • LHA (on-call MD unless specified) Details   • Initiate Observation Status   • CBC & Differential       Differential  diagnosis includes but is not limited to:  Differential diagnosis includes but is not limited to:  - contusion  - strain  - patellar fracture  - ligamentous injury  - meniscal injury  - septic knee    Independent interpretation of labs, radiology studies, and discussions with consultants: Dr. Arturo Zheng, orthopedic surgeon and Dr. Starks with [facility].    Discussion below represents my analysis of pertinent findings related to patient's condition, differential diagnosis, treatment plan and final disposition:    The patient presents with clinical concern for a septic right knee.  He had a dose of Xarelto last evening, so I do not feel comfortable tapping his knee in the ED.  I have discussed this with Dr. Zheng.  He states that he will see the patient in consultation tomorrow morning and will likely tap the knee then.  He recommends holding all antibiotics for now.  I have discussed this with Dr. Starks.    ED Course as of 04/29/23 1809   Sat Apr 29, 2023   1702 I discussed the patient's overall care with Dr. Zheng, orthopedic surgeon on-call.  He states that he is happy to see the patient in consultation tomorrow and will plan on aspirating the knee joint tomorrow morning.  He request that we do not give the patient any antibiotics before then. [AR]   1706 I discussed the patient's overall care with Dr. Starks with [facility].  She is agreeable to bringing the patient in for observation. [AR]      ED Course User Index  [AR] Consuelo Perez PA            Additional sources:  - Chronic or social conditions impacting care: Patient has significant pain with bearing weight at this time    - Shared decision making: I discussed ED evaluation and treatment plan with patient who is in agreement.  Discussed at length ED testing.     DIAGNOSIS  Final diagnoses:   Acute pain of right knee   Elevated sed rate   Elevated C-reactive protein (CRP)       DISPOSITION  ED Disposition     ED Disposition   Decision to Admit    Condition   --     Comment   Level of Care: Med/Surg [1]   Diagnosis: Acute pain of right knee [[id number]]   Admitting Physician: IMANI EASTMAN [[id number]]   Attending Physician: IMANI EASTMAN [[id number]]             RX  Medications - No data to display       Medication List      No changes were made to your prescriptions during this visit.         Latest Documented Vital Signs:  As of 18:09 EDT  BP- 136/84 HR- 92 Temp- 100.3 °F (37.9 °C) O2 sat- 95%    Provider Attestation:  I personally reviewed the past medical history, past surgical history, social history, family history, current medications and allergies as they appear in the chart. I reviewed the patient's history, physical, lab/imaging results and overall care with Dr. Hargrove who is in agreement with the patient's treatment plan.    EMR Dragon/Transcription disclaimer:  Dictated using Dragon dictation    Provider note signed by:    Note Disclaimer: At [facility], we believe that sharing information builds trust and better relationships. You are receiving this note because you are receiving care at [facility] or recently visited. It is possible you will see health information before a provider has talked with you about it. This kind of information can be easy to misunderstand. To help you fully understand what it means for your health, we urge you to discuss this note with your provider.       Consuelo Perez PA  04/29/23 2611

## 2023-04-29 NOTE — ED PROVIDER NOTES
"The RICARDO and I have discussed this patient's history, physical exam, and treatment plan.  I have reviewed the documentation and personally had a face to face interaction with the patient. I affirm the documentation and agree with the treatment and plan.  The attached note describes my personal findings.      I provided a substantive portion of the care of the patient.  I personally performed the physical exam in its entirety, and below are my findings.     Brief history of present illness: 52-year-old male with right knee pain over the last 2 to 3 days is worse with walking.  He has noted some swelling.  No known trauma.    Physical exam:    /86   Pulse 111   Temp 100.3 °F (37.9 °C)   Resp 18   Ht 177.8 cm (70\")   Wt (!) 156 kg (345 lb)   SpO2 96%   BMI 49.50 kg/m²       Physical Exam   Constitutional: No distress.  Nontoxic  HENT:  Head: Normocephalic and atraumatic.   Oropharynx: Mucous membranes are moist.   Eyes: . No scleral icterus. No conjunctival pallor.  Neck: Normal range of motion. Neck supple.   Cardiovascular: Pink warm and well perfused throughout.    Pulmonary/Chest: No respiratory distress.  No tachypnea or increased work of breathing appreciated.    Musculoskeletal: Moves all extremities equally.  Some swelling with joint effusion noted to the right knee.  No calf tenderness particular  Neurological: Alert and oriented.   Skin: Skin is pink, warm, and dry.   Psychiatric: Mood and affect normal.   Nursing note and vitals reviewed.        MDM:   I have personally reviewed and interpreted the EKG obtained today in the emergency department at 1458 concomitant with treatment.  EKG reveals rhythm/rate -sinus, 90. [id number] within normal.  QRS-narrow complex ST/T-wave -no STEMI; QTc within normal limits    Agree with plan for laboratory radiologic evaluation.       Tio Hargrove MD  04/29/23 1513    "

## 2023-04-29 NOTE — ED NOTES
Nursing report ED to floor  Grant Edge Jr.  52 y.o.  male    HPI :   Chief Complaint   Patient presents with    Knee Pain    Cough       Admitting doctor:   Radha Starks MD    Admitting diagnosis:   The primary encounter diagnosis was Acute pain of right knee. Diagnoses of Elevated sed rate and Elevated C-reactive protein (CRP) were also pertinent to this visit.    Code status:   Current Code Status       Date Active Code Status Order ID Comments User Context       Prior            Allergies:   Patient has no known allergies.    Isolation:   No active isolations    Intake and Output  No intake or output data in the 24 hours ending 04/29/23 1735    Weight:       04/29/23  1456   Weight: (!) 156 kg (345 lb)       Most recent vitals:   Vitals:    04/29/23 1546 04/29/23 1547 04/29/23 1646 04/29/23 1647   BP: 155/85  136/84    Pulse: 97 93 89 92   Resp:       Temp:       SpO2:  94%  95%   Weight:       Height:           Active LDAs/IV Access:   Lines, Drains & Airways       Active LDAs       Name Placement date Placement time Site Days    Peripheral IV 04/29/23 1513 Left Antecubital 04/29/23  1513  Antecubital  less than 1                    Labs (abnormal labs have a star):   Labs Reviewed   COMPREHENSIVE METABOLIC PANEL - Abnormal; Notable for the following components:       Result Value    Glucose 104 (*)     Total Bilirubin 1.7 (*)     All other components within normal limits    Narrative:     GFR Normal >60  Chronic Kidney Disease <60  Kidney Failure <15     C-REACTIVE PROTEIN - Abnormal; Notable for the following components:    C-Reactive Protein 7.19 (*)     All other components within normal limits   SEDIMENTATION RATE - Abnormal; Notable for the following components:    Sed Rate 24 (*)     All other components within normal limits   CBC WITH AUTO DIFFERENTIAL - Abnormal; Notable for the following components:    Lymphocyte % 13.7 (*)     Monocyte % 12.9 (*)     Immature Grans % 0.9 (*)      Monocytes, Absolute 1.05 (*)     Immature Grans, Absolute 0.07 (*)     All other components within normal limits   LACTIC ACID, PLASMA - Normal   BLOOD CULTURE   BLOOD CULTURE   CBC AND DIFFERENTIAL    Narrative:     The following orders were created for panel order CBC & Differential.  Procedure                               Abnormality         Status                     ---------                               -----------         ------                     CBC Auto Differential[[id number]]        Abnormal            Final result                 Please view results for these tests on the individual orders.       EKG:   No orders to display       Meds given in ED:   Medications - No data to display    Imaging results:  No radiology results for the last day    Ambulatory status:   - standby assist    Social issues:   Social History     Socioeconomic History    Marital status:    Tobacco Use    Smoking status: Never    Smokeless tobacco: Never   Vaping Use    Vaping Use: Never used   Substance and Sexual Activity    Alcohol use: No    Drug use: No    Sexual activity: Yes     Partners: Female     Birth control/protection: None       NIH Stroke Scale:         Ace Rubi RN  04/29/23 17:35 EDT

## 2023-04-30 LAB
ANION GAP SERPL CALCULATED.3IONS-SCNC: 10.1 MMOL/L (ref 5–15)
APPEARANCE FLD: ABNORMAL
BASOPHILS # BLD AUTO: 0.05 10*3/MM3 (ref 0–0.2)
BASOPHILS NFR BLD AUTO: 0.6 % (ref 0–1.5)
BUN SERPL-MCNC: 12 MG/DL (ref 6–20)
BUN/CREAT SERPL: 9.3 (ref 7–25)
CALCIUM SPEC-SCNC: 9.3 MG/DL (ref 8.6–10.5)
CHLORIDE SERPL-SCNC: 103 MMOL/L (ref 98–107)
CO2 SERPL-SCNC: 26.9 MMOL/L (ref 22–29)
COLOR FLD: YELLOW
CREAT SERPL-MCNC: 1.29 MG/DL (ref 0.76–1.27)
CRYSTALS FLD MICRO: NORMAL
DEPRECATED RDW RBC AUTO: 41.8 FL (ref 37–54)
EGFRCR SERPLBLD CKD-EPI 2021: 66.7 ML/MIN/1.73
EOSINOPHIL # BLD AUTO: 0.15 10*3/MM3 (ref 0–0.4)
EOSINOPHIL NFR BLD AUTO: 1.7 % (ref 0.3–6.2)
ERYTHROCYTE [DISTWIDTH] IN BLOOD BY AUTOMATED COUNT: 12.7 % (ref 12.3–15.4)
GLUCOSE SERPL-MCNC: 117 MG/DL (ref 65–99)
HCT VFR BLD AUTO: 43 % (ref 37.5–51)
HGB BLD-MCNC: 14.4 G/DL (ref 13–17.7)
IMM GRANULOCYTES # BLD AUTO: 0.06 10*3/MM3 (ref 0–0.05)
IMM GRANULOCYTES NFR BLD AUTO: 0.7 % (ref 0–0.5)
LYMPHOCYTES # BLD AUTO: 1.49 10*3/MM3 (ref 0.7–3.1)
LYMPHOCYTES NFR BLD AUTO: 17.2 % (ref 19.6–45.3)
LYMPHOCYTES NFR FLD MANUAL: 5 %
MCH RBC QN AUTO: 30.3 PG (ref 26.6–33)
MCHC RBC AUTO-ENTMCNC: 33.5 G/DL (ref 31.5–35.7)
MCV RBC AUTO: 90.3 FL (ref 79–97)
METHOD: ABNORMAL
MONOCYTES # BLD AUTO: 1.4 10*3/MM3 (ref 0.1–0.9)
MONOCYTES NFR BLD AUTO: 16.1 % (ref 5–12)
MONOCYTES NFR FLD: 6 %
NEUTROPHILS NFR BLD AUTO: 5.52 10*3/MM3 (ref 1.7–7)
NEUTROPHILS NFR BLD AUTO: 63.7 % (ref 42.7–76)
NEUTROPHILS NFR FLD MANUAL: 89 %
NRBC BLD AUTO-RTO: 0 /100 WBC (ref 0–0.2)
NUC CELL # FLD: ABNORMAL /MM3
PLATELET # BLD AUTO: 202 10*3/MM3 (ref 140–450)
PMV BLD AUTO: 10.1 FL (ref 6–12)
POTASSIUM SERPL-SCNC: 4.2 MMOL/L (ref 3.5–5.2)
RBC # BLD AUTO: 4.76 10*6/MM3 (ref 4.14–5.8)
RBC # FLD AUTO: 5000 /MM3
SODIUM SERPL-SCNC: 140 MMOL/L (ref 136–145)
WBC NRBC COR # BLD: 8.67 10*3/MM3 (ref 3.4–10.8)

## 2023-04-30 PROCEDURE — 94664 DEMO&/EVAL PT USE INHALER: CPT

## 2023-04-30 PROCEDURE — 89060 EXAM SYNOVIAL FLUID CRYSTALS: CPT | Performed by: ORTHOPAEDIC SURGERY

## 2023-04-30 PROCEDURE — 85025 COMPLETE CBC W/AUTO DIFF WBC: CPT | Performed by: INTERNAL MEDICINE

## 2023-04-30 PROCEDURE — 94799 UNLISTED PULMONARY SVC/PX: CPT

## 2023-04-30 PROCEDURE — 36415 COLL VENOUS BLD VENIPUNCTURE: CPT | Performed by: INTERNAL MEDICINE

## 2023-04-30 PROCEDURE — 80048 BASIC METABOLIC PNL TOTAL CA: CPT | Performed by: INTERNAL MEDICINE

## 2023-04-30 PROCEDURE — 89051 BODY FLUID CELL COUNT: CPT | Performed by: ORTHOPAEDIC SURGERY

## 2023-04-30 PROCEDURE — 87205 SMEAR GRAM STAIN: CPT | Performed by: ORTHOPAEDIC SURGERY

## 2023-04-30 PROCEDURE — 87070 CULTURE OTHR SPECIMN AEROBIC: CPT | Performed by: ORTHOPAEDIC SURGERY

## 2023-04-30 PROCEDURE — G0378 HOSPITAL OBSERVATION PER HR: HCPCS

## 2023-04-30 PROCEDURE — 96365 THER/PROPH/DIAG IV INF INIT: CPT

## 2023-04-30 PROCEDURE — 25010000002 PIPERACILLIN SOD-TAZOBACTAM PER 1 G: Performed by: INTERNAL MEDICINE

## 2023-04-30 PROCEDURE — 25010000002 VANCOMYCIN 10 G RECONSTITUTED SOLUTION: Performed by: INTERNAL MEDICINE

## 2023-04-30 RX ADMIN — MONTELUKAST SODIUM 10 MG: 10 TABLET, FILM COATED ORAL at 20:44

## 2023-04-30 RX ADMIN — GABAPENTIN 300 MG: 300 CAPSULE ORAL at 10:23

## 2023-04-30 RX ADMIN — VANCOMYCIN HYDROCHLORIDE 2500 MG: 10 INJECTION, POWDER, LYOPHILIZED, FOR SOLUTION INTRAVENOUS at 15:13

## 2023-04-30 RX ADMIN — LEVOTHYROXINE SODIUM 112 MCG: 0.11 TABLET ORAL at 06:04

## 2023-04-30 RX ADMIN — HYDROCODONE BITARTRATE AND ACETAMINOPHEN 1 TABLET: 5; 325 TABLET ORAL at 14:23

## 2023-04-30 RX ADMIN — PANTOPRAZOLE SODIUM 40 MG: 40 TABLET, DELAYED RELEASE ORAL at 06:04

## 2023-04-30 RX ADMIN — LISINOPRIL 40 MG: 40 TABLET ORAL at 10:23

## 2023-04-30 RX ADMIN — BUDESONIDE AND FORMOTEROL FUMARATE DIHYDRATE 2 PUFF: 160; 4.5 AEROSOL RESPIRATORY (INHALATION) at 21:34

## 2023-04-30 RX ADMIN — HYDROCODONE BITARTRATE AND ACETAMINOPHEN 1 TABLET: 5; 325 TABLET ORAL at 20:44

## 2023-04-30 RX ADMIN — ACETAMINOPHEN 650 MG: 325 TABLET, FILM COATED ORAL at 10:23

## 2023-04-30 RX ADMIN — HYDROCODONE BITARTRATE AND ACETAMINOPHEN 1 TABLET: 5; 325 TABLET ORAL at 06:08

## 2023-04-30 RX ADMIN — TAZOBACTAM SODIUM AND PIPERACILLIN SODIUM 4.5 G: 500; 4 INJECTION, SOLUTION INTRAVENOUS at 21:22

## 2023-04-30 RX ADMIN — ACETAMINOPHEN 650 MG: 325 TABLET, FILM COATED ORAL at 23:08

## 2023-04-30 RX ADMIN — CETIRIZINE HYDROCHLORIDE 10 MG: 10 TABLET ORAL at 10:23

## 2023-04-30 RX ADMIN — TAZOBACTAM SODIUM AND PIPERACILLIN SODIUM 4.5 G: 500; 4 INJECTION, SOLUTION INTRAVENOUS at 14:11

## 2023-04-30 RX ADMIN — METOPROLOL SUCCINATE 75 MG: 25 TABLET, EXTENDED RELEASE ORAL at 10:23

## 2023-04-30 RX ADMIN — BUDESONIDE AND FORMOTEROL FUMARATE DIHYDRATE 2 PUFF: 160; 4.5 AEROSOL RESPIRATORY (INHALATION) at 09:38

## 2023-04-30 NOTE — CONSULTS
Orthopaedic Surgery  Consult Note  Dr. DERIC Pryor Marce   [phone]    HPI:  Patient is a 52 y.o. Not  or  male who presents with complaints of sharp right knee pain for the past day.  Pain was insidious in onset.  He noticed a lot of swelling and discomfort.  Eventually it became so bad that he presented to the hospital where he was noted to have a mildly elevated CRP and sedimentation rate.  He does not claim to have a history of gout although he has had a uric acid resulted several times in the past, a couple of times that has been elevated.  He complains of sharp pains in the right knee worse with any activity.  He is unable to bend the knee or bear any weight due to pain.  Because he takes Xarelto, the emergency room was not able to aspirate the knee.  He was admitted for pain control and orthopedic evaluation.  His white count was normal and his fever curve has been normal as well.    MEDICAL HISTORY  Past Medical History:   Diagnosis Date   • Abnormal ECG    • Allergic    • Arthritis    • Chest pain    • CTS (carpal tunnel syndrome) A few months.   • DVT (deep venous thrombosis)    • GERD (gastroesophageal reflux disease)    • Hip arthrosis    • Hyperlipidemia    • Hypertension    • Hypokalemia    • Knee swelling    • Myocardial infarction    • Obesity    • Periarthritis of shoulder    • Simple goiter    • Sinus bradycardia    • Vitamin D deficiency    ·   Past Surgical History:   Procedure Laterality Date   • CARDIAC CATHETERIZATION N/A 10/18/2016    Procedure: Left Heart Cath;  Surgeon: Daniel Rosas MD;  Location: [facility] INVASIVE LOCATION;  Service:    • CHOLECYSTECTOMY     • EPIDURAL Right 11/4/2022    Procedure: Right L5 LUMBAR/SACRAL TRANSFORAMINAL EPIDURAL. Hold xarelto x 3 days prior to procedure;  Surgeon: La Sun MD;  Location: [facility] OR;  Service: Pain Management;  Laterality: Right;   • JOINT REPLACEMENT     • SALIVARY GLAND SURGERY     • THYROIDECTOMY   2013   • TONSILLECTOMY     • TOTAL HIP ARTHROPLASTY REVISION Left 2015   ·   Prior to Admission medications    Medication Sig Start Date End Date Taking? Authorizing Provider   albuterol sulfate  (90 Base) MCG/ACT inhaler Inhale 2 puffs Every 4 (Four) Hours As Needed for Wheezing. 1/6/23  Yes Jim Joseph APRN   azelastine (ASTELIN) 0.1 % nasal spray 2 sprays into the nostril(s) as directed by provider 2 (Two) Times a Day. Use in each nostril as directed  Patient taking differently: 2 sprays into the nostril(s) as directed by provider As Needed. Use in each nostril as directed 7/8/20  Yes Patricia Abdi APRN   benazepril (LOTENSIN) 40 MG tablet Take 1 tablet by mouth Daily. 11/7/22  Yes Patricia Abdi APRN   esomeprazole (nexIUM) 40 MG capsule TAKE 1 CAPSULE BY MOUTH DAILY AS NEEDED FOR ACID REFLUX OR SYMPTOMS 2/9/23  Yes Jim Joseph APRN   fluticasone (FLONASE) 50 MCG/ACT nasal spray 2 sprays into the nostril(s) as directed by provider Daily.  Patient taking differently: 2 sprays into the nostril(s) as directed by provider As Needed. 3/6/20  Yes Patricia Abdi APRN   Fluticasone-Salmeterol (ADVAIR/WIXELA) 100-50 MCG/ACT DISKUS Inhale 1 puff 2 (Two) Times a Day. 1/31/23  Yes Jim Joseph APRN   furosemide (Lasix) 20 MG tablet Take 1 tablet by mouth Every Other Day. 4/7/23  Yes Jim Joseph APRN   gabapentin (NEURONTIN) 300 MG capsule Take 1 capsule by mouth Daily.   Yes Kenny Thompson MD   levothyroxine (SYNTHROID, LEVOTHROID) 112 MCG tablet Take 1 tablet by mouth Daily. 3/21/23  Yes Jim Joseph APRN   Loratadine (CLARITIN PO) Take 10 mg by mouth Daily As Needed.   Yes Kenny Thompson MD   metoprolol succinate XL (TOPROL-XL) 50 MG 24 hr tablet Take 1.5 tablets by mouth Daily. 4/19/23  Yes Jim Joseph APRN   montelukast (SINGULAIR) 10 MG tablet Take 1 tablet by mouth Every Night. 2/13/23  Yes Jim Joseph APRN   potassium chloride 10 MEQ CR tablet Take 1 tablet by mouth  "Daily. 3/20/23  Yes Jim Joseph APRN   Pseudoephedrine-guaiFENesin (MUCINEX D PO) Take 1 tablet by mouth Daily.   Yes Provider, Kenny, MD   Xarelto 20 MG tablet TAKE 1 TABLET BY MOUTH DAILY  Patient taking differently: 1 tablet Daily. 1/20/23  Yes Jim Joseph APRN   ·   · No Known Allergies  Most Recent Immunizations   Administered Date(s) Administered   • COVID-19 (MODERNA) 1st,2nd,3rd Dose Monovalent 11/17/2021   • COVID-19 (MODERNA) BIVALENT 12+YRS 11/09/2022   • FluLaval/Fluzone >6mos 11/07/2022   • FluMist 2-49yrs 09/25/2013   • Influenza Injectable Mdck Pf Quad 11/07/2022   • Shingrix 11/09/2022   • Tdap 07/05/2017   • flucelvax quad pfs =>4 YRS 12/05/2019   ·   Social History     Tobacco Use   • Smoking status: Never   • Smokeless tobacco: Never   Substance Use Topics   • Alcohol use: No   ·    Social History     Substance and Sexual Activity   Drug Use No   ·     VITALS: /86 (BP Location: Right arm, Patient Position: Lying)   Pulse 90   Temp 98.2 °F (36.8 °C) (Oral)   Resp 18   Ht 177.8 cm (70\")   Wt (!) 156 kg (345 lb)   SpO2 92%   BMI 49.50 kg/m²  Body mass index is 49.5 kg/m².    PHYSICAL EXAM:   · CONSTITUTIONAL: No acute distress  · LUNGS: Equal chest rise, no shortness of air  · CARDIOVASCULAR: palpable peripheral pulses  · SKIN: no skin lesions in the area examined  · LYMPH: no lymphadenopathy in the area examined  · EXTREMITY: Right Lower Extremity  · Tenderness to Palpation: Tenderness to palpation at the Knee  · Gross Deformity: 2+ right knee effusion  · Pulses:  Brisk Capillary Refill  · Sensation: Intact to Saphenous, Sural, Deep Peroneal, Superficial Peroneal, and Tibial Nerves and grossly throughout extremity  · Motor: 5/5 EHL/FHL/TA/GS motor complexes    RADIOLOGY REVIEW:   No radiology results for the last 7 days    LABS:   Results for the past 24 hours:   Recent Results (from the past 24 hour(s))   Comprehensive Metabolic Panel    Collection Time: 04/29/23  3:14 PM    " Specimen: Blood   Result Value Ref Range    Glucose 104 (H) 65 - 99 mg/dL    BUN 11 6 - 20 mg/dL    Creatinine 1.24 0.76 - 1.27 mg/dL    Sodium 142 136 - 145 mmol/L    Potassium 4.0 3.5 - 5.2 mmol/L    Chloride 105 98 - 107 mmol/L    CO2 27.1 22.0 - 29.0 mmol/L    Calcium 9.4 8.6 - 10.5 mg/dL    Total Protein 6.9 6.0 - 8.5 g/dL    Albumin 3.9 3.5 - 5.2 g/dL    ALT (SGPT) 27 1 - 41 U/L    AST (SGOT) 22 1 - 40 U/L    Alkaline Phosphatase 79 39 - 117 U/L    Total Bilirubin 1.7 (H) 0.0 - 1.2 mg/dL    Globulin 3.0 gm/dL    A/G Ratio 1.3 g/dL    BUN/Creatinine Ratio 8.9 7.0 - 25.0    Anion Gap 9.9 5.0 - 15.0 mmol/L    eGFR 70.0 >60.0 mL/min/1.73   C-reactive Protein    Collection Time: 04/29/23  3:14 PM    Specimen: Blood   Result Value Ref Range    C-Reactive Protein 7.19 (H) 0.00 - 0.50 mg/dL   Sedimentation Rate    Collection Time: 04/29/23  3:14 PM    Specimen: Blood   Result Value Ref Range    Sed Rate 24 (H) 0 - 20 mm/hr   CBC Auto Differential    Collection Time: 04/29/23  3:14 PM    Specimen: Blood   Result Value Ref Range    WBC 8.17 3.40 - 10.80 10*3/mm3    RBC 5.02 4.14 - 5.80 10*6/mm3    Hemoglobin 15.3 13.0 - 17.7 g/dL    Hematocrit 46.6 37.5 - 51.0 %    MCV 92.8 79.0 - 97.0 fL    MCH 30.5 26.6 - 33.0 pg    MCHC 32.8 31.5 - 35.7 g/dL    RDW 13.3 12.3 - 15.4 %    RDW-SD 45.4 37.0 - 54.0 fl    MPV 9.9 6.0 - 12.0 fL    Platelets 205 140 - 450 10*3/mm3    Neutrophil % 70.8 42.7 - 76.0 %    Lymphocyte % 13.7 (L) 19.6 - 45.3 %    Monocyte % 12.9 (H) 5.0 - 12.0 %    Eosinophil % 1.2 0.3 - 6.2 %    Basophil % 0.5 0.0 - 1.5 %    Immature Grans % 0.9 (H) 0.0 - 0.5 %    Neutrophils, Absolute 5.79 1.70 - 7.00 10*3/mm3    Lymphocytes, Absolute 1.12 0.70 - 3.10 10*3/mm3    Monocytes, Absolute 1.05 (H) 0.10 - 0.90 10*3/mm3    Eosinophils, Absolute 0.10 0.00 - 0.40 10*3/mm3    Basophils, Absolute 0.04 0.00 - 0.20 10*3/mm3    Immature Grans, Absolute 0.07 (H) 0.00 - 0.05 10*3/mm3    nRBC 0.0 0.0 - 0.2 /100 WBC   Lactic Acid,  Plasma    Collection Time: 04/29/23  3:14 PM    Specimen: Blood   Result Value Ref Range    Lactate 1.3 0.5 - 2.0 mmol/L   Basic Metabolic Panel    Collection Time: 04/30/23  5:15 AM    Specimen: Blood   Result Value Ref Range    Glucose 117 (H) 65 - 99 mg/dL    BUN 12 6 - 20 mg/dL    Creatinine 1.29 (H) 0.76 - 1.27 mg/dL    Sodium 140 136 - 145 mmol/L    Potassium 4.2 3.5 - 5.2 mmol/L    Chloride 103 98 - 107 mmol/L    CO2 26.9 22.0 - 29.0 mmol/L    Calcium 9.3 8.6 - 10.5 mg/dL    BUN/Creatinine Ratio 9.3 7.0 - 25.0    Anion Gap 10.1 5.0 - 15.0 mmol/L    eGFR 66.7 >60.0 mL/min/1.73   CBC Auto Differential    Collection Time: 04/30/23  5:57 AM    Specimen: Blood   Result Value Ref Range    WBC 8.67 3.40 - 10.80 10*3/mm3    RBC 4.76 4.14 - 5.80 10*6/mm3    Hemoglobin 14.4 13.0 - 17.7 g/dL    Hematocrit 43.0 37.5 - 51.0 %    MCV 90.3 79.0 - 97.0 fL    MCH 30.3 26.6 - 33.0 pg    MCHC 33.5 31.5 - 35.7 g/dL    RDW 12.7 12.3 - 15.4 %    RDW-SD 41.8 37.0 - 54.0 fl    MPV 10.1 6.0 - 12.0 fL    Platelets 202 140 - 450 10*3/mm3    Neutrophil % 63.7 42.7 - 76.0 %    Lymphocyte % 17.2 (L) 19.6 - 45.3 %    Monocyte % 16.1 (H) 5.0 - 12.0 %    Eosinophil % 1.7 0.3 - 6.2 %    Basophil % 0.6 0.0 - 1.5 %    Immature Grans % 0.7 (H) 0.0 - 0.5 %    Neutrophils, Absolute 5.52 1.70 - 7.00 10*3/mm3    Lymphocytes, Absolute 1.49 0.70 - 3.10 10*3/mm3    Monocytes, Absolute 1.40 (H) 0.10 - 0.90 10*3/mm3    Eosinophils, Absolute 0.15 0.00 - 0.40 10*3/mm3    Basophils, Absolute 0.05 0.00 - 0.20 10*3/mm3    Immature Grans, Absolute 0.06 (H) 0.00 - 0.05 10*3/mm3    nRBC 0.0 0.0 - 0.2 /100 WBC     Right knee aspiration: The right knee was aspirated of about 80 cc of dark synovial fluid.  No sediment noted.  This was sent for analysis    IMPRESSION:  Patient is a 52 y.o. Not  or  male with right knee effusion    PLAN:   · Admited to: Radha Starks MD  · Disposition: Further plans to be determined based on the results of the  "aspiration.  I do not believe this to be of infectious etiology.  This is much more likely to be a gouty attack.  However, the result of the aspiration will tell us what we need to know.  If this is just gout, I would be happy to give him a steroid injection in the knee tomorrow to further help alleviate his pain.    R \"Arturo\" Marce DAVIES MD  Orthopaedic Surgery  [facility]  [phone] - [place] [facility]  [phone] - [place] [facility]            "

## 2023-04-30 NOTE — PLAN OF CARE
Goal Outcome Evaluation:  Plan of Care Reviewed With: patient        Progress: no change  Outcome Evaluation: VSS. Pt has been ambulating with a cane and assist x1 and been voiding per brp. Plan to to have procedure done by Dr. Zheng this morning. PRN pain meds given as per order with noted effect. Educated on falls precaution, b/p monitoring and medication management. Will take note of any changes.

## 2023-04-30 NOTE — PROGRESS NOTES
" LOS: 0 days     Name: Grant Edge Jr.  Age: 52 y.o.  Sex: male  :  1970  MRN: [id number]         Primary Care Physician: Jim Joseph APRN    Subjective   Subjective  Still with pain in the right knee.  Aspiration performed this morning.    Objective   Vital Signs  Temp:  [98.2 °F (36.8 °C)-100.3 °F (37.9 °C)] 98.2 °F (36.8 °C)  Heart Rate:  [] 90  Resp:  [18-25] 18  BP: (132-173)/(79-92) 132/86  Body mass index is 49.5 kg/m².    Objective:  General Appearance:  Comfortable and in no acute distress.    Vital signs: (most recent): Blood pressure 132/86, pulse 90, temperature 98.2 °F (36.8 °C), temperature source Oral, resp. rate 18, height 177.8 cm (70\"), weight (!) 156 kg (345 lb), SpO2 92 %.    Lungs:  Normal effort and normal respiratory rate.    Heart: Normal rate.  Regular rhythm.    Abdomen: Abdomen is soft.  Bowel sounds are normal.   There is no abdominal tenderness.     Extremities: There is no dependent edema or local swelling.  (Pain in the right knee with range of motion.  Associated swelling present.)  Neurological: Patient is alert and oriented to person, place and time.    Skin:  Warm and dry.              Results Review:       I reviewed the patient's new clinical results.    Results from last 7 days   Lab Units 23  0557 23  1514   WBC 10*3/mm3 8.67 8.17   HEMOGLOBIN g/dL 14.4 15.3   PLATELETS 10*3/mm3 202 205     Results from last 7 days   Lab Units 23  0515 23  1514   SODIUM mmol/L 140 142   POTASSIUM mmol/L 4.2 4.0   CHLORIDE mmol/L 103 105   CO2 mmol/L 26.9 27.1   BUN mg/dL 12 11   CREATININE mg/dL 1.29* 1.24   CALCIUM mg/dL 9.3 9.4   GLUCOSE mg/dL 117* 104*                 Scheduled Meds:   budesonide-formoterol, 2 puff, Inhalation, BID - RT  cetirizine, 10 mg, Oral, Daily  furosemide, 20 mg, Oral, Every Other Day  gabapentin, 300 mg, Oral, Daily  levothyroxine, 112 mcg, Oral, Q AM  lisinopril, 40 mg, Oral, Q24H  metoprolol succinate XL, 75 mg, Oral, " Daily  montelukast, 10 mg, Oral, Nightly  pantoprazole, 40 mg, Oral, Q AM  potassium chloride, 10 mEq, Oral, Daily      PRN Meds:   •  acetaminophen **OR** acetaminophen **OR** acetaminophen  •  albuterol  •  HYDROcodone-acetaminophen  •  ondansetron  Continuous Infusions:       Assessment & Plan   Active Hospital Problems    Diagnosis  POA   • **Acute pain of right knee [M25.561]  Yes   • Essential hypertension [I10]  Yes   • Hypothyroidism [E03.9]  Yes      Resolved Hospital Problems   No resolved problems to display.       Assessment & Plan    -Await results of knee aspirate.  Monitoring off antibiotics.  Appreciate orthopedic surgery  -Continue outpatient antihypertensive regimen  -Place Lasix on hold  -Supportive care with pain control        Tomás Drummond MD  [facility]  04/30/23  09:12 EDT

## 2023-04-30 NOTE — PROGRESS NOTES
"[facility] Clinical Pharmacy Services: Vancomycin Pharmacokinetic Initial Consult Note    Grant Edge Jr. is a 52 y.o. male who is on day 1 of pharmacy to dose vancomycin.    Indication: Bone and/or Joint Infection  Consulting Provider: Dr. Drummond    Culture/Source:   4/30 Body Fluid - pending     Other Antimicrobials: piperacillin-tazobactam    Vitals/Labs  Ht: 177.8 cm (70\"); Wt: (!) 156 kg (345 lb)  Temp Readings from Last 1 Encounters:   04/30/23 98.1 °F (36.7 °C) (Oral)    Estimated Creatinine Clearance: 100.4 mL/min (A) (by C-G formula based on SCr of 1.29 mg/dL (H)).    Renal Function  Results from last 7 days   Lab Units 04/30/23  0557 04/30/23  0515 04/29/23  1514   CREATININE mg/dL  --  1.29* 1.24   WBC 10*3/mm3 8.67  --  8.17       Assessment/Plan:    Target: -600 mg/L.hr     Will load patient with 2500 mg x1 followed by 1250 mg IV every  12  hours for a predicted AUC level of 528 mg/L.hr, which is within the target of 400-600 mg/L.hr   Vanc Trough has been ordered for 5/2 prior to scheduled dose.     Pharmacy will follow patient's kidney function and will adjust doses and obtain levels as necessary. Thank you for involving pharmacy in this patient's care. Please contact pharmacy with any questions or concerns.            Thank you,              Amaris Kay, PharmD  Clinical Pharmacist    "

## 2023-04-30 NOTE — H&P
HISTORY AND PHYSICAL   [facility]        Date of Admission: 2023  Patient Identification:  Name: Grant Edge Jr.  Age: 52 y.o.  Sex: male  :  1970  MRN: [id number]                     Primary Care Physician: Jim Joseph APRN    Chief Complaint:  52 year old gentleman who presented to the emergency room with pain and swelling of his right knee; it started yesterday; he denies injury; he has a history of dvt and is on xarelto; he has had subjective fever and chills; his temp was 100.3 when he presented; he denies any prior knee surgery    History of Present Illness:   As above    Past Medical History:  Past Medical History:   Diagnosis Date   • Abnormal ECG    • Allergic    • Arthritis    • Chest pain    • CTS (carpal tunnel syndrome) A few months.   • DVT (deep venous thrombosis)    • GERD (gastroesophageal reflux disease)    • Hip arthrosis    • Hyperlipidemia    • Hypertension    • Hypokalemia    • Knee swelling    • Myocardial infarction    • Obesity    • Periarthritis of shoulder    • Simple goiter    • Sinus bradycardia    • Vitamin D deficiency      Past Surgical History:  Past Surgical History:   Procedure Laterality Date   • CARDIAC CATHETERIZATION N/A 10/18/2016    Procedure: Left Heart Cath;  Surgeon: Daniel Rosas MD;  Location: [facility] INVASIVE LOCATION;  Service:    • CHOLECYSTECTOMY     • EPIDURAL Right 2022    Procedure: Right L5 LUMBAR/SACRAL TRANSFORAMINAL EPIDURAL. Hold xarelto x 3 days prior to procedure;  Surgeon: La Sun MD;  Location: [facility] OR;  Service: Pain Management;  Laterality: Right;   • JOINT REPLACEMENT     • SALIVARY GLAND SURGERY     • THYROIDECTOMY     • TONSILLECTOMY     • TOTAL HIP ARTHROPLASTY REVISION Left       Home Meds:  Medications Prior to Admission   Medication Sig Dispense Refill Last Dose   • albuterol sulfate  (90 Base) MCG/ACT inhaler Inhale 2 puffs Every 4 (Four) Hours As Needed for  Wheezing. 8 g 2 4/29/2023   • azelastine (ASTELIN) 0.1 % nasal spray 2 sprays into the nostril(s) as directed by provider 2 (Two) Times a Day. Use in each nostril as directed (Patient taking differently: 2 sprays into the nostril(s) as directed by provider As Needed. Use in each nostril as directed) 30 mL 2 Past Week   • benazepril (LOTENSIN) 40 MG tablet Take 1 tablet by mouth Daily. 90 tablet 1 4/29/2023   • esomeprazole (nexIUM) 40 MG capsule TAKE 1 CAPSULE BY MOUTH DAILY AS NEEDED FOR ACID REFLUX OR SYMPTOMS 90 capsule 0 4/29/2023   • fluticasone (FLONASE) 50 MCG/ACT nasal spray 2 sprays into the nostril(s) as directed by provider Daily. (Patient taking differently: 2 sprays into the nostril(s) as directed by provider As Needed.) 15.8 mL 1 Past Week   • Fluticasone-Salmeterol (ADVAIR/WIXELA) 100-50 MCG/ACT DISKUS Inhale 1 puff 2 (Two) Times a Day. 14 each 2 4/29/2023   • furosemide (Lasix) 20 MG tablet Take 1 tablet by mouth Every Other Day. 90 tablet 1 4/27/2023   • gabapentin (NEURONTIN) 300 MG capsule Take 1 capsule by mouth Daily.   4/28/2023   • levothyroxine (SYNTHROID, LEVOTHROID) 112 MCG tablet Take 1 tablet by mouth Daily. 90 tablet 1 4/29/2023   • Loratadine (CLARITIN PO) Take 10 mg by mouth Daily As Needed.   Past Week   • metoprolol succinate XL (TOPROL-XL) 50 MG 24 hr tablet Take 1.5 tablets by mouth Daily. 145 tablet 0 4/29/2023   • montelukast (SINGULAIR) 10 MG tablet Take 1 tablet by mouth Every Night. 90 tablet 1 Past Week   • potassium chloride 10 MEQ CR tablet Take 1 tablet by mouth Daily. 30 tablet 1 4/29/2023   • Pseudoephedrine-guaiFENesin (MUCINEX D PO) Take 1 tablet by mouth Daily.   4/28/2023   • Xarelto 20 MG tablet TAKE 1 TABLET BY MOUTH DAILY (Patient taking differently: 1 tablet Daily.) 90 tablet 1 4/28/2023       Allergies:  No Known Allergies  Immunizations:  Immunization History   Administered Date(s) Administered   • COVID-19 (MODERNA) 1st,2nd,3rd Dose Monovalent 02/01/2021,  2021, 2021   • COVID-19 (MODERNA) BIVALENT 12+YRS 2022   • FluLaval/Fluzone >6mos 2020, 2021, 2022   • FluMist 2-49yrs 2013   • Influenza Injectable Mdck Pf Quad 2022   • Shingrix 2022   • Tdap 2017   • flucelvax quad pfs =>4 YRS 2018, 2019     Social History:   Social History     Social History Narrative    [facility]     Social History     Socioeconomic History   • Marital status:    Tobacco Use   • Smoking status: Never   • Smokeless tobacco: Never   Vaping Use   • Vaping Use: Never used   Substance and Sexual Activity   • Alcohol use: No   • Drug use: No   • Sexual activity: Yes     Partners: Female     Birth control/protection: None       Family History:  Family History   Problem Relation Age of Onset   • Diabetes Father    • Heart disease Father    • Arthritis Father    • Hyperlipidemia Father    • Hypertension Father         Review of Systems  See history of present illness and past medical history.  Patient denies headache, dizziness, syncope, falls, trauma, change in vision, change in hearing, change in taste, changes in weight, changes in appetite, focal weakness, numbness, or paresthesia.  Patient denies chest pain, palpitations, dyspnea, orthopnea, PND, cough, sinus pressure, rhinorrhea, epistaxis, hemoptysis, nausea, vomiting,hematemesis, diarrhea, constipation or hematchezia.  Denies cold or heat intolerance, polydipsia, polyuria, polyphagia. Denies hematuria, pyuria, dysuria, hesitancy, frequency or urgency. Denies consumption of raw and under cooked meats foods or change in water source.  Denies fever, chills, sweats, night sweats.  Denies missing any routine medications. Remainder of ROS is negative.    Objective:  T Max 24 hrs: Temp (24hrs), Av.4 °F (37.4 °C), Min:98.5 °F (36.9 °C), Max:100.3 °F (37.9 °C)    Vitals Ranges:   Temp:  [98.5 °F (36.9 °C)-100.3 °F (37.9 °C)] 98.5 °F (36.9 °C)  Heart Rate:  [] 101  Resp:   "[18-20] 20  BP: (136-173)/(79-92) 173/79      Exam:  /79 (BP Location: Right arm, Patient Position: Lying)   Pulse 101   Temp 98.5 °F (36.9 °C) (Oral)   Resp 20   Ht 177.8 cm (70\")   Wt (!) 156 kg (345 lb)   SpO2 95%   BMI 49.50 kg/m²     General Appearance:    Alert, cooperative, no distress, appears stated age   Head:    Normocephalic, without obvious abnormality, atraumatic   Eyes:    PERRL, conjunctivae/corneas clear, EOM's intact, both eyes   Ears:    Normal external ear canals, both ears   Nose:   Nares normal, septum midline, mucosa normal, no drainage    or sinus tenderness   Throat:   Lips, mucosa, and tongue normal   Neck:   Supple, symmetrical, trachea midline, no adenopathy;     thyroid:  no enlargement/tenderness/nodules; no carotid    bruit or JVD   Back:     Symmetric, no curvature, ROM normal, no CVA tenderness   Lungs:     Clear to auscultation bilaterally, respirations unlabored   Chest Wall:    No tenderness or deformity    Heart:    Regular rate and rhythm, S1 and S2 normal, no murmur, rub   or gallop   Abdomen:     Soft, nontender, bowel sounds active all four quadrants,     no masses, no hepatomegaly, no splenomegaly   Extremities:   Extremities normal, atraumatic,   Edema right knee   Pulses:   2+ and symmetric all extremities   Skin:   Skin color, texture, turgor normal, no rashes or lesions    .    Data Review:  Labs in chart were reviewed.  WBC   Date Value Ref Range Status   04/29/2023 8.17 3.40 - 10.80 10*3/mm3 Final     Hemoglobin   Date Value Ref Range Status   04/29/2023 15.3 13.0 - 17.7 g/dL Final     Hematocrit   Date Value Ref Range Status   04/29/2023 46.6 37.5 - 51.0 % Final     Platelets   Date Value Ref Range Status   04/29/2023 205 140 - 450 10*3/mm3 Final     Sodium   Date Value Ref Range Status   04/29/2023 142 136 - 145 mmol/L Final     Potassium   Date Value Ref Range Status   04/29/2023 4.0 3.5 - 5.2 mmol/L Final     Comment:     Slight hemolysis detected by " analyzer. Results may be affected.     Chloride   Date Value Ref Range Status   04/29/2023 105 98 - 107 mmol/L Final     CO2   Date Value Ref Range Status   04/29/2023 27.1 22.0 - 29.0 mmol/L Final     BUN   Date Value Ref Range Status   04/29/2023 11 6 - 20 mg/dL Final     Creatinine   Date Value Ref Range Status   04/29/2023 1.24 0.76 - 1.27 mg/dL Final     Glucose   Date Value Ref Range Status   04/29/2023 104 (H) 65 - 99 mg/dL Final     Calcium   Date Value Ref Range Status   04/29/2023 9.4 8.6 - 10.5 mg/dL Final     AST (SGOT)   Date Value Ref Range Status   04/29/2023 22 1 - 40 U/L Final     ALT (SGPT)   Date Value Ref Range Status   04/29/2023 27 1 - 41 U/L Final     Alkaline Phosphatase   Date Value Ref Range Status   04/29/2023 79 39 - 117 U/L Final                Imaging Results (All)     Procedure Component Value Units Date/Time    XR Knee 1 or 2 View Right [[id number]] Collected: 04/29/23 1537     Updated: 04/29/23 1602    Narrative:      TWO-VIEW RIGHT KNEE     HISTORY: Knee pain. No trauma.     FINDINGS: There are mild-to-moderate changes predominantly at the  patellofemoral compartment as also noted on the recent exam dated  03/23/2022. There appears to be a small to moderate joint effusion that  is larger on today's examination.     This report was finalized on 4/29/2023 3:38 PM by Dr. Leonid Petersen M.D.               Assessment:  Active Hospital Problems    Diagnosis  POA   • **Acute pain of right knee [M25.561]  Yes      Resolved Hospital Problems   No resolved problems to display.   right knee effusion  Obesity   Hypertension  Hypothyroidism  hyperglycemia    Plan:  Will have the knee aspirated by dr carlos in the am  He requested to hold off on antibiotics  Hold xarelto  Pain control  dw patient and ED provider as well as his nurse    Radha Starks MD  4/29/2023  21:01 EDT

## 2023-05-01 LAB
ANION GAP SERPL CALCULATED.3IONS-SCNC: 8 MMOL/L (ref 5–15)
BUN SERPL-MCNC: 15 MG/DL (ref 6–20)
BUN/CREAT SERPL: 11.7 (ref 7–25)
CALCIUM SPEC-SCNC: 8.5 MG/DL (ref 8.6–10.5)
CHLORIDE SERPL-SCNC: 106 MMOL/L (ref 98–107)
CO2 SERPL-SCNC: 28 MMOL/L (ref 22–29)
CREAT SERPL-MCNC: 1.28 MG/DL (ref 0.76–1.27)
DEPRECATED RDW RBC AUTO: 44.1 FL (ref 37–54)
EGFRCR SERPLBLD CKD-EPI 2021: 67.3 ML/MIN/1.73
ERYTHROCYTE [DISTWIDTH] IN BLOOD BY AUTOMATED COUNT: 13 % (ref 12.3–15.4)
GLUCOSE SERPL-MCNC: 114 MG/DL (ref 65–99)
HCT VFR BLD AUTO: 43.3 % (ref 37.5–51)
HGB BLD-MCNC: 13.9 G/DL (ref 13–17.7)
MCH RBC QN AUTO: 30 PG (ref 26.6–33)
MCHC RBC AUTO-ENTMCNC: 32.1 G/DL (ref 31.5–35.7)
MCV RBC AUTO: 93.3 FL (ref 79–97)
PLATELET # BLD AUTO: 195 10*3/MM3 (ref 140–450)
PMV BLD AUTO: 10.1 FL (ref 6–12)
POTASSIUM SERPL-SCNC: 4.1 MMOL/L (ref 3.5–5.2)
RBC # BLD AUTO: 4.64 10*6/MM3 (ref 4.14–5.8)
SODIUM SERPL-SCNC: 142 MMOL/L (ref 136–145)
WBC NRBC COR # BLD: 7.92 10*3/MM3 (ref 3.4–10.8)

## 2023-05-01 PROCEDURE — G0378 HOSPITAL OBSERVATION PER HR: HCPCS

## 2023-05-01 PROCEDURE — 94664 DEMO&/EVAL PT USE INHALER: CPT

## 2023-05-01 PROCEDURE — 96366 THER/PROPH/DIAG IV INF ADDON: CPT

## 2023-05-01 PROCEDURE — 85027 COMPLETE CBC AUTOMATED: CPT | Performed by: INTERNAL MEDICINE

## 2023-05-01 PROCEDURE — 25010000002 PIPERACILLIN SOD-TAZOBACTAM PER 1 G: Performed by: INTERNAL MEDICINE

## 2023-05-01 PROCEDURE — 96368 THER/DIAG CONCURRENT INF: CPT

## 2023-05-01 PROCEDURE — 25010000002 VANCOMYCIN 10 G RECONSTITUTED SOLUTION: Performed by: INTERNAL MEDICINE

## 2023-05-01 PROCEDURE — 80048 BASIC METABOLIC PNL TOTAL CA: CPT | Performed by: INTERNAL MEDICINE

## 2023-05-01 PROCEDURE — 94799 UNLISTED PULMONARY SVC/PX: CPT

## 2023-05-01 PROCEDURE — 96367 TX/PROPH/DG ADDL SEQ IV INF: CPT

## 2023-05-01 RX ADMIN — HYDROCODONE BITARTRATE AND ACETAMINOPHEN 1 TABLET: 5; 325 TABLET ORAL at 02:52

## 2023-05-01 RX ADMIN — HYDROCODONE BITARTRATE AND ACETAMINOPHEN 1 TABLET: 5; 325 TABLET ORAL at 20:08

## 2023-05-01 RX ADMIN — VANCOMYCIN HYDROCHLORIDE 1250 MG: 10 INJECTION, POWDER, LYOPHILIZED, FOR SOLUTION INTRAVENOUS at 01:32

## 2023-05-01 RX ADMIN — TAZOBACTAM SODIUM AND PIPERACILLIN SODIUM 4.5 G: 500; 4 INJECTION, SOLUTION INTRAVENOUS at 22:23

## 2023-05-01 RX ADMIN — MONTELUKAST SODIUM 10 MG: 10 TABLET, FILM COATED ORAL at 20:08

## 2023-05-01 RX ADMIN — HYDROCODONE BITARTRATE AND ACETAMINOPHEN 1 TABLET: 5; 325 TABLET ORAL at 09:06

## 2023-05-01 RX ADMIN — TAZOBACTAM SODIUM AND PIPERACILLIN SODIUM 4.5 G: 500; 4 INJECTION, SOLUTION INTRAVENOUS at 05:08

## 2023-05-01 RX ADMIN — LEVOTHYROXINE SODIUM 112 MCG: 0.11 TABLET ORAL at 05:50

## 2023-05-01 RX ADMIN — BUDESONIDE AND FORMOTEROL FUMARATE DIHYDRATE 2 PUFF: 160; 4.5 AEROSOL RESPIRATORY (INHALATION) at 21:57

## 2023-05-01 RX ADMIN — LISINOPRIL 40 MG: 40 TABLET ORAL at 08:59

## 2023-05-01 RX ADMIN — PANTOPRAZOLE SODIUM 40 MG: 40 TABLET, DELAYED RELEASE ORAL at 05:50

## 2023-05-01 RX ADMIN — CETIRIZINE HYDROCHLORIDE 10 MG: 10 TABLET ORAL at 08:58

## 2023-05-01 RX ADMIN — VANCOMYCIN HYDROCHLORIDE 1250 MG: 10 INJECTION, POWDER, LYOPHILIZED, FOR SOLUTION INTRAVENOUS at 13:19

## 2023-05-01 RX ADMIN — BUDESONIDE AND FORMOTEROL FUMARATE DIHYDRATE 2 PUFF: 160; 4.5 AEROSOL RESPIRATORY (INHALATION) at 09:45

## 2023-05-01 RX ADMIN — TAZOBACTAM SODIUM AND PIPERACILLIN SODIUM 4.5 G: 500; 4 INJECTION, SOLUTION INTRAVENOUS at 12:44

## 2023-05-01 RX ADMIN — METOPROLOL SUCCINATE 75 MG: 25 TABLET, EXTENDED RELEASE ORAL at 08:59

## 2023-05-01 RX ADMIN — GABAPENTIN 300 MG: 300 CAPSULE ORAL at 08:58

## 2023-05-01 NOTE — PROGRESS NOTES
"DAILY PROGRESS NOTE  [facility]    Patient Identification:  Name: Grant Edge Jr.  Age: 52 y.o.  Sex: male  :  1970  MRN: [id number]         Primary Care Physician: Jim Joseph APRN    Subjective:  Interval History:right knee pain    Objective:    Scheduled Meds:budesonide-formoterol, 2 puff, Inhalation, BID - RT  cetirizine, 10 mg, Oral, Daily  gabapentin, 300 mg, Oral, Daily  levothyroxine, 112 mcg, Oral, Q AM  lisinopril, 40 mg, Oral, Q24H  metoprolol succinate XL, 75 mg, Oral, Daily  montelukast, 10 mg, Oral, Nightly  pantoprazole, 40 mg, Oral, Q AM  piperacillin-tazobactam, 4.5 g, Intravenous, Q8H  vancomycin, 1,250 mg, Intravenous, Q12H      Continuous Infusions:Pharmacy to dose vancomycin,         Vital signs in last 24 hours:  Temp:  [97.8 °F (36.6 °C)-99.5 °F (37.5 °C)] 98.2 °F (36.8 °C)  Heart Rate:  [79-90] 86  Resp:  [16-20] 20  BP: (114-168)/(70-85) 144/77    Intake/Output:    Intake/Output Summary (Last 24 hours) at 2023 1229  Last data filed at 2023 1024  Gross per 24 hour   Intake 1060 ml   Output 400 ml   Net 660 ml       Exam:  /77 (BP Location: Left arm, Patient Position: Sitting)   Pulse 86   Temp 98.2 °F (36.8 °C) (Oral)   Resp 20   Ht 177.8 cm (70\")   Wt (!) 156 kg (345 lb)   SpO2 93%   BMI 49.50 kg/m²     General Appearance:    Alert, cooperative, no distress   Head:    Normocephalic, without obvious abnormality, atraumatic   Eyes:       Throat:   Lips, tongue, gums normal   Neck:   Supple, symmetrical, trachea midline, no JVD   Lungs:     Clear to auscultation bilaterally, respirations unlabored   Chest Wall:    No tenderness or deformity    Heart:    Regular rate and rhythm, S1 and S2 normal, no murmur,no  Rub or gallop   Abdomen:     Soft, nontender, bowel sounds active, no masses, no organomegaly    Extremities:   Extremities normal,right  knee tender, no cyanosis or edema   Pulses:      Skin:   Skin is warm and dry,  no rashes or palpable " lesions   Neurologic:   no focal deficits noted      Lab Results (last 72 hours)     Procedure Component Value Units Date/Time    Body Fluid Culture - Body Fluid, Knee, Right [[id number]] Collected: 04/30/23 0855    Specimen: Body Fluid from Knee, Right Updated: 05/01/23 0708     Body Fluid Culture No growth     Gram Stain Few (2+) WBCs seen      No organisms seen    Basic Metabolic Panel [[id number]]  (Abnormal) Collected: 05/01/23 0436    Specimen: Blood Updated: 05/01/23 0517     Glucose 114 mg/dL      BUN 15 mg/dL      Creatinine 1.28 mg/dL      Sodium 142 mmol/L      Potassium 4.1 mmol/L      Chloride 106 mmol/L      CO2 28.0 mmol/L      Calcium 8.5 mg/dL      BUN/Creatinine Ratio 11.7     Anion Gap 8.0 mmol/L      eGFR 67.3 mL/min/1.73     Narrative:      GFR Normal >60  Chronic Kidney Disease <60  Kidney Failure <15      CBC (No Diff) [[id number]]  (Normal) Collected: 05/01/23 0436    Specimen: Blood Updated: 05/01/23 0505     WBC 7.92 10*3/mm3      RBC 4.64 10*6/mm3      Hemoglobin 13.9 g/dL      Hematocrit 43.3 %      MCV 93.3 fL      MCH 30.0 pg      MCHC 32.1 g/dL      RDW 13.0 %      RDW-SD 44.1 fl      MPV 10.1 fL      Platelets 195 10*3/mm3     Blood Culture - Blood, Arm, Right [[id number]]  (Normal) Collected: 04/29/23 1541    Specimen: Blood from Arm, Right Updated: 04/30/23 1600     Blood Culture No growth at 24 hours    Blood Culture - Blood, Arm, Left [[id number]]  (Normal) Collected: 04/29/23 1514    Specimen: Blood from Arm, Left Updated: 04/30/23 1531     Blood Culture No growth at 24 hours    Crystal Exam, Fluid - Synovial Fluid, [[id number]] Collected: 04/30/23 0855    Specimen: Synovial Fluid Updated: 04/30/23 1104     Crystals, Fluid No crystals seen    Body Fluid Cell Count With Differential - Body Fluid, Knee, Right [[id number]]  (Abnormal) Collected: 04/30/23 0918    Specimen: Body Fluid from Knee, Right Updated: 04/30/23 1024    Narrative:      The following orders were created for panel order  Body Fluid Cell Count With Differential - Body Fluid, Knee, Right.  Procedure                               Abnormality         Status                     ---------                               -----------         ------                     Body fluid cell count - ...[[id number]]  Abnormal            Final result               Body fluid differential ...[[id number]]                      Final result                 Please view results for these tests on the individual orders.    Body fluid differential - Body Fluid, Knee, Right [[id number]] Collected: 04/30/23 0918    Specimen: Body Fluid from Knee, Right Updated: 04/30/23 1024     Neutrophils, Fluid 89 %      Lymphocytes, Fluid 5 %      Monocytes, Fluid 6 %     Body fluid cell count - Body Fluid, Knee, Right [[id number]]  (Abnormal) Collected: 04/30/23 0918    Specimen: Body Fluid from Knee, Right Updated: 04/30/23 0953     Color, Fluid Yellow     Appearance, Fluid Cloudy     RBC, Fluid 5,000 /mm3      Nucleated Cells, Fluid 45,220 /mm3      Method: Automated Sysmex XN Method    Narrative:      No reference range established. Physician to interpret results with clinical findings.    CBC Auto Differential [[id number]]  (Abnormal) Collected: 04/30/23 0557    Specimen: Blood Updated: 04/30/23 0654     WBC 8.67 10*3/mm3      RBC 4.76 10*6/mm3      Hemoglobin 14.4 g/dL      Hematocrit 43.0 %      MCV 90.3 fL      MCH 30.3 pg      MCHC 33.5 g/dL      RDW 12.7 %      RDW-SD 41.8 fl      MPV 10.1 fL      Platelets 202 10*3/mm3      Neutrophil % 63.7 %      Lymphocyte % 17.2 %      Monocyte % 16.1 %      Eosinophil % 1.7 %      Basophil % 0.6 %      Immature Grans % 0.7 %      Neutrophils, Absolute 5.52 10*3/mm3      Lymphocytes, Absolute 1.49 10*3/mm3      Monocytes, Absolute 1.40 10*3/mm3      Eosinophils, Absolute 0.15 10*3/mm3      Basophils, Absolute 0.05 10*3/mm3      Immature Grans, Absolute 0.06 10*3/mm3      nRBC 0.0 /100 WBC     Basic Metabolic Panel [[id number]]   (Abnormal) Collected: 04/30/23 0515    Specimen: Blood Updated: 04/30/23 0616     Glucose 117 mg/dL      BUN 12 mg/dL      Creatinine 1.29 mg/dL      Sodium 140 mmol/L      Potassium 4.2 mmol/L      Chloride 103 mmol/L      CO2 26.9 mmol/L      Calcium 9.3 mg/dL      BUN/Creatinine Ratio 9.3     Anion Gap 10.1 mmol/L      eGFR 66.7 mL/min/1.73     Narrative:      GFR Normal >60  Chronic Kidney Disease <60  Kidney Failure <15      Comprehensive Metabolic Panel [[id number]]  (Abnormal) Collected: 04/29/23 1514    Specimen: Blood Updated: 04/29/23 1544     Glucose 104 mg/dL      BUN 11 mg/dL      Creatinine 1.24 mg/dL      Sodium 142 mmol/L      Potassium 4.0 mmol/L      Comment: Slight hemolysis detected by analyzer. Results may be affected.        Chloride 105 mmol/L      CO2 27.1 mmol/L      Calcium 9.4 mg/dL      Total Protein 6.9 g/dL      Albumin 3.9 g/dL      ALT (SGPT) 27 U/L      AST (SGOT) 22 U/L      Alkaline Phosphatase 79 U/L      Total Bilirubin 1.7 mg/dL      Globulin 3.0 gm/dL      A/G Ratio 1.3 g/dL      BUN/Creatinine Ratio 8.9     Anion Gap 9.9 mmol/L      eGFR 70.0 mL/min/1.73     Narrative:      GFR Normal >60  Chronic Kidney Disease <60  Kidney Failure <15      C-reactive Protein [[id number]]  (Abnormal) Collected: 04/29/23 1514    Specimen: Blood Updated: 04/29/23 1544     C-Reactive Protein 7.19 mg/dL     Lactic Acid, Plasma [[id number]]  (Normal) Collected: 04/29/23 1514    Specimen: Blood Updated: 04/29/23 1541     Lactate 1.3 mmol/L     Sedimentation Rate [[id number]]  (Abnormal) Collected: 04/29/23 1514    Specimen: Blood Updated: 04/29/23 1527     Sed Rate 24 mm/hr     CBC & Differential [[id number]]  (Abnormal) Collected: 04/29/23 1514    Specimen: Blood Updated: 04/29/23 1525    Narrative:      The following orders were created for panel order CBC & Differential.  Procedure                               Abnormality         Status                     ---------                                -----------         ------                     CBC Auto Differential[[id number]]        Abnormal            Final result                 Please view results for these tests on the individual orders.    CBC Auto Differential [[id number]]  (Abnormal) Collected: 04/29/23 1514    Specimen: Blood Updated: 04/29/23 1525     WBC 8.17 10*3/mm3      RBC 5.02 10*6/mm3      Hemoglobin 15.3 g/dL      Hematocrit 46.6 %      MCV 92.8 fL      MCH 30.5 pg      MCHC 32.8 g/dL      RDW 13.3 %      RDW-SD 45.4 fl      MPV 9.9 fL      Platelets 205 10*3/mm3      Neutrophil % 70.8 %      Lymphocyte % 13.7 %      Monocyte % 12.9 %      Eosinophil % 1.2 %      Basophil % 0.5 %      Immature Grans % 0.9 %      Neutrophils, Absolute 5.79 10*3/mm3      Lymphocytes, Absolute 1.12 10*3/mm3      Monocytes, Absolute 1.05 10*3/mm3      Eosinophils, Absolute 0.10 10*3/mm3      Basophils, Absolute 0.04 10*3/mm3      Immature Grans, Absolute 0.07 10*3/mm3      nRBC 0.0 /100 WBC         Data Review:  Results from last 7 days   Lab Units 05/01/23  0436 04/30/23  0515 04/29/23  1514   SODIUM mmol/L 142 140 142   POTASSIUM mmol/L 4.1 4.2 4.0   CHLORIDE mmol/L 106 103 105   CO2 mmol/L 28.0 26.9 27.1   BUN mg/dL 15 12 11   CREATININE mg/dL 1.28* 1.29* 1.24   GLUCOSE mg/dL 114* 117* 104*   CALCIUM mg/dL 8.5* 9.3 9.4     Results from last 7 days   Lab Units 05/01/23  0436 04/30/23  0557 04/29/23  1514   WBC 10*3/mm3 7.92 8.67 8.17   HEMOGLOBIN g/dL 13.9 14.4 15.3   HEMATOCRIT % 43.3 43.0 46.6   PLATELETS 10*3/mm3 195 202 205             Lab Results   Lab Value Date/Time    TROPONINT <0.010 01/06/2020 0929         Results from last 7 days   Lab Units 04/29/23  1514   ALK PHOS U/L 79   BILIRUBIN mg/dL 1.7*   ALT (SGPT) U/L 27   AST (SGOT) U/L 22             No results found for: POCGLU        Past Medical History:   Diagnosis Date   • Abnormal ECG    • Allergic    • Arthritis    • Chest pain    • CTS (carpal tunnel syndrome) A few months.   • DVT (deep venous  thrombosis)    • GERD (gastroesophageal reflux disease)    • Hip arthrosis    • Hyperlipidemia    • Hypertension    • Hypokalemia    • Knee swelling    • Myocardial infarction    • Obesity    • Periarthritis of shoulder    • Simple goiter    • Sinus bradycardia    • Vitamin D deficiency        Assessment:  Active Hospital Problems    Diagnosis  POA   • **Acute pain of right knee [M25.561]  Yes   • Essential hypertension [I10]  Yes   • Hypothyroidism [E03.9]  Yes      Resolved Hospital Problems   No resolved problems to display.       Plan:  Ortho consult noted. Watch another day. Follow lab.    Boris Armas MD  5/1/2023  12:29 EDT

## 2023-05-01 NOTE — PROGRESS NOTES
Cultures from the knee remain negative.  He does state he feels a little bit better.  Cell count below 50,000 neutrophils.  This does not appear to be positive for infection.  However, I am not confident enough to give him an intra-articular steroid injection.  I would probably watch him another day and if he does not continue to improve, I may consider another knee aspiration.

## 2023-05-01 NOTE — PLAN OF CARE
Goal Outcome Evaluation:   Knee effusion post aspiration, VSS. Pain controlled with PO medications. IB ABX given per order. Ambulating with assist x1 and walker to the bathroom. Awaiting final results of aspiration. WCTM.

## 2023-05-01 NOTE — PLAN OF CARE
Goal Outcome Evaluation:   Dr. Zheng into aspirate patient's right knee this AM. Specimen fluid collected and sent to lab. VSS. PT up with cane and assist x 1, unsteady gait to BR. PT refused to use urinal. IV antibiotics started. Pain managed with PO PRN meds. WCTM.

## 2023-05-01 NOTE — CASE MANAGEMENT/SOCIAL WORK
Discharge Planning Assessment  [facility]     Patient Name: Grant Edge Jr.  MRN: [id number]  Today's Date: 5/1/2023    Admit Date: 4/29/2023    Plan: Return home with family assist. Denies needs   Discharge Needs Assessment     Row Name 05/01/23 1408       Living Environment    People in Home spouse    Current Living Arrangements home    Primary Care Provided by self    Provides Primary Care For no one    Family Caregiver if Needed spouse;parent(s)    Quality of Family Relationships helpful    Able to Return to Prior Arrangements yes       Transition Planning    Patient/Family Anticipates Transition to home with family    Patient/Family Anticipated Services at Transition none    Transportation Anticipated family or friend will provide       Discharge Needs Assessment    Equipment Currently Used at Home none    Concerns to be Addressed denies needs/concerns at this time    Anticipated Changes Related to Illness none               Discharge Plan     Row Name 05/01/23 1409       Plan    Plan Return home with family assist. Denies needs    Patient/Family in Agreement with Plan yes    Plan Comments CCP introduced self and explained role. Patient lives with his wife. Their home has 10 steps to enter with a handrail and bedroom is on the first floor. Patient drives and is IADL at baseline but states his wife can also provide transportation and assistance as needed (including with stairs) at home as needed. Patient denies any AD/LW documents, sees Jim Joseph for PCP, and fills prescriptions at [facility] on [facility]. Patient has a CPAP that he does not use, has used HH in the past but cannot recall which agency, and denies any JUANIS history. At discharge he plans to either return home with his wife or go to his mom's. His family can provide transportation and assist him at home as needed. He denies any discharge planning needs at this time. Tawnya LEOS RN/CCP              Continued Care and Services -  Admitted Since 4/29/2023    Coordination has not been started for this encounter.          Demographic Summary     Row Name 05/01/23 1408       General Information    Admission Type observation    Arrived From home    Referral Source admission list    Reason for Consult discharge planning               Functional Status     Row Name 05/01/23 1408       Functional Status    Usual Activity Tolerance good    Current Activity Tolerance moderate       Functional Status, IADL    Medications independent    Meal Preparation independent    Housekeeping independent    Laundry independent    Shopping independent               Psychosocial    No documentation.                Abuse/Neglect    No documentation.                Legal    No documentation.                Substance Abuse    No documentation.                Patient Forms    No documentation.                   Carmen Glover

## 2023-05-02 LAB
ANION GAP SERPL CALCULATED.3IONS-SCNC: 8.9 MMOL/L (ref 5–15)
APPEARANCE FLD: ABNORMAL
BASOPHILS # BLD AUTO: 0.05 10*3/MM3 (ref 0–0.2)
BASOPHILS NFR BLD AUTO: 0.7 % (ref 0–1.5)
BUN SERPL-MCNC: 16 MG/DL (ref 6–20)
BUN/CREAT SERPL: 11.1 (ref 7–25)
CALCIUM SPEC-SCNC: 9.2 MG/DL (ref 8.6–10.5)
CHLORIDE SERPL-SCNC: 108 MMOL/L (ref 98–107)
CO2 SERPL-SCNC: 29.1 MMOL/L (ref 22–29)
COLOR FLD: ABNORMAL
CREAT SERPL-MCNC: 1.44 MG/DL (ref 0.76–1.27)
CRP SERPL-MCNC: 12.85 MG/DL (ref 0–0.5)
CRYSTALS FLD MICRO: NORMAL
DEPRECATED RDW RBC AUTO: 44.2 FL (ref 37–54)
EGFRCR SERPLBLD CKD-EPI 2021: 58.5 ML/MIN/1.73
EOSINOPHIL # BLD AUTO: 0.29 10*3/MM3 (ref 0–0.4)
EOSINOPHIL NFR BLD AUTO: 4 % (ref 0.3–6.2)
ERYTHROCYTE [DISTWIDTH] IN BLOOD BY AUTOMATED COUNT: 12.9 % (ref 12.3–15.4)
ERYTHROCYTE [SEDIMENTATION RATE] IN BLOOD: 43 MM/HR (ref 0–20)
GLUCOSE SERPL-MCNC: 112 MG/DL (ref 65–99)
HCT VFR BLD AUTO: 42.6 % (ref 37.5–51)
HGB BLD-MCNC: 13.6 G/DL (ref 13–17.7)
IMM GRANULOCYTES # BLD AUTO: 0.06 10*3/MM3 (ref 0–0.05)
IMM GRANULOCYTES NFR BLD AUTO: 0.8 % (ref 0–0.5)
LYMPHOCYTES # BLD AUTO: 1.69 10*3/MM3 (ref 0.7–3.1)
LYMPHOCYTES NFR BLD AUTO: 23 % (ref 19.6–45.3)
LYMPHOCYTES NFR FLD MANUAL: 3 %
MCH RBC QN AUTO: 29.9 PG (ref 26.6–33)
MCHC RBC AUTO-ENTMCNC: 31.9 G/DL (ref 31.5–35.7)
MCV RBC AUTO: 93.6 FL (ref 79–97)
METHOD: ABNORMAL
MONOCYTES # BLD AUTO: 0.82 10*3/MM3 (ref 0.1–0.9)
MONOCYTES NFR BLD AUTO: 11.2 % (ref 5–12)
MONOCYTES NFR FLD: 3 %
MONOS+MACROS NFR FLD: 7 %
NEUTROPHILS NFR BLD AUTO: 4.43 10*3/MM3 (ref 1.7–7)
NEUTROPHILS NFR BLD AUTO: 60.3 % (ref 42.7–76)
NEUTROPHILS NFR FLD MANUAL: 87 %
NRBC BLD AUTO-RTO: 0.1 /100 WBC (ref 0–0.2)
NUC CELL # FLD: ABNORMAL /MM3
PLATELET # BLD AUTO: 234 10*3/MM3 (ref 140–450)
PMV BLD AUTO: 10 FL (ref 6–12)
POTASSIUM SERPL-SCNC: 4.3 MMOL/L (ref 3.5–5.2)
RBC # BLD AUTO: 4.55 10*6/MM3 (ref 4.14–5.8)
RBC # FLD AUTO: ABNORMAL /MM3
SODIUM SERPL-SCNC: 146 MMOL/L (ref 136–145)
VANCOMYCIN TROUGH SERPL-MCNC: 9.9 MCG/ML (ref 5–20)
WBC NRBC COR # BLD: 7.34 10*3/MM3 (ref 3.4–10.8)

## 2023-05-02 PROCEDURE — 96366 THER/PROPH/DIAG IV INF ADDON: CPT

## 2023-05-02 PROCEDURE — G0378 HOSPITAL OBSERVATION PER HR: HCPCS

## 2023-05-02 PROCEDURE — 85652 RBC SED RATE AUTOMATED: CPT | Performed by: HOSPITALIST

## 2023-05-02 PROCEDURE — 80202 ASSAY OF VANCOMYCIN: CPT | Performed by: HOSPITALIST

## 2023-05-02 PROCEDURE — 25010000002 PIPERACILLIN SOD-TAZOBACTAM PER 1 G: Performed by: INTERNAL MEDICINE

## 2023-05-02 PROCEDURE — 87205 SMEAR GRAM STAIN: CPT | Performed by: ORTHOPAEDIC SURGERY

## 2023-05-02 PROCEDURE — 94761 N-INVAS EAR/PLS OXIMETRY MLT: CPT

## 2023-05-02 PROCEDURE — 25010000002 VANCOMYCIN 750 MG RECONSTITUTED SOLUTION 1 EACH VIAL: Performed by: INTERNAL MEDICINE

## 2023-05-02 PROCEDURE — 87070 CULTURE OTHR SPECIMN AEROBIC: CPT | Performed by: ORTHOPAEDIC SURGERY

## 2023-05-02 PROCEDURE — 86140 C-REACTIVE PROTEIN: CPT | Performed by: HOSPITALIST

## 2023-05-02 PROCEDURE — 94799 UNLISTED PULMONARY SVC/PX: CPT

## 2023-05-02 PROCEDURE — 89060 EXAM SYNOVIAL FLUID CRYSTALS: CPT | Performed by: ORTHOPAEDIC SURGERY

## 2023-05-02 PROCEDURE — 85025 COMPLETE CBC W/AUTO DIFF WBC: CPT | Performed by: HOSPITALIST

## 2023-05-02 PROCEDURE — 94664 DEMO&/EVAL PT USE INHALER: CPT

## 2023-05-02 PROCEDURE — 80048 BASIC METABOLIC PNL TOTAL CA: CPT | Performed by: HOSPITALIST

## 2023-05-02 PROCEDURE — 89051 BODY FLUID CELL COUNT: CPT | Performed by: ORTHOPAEDIC SURGERY

## 2023-05-02 RX ADMIN — VANCOMYCIN HYDROCHLORIDE 750 MG: 750 INJECTION, POWDER, LYOPHILIZED, FOR SOLUTION INTRAVENOUS at 16:34

## 2023-05-02 RX ADMIN — BUDESONIDE AND FORMOTEROL FUMARATE DIHYDRATE 2 PUFF: 160; 4.5 AEROSOL RESPIRATORY (INHALATION) at 20:53

## 2023-05-02 RX ADMIN — MONTELUKAST SODIUM 10 MG: 10 TABLET, FILM COATED ORAL at 21:37

## 2023-05-02 RX ADMIN — BUDESONIDE AND FORMOTEROL FUMARATE DIHYDRATE 2 PUFF: 160; 4.5 AEROSOL RESPIRATORY (INHALATION) at 09:43

## 2023-05-02 RX ADMIN — HYDROCODONE BITARTRATE AND ACETAMINOPHEN 1 TABLET: 5; 325 TABLET ORAL at 08:57

## 2023-05-02 RX ADMIN — HYDROCODONE BITARTRATE AND ACETAMINOPHEN 1 TABLET: 5; 325 TABLET ORAL at 02:33

## 2023-05-02 RX ADMIN — GABAPENTIN 300 MG: 300 CAPSULE ORAL at 08:53

## 2023-05-02 RX ADMIN — CETIRIZINE HYDROCHLORIDE 10 MG: 10 TABLET ORAL at 08:53

## 2023-05-02 RX ADMIN — TAZOBACTAM SODIUM AND PIPERACILLIN SODIUM 4.5 G: 500; 4 INJECTION, SOLUTION INTRAVENOUS at 13:35

## 2023-05-02 RX ADMIN — LEVOTHYROXINE SODIUM 112 MCG: 0.11 TABLET ORAL at 05:38

## 2023-05-02 RX ADMIN — PANTOPRAZOLE SODIUM 40 MG: 40 TABLET, DELAYED RELEASE ORAL at 05:38

## 2023-05-02 RX ADMIN — VANCOMYCIN HYDROCHLORIDE 750 MG: 750 INJECTION, POWDER, LYOPHILIZED, FOR SOLUTION INTRAVENOUS at 02:41

## 2023-05-02 RX ADMIN — TAZOBACTAM SODIUM AND PIPERACILLIN SODIUM 4.5 G: 500; 4 INJECTION, SOLUTION INTRAVENOUS at 21:37

## 2023-05-02 RX ADMIN — LISINOPRIL 40 MG: 40 TABLET ORAL at 08:54

## 2023-05-02 RX ADMIN — TAZOBACTAM SODIUM AND PIPERACILLIN SODIUM 4.5 G: 500; 4 INJECTION, SOLUTION INTRAVENOUS at 05:36

## 2023-05-02 RX ADMIN — HYDROCODONE BITARTRATE AND ACETAMINOPHEN 1 TABLET: 5; 325 TABLET ORAL at 21:46

## 2023-05-02 RX ADMIN — METOPROLOL SUCCINATE 75 MG: 25 TABLET, EXTENDED RELEASE ORAL at 08:53

## 2023-05-02 RX ADMIN — HYDROCODONE BITARTRATE AND ACETAMINOPHEN 1 TABLET: 5; 325 TABLET ORAL at 15:43

## 2023-05-02 NOTE — PROGRESS NOTES
[facility] Clinical Pharmacy Services: Vancomycin Level Monitoring Note    Grant Edge Jr. is a 52 y.o. male who is on day 3/7 of pharmacy to dose vancomycin for Bone and/or Joint Infection.    Estimated Creatinine Clearance: 101.2 mL/min (A) (by C-G formula based on SCr of 1.28 mg/dL (H)).    Current Vanc Dose: 750 mg IV every  8  hours  Results from last 7 days   Lab Units 05/02/23  0118   VANCOMYCIN TR mcg/mL 9.90   Predicted AUC at current dose:424 mg/L.hr  Next Level Date and Time: Vanc Trough on 5/3 @ 1000    Previous regimen provided an appropriate AUC but the trough was less than 10.  Will change dose slightly to ensure trough stays above 10 in an attempt to prevent resistance development.    Pharmacy is continuing to monitor and will adjust as needed.    Joseph Lloyd III, [facility]  Clinical Pharmacist

## 2023-05-02 NOTE — PROGRESS NOTES
"DAILY PROGRESS NOTE  [facility]    Patient Identification:  Name: Grant Edge Jr.  Age: 52 y.o.  Sex: male  :  1970  MRN: [id number]         Primary Care Physician: Jim Joseph APRN    Subjective:  Interval History:right knee pain    Objective:    Scheduled Meds:budesonide-formoterol, 2 puff, Inhalation, BID - RT  cetirizine, 10 mg, Oral, Daily  gabapentin, 300 mg, Oral, Daily  levothyroxine, 112 mcg, Oral, Q AM  lisinopril, 40 mg, Oral, Q24H  metoprolol succinate XL, 75 mg, Oral, Daily  montelukast, 10 mg, Oral, Nightly  pantoprazole, 40 mg, Oral, Q AM  piperacillin-tazobactam, 4.5 g, Intravenous, Q8H  vancomycin, 750 mg, Intravenous, Q8H      Continuous Infusions:Pharmacy to dose vancomycin,         Vital signs in last 24 hours:  Temp:  [98 °F (36.7 °C)-98.3 °F (36.8 °C)] 98 °F (36.7 °C)  Heart Rate:  [82-89] 84  Resp:  [16-20] 18  BP: (130-133)/(77-86) 133/80    Intake/Output:    Intake/Output Summary (Last 24 hours) at 2023 1508  Last data filed at 2023 0900  Gross per 24 hour   Intake 290 ml   Output 150 ml   Net 140 ml       Exam:  /80 (BP Location: Left arm, Patient Position: Lying)   Pulse 84   Temp 98 °F (36.7 °C) (Oral)   Resp 18   Ht 177.8 cm (70\")   Wt (!) 156 kg (345 lb)   SpO2 94%   BMI 49.50 kg/m²     General Appearance:    Alert, cooperative, no distress   Head:    Normocephalic, without obvious abnormality, atraumatic   Eyes:       Throat:   Lips, tongue, gums normal   Neck:   Supple, symmetrical, trachea midline, no JVD   Lungs:     Clear to auscultation bilaterally, respirations unlabored   Chest Wall:    No tenderness or deformity    Heart:    Regular rate and rhythm, S1 and S2 normal, no murmur,no  Rub or gallop   Abdomen:     Soft, nontender, bowel sounds active, no masses, no organomegaly    Extremities:   Extremities normal,right  knee tender, no cyanosis or edema   Pulses:      Skin:   Skin is warm and dry,  no rashes or palpable lesions "   Neurologic:   no focal deficits noted      Lab Results (last 72 hours)     Procedure Component Value Units Date/Time    Body Fluid Culture - Body Fluid, Knee, Right [[id number]] Collected: 04/30/23 0855    Specimen: Body Fluid from Knee, Right Updated: 05/01/23 0708     Body Fluid Culture No growth     Gram Stain Few (2+) WBCs seen      No organisms seen    Basic Metabolic Panel [[id number]]  (Abnormal) Collected: 05/01/23 0436    Specimen: Blood Updated: 05/01/23 0517     Glucose 114 mg/dL      BUN 15 mg/dL      Creatinine 1.28 mg/dL      Sodium 142 mmol/L      Potassium 4.1 mmol/L      Chloride 106 mmol/L      CO2 28.0 mmol/L      Calcium 8.5 mg/dL      BUN/Creatinine Ratio 11.7     Anion Gap 8.0 mmol/L      eGFR 67.3 mL/min/1.73     Narrative:      GFR Normal >60  Chronic Kidney Disease <60  Kidney Failure <15      CBC (No Diff) [[id number]]  (Normal) Collected: 05/01/23 0436    Specimen: Blood Updated: 05/01/23 0505     WBC 7.92 10*3/mm3      RBC 4.64 10*6/mm3      Hemoglobin 13.9 g/dL      Hematocrit 43.3 %      MCV 93.3 fL      MCH 30.0 pg      MCHC 32.1 g/dL      RDW 13.0 %      RDW-SD 44.1 fl      MPV 10.1 fL      Platelets 195 10*3/mm3     Blood Culture - Blood, Arm, Right [[id number]]  (Normal) Collected: 04/29/23 1541    Specimen: Blood from Arm, Right Updated: 04/30/23 1600     Blood Culture No growth at 24 hours    Blood Culture - Blood, Arm, Left [[id number]]  (Normal) Collected: 04/29/23 1514    Specimen: Blood from Arm, Left Updated: 04/30/23 1531     Blood Culture No growth at 24 hours    Crystal Exam, Fluid - Synovial Fluid, [[id number]] Collected: 04/30/23 0855    Specimen: Synovial Fluid Updated: 04/30/23 1104     Crystals, Fluid No crystals seen    Body Fluid Cell Count With Differential - Body Fluid, Knee, Right [[id number]]  (Abnormal) Collected: 04/30/23 0918    Specimen: Body Fluid from Knee, Right Updated: 04/30/23 1024    Narrative:      The following orders were created for panel order Body  Fluid Cell Count With Differential - Body Fluid, Knee, Right.  Procedure                               Abnormality         Status                     ---------                               -----------         ------                     Body fluid cell count - ...[[id number]]  Abnormal            Final result               Body fluid differential ...[[id number]]                      Final result                 Please view results for these tests on the individual orders.    Body fluid differential - Body Fluid, Knee, Right [[id number]] Collected: 04/30/23 0918    Specimen: Body Fluid from Knee, Right Updated: 04/30/23 1024     Neutrophils, Fluid 89 %      Lymphocytes, Fluid 5 %      Monocytes, Fluid 6 %     Body fluid cell count - Body Fluid, Knee, Right [[id number]]  (Abnormal) Collected: 04/30/23 0918    Specimen: Body Fluid from Knee, Right Updated: 04/30/23 0953     Color, Fluid Yellow     Appearance, Fluid Cloudy     RBC, Fluid 5,000 /mm3      Nucleated Cells, Fluid 45,220 /mm3      Method: Automated [employer]obee XN Method    Narrative:      No reference range established. Physician to interpret results with clinical findings.    CBC Auto Differential [[id number]]  (Abnormal) Collected: 04/30/23 0557    Specimen: Blood Updated: 04/30/23 0654     WBC 8.67 10*3/mm3      RBC 4.76 10*6/mm3      Hemoglobin 14.4 g/dL      Hematocrit 43.0 %      MCV 90.3 fL      MCH 30.3 pg      MCHC 33.5 g/dL      RDW 12.7 %      RDW-SD 41.8 fl      MPV 10.1 fL      Platelets 202 10*3/mm3      Neutrophil % 63.7 %      Lymphocyte % 17.2 %      Monocyte % 16.1 %      Eosinophil % 1.7 %      Basophil % 0.6 %      Immature Grans % 0.7 %      Neutrophils, Absolute 5.52 10*3/mm3      Lymphocytes, Absolute 1.49 10*3/mm3      Monocytes, Absolute 1.40 10*3/mm3      Eosinophils, Absolute 0.15 10*3/mm3      Basophils, Absolute 0.05 10*3/mm3      Immature Grans, Absolute 0.06 10*3/mm3      nRBC 0.0 /100 WBC     Basic Metabolic Panel [[id number]]   (Abnormal) Collected: 04/30/23 0515    Specimen: Blood Updated: 04/30/23 0616     Glucose 117 mg/dL      BUN 12 mg/dL      Creatinine 1.29 mg/dL      Sodium 140 mmol/L      Potassium 4.2 mmol/L      Chloride 103 mmol/L      CO2 26.9 mmol/L      Calcium 9.3 mg/dL      BUN/Creatinine Ratio 9.3     Anion Gap 10.1 mmol/L      eGFR 66.7 mL/min/1.73     Narrative:      GFR Normal >60  Chronic Kidney Disease <60  Kidney Failure <15      Comprehensive Metabolic Panel [[id number]]  (Abnormal) Collected: 04/29/23 1514    Specimen: Blood Updated: 04/29/23 1544     Glucose 104 mg/dL      BUN 11 mg/dL      Creatinine 1.24 mg/dL      Sodium 142 mmol/L      Potassium 4.0 mmol/L      Comment: Slight hemolysis detected by analyzer. Results may be affected.        Chloride 105 mmol/L      CO2 27.1 mmol/L      Calcium 9.4 mg/dL      Total Protein 6.9 g/dL      Albumin 3.9 g/dL      ALT (SGPT) 27 U/L      AST (SGOT) 22 U/L      Alkaline Phosphatase 79 U/L      Total Bilirubin 1.7 mg/dL      Globulin 3.0 gm/dL      A/G Ratio 1.3 g/dL      BUN/Creatinine Ratio 8.9     Anion Gap 9.9 mmol/L      eGFR 70.0 mL/min/1.73     Narrative:      GFR Normal >60  Chronic Kidney Disease <60  Kidney Failure <15      C-reactive Protein [[id number]]  (Abnormal) Collected: 04/29/23 1514    Specimen: Blood Updated: 04/29/23 1544     C-Reactive Protein 7.19 mg/dL     Lactic Acid, Plasma [[id number]]  (Normal) Collected: 04/29/23 1514    Specimen: Blood Updated: 04/29/23 1541     Lactate 1.3 mmol/L     Sedimentation Rate [[id number]]  (Abnormal) Collected: 04/29/23 1514    Specimen: Blood Updated: 04/29/23 1527     Sed Rate 24 mm/hr     CBC & Differential [[id number]]  (Abnormal) Collected: 04/29/23 1514    Specimen: Blood Updated: 04/29/23 1525    Narrative:      The following orders were created for panel order CBC & Differential.  Procedure                               Abnormality         Status                     ---------                                -----------         ------                     CBC Auto Differential[[id number]]        Abnormal            Final result                 Please view results for these tests on the individual orders.    CBC Auto Differential [[id number]]  (Abnormal) Collected: 04/29/23 1514    Specimen: Blood Updated: 04/29/23 1525     WBC 8.17 10*3/mm3      RBC 5.02 10*6/mm3      Hemoglobin 15.3 g/dL      Hematocrit 46.6 %      MCV 92.8 fL      MCH 30.5 pg      MCHC 32.8 g/dL      RDW 13.3 %      RDW-SD 45.4 fl      MPV 9.9 fL      Platelets 205 10*3/mm3      Neutrophil % 70.8 %      Lymphocyte % 13.7 %      Monocyte % 12.9 %      Eosinophil % 1.2 %      Basophil % 0.5 %      Immature Grans % 0.9 %      Neutrophils, Absolute 5.79 10*3/mm3      Lymphocytes, Absolute 1.12 10*3/mm3      Monocytes, Absolute 1.05 10*3/mm3      Eosinophils, Absolute 0.10 10*3/mm3      Basophils, Absolute 0.04 10*3/mm3      Immature Grans, Absolute 0.07 10*3/mm3      nRBC 0.0 /100 WBC         Data Review:  Results from last 7 days   Lab Units 05/02/23  0501 05/01/23  0436 04/30/23  0515   SODIUM mmol/L 146* 142 140   POTASSIUM mmol/L 4.3 4.1 4.2   CHLORIDE mmol/L 108* 106 103   CO2 mmol/L 29.1* 28.0 26.9   BUN mg/dL 16 15 12   CREATININE mg/dL 1.44* 1.28* 1.29*   GLUCOSE mg/dL 112* 114* 117*   CALCIUM mg/dL 9.2 8.5* 9.3     Results from last 7 days   Lab Units 05/02/23  0501 05/01/23  0436 04/30/23  0557   WBC 10*3/mm3 7.34 7.92 8.67   HEMOGLOBIN g/dL 13.6 13.9 14.4   HEMATOCRIT % 42.6 43.3 43.0   PLATELETS 10*3/mm3 234 195 202             Lab Results   Lab Value Date/Time    TROPONINT <0.010 01/06/2020 0929         Results from last 7 days   Lab Units 04/29/23  1514   ALK PHOS U/L 79   BILIRUBIN mg/dL 1.7*   ALT (SGPT) U/L 27   AST (SGOT) U/L 22             No results found for: POCGLU        Past Medical History:   Diagnosis Date   • Abnormal ECG    • Allergic    • Arthritis    • Chest pain    • CTS (carpal tunnel syndrome) A few months.   • DVT (deep  venous thrombosis)    • GERD (gastroesophageal reflux disease)    • Hip arthrosis    • Hyperlipidemia    • Hypertension    • Hypokalemia    • Knee swelling    • Myocardial infarction    • Obesity    • Periarthritis of shoulder    • Simple goiter    • Sinus bradycardia    • Vitamin D deficiency        Assessment:  Active Hospital Problems    Diagnosis  POA   • **Acute pain of right knee [M25.561]  Yes   • Essential hypertension [I10]  Yes   • Hypothyroidism [E03.9]  Yes      Resolved Hospital Problems   No resolved problems to display.       Plan:  Ortho consult noted. Watch another day. Follow lab. Continue antibiotics.    Boris Armas MD  5/2/2023  15:08 EDT

## 2023-05-02 NOTE — PLAN OF CARE
Goal Outcome Evaluation:  Patient is a pleasant gentleman. Receiving V antibiotics and oral pain medication. Frequent urination. Ambulating with the use of a walker with stand by assistance. Another right knee aspiration is possible later today. Discharge pending. VSS. Call light is within reach. Will continue to monitor.

## 2023-05-02 NOTE — PLAN OF CARE
Goal Outcome Evaluation:  Patient is alert and oriented.  Vitals are stable.  Continues on Zosyn, and Vancomycin for antibiotic therapy.  Norco 5-325 mg are given for pain with positive results.

## 2023-05-02 NOTE — PROGRESS NOTES
Patient does feel little bit better, but he does have some more fluid buildup on the right knee.    Right knee aspiration: The right knee was aspirated about 40 cc of blood-tinged synovial fluid with no obvious sediment.  This was sent for analysis.  Patient tolerated the procedure well    Plan: The right knee was aspirated again.  I will follow-up the results.  I continue to be worried about infection but we will see what these aspiration results show.

## 2023-05-03 LAB
ANION GAP SERPL CALCULATED.3IONS-SCNC: 10.7 MMOL/L (ref 5–15)
BASOPHILS # BLD AUTO: 0.05 10*3/MM3 (ref 0–0.2)
BASOPHILS NFR BLD AUTO: 0.9 % (ref 0–1.5)
BUN SERPL-MCNC: 14 MG/DL (ref 6–20)
BUN/CREAT SERPL: 12 (ref 7–25)
CALCIUM SPEC-SCNC: 8.8 MG/DL (ref 8.6–10.5)
CHLORIDE SERPL-SCNC: 109 MMOL/L (ref 98–107)
CO2 SERPL-SCNC: 25.3 MMOL/L (ref 22–29)
CREAT SERPL-MCNC: 1.17 MG/DL (ref 0.76–1.27)
DEPRECATED RDW RBC AUTO: 43.7 FL (ref 37–54)
EGFRCR SERPLBLD CKD-EPI 2021: 75 ML/MIN/1.73
EOSINOPHIL # BLD AUTO: 0.29 10*3/MM3 (ref 0–0.4)
EOSINOPHIL NFR BLD AUTO: 5.2 % (ref 0.3–6.2)
ERYTHROCYTE [DISTWIDTH] IN BLOOD BY AUTOMATED COUNT: 12.9 % (ref 12.3–15.4)
GLUCOSE SERPL-MCNC: 109 MG/DL (ref 65–99)
HCT VFR BLD AUTO: 42.6 % (ref 37.5–51)
HGB BLD-MCNC: 14 G/DL (ref 13–17.7)
IMM GRANULOCYTES # BLD AUTO: 0.04 10*3/MM3 (ref 0–0.05)
IMM GRANULOCYTES NFR BLD AUTO: 0.7 % (ref 0–0.5)
LYMPHOCYTES # BLD AUTO: 1.37 10*3/MM3 (ref 0.7–3.1)
LYMPHOCYTES NFR BLD AUTO: 24.6 % (ref 19.6–45.3)
MCH RBC QN AUTO: 30.8 PG (ref 26.6–33)
MCHC RBC AUTO-ENTMCNC: 32.9 G/DL (ref 31.5–35.7)
MCV RBC AUTO: 93.8 FL (ref 79–97)
MONOCYTES # BLD AUTO: 0.6 10*3/MM3 (ref 0.1–0.9)
MONOCYTES NFR BLD AUTO: 10.8 % (ref 5–12)
NEUTROPHILS NFR BLD AUTO: 3.22 10*3/MM3 (ref 1.7–7)
NEUTROPHILS NFR BLD AUTO: 57.8 % (ref 42.7–76)
NRBC BLD AUTO-RTO: 0 /100 WBC (ref 0–0.2)
PLATELET # BLD AUTO: 251 10*3/MM3 (ref 140–450)
PMV BLD AUTO: 9.4 FL (ref 6–12)
POTASSIUM SERPL-SCNC: 4.8 MMOL/L (ref 3.5–5.2)
RBC # BLD AUTO: 4.54 10*6/MM3 (ref 4.14–5.8)
SODIUM SERPL-SCNC: 145 MMOL/L (ref 136–145)
VANCOMYCIN TROUGH SERPL-MCNC: 11.2 MCG/ML (ref 5–20)
WBC NRBC COR # BLD: 5.57 10*3/MM3 (ref 3.4–10.8)

## 2023-05-03 PROCEDURE — 94664 DEMO&/EVAL PT USE INHALER: CPT

## 2023-05-03 PROCEDURE — 80202 ASSAY OF VANCOMYCIN: CPT | Performed by: INTERNAL MEDICINE

## 2023-05-03 PROCEDURE — 0 LIDOCAINE 1 % SOLUTION

## 2023-05-03 PROCEDURE — 25010000002 PIPERACILLIN SOD-TAZOBACTAM PER 1 G: Performed by: INTERNAL MEDICINE

## 2023-05-03 PROCEDURE — 94761 N-INVAS EAR/PLS OXIMETRY MLT: CPT

## 2023-05-03 PROCEDURE — 25010000002 VANCOMYCIN 750 MG RECONSTITUTED SOLUTION 1 EACH VIAL: Performed by: INTERNAL MEDICINE

## 2023-05-03 PROCEDURE — 94799 UNLISTED PULMONARY SVC/PX: CPT

## 2023-05-03 PROCEDURE — 85025 COMPLETE CBC W/AUTO DIFF WBC: CPT | Performed by: HOSPITALIST

## 2023-05-03 PROCEDURE — 80048 BASIC METABOLIC PNL TOTAL CA: CPT | Performed by: HOSPITALIST

## 2023-05-03 PROCEDURE — G0378 HOSPITAL OBSERVATION PER HR: HCPCS

## 2023-05-03 PROCEDURE — 25010000002 VANCOMYCIN 10 G RECONSTITUTED SOLUTION: Performed by: HOSPITALIST

## 2023-05-03 PROCEDURE — 25010000002 TRIAMCINOLONE PER 10 MG: Performed by: ORTHOPAEDIC SURGERY

## 2023-05-03 RX ORDER — LIDOCAINE HYDROCHLORIDE 10 MG/ML
10 INJECTION, SOLUTION INFILTRATION; PERINEURAL ONCE
Status: DISCONTINUED | OUTPATIENT
Start: 2023-05-03 | End: 2023-05-04 | Stop reason: HOSPADM

## 2023-05-03 RX ORDER — TRIAMCINOLONE ACETONIDE 40 MG/ML
40 INJECTION, SUSPENSION INTRA-ARTICULAR; INTRAMUSCULAR ONCE
Status: COMPLETED | OUTPATIENT
Start: 2023-05-03 | End: 2023-05-03

## 2023-05-03 RX ORDER — LIDOCAINE HYDROCHLORIDE 10 MG/ML
INJECTION, SOLUTION INFILTRATION; PERINEURAL
Status: COMPLETED
Start: 2023-05-03 | End: 2023-05-03

## 2023-05-03 RX ADMIN — MONTELUKAST SODIUM 10 MG: 10 TABLET, FILM COATED ORAL at 20:10

## 2023-05-03 RX ADMIN — HYDROCODONE BITARTRATE AND ACETAMINOPHEN 1 TABLET: 5; 325 TABLET ORAL at 05:31

## 2023-05-03 RX ADMIN — LISINOPRIL 40 MG: 40 TABLET ORAL at 08:50

## 2023-05-03 RX ADMIN — VANCOMYCIN HYDROCHLORIDE 1750 MG: 10 INJECTION, POWDER, LYOPHILIZED, FOR SOLUTION INTRAVENOUS at 20:09

## 2023-05-03 RX ADMIN — GABAPENTIN 300 MG: 300 CAPSULE ORAL at 08:50

## 2023-05-03 RX ADMIN — LEVOTHYROXINE SODIUM 112 MCG: 0.11 TABLET ORAL at 05:31

## 2023-05-03 RX ADMIN — TAZOBACTAM SODIUM AND PIPERACILLIN SODIUM 4.5 G: 500; 4 INJECTION, SOLUTION INTRAVENOUS at 05:30

## 2023-05-03 RX ADMIN — TRIAMCINOLONE ACETONIDE 40 MG: 40 INJECTION, SUSPENSION INTRA-ARTICULAR; INTRAMUSCULAR at 06:53

## 2023-05-03 RX ADMIN — CETIRIZINE HYDROCHLORIDE 10 MG: 10 TABLET ORAL at 08:50

## 2023-05-03 RX ADMIN — LIDOCAINE HYDROCHLORIDE: 10 INJECTION, SOLUTION INFILTRATION; PERINEURAL at 06:52

## 2023-05-03 RX ADMIN — METOPROLOL SUCCINATE 75 MG: 25 TABLET, EXTENDED RELEASE ORAL at 08:50

## 2023-05-03 RX ADMIN — PANTOPRAZOLE SODIUM 40 MG: 40 TABLET, DELAYED RELEASE ORAL at 05:31

## 2023-05-03 RX ADMIN — TAZOBACTAM SODIUM AND PIPERACILLIN SODIUM 4.5 G: 500; 4 INJECTION, SOLUTION INTRAVENOUS at 14:27

## 2023-05-03 RX ADMIN — VANCOMYCIN HYDROCHLORIDE 750 MG: 750 INJECTION, POWDER, LYOPHILIZED, FOR SOLUTION INTRAVENOUS at 01:36

## 2023-05-03 RX ADMIN — BUDESONIDE AND FORMOTEROL FUMARATE DIHYDRATE 2 PUFF: 160; 4.5 AEROSOL RESPIRATORY (INHALATION) at 08:52

## 2023-05-03 RX ADMIN — BUDESONIDE AND FORMOTEROL FUMARATE DIHYDRATE 2 PUFF: 160; 4.5 AEROSOL RESPIRATORY (INHALATION) at 20:02

## 2023-05-03 RX ADMIN — HYDROCODONE BITARTRATE AND ACETAMINOPHEN 1 TABLET: 5; 325 TABLET ORAL at 12:30

## 2023-05-03 RX ADMIN — TAZOBACTAM SODIUM AND PIPERACILLIN SODIUM 4.5 G: 500; 4 INJECTION, SOLUTION INTRAVENOUS at 22:55

## 2023-05-03 RX ADMIN — HYDROCODONE BITARTRATE AND ACETAMINOPHEN 1 TABLET: 5; 325 TABLET ORAL at 19:44

## 2023-05-03 RX ADMIN — VANCOMYCIN HYDROCHLORIDE 750 MG: 750 INJECTION, POWDER, LYOPHILIZED, FOR SOLUTION INTRAVENOUS at 08:50

## 2023-05-03 NOTE — PROGRESS NOTES
"[facility] Clinical Pharmacy Services: Vancomycin Monitoring Note    Grant Edge Jr. is a 52 y.o. male who is on day 4/7 of pharmacy to dose vancomycin for Bone and/or Joint Infection.    Previous Vancomycin Dose:   1250 mg IV every  12  hours  Updated Cultures and Sensitivities:  4/29 Blood cx: NGTD  4/30 Body Fluid cx: NGTD    Results from last 7 days   Lab Units 05/03/23  0533 05/02/23  0118   VANCOMYCIN TR mcg/mL 11.20 9.90     Vitals/Labs  Ht: 177.8 cm (70\"); Wt: (!) 156 kg (345 lb)   Temp Readings from Last 1 Encounters:   05/03/23 97.7 °F (36.5 °C) (Oral)     Estimated Creatinine Clearance: 110.7 mL/min (by C-G formula based on SCr of 1.17 mg/dL).       Results from last 7 days   Lab Units 05/03/23  0533 05/02/23  0501 05/01/23  0436   CREATININE mg/dL 1.17 1.44* 1.28*   WBC 10*3/mm3 5.57 7.34 7.92     Assessment/Plan    Level drawn this AM ~4 hrs after dose = 11.2mcg/ml, yielding AUC of 295 mg/L*hr. Will adjust to Vancomycin 1750mg q12h.    Vancomycin dose of  1250 mg IV every 12 hours provides a predicted  mg/L.hr .   Next Level Date and Time: No levels currently scheduled, will follow and order as appropriate.  We will continue to monitor patient changes and renal function     Thank you for involving pharmacy in this patient's care. Please contact pharmacy with any questions or concerns.       Jack Rubio, [facility]  Clinical Pharmacist             "

## 2023-05-03 NOTE — CASE MANAGEMENT/SOCIAL WORK
Continued Stay Note  Psychiatric     Patient Name: Grant Edge Jr.  MRN: [id number]  Today's Date: 5/3/2023    Admit Date: 4/29/2023    Plan: home with family assist   Discharge Plan     Row Name 05/03/23 1131       Plan    Plan home with family assist    Patient/Family in Agreement with Plan yes    Plan Comments Spoke with patient at bedside. He confirms that he plans to return home at dc. He states that he has been getting up with assist x1 and a cane. At dc, he plans to return and does not anticipate any needs. CCP will follow up after seen by PT.               Discharge Codes    No documentation.               Expected Discharge Date and Time     Expected Discharge Date Expected Discharge Time    May 2, 2023             Angelita Mendiola RN

## 2023-05-03 NOTE — PLAN OF CARE
Goal Outcome Evaluation:              Outcome Evaluation: Admitted for R knee pain, steriod injection at bedside with previous shift, stand by assist w/cane, voiding per brp, up in chair this shift, IV Abx, VSS, RA, educated on bp monitoring, PT consulted but not seen pt yet, possibly d/c home tomorrow, CTM

## 2023-05-03 NOTE — PAYOR COMM NOTE
"Grant Echavarria Jr. (52 y.o. Male)       ATTN: REQUESTING INPATIENT AUTHORIZATION:  [id number]    PATIENT WAS OBSERVATION AND CONVERTED TO INPATIENT 05/02/23    PLEASE REPLY TO UR DEPT: [phone],  [phone]    [facility]: NPI [id number]  [facility]# [id number]      Date of Birth   1970    Social Security Number       Address   [address] [place], [place] [address]    Home Phone   [phone]    MRN   [id number]       [facility]    Marital Status                               Admission Date   4/29/23    Admission Type   Emergency    Admitting Provider   Radha Starks MD    Attending Provider   Boris Armas MD    Department, Room/Bed   [address], 90/1       Discharge Date       Discharge Disposition       Discharge Destination                               Attending Provider: Boris Armas MD    Allergies: No Known Allergies    Isolation: None   Infection: None   Code Status: CPR    Ht: 177.8 cm (70\")   Wt: 156 kg (345 lb)    Admission Cmt: None   Principal Problem: Acute pain of right knee [M25.561]                 Active Insurance as of 4/29/2023     Primary Coverage     Payor Plan Insurance Group Employer/Plan Group    [employer] EMPLOYEE [id number]     Payor Plan Address Payor Plan Phone Number Payor Plan Fax Number Effective Dates    [address] [phone]  1/1/2022 - None Entered    [place] [address]       Subscriber Name Subscriber Birth Date Member ID       GRANT ECHAVARRIA FABIO LÓPEZ 1970 [id number]                 Emergency Contacts      (Rel.) Home Phone Work Phone Mobile Phone    MineshShawnmitchell (Spouse) [phone] -- [phone]    MineshAlina (Mother) [phone] -- [phone]               History & Physical      Radha Starks MD at 04/29/23 2101          HISTORY AND PHYSICAL   [facility]        Date of Admission: 4/29/2023  Patient " Identification:  Name: Grant Edge Jr.  Age: 52 y.o.  Sex: male  :  1970  MRN: [id number]                     Primary Care Physician: Jim Joseph APRN    Chief Complaint:  52 year old gentleman who presented to the emergency room with pain and swelling of his right knee; it started yesterday; he denies injury; he has a history of dvt and is on xarelto; he has had subjective fever and chills; his temp was 100.3 when he presented; he denies any prior knee surgery    History of Present Illness:   As above    Past Medical History:  Past Medical History:   Diagnosis Date   • Abnormal ECG    • Allergic    • Arthritis    • Chest pain    • CTS (carpal tunnel syndrome) A few months.   • DVT (deep venous thrombosis)    • GERD (gastroesophageal reflux disease)    • Hip arthrosis    • Hyperlipidemia    • Hypertension    • Hypokalemia    • Knee swelling    • Myocardial infarction    • Obesity    • Periarthritis of shoulder    • Simple goiter    • Sinus bradycardia    • Vitamin D deficiency      Past Surgical History:  Past Surgical History:   Procedure Laterality Date   • CARDIAC CATHETERIZATION N/A 10/18/2016    Procedure: Left Heart Cath;  Surgeon: Daniel Rosas MD;  Location: [facility] CATH INVASIVE LOCATION;  Service:    • CHOLECYSTECTOMY     • EPIDURAL Right 2022    Procedure: Right L5 LUMBAR/SACRAL TRANSFORAMINAL EPIDURAL. Hold xarelto x 3 days prior to procedure;  Surgeon: La Sun MD;  Location: [facility] OR;  Service: Pain Management;  Laterality: Right;   • JOINT REPLACEMENT     • SALIVARY GLAND SURGERY     • THYROIDECTOMY     • TONSILLECTOMY     • TOTAL HIP ARTHROPLASTY REVISION Left       Home Meds:  Medications Prior to Admission   Medication Sig Dispense Refill Last Dose   • albuterol sulfate  (90 Base) MCG/ACT inhaler Inhale 2 puffs Every 4 (Four) Hours As Needed for Wheezing. 8 g 2 2023   • azelastine (ASTELIN) 0.1 % nasal spray 2 sprays into the nostril(s) as  directed by provider 2 (Two) Times a Day. Use in each nostril as directed (Patient taking differently: 2 sprays into the nostril(s) as directed by provider As Needed. Use in each nostril as directed) 30 mL 2 Past Week   • benazepril (LOTENSIN) 40 MG tablet Take 1 tablet by mouth Daily. 90 tablet 1 4/29/2023   • esomeprazole (nexIUM) 40 MG capsule TAKE 1 CAPSULE BY MOUTH DAILY AS NEEDED FOR ACID REFLUX OR SYMPTOMS 90 capsule 0 4/29/2023   • fluticasone (FLONASE) 50 MCG/ACT nasal spray 2 sprays into the nostril(s) as directed by provider Daily. (Patient taking differently: 2 sprays into the nostril(s) as directed by provider As Needed.) 15.8 mL 1 Past Week   • Fluticasone-Salmeterol (ADVAIR/WIXELA) 100-50 MCG/ACT DISKUS Inhale 1 puff 2 (Two) Times a Day. 14 each 2 4/29/2023   • furosemide (Lasix) 20 MG tablet Take 1 tablet by mouth Every Other Day. 90 tablet 1 4/27/2023   • gabapentin (NEURONTIN) 300 MG capsule Take 1 capsule by mouth Daily.   4/28/2023   • levothyroxine (SYNTHROID, LEVOTHROID) 112 MCG tablet Take 1 tablet by mouth Daily. 90 tablet 1 4/29/2023   • Loratadine (CLARITIN PO) Take 10 mg by mouth Daily As Needed.   Past Week   • metoprolol succinate XL (TOPROL-XL) 50 MG 24 hr tablet Take 1.5 tablets by mouth Daily. 145 tablet 0 4/29/2023   • montelukast (SINGULAIR) 10 MG tablet Take 1 tablet by mouth Every Night. 90 tablet 1 Past Week   • potassium chloride 10 MEQ CR tablet Take 1 tablet by mouth Daily. 30 tablet 1 4/29/2023   • Pseudoephedrine-guaiFENesin (MUCINEX D PO) Take 1 tablet by mouth Daily.   4/28/2023   • Xarelto 20 MG tablet TAKE 1 TABLET BY MOUTH DAILY (Patient taking differently: 1 tablet Daily.) 90 tablet 1 4/28/2023       Allergies:  No Known Allergies  Immunizations:  Immunization History   Administered Date(s) Administered   • COVID-19 (MODERNA) 1st,2nd,3rd Dose Monovalent 02/01/2021, 03/01/2021, 11/17/2021   • COVID-19 (MODERNA) BIVALENT 12+YRS 11/09/2022   • FluLaval/Fluzone >6mos  2020, 2021, 2022   • FluMist 2-49yrs 2013   • Influenza Injectable Mdck Pf Quad 2022   • Shingrix 2022   • Tdap 2017   • flucelvax quad pfs =>4 YRS 2018, 2019     Social History:   Social History     Social History Narrative    [facility]     Social History     Socioeconomic History   • Marital status:    Tobacco Use   • Smoking status: Never   • Smokeless tobacco: Never   Vaping Use   • Vaping Use: Never used   Substance and Sexual Activity   • Alcohol use: No   • Drug use: No   • Sexual activity: Yes     Partners: Female     Birth control/protection: None       Family History:  Family History   Problem Relation Age of Onset   • Diabetes Father    • Heart disease Father    • Arthritis Father    • Hyperlipidemia Father    • Hypertension Father         Review of Systems  See history of present illness and past medical history.  Patient denies headache, dizziness, syncope, falls, trauma, change in vision, change in hearing, change in taste, changes in weight, changes in appetite, focal weakness, numbness, or paresthesia.  Patient denies chest pain, palpitations, dyspnea, orthopnea, PND, cough, sinus pressure, rhinorrhea, epistaxis, hemoptysis, nausea, vomiting,hematemesis, diarrhea, constipation or hematchezia.  Denies cold or heat intolerance, polydipsia, polyuria, polyphagia. Denies hematuria, pyuria, dysuria, hesitancy, frequency or urgency. Denies consumption of raw and under cooked meats foods or change in water source.  Denies fever, chills, sweats, night sweats.  Denies missing any routine medications. Remainder of ROS is negative.    Objective:  T Max 24 hrs: Temp (24hrs), Av.4 °F (37.4 °C), Min:98.5 °F (36.9 °C), Max:100.3 °F (37.9 °C)    Vitals Ranges:   Temp:  [98.5 °F (36.9 °C)-100.3 °F (37.9 °C)] 98.5 °F (36.9 °C)  Heart Rate:  [] 101  Resp:  [18-20] 20  BP: (136-173)/(79-92) 173/79      Exam:  /79 (BP Location: Right arm, Patient  "Position: Lying)   Pulse 101   Temp 98.5 °F (36.9 °C) (Oral)   Resp 20   Ht 177.8 cm (70\")   Wt (!) 156 kg (345 lb)   SpO2 95%   BMI 49.50 kg/m²     General Appearance:    Alert, cooperative, no distress, appears stated age   Head:    Normocephalic, without obvious abnormality, atraumatic   Eyes:    PERRL, conjunctivae/corneas clear, EOM's intact, both eyes   Ears:    Normal external ear canals, both ears   Nose:   Nares normal, septum midline, mucosa normal, no drainage    or sinus tenderness   Throat:   Lips, mucosa, and tongue normal   Neck:   Supple, symmetrical, trachea midline, no adenopathy;     thyroid:  no enlargement/tenderness/nodules; no carotid    bruit or JVD   Back:     Symmetric, no curvature, ROM normal, no CVA tenderness   Lungs:     Clear to auscultation bilaterally, respirations unlabored   Chest Wall:    No tenderness or deformity    Heart:    Regular rate and rhythm, S1 and S2 normal, no murmur, rub   or gallop   Abdomen:     Soft, nontender, bowel sounds active all four quadrants,     no masses, no hepatomegaly, no splenomegaly   Extremities:   Extremities normal, atraumatic,   Edema right knee   Pulses:   2+ and symmetric all extremities   Skin:   Skin color, texture, turgor normal, no rashes or lesions    .    Data Review:  Labs in chart were reviewed.  WBC   Date Value Ref Range Status   04/29/2023 8.17 3.40 - 10.80 10*3/mm3 Final     Hemoglobin   Date Value Ref Range Status   04/29/2023 15.3 13.0 - 17.7 g/dL Final     Hematocrit   Date Value Ref Range Status   04/29/2023 46.6 37.5 - 51.0 % Final     Platelets   Date Value Ref Range Status   04/29/2023 205 140 - 450 10*3/mm3 Final     Sodium   Date Value Ref Range Status   04/29/2023 142 136 - 145 mmol/L Final     Potassium   Date Value Ref Range Status   04/29/2023 4.0 3.5 - 5.2 mmol/L Final     Comment:     Slight hemolysis detected by analyzer. Results may be affected.     Chloride   Date Value Ref Range Status   04/29/2023 105 98 " - 107 mmol/L Final     CO2   Date Value Ref Range Status   04/29/2023 27.1 22.0 - 29.0 mmol/L Final     BUN   Date Value Ref Range Status   04/29/2023 11 6 - 20 mg/dL Final     Creatinine   Date Value Ref Range Status   04/29/2023 1.24 0.76 - 1.27 mg/dL Final     Glucose   Date Value Ref Range Status   04/29/2023 104 (H) 65 - 99 mg/dL Final     Calcium   Date Value Ref Range Status   04/29/2023 9.4 8.6 - 10.5 mg/dL Final     AST (SGOT)   Date Value Ref Range Status   04/29/2023 22 1 - 40 U/L Final     ALT (SGPT)   Date Value Ref Range Status   04/29/2023 27 1 - 41 U/L Final     Alkaline Phosphatase   Date Value Ref Range Status   04/29/2023 79 39 - 117 U/L Final                Imaging Results (All)     Procedure Component Value Units Date/Time    XR Knee 1 or 2 View Right [[id number]] Collected: 04/29/23 1537     Updated: 04/29/23 1602    Narrative:      TWO-VIEW RIGHT KNEE     HISTORY: Knee pain. No trauma.     FINDINGS: There are mild-to-moderate changes predominantly at the  patellofemoral compartment as also noted on the recent exam dated  03/23/2022. There appears to be a small to moderate joint effusion that  is larger on today's examination.     This report was finalized on 4/29/2023 3:38 PM by Dr. Leonid Petersen M.D.               Assessment:  Active Hospital Problems    Diagnosis  POA   • **Acute pain of right knee [M25.561]  Yes      Resolved Hospital Problems   No resolved problems to display.   right knee effusion  Obesity   Hypertension  Hypothyroidism  hyperglycemia    Plan:  Will have the knee aspirated by dr carlos in the am  He requested to hold off on antibiotics  Hold xarelto  Pain control  dw patient and ED provider as well as his nurse    Radha Starks MD  4/29/2023  21:01 EDT      Electronically signed by Radha Starks MD at 04/29/23 2101          Emergency Department Notes      Jerome Pierce, RN at 04/29/23 1423        Pt arrived by PV reports right knee pain, denies injury,  hx of DVT's is on blood thinners     .    Electronically signed by Jerome Pierce RN at 04/29/23 8947     Consuelo Perez PA at 04/29/23 1505     Attestation signed by Tio Hargrove MD at 04/29/23 1950        SHARED APC FACE TO FACE: This visit was performed by both the physician and an APC. I personally evaluated and examined the patient. I performed the entirety of the physical exam and I documented this exam in this attestation or in accompanying documentation.    Tio Hargrove MD 4/29/2023 19:50 EDT                          EMERGENCY DEPARTMENT ENCOUNTER    Room Number:  06/06  Date seen:  4/29/2023  PCP: Jim Joseph APRN    Spoken Language:  English  Language interpretation services not needed     HPI:  Chief Complaint: right knee pain    A complete HPI/ROS/PMH/PSH/SH/FH are unobtainable due to: n/a    Context: Grant Edge Jr. is a 52 y.o. male presenting to the emergency department complaining of right knee pain.  The history is being obtained by the patient, and by review of the medical chart.  The patient states that he began having right knee pain yesterday.  He denies any specific injury or accident which onset his pain.  The patient is anticoagulated on Xarelto due to a history of DVTs.  He states that his most recent dose of Xarelto was last night.  Patient is unsure if he has had a fever, but at triage his temperature was 100.3F.  The patient denies headache, cough, sore throat, chest pain, shortness of breath, abdominal pain, nausea, vomiting or diarrhea.    PAST MEDICAL HISTORY  Active Ambulatory Problems     Diagnosis Date Noted   • Abdominal abscess 03/07/2016   • Benign essential hypertension 03/07/2016   • Benign prostatic hyperplasia with urinary obstruction 03/07/2016   • Atypical chest pain 03/07/2016   • Degeneration of intervertebral disc of lumbar region 03/07/2016   • Gastroesophageal reflux disease with esophagitis 03/07/2016   • Primary hypertriglyceridemia 03/07/2016   •  Gastroesophageal reflux disease 03/07/2016   • Hematochezia 03/07/2016   • Hematuria 03/07/2016   • Hyperlipidemia 03/07/2016   • Hypertension 03/07/2016   • Hypokalemia 03/07/2016   • Hypothyroidism 03/07/2016   • Insomnia 03/07/2016   • Arthralgia of hip 03/07/2016   • Chronic low back pain 03/07/2016   • Lumbar radiculopathy 03/07/2016   • Morbid obesity (HCC) 03/07/2016   • Nonvenomous insect bite with infection 03/07/2016   • Osteoarthritis of hip 03/07/2016   • Pleurodynia 03/07/2016   • Sinus tachycardia 03/07/2016   • Vitamin D deficiency 03/07/2016   • Right knee pain 03/07/2016   • Pain of lower extremity 03/07/2016   • Bilateral headaches 03/07/2016   • Health care maintenance 08/22/2016   • Old anterior myocardial infarction 08/22/2016   • EKG, abnormal 08/22/2016   • History of hepatic disease 04/21/2015   • Essential hypertension 10/12/2016   • Abnormal stress test 10/12/2016   • Moderate single current episode of major depressive disorder 07/05/2017   • Prediabetes 12/05/2019   • Recurrent acute deep vein thrombosis (DVT) of left lower extremity (HCC) 06/03/2020   • Hyperuricemia 07/10/2020   • H/O gastroesophageal reflux (GERD) 04/21/2015   • Seasonal allergies 07/12/2012   • Class 3 severe obesity due to excess calories without serious comorbidity with body mass index (BMI) of 40.0 to 44.9 in adult 03/14/2022   • Right hip pain 09/07/2022   • History of asthma 12/12/2022   • Stasis edema of both lower extremities 02/27/2023     Resolved Ambulatory Problems     Diagnosis Date Noted   • Acute bronchitis 03/07/2016   • Seasonal allergic rhinitis 03/07/2016   • Cough 03/07/2016   • Shortness of breath 03/07/2016   • Green stool 03/07/2016   • Left lower quadrant pain 03/07/2016   • Chest pain on breathing 08/22/2016     Past Medical History:   Diagnosis Date   • Abnormal ECG    • Allergic    • Arthritis    • Chest pain    • CTS (carpal tunnel syndrome) A few months.   • DVT (deep venous thrombosis)    •  GERD (gastroesophageal reflux disease)    • Hip arthrosis    • Knee swelling    • Myocardial infarction    • Obesity    • Periarthritis of shoulder    • Simple goiter    • Sinus bradycardia        PAST SURGICAL HISTORY  Past Surgical History:   Procedure Laterality Date   • CARDIAC CATHETERIZATION N/A 10/18/2016    Procedure: Left Heart Cath;  Surgeon: Daniel Rosas MD;  Location:  [facility] CATH INVASIVE LOCATION;  Service:    • CHOLECYSTECTOMY     • EPIDURAL Right 11/4/2022    Procedure: Right L5 LUMBAR/SACRAL TRANSFORAMINAL EPIDURAL. Hold xarelto x 3 days prior to procedure;  Surgeon: aL Sun MD;  [place] [facility] [place] [facility] [place];  Service: Pain Management;  Laterality: Right;   • JOINT REPLACEMENT     • SALIVARY GLAND SURGERY     • THYROIDECTOMY  2013   • TONSILLECTOMY     • TOTAL HIP ARTHROPLASTY REVISION Left 2015       FAMILY HISTORY  Family History   Problem Relation Age of Onset   • Diabetes Father    • Heart disease Father    • Arthritis Father    • Hyperlipidemia Father    • Hypertension Father        SOCIAL HISTORY  Social History     Socioeconomic History   • Marital status:    Tobacco Use   • Smoking status: Never   • Smokeless tobacco: Never   Vaping Use   • Vaping Use: Never used   Substance and Sexual Activity   • Alcohol use: No   • Drug use: No   • Sexual activity: Yes     Partners: Female     Birth control/protection: None     ALLERGIES  Patient has no known allergies.    REVIEW OF SYSTEMS  All systems reviewed and negative except for those discussed in HPI.     PHYSICAL EXAM  ED Triage Vitals   Temp Heart Rate Resp BP SpO2   04/29/23 1424 04/29/23 1424 04/29/23 1424 04/29/23 1456 04/29/23 1424   100.3 °F (37.9 °C) 111 18 142/86 96 %      Temp src Heart Rate Source Patient Position BP Location FiO2 (%)   -- 04/29/23 1424 -- -- --    Monitor          Physical Exam  Vitals and nursing note reviewed.   Constitutional:       Appearance: Normal appearance.   HENT:      Head: Normocephalic  and atraumatic.      Mouth/Throat:      Mouth: Mucous membranes are moist.   Eyes:      Extraocular Movements: Extraocular movements intact.      Pupils: Pupils are equal, round, and reactive to light.   Cardiovascular:      Rate and Rhythm: Normal rate and regular rhythm.   Pulmonary:      Effort: Pulmonary effort is normal.      Breath sounds: Normal breath sounds.   Musculoskeletal:      Cervical back: Normal range of motion and neck supple.      Comments: The right knee has a palpable joint effusion.  It is not overtly warm or erythematous.  The patient has significant pain with any flexion of the knee.   Neurological:      Mental Status: He is alert and oriented to person, place, and time. Mental status is at baseline.   Psychiatric:         Mood and Affect: Mood normal.         Behavior: Behavior normal.         Thought Content: Thought content normal.         Judgment: Judgment normal.         LAB RESULTS  Recent Results (from the past 24 hour(s))   Comprehensive Metabolic Panel    Collection Time: 04/29/23  3:14 PM    Specimen: Blood   Result Value Ref Range    Glucose 104 (H) 65 - 99 mg/dL    BUN 11 6 - 20 mg/dL    Creatinine 1.24 0.76 - 1.27 mg/dL    Sodium 142 136 - 145 mmol/L    Potassium 4.0 3.5 - 5.2 mmol/L    Chloride 105 98 - 107 mmol/L    CO2 27.1 22.0 - 29.0 mmol/L    Calcium 9.4 8.6 - 10.5 mg/dL    Total Protein 6.9 6.0 - 8.5 g/dL    Albumin 3.9 3.5 - 5.2 g/dL    ALT (SGPT) 27 1 - 41 U/L    AST (SGOT) 22 1 - 40 U/L    Alkaline Phosphatase 79 39 - 117 U/L    Total Bilirubin 1.7 (H) 0.0 - 1.2 mg/dL    Globulin 3.0 gm/dL    A/G Ratio 1.3 g/dL    BUN/Creatinine Ratio 8.9 7.0 - 25.0    Anion Gap 9.9 5.0 - 15.0 mmol/L    eGFR 70.0 >60.0 mL/min/1.73   C-reactive Protein    Collection Time: 04/29/23  3:14 PM    Specimen: Blood   Result Value Ref Range    C-Reactive Protein 7.19 (H) 0.00 - 0.50 mg/dL   Sedimentation Rate    Collection Time: 04/29/23  3:14 PM    Specimen: Blood   Result Value Ref Range     Sed Rate 24 (H) 0 - 20 mm/hr   CBC Auto Differential    Collection Time: 04/29/23  3:14 PM    Specimen: Blood   Result Value Ref Range    WBC 8.17 3.40 - 10.80 10*3/mm3    RBC 5.02 4.14 - 5.80 10*6/mm3    Hemoglobin 15.3 13.0 - 17.7 g/dL    Hematocrit 46.6 37.5 - 51.0 %    MCV 92.8 79.0 - 97.0 fL    MCH 30.5 26.6 - 33.0 pg    MCHC 32.8 31.5 - 35.7 g/dL    RDW 13.3 12.3 - 15.4 %    RDW-SD 45.4 37.0 - 54.0 fl    MPV 9.9 6.0 - 12.0 fL    Platelets 205 140 - 450 10*3/mm3    Neutrophil % 70.8 42.7 - 76.0 %    Lymphocyte % 13.7 (L) 19.6 - 45.3 %    Monocyte % 12.9 (H) 5.0 - 12.0 %    Eosinophil % 1.2 0.3 - 6.2 %    Basophil % 0.5 0.0 - 1.5 %    Immature Grans % 0.9 (H) 0.0 - 0.5 %    Neutrophils, Absolute 5.79 1.70 - 7.00 10*3/mm3    Lymphocytes, Absolute 1.12 0.70 - 3.10 10*3/mm3    Monocytes, Absolute 1.05 (H) 0.10 - 0.90 10*3/mm3    Eosinophils, Absolute 0.10 0.00 - 0.40 10*3/mm3    Basophils, Absolute 0.04 0.00 - 0.20 10*3/mm3    Immature Grans, Absolute 0.07 (H) 0.00 - 0.05 10*3/mm3    nRBC 0.0 0.0 - 0.2 /100 WBC   Lactic Acid, Plasma    Collection Time: 04/29/23  3:14 PM    Specimen: Blood   Result Value Ref Range    Lactate 1.3 0.5 - 2.0 mmol/L       RADIOLOGY  Imaging Results (Last 24 Hours)     Procedure Component Value Units Date/Time    XR Knee 1 or 2 View Right [[id number]] Collected: 04/29/23 1537     Updated: 04/29/23 1602    Narrative:      TWO-VIEW RIGHT KNEE     HISTORY: Knee pain. No trauma.     FINDINGS: There are mild-to-moderate changes predominantly at the  patellofemoral compartment as also noted on the recent exam dated  03/23/2022. There appears to be a small to moderate joint effusion that  is larger on today's examination.     This report was finalized on 4/29/2023 3:38 PM by Dr. Leonid Petersen M.D.             PROCEDURES  Procedures  None    MEDICATIONS GIVEN IN ER  Medications - No data to display    MEDICAL DECISION MAKING, PROGRESS, and CONSULTS  Vital signs and nursing notes have all been  reviewed by me.    All laboratory results have been independently interpreted by me.      Orders placed during this visit:  Orders Placed This Encounter   Procedures   • Blood Culture - Blood,   • Blood Culture - Blood,   • XR Knee 1 or 2 View Right   • Comprehensive Metabolic Panel   • C-reactive Protein   • Sedimentation Rate   • CBC Auto Differential   • Lactic Acid, Plasma   • Ortho (on-call MD unless specified)   • [facility] (on-call MD unless specified) Details   • Initiate Observation Status   • CBC & Differential       Differential diagnosis includes but is not limited to:  Differential diagnosis includes but is not limited to:  - contusion  - strain  - patellar fracture  - ligamentous injury  - meniscal injury  - septic knee    Independent interpretation of labs, radiology studies, and discussions with consultants: Dr. Arturo Zheng, orthopedic surgeon and Dr. Starks with [facility].    Discussion below represents my analysis of pertinent findings related to patient's condition, differential diagnosis, treatment plan and final disposition:    The patient presents with clinical concern for a septic right knee.  He had a dose of Xarelto last evening, so I do not feel comfortable tapping his knee in the ED.  I have discussed this with Dr. Zheng.  He states that he will see the patient in consultation tomorrow morning and will likely tap the knee then.  He recommends holding all antibiotics for now.  I have discussed this with Dr. Starks.    ED Course as of 04/29/23 1809   Sat Apr 29, 2023   1702 I discussed the patient's overall care with Dr. Zheng, orthopedic surgeon on-call.  He states that he is happy to see the patient in consultation tomorrow and will plan on aspirating the knee joint tomorrow morning.  He request that we do not give the patient any antibiotics before then. [AR]   1706 I discussed the patient's overall care with Dr. Starks with [facility].  She is agreeable to bringing the patient in for observation. [AR]       ED Course User Index  [AR] Consuelo Perez PA            Additional sources:  - Chronic or social conditions impacting care: Patient has significant pain with bearing weight at this time    - Shared decision making: I discussed ED evaluation and treatment plan with patient who is in agreement.  Discussed at length ED testing.     DIAGNOSIS  Final diagnoses:   Acute pain of right knee   Elevated sed rate   Elevated C-reactive protein (CRP)       DISPOSITION  ED Disposition     ED Disposition   Decision to Admit    Condition   --    Comment   Level of Care: Med/Surg [1]   Diagnosis: Acute pain of right knee [[id number]]   Admitting Physician: IMANI EASTMAN [[id number]]   Attending Physician: IMANI EASTMAN [[id number]]             RX  Medications - No data to display       Medication List      No changes were made to your prescriptions during this visit.         Latest Documented Vital Signs:  As of 18:09 EDT  BP- 136/84 HR- 92 Temp- 100.3 °F (37.9 °C) O2 sat- 95%    Provider Attestation:  I personally reviewed the past medical history, past surgical history, social history, family history, current medications and allergies as they appear in the chart. I reviewed the patient's history, physical, lab/imaging results and overall care with Dr. Hargrove who is in agreement with the patient's treatment plan.    EMR Dragon/Transcription disclaimer:  Dictated using Dragon dictation    Provider note signed by:    Note Disclaimer: At [facility], we believe that sharing information builds trust and better relationships. You are receiving this note because you are receiving care at [facility] or recently visited. It is possible you will see health information before a provider has talked with you about it. This kind of information can be easy to misunderstand. To help you fully understand what it means for your health, we urge you to discuss this note with your provider.       Consuelo Perez PA  04/29/23  "1809      Electronically signed by Tio Hargrove MD at 04/29/23 1950     Tio Hargrove MD at 04/29/23 1509        The RICARDO and I have discussed this patient's history, physical exam, and treatment plan.  I have reviewed the documentation and personally had a face to face interaction with the patient. I affirm the documentation and agree with the treatment and plan.  The attached note describes my personal findings.      I provided a substantive portion of the care of the patient.  I personally performed the physical exam in its entirety, and below are my findings.     Brief history of present illness: 52-year-old male with right knee pain over the last 2 to 3 days is worse with walking.  He has noted some swelling.  No known trauma.    Physical exam:    /86   Pulse 111   Temp 100.3 °F (37.9 °C)   Resp 18   Ht 177.8 cm (70\")   Wt (!) 156 kg (345 lb)   SpO2 96%   BMI 49.50 kg/m²       Physical Exam   Constitutional: No distress.  Nontoxic  HENT:  Head: Normocephalic and atraumatic.   Oropharynx: Mucous membranes are moist.   Eyes: . No scleral icterus. No conjunctival pallor.  Neck: Normal range of motion. Neck supple.   Cardiovascular: Pink warm and well perfused throughout.    Pulmonary/Chest: No respiratory distress.  No tachypnea or increased work of breathing appreciated.    Musculoskeletal: Moves all extremities equally.  Some swelling with joint effusion noted to the right knee.  No calf tenderness particular  Neurological: Alert and oriented.   Skin: Skin is pink, warm, and dry.   Psychiatric: Mood and affect normal.   Nursing note and vitals reviewed.        MDM:   I have personally reviewed and interpreted the EKG obtained today in the emergency department at 1458 concomitant with treatment.  EKG reveals rhythm/rate -sinus, 90. RI-REJI within normal.  QRS-narrow complex ST/T-wave -no STEMI; QTc within normal limits    Agree with plan for laboratory radiologic evaluation.       Tio Hargrove, " MD  04/29/23 1513      Electronically signed by Tio Hargrove MD at 04/29/23 1513         Vital Signs (last 3 days)     Date/Time Temp Temp src Pulse Resp BP Patient Position SpO2    05/03/23 0523 97.5 (36.4) Oral 83 16 129/82 Lying 94    05/03/23 0111 97.4 (36.3) Oral 95 16 153/91 Sitting 95    05/02/23 2117 97.7 (36.5) Oral 80 16 149/80 Sitting 95    05/02/23 2053 -- -- 84 18 -- -- 95    05/02/23 1840 98.1 (36.7) Oral 84 18 130/86 Sitting 95    05/02/23 1433 98.1 (36.7) Oral 85 18 128/72 Lying 98    05/02/23 1002 98 (36.7) Oral 84 18 133/80 Lying 94    05/02/23 0947 -- -- 82 20 -- -- --    05/02/23 0944 -- -- 82 20 -- -- 96    05/02/23 0531 98.3 (36.8) Oral 84 16 130/77 Lying 95    05/02/23 0135 98.1 (36.7) Oral 87 18 131/86 Lying 93    05/01/23 2159 -- -- 89 18 -- -- --    05/01/23 2157 -- -- 88 18 -- -- 93    05/01/23 1024 98.2 (36.8) Oral 86 20 144/77 Sitting 93    05/01/23 0945 -- -- 90 16 -- -- 96    05/01/23 0506 98.8 (37.1) Oral 79 16 131/84 Lying 94    05/01/23 0152 98.8 (37.1) Oral 79 16 161/82 Sitting 92    04/30/23 2138 99.5 (37.5) Oral 85 18 149/85 Lying 99    04/30/23 2136 -- -- 90 18 -- -- --    04/30/23 2134 -- -- 89 18 -- -- 98    04/30/23 1800 98.3 (36.8) Oral 90 18 168/70 Lying 96    04/30/23 1412 97.8 (36.6) Oral 81 18 114/76 Sitting 94    04/30/23 1010 98.1 (36.7) Oral 89 18 172/94 Lying 94    04/30/23 0938 -- -- 93 18 -- -- 95    04/30/23 0550 98.2 (36.8) Oral 90 18 132/86 Lying 92    04/30/23 0130 98.7 (37.1) Oral 108 25 152/80 Lying 94            Intake & Output (last 3 days)       04/30 0701 05/01 0700 05/01 0701 05/02 0700 05/02 0701 05/03 0700 05/03 0701  05/04 0700    P.O. 820 650 820     Total Intake(mL/kg) 820 (5.3) 650 (4.2) 820 (5.3)     Urine (mL/kg/hr) 200 (0.1) 350 (0.1)      Total Output 200 350      Net +620 +300 +820             Urine Unmeasured Occurrence 7 x 7 x 4 x          Lines, Drains & Airways     Active LDAs     Name Placement date Placement time Site Days     Peripheral IV 05/02/23 0050 Right;Anterior Forearm 05/02/23  0050  Forearm  1                Current Facility-Administered Medications   Medication Dose Route Frequency Provider Last Rate Last Admin   • acetaminophen (TYLENOL) tablet 650 mg  650 mg Oral Q4H PRN Radha Starks MD   650 mg at 04/30/23 2308    Or   • acetaminophen (TYLENOL) 160 MG/5ML solution 650 mg  650 mg Oral Q4H PRN Radha Starks MD        Or   • acetaminophen (TYLENOL) suppository 650 mg  650 mg Rectal Q4H PRN Radha Starks MD       • albuterol (PROVENTIL) nebulizer solution 0.083% 2.5 mg/3mL  2.5 mg Nebulization Q6H PRN Radha Starks MD       • budesonide-formoterol (SYMBICORT) 160-4.5 MCG/ACT inhaler 2 puff  2 puff Inhalation BID - RT Radha Starks MD   2 puff at 05/02/23 2053   • cetirizine (zyrTEC) tablet 10 mg  10 mg Oral Daily Radha Starks MD   10 mg at 05/02/23 0853   • gabapentin (NEURONTIN) capsule 300 mg  300 mg Oral Daily Radha Starks MD   300 mg at 05/02/23 0853   • HYDROcodone-acetaminophen (NORCO) 5-325 MG per tablet 1 tablet  1 tablet Oral Q6H PRN Radha Starks MD   1 tablet at 05/03/23 0531   • levothyroxine (SYNTHROID, LEVOTHROID) tablet 112 mcg  112 mcg Oral Q AM Radha Starks MD   112 mcg at 05/03/23 0531   • lidocaine (XYLOCAINE) 1 % injection 10 mL  10 mL Infiltration Once SweetTip II, MD       • lisinopril (PRINIVIL,ZESTRIL) tablet 40 mg  40 mg Oral Q24H Radha Starks MD   40 mg at 05/02/23 0854   • metoprolol succinate XL (TOPROL-XL) 24 hr tablet 75 mg  75 mg Oral Daily Radha Starks MD   75 mg at 05/02/23 0853   • montelukast (SINGULAIR) tablet 10 mg  10 mg Oral Nightly Radha Starks MD   10 mg at 05/02/23 2137   • ondansetron (ZOFRAN) injection 4 mg  4 mg Intravenous Q6H PRN Radha Starks MD       • pantoprazole (PROTONIX) EC tablet 40 mg  40 mg Oral Q AM Radha Starks MD   40 mg  at 05/03/23 0531   • Pharmacy to dose vancomycin   Does not apply Continuous PRN Tomás Drummond MD       • piperacillin-tazobactam (ZOSYN) 4.5 g in iso-osmotic dextrose 100 mL IVPB (premix)  4.5 g Intravenous Q8H Tomás Drummond MD 0 mL/hr at 05/02/23 0240 4.5 g at 05/03/23 0530   • vancomycin 750 mg in sodium chloride 0.9 % 250 mL IVPB-VTB  750 mg Intravenous Q8H Tomás Drummond MD 0 mL/hr at 05/02/23 0420 750 mg at 05/03/23 0136     Medication Administration Report for Grant Edge Jr. as of 05/03/23 0828   Legend:    Given Hold Not Given Due Canceled Entry Other Actions    Time Time (Time) Time  Time-Action       Discontinued     Completed     Future     MAR Hold     Linked           Medications 05/01/23 05/02/23 05/03/23    acetaminophen (TYLENOL) tablet 650 mg  Dose: 650 mg  Freq: Every 4 Hours PRN Route: PO  PRN Reason: Mild Pain  Start: 04/29/23 1929   Admin Instructions:   Based on patient request - if ordered for moderate or severe pain, provider allows for administration of a medication prescribed for a lower pain scale.  Do not exceed 4 grams of acetaminophen in a 24 hr period. Max dose of 2gm for AST/ALT greater than 120 units/L    If given for fever, use fever parameter: fever greater than 100.4 °F.    If given for pain, use the following pain scale:   Mild Pain = Pain Score of 1-3, CPOT 1-2  Moderate Pain = Pain Score of 4-6, CPOT 3-4  Severe Pain = Pain Score of 7-10, CPOT 5-8         Or  acetaminophen (TYLENOL) 160 MG/5ML solution 650 mg  Dose: 650 mg  Freq: Every 4 Hours PRN Route: PO  PRN Reason: Mild Pain  Start: 04/29/23 1929   Admin Instructions:   Based on patient request - if ordered for moderate or severe pain, provider allows for administration of a medication prescribed for a lower pain scale.  Do not exceed 4 grams of acetaminophen in a 24 hr period. Max dose of 2gm for AST/ALT greater than 120 units/L    If given for fever, use fever parameter: fever  greater than 100.4 °F.    If given for pain, use the following pain scale:   Mild Pain = Pain Score of 1-3, CPOT 1-2  Moderate Pain = Pain Score of 4-6, CPOT 3-4  Severe Pain = Pain Score of 7-10, CPOT 5-8         Or  acetaminophen (TYLENOL) suppository 650 mg  Dose: 650 mg  Freq: Every 4 Hours PRN Route: RE  PRN Reason: Mild Pain  Start: 04/29/23 1929   Admin Instructions:   Based on patient request - if ordered for moderate or severe pain, provider allows for administration of a medication prescribed for a lower pain scale.  Do not exceed 4 grams of acetaminophen in a 24 hr period. Max dose of 2gm for AST/ALT greater than 120 units/L    If given for fever, use fever parameter: fever greater than 100.4 °F.    If given for pain, use the following pain scale:   Mild Pain = Pain Score of 1-3, CPOT 1-2  Moderate Pain = Pain Score of 4-6, CPOT 3-4  Severe Pain = Pain Score of 7-10, CPOT 5-8          albuterol (PROVENTIL) nebulizer solution 0.083% 2.5 mg/3mL  Dose: 2.5 mg  Freq: Every 6 Hours PRN Route: NEBULIZATION  PRN Reason: Wheezing  Start: 04/29/23 2105          budesonide-formoterol (SYMBICORT) 160-4.5 MCG/ACT inhaler 2 puff  Dose: 2 puff  Freq: 2 Times Daily - RT Route: IN  Start: 04/29/23 2200   Admin Instructions:      Shake well.  Rinse mouth after use, do not swallow water.  Send aerosols to pharmacy in ziplock bag for proper disposal.    0945-Given     2157-Given         0943-Given     2053-Given         0930 2130            cetirizine (zyrTEC) tablet 10 mg  Dose: 10 mg  Freq: Daily Route: PO  Start: 04/30/23 0900    0858-Given          0853-Given          0900             gabapentin (NEURONTIN) capsule 300 mg  Dose: 300 mg  Freq: Daily Route: PO  Start: 04/30/23 0900   Admin Instructions:             0858-Given          0853-Given          0900             HYDROcodone-acetaminophen (NORCO) 5-325 MG per tablet 1 tablet  Dose: 1 tablet  Freq: Every 6 Hours PRN Route: PO  PRN Reason: Moderate Pain  Start:  04/29/23 2101   End: 05/06/23 2100   Admin Instructions:   Based on patient request - if ordered for moderate or severe pain, provider allows for administration of a medication prescribed for a lower pain scale.  [FAUSTO]    Do not exceed 4 grams of acetaminophen in a 24 hr period. Max dose of 2gm for AST/ALT greater than 120 units/L        If given for pain, use the following pain scale:   Mild Pain = Pain Score of 1-3, CPOT 1-2  Moderate Pain = Pain Score of 4-6, CPOT 3-4  Severe Pain = Pain Score of 7-10, CPOT 5-8    0252-Given     0906-Given     2008-Given        0233-Given     0857-Given     1543-Given       2146-Given          0531-Given             levothyroxine (SYNTHROID, LEVOTHROID) tablet 112 mcg  Dose: 112 mcg  Freq: Every Early Morning Route: PO  Start: 04/30/23 0600   Admin Instructions:   Take on empty stomach.    0550-Given          0538-Given          0531-Given             lidocaine (XYLOCAINE) 1 % injection 10 mL  Dose: 10 mL  Freq: Once Route: INFILTRATION  Start: 05/03/23 0700      (0653)-Not Given [C]             lisinopril (PRINIVIL,ZESTRIL) tablet 40 mg  Dose: 40 mg  Freq: Every 24 Hours Scheduled Route: PO  Start: 04/30/23 0900    0859-Given          0854-Given          0900             metoprolol succinate XL (TOPROL-XL) 24 hr tablet 75 mg  Dose: 75 mg  Freq: Daily Route: PO  Start: 04/30/23 0900   Admin Instructions:   Do not crush or chew.    0859-Given          0853-Given          0900             montelukast (SINGULAIR) tablet 10 mg  Dose: 10 mg  Freq: Nightly Route: PO  Start: 04/29/23 2200 2008-Given          2137-Given          2100             ondansetron (ZOFRAN) injection 4 mg  Dose: 4 mg  Freq: Every 6 Hours PRN Route: IV  PRN Reasons: Nausea,Vomiting  Start: 04/29/23 1929   Admin Instructions:   If BOTH ondansetron (ZOFRAN) and promethazine (PHENERGAN) are ordered use ondansetron first and THEN promethazine IF ondansetron is ineffective.          pantoprazole (PROTONIX) EC  tablet 40 mg  Dose: 40 mg  Freq: Every Early Morning Route: PO  Start: 04/30/23 0600   Admin Instructions:   Swallow whole; do not crush, split, or chew.    0550-Given          0538-Given          0531-Given             Pharmacy to dose vancomycin  Freq: Continuous PRN Route: XX  PRN Reason: Consult  Indications of Use: BONE AND/OR JOINT INFECTION  Start: 04/30/23 1202   End: 05/07/23 1201          piperacillin-tazobactam (ZOSYN) 4.5 g in iso-osmotic dextrose 100 mL IVPB (premix)  Dose: 4.5 g  Freq: Every 8 Hours Route: IV  Indications of Use: BONE AND/OR JOINT INFECTION  Start: 04/30/23 1900   End: 05/07/23 1429   Admin Instructions:   Refrigerate    0508-New Bag     1244-New Bag     1900-Stopped       2223-New Bag          0028-Currently Infusing     0234-Currently Infusing     0240-Stopped       0536-New Bag     1335-New Bag     2137-New Bag        0530-New Bag     1430     2230           vancomycin 750 mg in sodium chloride 0.9 % 250 mL IVPB-VTB  Dose: 750 mg  Freq: Every 8 Hours Route: IV  Indications of Use: BONE AND/OR JOINT INFECTION  Start: 05/02/23 0145   End: 05/07/23 0829     0241-New Bag     0420-Stopped     1634-New Bag        0100-Canceled Entry     0136-New Bag     0830       1630            Completed Medications  Medications 05/01/23 05/02/23 05/03/23       lidocaine (XYLOCAINE) 1 % injection  - ADS Override Pull  Start: 05/03/23 0617   End: 05/03/23 0652   Admin Instructions:   Created by cabinet override      0652-Given [C]             piperacillin-tazobactam (ZOSYN) 4.5 g in iso-osmotic dextrose 100 mL IVPB (premix)  Dose: 4.5 g  Freq: Once Route: IV  Start: 04/30/23 1300   End: 04/30/23 1430   Admin Instructions:   Refrigerate          triamcinolone acetonide (KENALOG-40) injection 40 mg  Dose: 40 mg  Freq: Once Route: IM  Start: 05/03/23 0700   End: 05/03/23 0653      0653-Given [C]             vancomycin 2500 mg/500 mL 0.9% NS IVPB ([facility])  Dose: 2,500 mg  Freq: Once Route: IV  Indications of  Use: BONE AND/OR JOINT INFECTION  Start: 04/30/23 1300   End: 04/30/23 1813         Discontinued Medications  Medications 05/01/23 05/02/23 05/03/23       furosemide (LASIX) tablet 20 mg  Dose: 20 mg  Freq: Every Other Day Route: PO  Start: 04/29/23 2200   End: 04/30/23 0913          piperacillin-tazobactam (ZOSYN) 3.375 g in iso-osmotic dextrose 50 ml (premix)  Dose: 3.375 g  Freq: Every 8 Hours Route: IV  Indications of Use: BONE AND/OR JOINT INFECTION  Start: 04/30/23 1900   End: 04/30/23 1208   Admin Instructions:   Refrigerate          piperacillin-tazobactam (ZOSYN) 3.375 g in iso-osmotic dextrose 50 ml (premix)  Dose: 3.375 g  Freq: Once Route: IV  Start: 04/30/23 1300   End: 04/30/23 1207   Admin Instructions:   Refrigerate          potassium chloride (K-DUR,KLOR-CON) ER tablet 10 mEq  Dose: 10 mEq  Freq: Daily Route: PO  Start: 04/30/23 0900   End: 04/30/23 0913   Admin Instructions:   Swallow whole; do not crush, split, or chew.          vancomycin 1250 mg/250 mL 0.9% NS IVPB ([facility])  Dose: 1,250 mg  Freq: Every 12 Hours Route: IV  Indications of Use: BONE AND/OR JOINT INFECTION  Start: 05/01/23 0100   End: 05/02/23 0140    0132-New Bag     1319-New Bag     1900-Stopped        (0215)-Not Given                     Lab Results     Procedure Component Value Units Date/Time    Body Fluid Culture - Body Fluid, Knee, Right [[id number]] Collected: 05/02/23 0655    Specimen: Body Fluid from Knee, Right Updated: 05/03/23 0747     Body Fluid Culture No growth     Gram Stain Few (2+) WBCs seen      No organisms seen    Body Fluid Culture - Body Fluid, Knee, Right [[id number]] Collected: 04/30/23 0855    Specimen: Body Fluid from Knee, Right Updated: 05/03/23 0720     Body Fluid Culture No growth at 3 days     Gram Stain Few (2+) WBCs seen      No organisms seen    Basic Metabolic Panel [[id number]]  (Abnormal) Collected: 05/03/23 0533    Specimen: Blood Updated: 05/03/23 0638     Glucose 109 mg/dL      BUN 14 mg/dL       Creatinine 1.17 mg/dL      Sodium 145 mmol/L      Potassium 4.8 mmol/L      Comment: Slight hemolysis detected by analyzer. Results may be affected.        Chloride 109 mmol/L      CO2 25.3 mmol/L      Calcium 8.8 mg/dL      BUN/Creatinine Ratio 12.0     Anion Gap 10.7 mmol/L      eGFR 75.0 mL/min/1.73     Narrative:      GFR Normal >60  Chronic Kidney Disease <60  Kidney Failure <15      Vancomycin, Trough [[id number]]  (Normal) Collected: 05/03/23 0533    Specimen: Blood Updated: 05/03/23 0636     Vancomycin Trough 11.20 mcg/mL     Narrative:      Therapeutic Ranges for Vancomycin    Vancomycin Random   5.0-40.0 mcg/mL  Vancomycin Trough   5.0-20.0 mcg/mL  Vancomycin Peak     20.0-40.0 mcg/mL    CBC & Differential [[id number]]  (Abnormal) Collected: 05/03/23 0533    Specimen: Blood Updated: 05/03/23 0616    Narrative:      The following orders were created for panel order CBC & Differential.  Procedure                               Abnormality         Status                     ---------                               -----------         ------                     CBC Auto Differential[[id number]]        Abnormal            Final result                 Please view results for these tests on the individual orders.    CBC Auto Differential [[id number]]  (Abnormal) Collected: 05/03/23 0533    Specimen: Blood Updated: 05/03/23 0616     WBC 5.57 10*3/mm3      RBC 4.54 10*6/mm3      Hemoglobin 14.0 g/dL      Hematocrit 42.6 %      MCV 93.8 fL      MCH 30.8 pg      MCHC 32.9 g/dL      RDW 12.9 %      RDW-SD 43.7 fl      MPV 9.4 fL      Platelets 251 10*3/mm3      Neutrophil % 57.8 %      Lymphocyte % 24.6 %      Monocyte % 10.8 %      Eosinophil % 5.2 %      Basophil % 0.9 %      Immature Grans % 0.7 %      Neutrophils, Absolute 3.22 10*3/mm3      Lymphocytes, Absolute 1.37 10*3/mm3      Monocytes, Absolute 0.60 10*3/mm3      Eosinophils, Absolute 0.29 10*3/mm3      Basophils, Absolute 0.05 10*3/mm3      Immature Grans,  Absolute 0.04 10*3/mm3      nRBC 0.0 /100 WBC     Blood Culture - Blood, Arm, Right [[id number]]  (Normal) Collected: 04/29/23 1541    Specimen: Blood from Arm, Right Updated: 05/02/23 1600     Blood Culture No growth at 3 days    Blood Culture - Blood, Arm, Left [[id number]]  (Normal) Collected: 04/29/23 1514    Specimen: Blood from Arm, Left Updated: 05/02/23 1531     Blood Culture No growth at 3 days    Crystal Exam, Fluid - Synovial Fluid, [[id number]] Collected: 05/02/23 0655    Specimen: Synovial Fluid Updated: 05/02/23 0852     Crystals, Fluid None Seen    Body Fluid Cell Count With Differential - Body Fluid, Knee, Right [[id number]]  (Abnormal) Collected: 05/02/23 0655    Specimen: Body Fluid from Knee, Right Updated: 05/02/23 0843    Narrative:      The following orders were created for panel order Body Fluid Cell Count With Differential - Body Fluid, Knee, Right.  Procedure                               Abnormality         Status                     ---------                               -----------         ------                     Body fluid cell count - ...[[id number]]  Abnormal            Final result               Body fluid differential ...[[id number]]                      Final result                 Please view results for these tests on the individual orders.    Body fluid differential - Body Fluid, Knee, Right [[id number]] Collected: 05/02/23 0655    Specimen: Body Fluid from Knee, Right Updated: 05/02/23 0843     Neutrophils, Fluid 87 %      Lymphocytes, Fluid 3 %      Monocytes, Fluid 3 %      Mononuclear, Fluid 7 %     Body fluid cell count - Body Fluid, Knee, Right [[id number]]  (Abnormal) Collected: 05/02/23 0655    Specimen: Body Fluid from Knee, Right Updated: 05/02/23 0843     Color, Fluid Red     Appearance, Fluid Cloudy     RBC, Fluid 70,000 /mm3      Nucleated Cells, Fluid 12,420 /mm3      Method: Automated Sysmex XN Method    Narrative:      No reference range established. Physician to  interpret results with clinical findings.    Sedimentation Rate [[id number]]  (Abnormal) Collected: 05/02/23 0501    Specimen: Blood Updated: 05/02/23 0612     Sed Rate 43 mm/hr     Basic Metabolic Panel [[id number]]  (Abnormal) Collected: 05/02/23 0501    Specimen: Blood Updated: 05/02/23 0555     Glucose 112 mg/dL      BUN 16 mg/dL      Creatinine 1.44 mg/dL      Sodium 146 mmol/L      Potassium 4.3 mmol/L      Chloride 108 mmol/L      CO2 29.1 mmol/L      Calcium 9.2 mg/dL      BUN/Creatinine Ratio 11.1     Anion Gap 8.9 mmol/L      eGFR 58.5 mL/min/1.73     Narrative:      GFR Normal >60  Chronic Kidney Disease <60  Kidney Failure <15      C-reactive Protein [[id number]]  (Abnormal) Collected: 05/02/23 0501    Specimen: Blood Updated: 05/02/23 0555     C-Reactive Protein 12.85 mg/dL     CBC & Differential [[id number]]  (Abnormal) Collected: 05/02/23 0501    Specimen: Blood Updated: 05/02/23 0537    Narrative:      The following orders were created for panel order CBC & Differential.  Procedure                               Abnormality         Status                     ---------                               -----------         ------                     CBC Auto Differential[[id number]]        Abnormal            Final result                 Please view results for these tests on the individual orders.    CBC Auto Differential [[id number]]  (Abnormal) Collected: 05/02/23 0501    Specimen: Blood Updated: 05/02/23 0537     WBC 7.34 10*3/mm3      RBC 4.55 10*6/mm3      Hemoglobin 13.6 g/dL      Hematocrit 42.6 %      MCV 93.6 fL      MCH 29.9 pg      MCHC 31.9 g/dL      RDW 12.9 %      RDW-SD 44.2 fl      MPV 10.0 fL      Platelets 234 10*3/mm3      Neutrophil % 60.3 %      Lymphocyte % 23.0 %      Monocyte % 11.2 %      Eosinophil % 4.0 %      Basophil % 0.7 %      Immature Grans % 0.8 %      Neutrophils, Absolute 4.43 10*3/mm3      Lymphocytes, Absolute 1.69 10*3/mm3      Monocytes, Absolute 0.82 10*3/mm3       Eosinophils, Absolute 0.29 10*3/mm3      Basophils, Absolute 0.05 10*3/mm3      Immature Grans, Absolute 0.06 10*3/mm3      nRBC 0.1 /100 WBC     Vancomycin, Trough Please draw prior to vancomycin dose scheduled on 5/2 for 0100. Do not give dose if trough is greater than 21. Thank you! [[id number]]  (Normal) Collected: 05/02/23 0118    Specimen: Blood Updated: 05/02/23 0135     Vancomycin Trough 9.90 mcg/mL     Narrative:      Therapeutic Ranges for Vancomycin    Vancomycin Random   5.0-40.0 mcg/mL  Vancomycin Trough   5.0-20.0 mcg/mL  Vancomycin Peak     20.0-40.0 mcg/mL    Basic Metabolic Panel [[id number]]  (Abnormal) Collected: 05/01/23 0436    Specimen: Blood Updated: 05/01/23 0517     Glucose 114 mg/dL      BUN 15 mg/dL      Creatinine 1.28 mg/dL      Sodium 142 mmol/L      Potassium 4.1 mmol/L      Chloride 106 mmol/L      CO2 28.0 mmol/L      Calcium 8.5 mg/dL      BUN/Creatinine Ratio 11.7     Anion Gap 8.0 mmol/L      eGFR 67.3 mL/min/1.73     Narrative:      GFR Normal >60  Chronic Kidney Disease <60  Kidney Failure <15      CBC (No Diff) [[id number]]  (Normal) Collected: 05/01/23 0436    Specimen: Blood Updated: 05/01/23 0505     WBC 7.92 10*3/mm3      RBC 4.64 10*6/mm3      Hemoglobin 13.9 g/dL      Hematocrit 43.3 %      MCV 93.3 fL      MCH 30.0 pg      MCHC 32.1 g/dL      RDW 13.0 %      RDW-SD 44.1 fl      MPV 10.1 fL      Platelets 195 10*3/mm3     Crystal Exam, Fluid - Synovial Fluid, [[id number]] Collected: 04/30/23 0855    Specimen: Synovial Fluid Updated: 04/30/23 1104     Crystals, Fluid No crystals seen    Body Fluid Cell Count With Differential - Body Fluid, Knee, Right [[id number]]  (Abnormal) Collected: 04/30/23 0918    Specimen: Body Fluid from Knee, Right Updated: 04/30/23 1024    Narrative:      The following orders were created for panel order Body Fluid Cell Count With Differential - Body Fluid, Knee, Right.  Procedure                               Abnormality         Status                      ---------                               -----------         ------                     Body fluid cell count - ...[[id number]]  Abnormal            Final result               Body fluid differential ...[[id number]]                      Final result                 Please view results for these tests on the individual orders.    Body fluid differential - Body Fluid, Knee, Right [[id number]] Collected: 04/30/23 0918    Specimen: Body Fluid from Knee, Right Updated: 04/30/23 1024     Neutrophils, Fluid 89 %      Lymphocytes, Fluid 5 %      Monocytes, Fluid 6 %     Body fluid cell count - Body Fluid, Knee, Right [[id number]]  (Abnormal) Collected: 04/30/23 0918    Specimen: Body Fluid from Knee, Right Updated: 04/30/23 0953     Color, Fluid Yellow     Appearance, Fluid Cloudy     RBC, Fluid 5,000 /mm3      Nucleated Cells, Fluid 45,220 /mm3      Method: Automated Sysmex XN Method    Narrative:      No reference range established. Physician to interpret results with clinical findings.    CBC Auto Differential [[id number]]  (Abnormal) Collected: 04/30/23 0557    Specimen: Blood Updated: 04/30/23 0654     WBC 8.67 10*3/mm3      RBC 4.76 10*6/mm3      Hemoglobin 14.4 g/dL      Hematocrit 43.0 %      MCV 90.3 fL      MCH 30.3 pg      MCHC 33.5 g/dL      RDW 12.7 %      RDW-SD 41.8 fl      MPV 10.1 fL      Platelets 202 10*3/mm3      Neutrophil % 63.7 %      Lymphocyte % 17.2 %      Monocyte % 16.1 %      Eosinophil % 1.7 %      Basophil % 0.6 %      Immature Grans % 0.7 %      Neutrophils, Absolute 5.52 10*3/mm3      Lymphocytes, Absolute 1.49 10*3/mm3      Monocytes, Absolute 1.40 10*3/mm3      Eosinophils, Absolute 0.15 10*3/mm3      Basophils, Absolute 0.05 10*3/mm3      Immature Grans, Absolute 0.06 10*3/mm3      nRBC 0.0 /100 WBC     Basic Metabolic Panel [[id number]]  (Abnormal) Collected: 04/30/23 0515    Specimen: Blood Updated: 04/30/23 0616     Glucose 117 mg/dL      BUN 12 mg/dL      Creatinine 1.29 mg/dL       Sodium 140 mmol/L      Potassium 4.2 mmol/L      Chloride 103 mmol/L      CO2 26.9 mmol/L      Calcium 9.3 mg/dL      BUN/Creatinine Ratio 9.3     Anion Gap 10.1 mmol/L      eGFR 66.7 mL/min/1.73     Narrative:      GFR Normal >60  Chronic Kidney Disease <60  Kidney Failure <15      Comprehensive Metabolic Panel [[id number]]  (Abnormal) Collected: 04/29/23 1514    Specimen: Blood Updated: 04/29/23 1544     Glucose 104 mg/dL      BUN 11 mg/dL      Creatinine 1.24 mg/dL      Sodium 142 mmol/L      Potassium 4.0 mmol/L      Comment: Slight hemolysis detected by analyzer. Results may be affected.        Chloride 105 mmol/L      CO2 27.1 mmol/L      Calcium 9.4 mg/dL      Total Protein 6.9 g/dL      Albumin 3.9 g/dL      ALT (SGPT) 27 U/L      AST (SGOT) 22 U/L      Alkaline Phosphatase 79 U/L      Total Bilirubin 1.7 mg/dL      Globulin 3.0 gm/dL      A/G Ratio 1.3 g/dL      BUN/Creatinine Ratio 8.9     Anion Gap 9.9 mmol/L      eGFR 70.0 mL/min/1.73     Narrative:      GFR Normal >60  Chronic Kidney Disease <60  Kidney Failure <15      C-reactive Protein [[id number]]  (Abnormal) Collected: 04/29/23 1514    Specimen: Blood Updated: 04/29/23 1544     C-Reactive Protein 7.19 mg/dL     Lactic Acid, Plasma [[id number]]  (Normal) Collected: 04/29/23 1514    Specimen: Blood Updated: 04/29/23 1541     Lactate 1.3 mmol/L     Sedimentation Rate [[id number]]  (Abnormal) Collected: 04/29/23 1514    Specimen: Blood Updated: 04/29/23 1527     Sed Rate 24 mm/hr     CBC & Differential [[id number]]  (Abnormal) Collected: 04/29/23 1514    Specimen: Blood Updated: 04/29/23 1525    Narrative:      The following orders were created for panel order CBC & Differential.  Procedure                               Abnormality         Status                     ---------                               -----------         ------                     CBC Auto Differential[[id number]]        Abnormal            Final result                 Please view  results for these tests on the individual orders.    CBC Auto Differential [[id number]]  (Abnormal) Collected: 04/29/23 1514    Specimen: Blood Updated: 04/29/23 1525     WBC 8.17 10*3/mm3      RBC 5.02 10*6/mm3      Hemoglobin 15.3 g/dL      Hematocrit 46.6 %      MCV 92.8 fL      MCH 30.5 pg      MCHC 32.8 g/dL      RDW 13.3 %      RDW-SD 45.4 fl      MPV 9.9 fL      Platelets 205 10*3/mm3      Neutrophil % 70.8 %      Lymphocyte % 13.7 %      Monocyte % 12.9 %      Eosinophil % 1.2 %      Basophil % 0.5 %      Immature Grans % 0.9 %      Neutrophils, Absolute 5.79 10*3/mm3      Lymphocytes, Absolute 1.12 10*3/mm3      Monocytes, Absolute 1.05 10*3/mm3      Eosinophils, Absolute 0.10 10*3/mm3      Basophils, Absolute 0.04 10*3/mm3      Immature Grans, Absolute 0.07 10*3/mm3      nRBC 0.0 /100 WBC           Imaging Results     Procedure Component Value Units Date/Time    XR Knee 1 or 2 View Right [[id number]] Collected: 04/29/23 1537     Updated: 04/29/23 1602    Narrative:      TWO-VIEW RIGHT KNEE     HISTORY: Knee pain. No trauma.     FINDINGS: There are mild-to-moderate changes predominantly at the  patellofemoral compartment as also noted on the recent exam dated  03/23/2022. There appears to be a small to moderate joint effusion that  is larger on today's examination.     This report was finalized on 4/29/2023 3:38 PM by Dr. Leonid Petersen M.D.             Orders      Start     Ordered    05/03/23 1000  Vancomycin, Trough  Timed         05/02/23 0140    05/03/23 0700  lidocaine (XYLOCAINE) 1 % injection 10 mL  Once         05/03/23 0602    05/03/23 0700  triamcinolone acetonide (KENALOG-40) injection 40 mg  Once         05/03/23 0602    05/03/23 0617  lidocaine (XYLOCAINE) 1 % injection  - ADS Override Pull        Note to Pharmacy: Created by cabinet override    05/03/23 0617    05/03/23 0600  Basic Metabolic Panel  Morning Draw         05/02/23 1509    05/03/23 0600  CBC & Differential  Morning Draw          05/02/23 1509    05/03/23 0600  CBC Auto Differential  PROCEDURE ONCE         05/02/23 2204    05/02/23 1619  Inpatient Admission  Once         05/02/23 1619    05/02/23 1200  Vancomycin, Trough Please draw prior to vancomycin dose scheduled on 5/2 for best monitoring of vancomycin therapy. Thank you!  Timed,   Status:  Canceled        Comments: Please draw prior to vancomycin dose scheduled on 5/2 for best monitoring of vancomycin therapy. Thank you!      04/30/23 1217    05/02/23 0839  Body fluid differential - Body Fluid, Knee, Right  Once         05/02/23 0838    05/02/23 0653  Body Fluid Cell Count With Differential - Body Fluid, Knee, Right  STAT         05/02/23 0652    05/02/23 0653  Crystal Exam, Fluid - Synovial Fluid,  Once         05/02/23 0652    05/02/23 0653  Body Fluid Culture - Body Fluid, Knee, Right  STAT         05/02/23 0652    05/02/23 0653  Body fluid cell count - Body Fluid, Knee, Right  PROCEDURE ONCE         05/02/23 0652    05/02/23 0600  Basic Metabolic Panel  Morning Draw         05/01/23 1232    05/02/23 0600  CBC & Differential  Morning Draw         05/01/23 1232    05/02/23 0600  C-reactive Protein  Morning Draw         05/01/23 1232    05/02/23 0600  Sedimentation Rate  Morning Draw         05/01/23 1232    05/02/23 0600  CBC Auto Differential  PROCEDURE ONCE         05/01/23 2204    05/02/23 0145  vancomycin 750 mg in sodium chloride 0.9 % 250 mL IVPB-VTB  Every 8 Hours         05/02/23 0140    05/02/23 0015  Vancomycin, Trough Please draw prior to vancomycin dose scheduled on 5/2 for 0100. Do not give dose if trough is greater than 21. Thank you!  Timed        Comments: Please draw prior to vancomycin dose scheduled on 5/2 for 0100. Do not give dose if trough is greater than 21. Thank you!      05/01/23 1538    05/01/23 0600  CBC (No Diff)  Morning Draw         04/30/23 0913    05/01/23 0600  Basic Metabolic Panel  Morning Draw         04/30/23 0913    05/01/23 0100  vancomycin  1250 mg/250 mL 0.9% NS IVPB ([facility])  Every 12 Hours,   Status:  Discontinued         04/30/23 1216    04/30/23 1900  piperacillin-tazobactam (ZOSYN) 3.375 g in iso-osmotic dextrose 50 ml (premix)  Every 8 Hours,   Status:  Discontinued         04/30/23 1203    04/30/23 1900  piperacillin-tazobactam (ZOSYN) 4.5 g in iso-osmotic dextrose 100 mL IVPB (premix)  Every 8 Hours         04/30/23 1208    04/30/23 1300  piperacillin-tazobactam (ZOSYN) 3.375 g in iso-osmotic dextrose 50 ml (premix)  Once,   Status:  Discontinued         04/30/23 1203    04/30/23 1300  piperacillin-tazobactam (ZOSYN) 4.5 g in iso-osmotic dextrose 100 mL IVPB (premix)  Once         04/30/23 1207    04/30/23 1300  vancomycin 2500 mg/500 mL 0.9% NS IVPB ([facility])  Once         04/30/23 1210    04/30/23 1202  Pharmacy to dose vancomycin  Continuous PRN         04/30/23 1203    04/30/23 0954  Body fluid differential - Body Fluid, Knee, Right  Once         04/30/23 0953    04/30/23 0900  lisinopril (PRINIVIL,ZESTRIL) tablet 40 mg  Every 24 Hours Scheduled         04/29/23 2106 04/30/23 0900  gabapentin (NEURONTIN) capsule 300 mg  Daily         04/29/23 2106 04/30/23 0900  cetirizine (zyrTEC) tablet 10 mg  Daily         04/29/23 2106 04/30/23 0900  metoprolol succinate XL (TOPROL-XL) 24 hr tablet 75 mg  Daily         04/29/23 2106 04/30/23 0900  potassium chloride (K-DUR,KLOR-CON) ER tablet 10 mEq  Daily,   Status:  Discontinued         04/29/23 2106 04/30/23 0809  Body Fluid Cell Count With Differential - Body Fluid, Knee, Right  STAT         04/30/23 0808    04/30/23 0809  Body Fluid Culture - Body Fluid, Knee, Right  STAT         04/30/23 0808    04/30/23 0809  Crystal Exam, Fluid - Synovial Fluid,  Once         04/30/23 0808    04/30/23 0809  Body fluid cell count - Body Fluid, Knee, Right  PROCEDURE ONCE         04/30/23 0809    04/30/23 0800  Oral Care  2 Times Daily       04/29/23 1929    04/30/23 0600  Basic Metabolic Panel  Morning  Draw         04/29/23 1929 04/30/23 0600  CBC Auto Differential  Morning Draw         04/29/23 1929 04/30/23 0600  pantoprazole (PROTONIX) EC tablet 40 mg  Every Early Morning         04/29/23 2106 04/30/23 0600  levothyroxine (SYNTHROID, LEVOTHROID) tablet 112 mcg  Every Early Morning         04/29/23 2106 04/29/23 2200  budesonide-formoterol (SYMBICORT) 160-4.5 MCG/ACT inhaler 2 puff  2 Times Daily - RT         04/29/23 2106 04/29/23 2200  furosemide (LASIX) tablet 20 mg  Every Other Day,   Status:  Discontinued         04/29/23 2106 04/29/23 2200  montelukast (SINGULAIR) tablet 10 mg  Nightly         04/29/23 2106 04/29/23 2105  albuterol (PROVENTIL) nebulizer solution 0.083% 2.5 mg/3mL  Every 6 Hours PRN         04/29/23 2106 04/29/23 2101  HYDROcodone-acetaminophen (NORCO) 5-325 MG per tablet 1 tablet  Every 6 Hours PRN         04/29/23 2101 04/29/23 2051  Planning for intra-articular aspiration in the morning.  Please obtain and place at bedside: 1.  20 cc syringe 2.  1.5 inch 18-gauge needle 3.  Alcohol swabs 4.  Band-Aid 5. Specimen Cup Thanks!  Misc Nursing Order (Specify)  Once        Comments: Planning for intra-articular aspiration in the morning.  Please obtain and place at bedside:  1.  20 cc syringe  2.  1.5 inch 18-gauge needle  3.  Alcohol swabs  4.  Band-Aid  5. Specimen Cup       Thanks!    04/29/23 2051 04/29/23 2006  Inpatient Case Management  Consult  Once        Provider:  (Not yet assigned)    04/29/23 2005 04/29/23 2000  Vital Signs  Every 4 Hours       04/29/23 1929 04/29/23 1930  Code Status and Medical Interventions:  Continuous         04/29/23 1929 04/29/23 1930  Diet: Regular/House Diet; Texture: Regular Texture (IDDSI 7); Fluid Consistency: Thin (IDDSI 0)  Diet Effective Now         04/29/23 1929 04/29/23 1930  Intake & Output  Every Shift       04/29/23 1929 04/29/23 1930  Oxygen Therapy- Nasal Cannula; Titrate for SPO2: 90%  - 95%  Continuous         04/29/23 1929 04/29/23 1930  Place Sequential Compression Device  Once         04/29/23 1929    04/29/23 1930  Maintain Sequential Compression Device  Continuous         04/29/23 1929 04/29/23 1929  acetaminophen (TYLENOL) tablet 650 mg  Every 4 Hours PRN        See Hyperspace for full Linked Orders Report.    04/29/23 1929    04/29/23 1929  acetaminophen (TYLENOL) 160 MG/5ML solution 650 mg  Every 4 Hours PRN        See Hyperspace for full Linked Orders Report.    04/29/23 1929 04/29/23 1929  acetaminophen (TYLENOL) suppository 650 mg  Every 4 Hours PRN        See Hyperspace for full Linked Orders Report.    04/29/23 1929 04/29/23 1929  ondansetron (ZOFRAN) injection 4 mg  Every 6 Hours PRN         04/29/23 1929 04/29/23 1726  Initiate Observation Status  Once         04/29/23 1725    04/29/23 1704  [facility] (on-call MD unless specified) Details  Once        Specialty:  Hospitalist  Provider:  Radha Starks MD    04/29/23 1703    04/29/23 1649  Ortho (on-call MD unless specified)  Once        Specialty:  Orthopedic Surgery  Provider:  (Not yet assigned)    04/29/23 1648    04/29/23 1511  Blood Culture - Blood, Arm, Right  Once         04/29/23 1510    04/29/23 1511  Blood Culture - Blood, Arm, Left  Once         04/29/23 1510    04/29/23 1511  Lactic Acid, Plasma  Once         04/29/23 1510    04/29/23 1509  CBC & Differential  Once         04/29/23 1509    04/29/23 1509  Comprehensive Metabolic Panel  Once         04/29/23 1509    04/29/23 1509  C-reactive Protein  STAT         04/29/23 1509    04/29/23 1509  Sedimentation Rate  STAT         04/29/23 1509    04/29/23 1509  CBC Auto Differential  PROCEDURE ONCE         04/29/23 1509    04/29/23 1457  XR Knee 1 or 2 View Right  1 Time Imaging         04/29/23 1446    04/29/23 1446  XR Knee 3 View Right  1 Time Imaging,   Status:  Canceled         04/29/23 1446    04/29/23 1446  XR Chest 2 View  1 Time Imaging,   Status:   Canceled         23 1446    Unscheduled  Up With Assistance  As Needed       23 1929                   Physician Progress Notes (all)      Tip Zheng II, MD at 23 0647        Aspiration from yesterday even more reassuring that there is no infection.  Only 12,000 white cells.    Injection: The right knee was injected with 40 mg of Kenalog and 3 cc of lidocaine.  This was done under sterile conditions.  Patient tolerated the procedure well.  No complications    Plan: From my standpoint he is good to go.  This will likely take 24 hours for the steroid to really kick in.  It may be more prudent to have him work with therapy again today and tomorrow morning and discharge him home tomorrow.  Patient very much agrees and would prefer to be discharged tomorrow.  That is fine with me.    Electronically signed by Tip Zheng II, MD at 23 0648     Boris Armas MD at 23 1508          DAILY PROGRESS NOTE  [facility]    Patient Identification:  Name: Grant Edge JrScotty  Age: 52 y.o.  Sex: male  :  1970  MRN: [id number]         Primary Care Physician: Jim Joseph APRN    Subjective:  Interval History:right knee pain    Objective:    Scheduled Meds:budesonide-formoterol, 2 puff, Inhalation, BID - RT  cetirizine, 10 mg, Oral, Daily  gabapentin, 300 mg, Oral, Daily  levothyroxine, 112 mcg, Oral, Q AM  lisinopril, 40 mg, Oral, Q24H  metoprolol succinate XL, 75 mg, Oral, Daily  montelukast, 10 mg, Oral, Nightly  pantoprazole, 40 mg, Oral, Q AM  piperacillin-tazobactam, 4.5 g, Intravenous, Q8H  vancomycin, 750 mg, Intravenous, Q8H      Continuous Infusions:Pharmacy to dose vancomycin,         Vital signs in last 24 hours:  Temp:  [98 °F (36.7 °C)-98.3 °F (36.8 °C)] 98 °F (36.7 °C)  Heart Rate:  [82-89] 84  Resp:  [16-20] 18  BP: (130-133)/(77-86) 133/80    Intake/Output:    Intake/Output Summary (Last 24 hours) at 2023 6431  Last data filed at  "5/2/2023 0900  Gross per 24 hour   Intake 290 ml   Output 150 ml   Net 140 ml       Exam:  /80 (BP Location: Left arm, Patient Position: Lying)   Pulse 84   Temp 98 °F (36.7 °C) (Oral)   Resp 18   Ht 177.8 cm (70\")   Wt (!) 156 kg (345 lb)   SpO2 94%   BMI 49.50 kg/m²     General Appearance:    Alert, cooperative, no distress   Head:    Normocephalic, without obvious abnormality, atraumatic   Eyes:       Throat:   Lips, tongue, gums normal   Neck:   Supple, symmetrical, trachea midline, no JVD   Lungs:     Clear to auscultation bilaterally, respirations unlabored   Chest Wall:    No tenderness or deformity    Heart:    Regular rate and rhythm, S1 and S2 normal, no murmur,no  Rub or gallop   Abdomen:     Soft, nontender, bowel sounds active, no masses, no organomegaly    Extremities:   Extremities normal,right  knee tender, no cyanosis or edema   Pulses:      Skin:   Skin is warm and dry,  no rashes or palpable lesions   Neurologic:   no focal deficits noted        Data Review:    No results found for: POCGLU        Past Medical History:   Diagnosis Date   • Abnormal ECG    • Allergic    • Arthritis    • Chest pain    • CTS (carpal tunnel syndrome) A few months.   • DVT (deep venous thrombosis)    • GERD (gastroesophageal reflux disease)    • Hip arthrosis    • Hyperlipidemia    • Hypertension    • Hypokalemia    • Knee swelling    • Myocardial infarction    • Obesity    • Periarthritis of shoulder    • Simple goiter    • Sinus bradycardia    • Vitamin D deficiency        Assessment:  Active Hospital Problems    Diagnosis  POA   • **Acute pain of right knee [M25.561]  Yes   • Essential hypertension [I10]  Yes   • Hypothyroidism [E03.9]  Yes      Resolved Hospital Problems   No resolved problems to display.       Plan:  Ortho consult noted. Watch another day. Follow lab. Continue antibiotics.    Boris Armas MD  5/2/2023  15:08 EDT    Electronically signed by Boris Armas MD at 05/02/23 150Estelle Zheng, " "Tip Lopez II, MD at 23 0652        Patient does feel little bit better, but he does have some more fluid buildup on the right knee.    Right knee aspiration: The right knee was aspirated about 40 cc of blood-tinged synovial fluid with no obvious sediment.  This was sent for analysis.  Patient tolerated the procedure well    Plan: The right knee was aspirated again.  I will follow-up the results.  I continue to be worried about infection but we will see what these aspiration results show.    Electronically signed by Tip Zheng II, MD at 23 0653     Boris Armas MD at 23 1229          DAILY PROGRESS NOTE  [facility]    Patient Identification:  Name: Grant Edge Jr.  Age: 52 y.o.  Sex: male  :  1970  MRN: [id number]         Primary Care Physician: Jim Joseph APRN    Subjective:  Interval History:right knee pain    Objective:    Scheduled Meds:budesonide-formoterol, 2 puff, Inhalation, BID - RT  cetirizine, 10 mg, Oral, Daily  gabapentin, 300 mg, Oral, Daily  levothyroxine, 112 mcg, Oral, Q AM  lisinopril, 40 mg, Oral, Q24H  metoprolol succinate XL, 75 mg, Oral, Daily  montelukast, 10 mg, Oral, Nightly  pantoprazole, 40 mg, Oral, Q AM  piperacillin-tazobactam, 4.5 g, Intravenous, Q8H  vancomycin, 1,250 mg, Intravenous, Q12H      Continuous Infusions:Pharmacy to dose vancomycin,         Vital signs in last 24 hours:  Temp:  [97.8 °F (36.6 °C)-99.5 °F (37.5 °C)] 98.2 °F (36.8 °C)  Heart Rate:  [79-90] 86  Resp:  [16-20] 20  BP: (114-168)/(70-85) 144/77    Intake/Output:    Intake/Output Summary (Last 24 hours) at 2023 1229  Last data filed at 2023 1024  Gross per 24 hour   Intake 1060 ml   Output 400 ml   Net 660 ml       Exam:  /77 (BP Location: Left arm, Patient Position: Sitting)   Pulse 86   Temp 98.2 °F (36.8 °C) (Oral)   Resp 20   Ht 177.8 cm (70\")   Wt (!) 156 kg (345 lb)   SpO2 93%   BMI 49.50 kg/m²     General " Appearance:    Alert, cooperative, no distress   Head:    Normocephalic, without obvious abnormality, atraumatic   Eyes:       Throat:   Lips, tongue, gums normal   Neck:   Supple, symmetrical, trachea midline, no JVD   Lungs:     Clear to auscultation bilaterally, respirations unlabored   Chest Wall:    No tenderness or deformity    Heart:    Regular rate and rhythm, S1 and S2 normal, no murmur,no  Rub or gallop   Abdomen:     Soft, nontender, bowel sounds active, no masses, no organomegaly    Extremities:   Extremities normal,right  knee tender, no cyanosis or edema   Pulses:      Skin:   Skin is warm and dry,  no rashes or palpable lesions   Neurologic:   no focal deficits noted        Data Review:          Results from last 7 days   Lab Units 04/29/23  1514   ALK PHOS U/L 79   BILIRUBIN mg/dL 1.7*   ALT (SGPT) U/L 27   AST (SGOT) U/L 22             No results found for: POCGLU        Past Medical History:   Diagnosis Date   • Abnormal ECG    • Allergic    • Arthritis    • Chest pain    • CTS (carpal tunnel syndrome) A few months.   • DVT (deep venous thrombosis)    • GERD (gastroesophageal reflux disease)    • Hip arthrosis    • Hyperlipidemia    • Hypertension    • Hypokalemia    • Knee swelling    • Myocardial infarction    • Obesity    • Periarthritis of shoulder    • Simple goiter    • Sinus bradycardia    • Vitamin D deficiency        Assessment:  Active Hospital Problems    Diagnosis  POA   • **Acute pain of right knee [M25.561]  Yes   • Essential hypertension [I10]  Yes   • Hypothyroidism [E03.9]  Yes      Resolved Hospital Problems   No resolved problems to display.       Plan:  Ortho consult noted. Watch another day. Follow lab.    Boris Armas MD  5/1/2023  12:29 EDT    Electronically signed by Boris Armas MD at 05/01/23 1235     Tip Zheng II, MD at 05/01/23 0601        Cultures from the knee remain negative.  He does state he feels a little bit better.  Cell count below 50,000  "neutrophils.  This does not appear to be positive for infection.  However, I am not confident enough to give him an intra-articular steroid injection.  I would probably watch him another day and if he does not continue to improve, I may consider another knee aspiration.    Electronically signed by Tip Zheng II, MD at 23 0644     Tomás Drummond MD at 23 0911           LOS: 0 days     Name: Grant Edge Jr.  Age: 52 y.o.  Sex: male  :  1970  MRN: [id number]         Primary Care Physician: Jim Joseph APRN    Subjective   Subjective  Still with pain in the right knee.  Aspiration performed this morning.    Objective   Vital Signs  Temp:  [98.2 °F (36.8 °C)-100.3 °F (37.9 °C)] 98.2 °F (36.8 °C)  Heart Rate:  [] 90  Resp:  [18-25] 18  BP: (132-173)/(79-92) 132/86  Body mass index is 49.5 kg/m².    Objective:  General Appearance:  Comfortable and in no acute distress.    Vital signs: (most recent): Blood pressure 132/86, pulse 90, temperature 98.2 °F (36.8 °C), temperature source Oral, resp. rate 18, height 177.8 cm (70\"), weight (!) 156 kg (345 lb), SpO2 92 %.    Lungs:  Normal effort and normal respiratory rate.    Heart: Normal rate.  Regular rhythm.    Abdomen: Abdomen is soft.  Bowel sounds are normal.   There is no abdominal tenderness.     Extremities: There is no dependent edema or local swelling.  (Pain in the right knee with range of motion.  Associated swelling present.)  Neurological: Patient is alert and oriented to person, place and time.    Skin:  Warm and dry.              Results Review:       I reviewed the patient's new clinical results.      Scheduled Meds:   budesonide-formoterol, 2 puff, Inhalation, BID - RT  cetirizine, 10 mg, Oral, Daily  furosemide, 20 mg, Oral, Every Other Day  gabapentin, 300 mg, Oral, Daily  levothyroxine, 112 mcg, Oral, Q AM  lisinopril, 40 mg, Oral, Q24H  metoprolol succinate XL, 75 mg, Oral, Daily  montelukast, 10 mg, " Oral, Nightly  pantoprazole, 40 mg, Oral, Q AM  potassium chloride, 10 mEq, Oral, Daily      PRN Meds:   •  acetaminophen **OR** acetaminophen **OR** acetaminophen  •  albuterol  •  HYDROcodone-acetaminophen  •  ondansetron  Continuous Infusions:       Assessment & Plan   Active Hospital Problems    Diagnosis  POA   • **Acute pain of right knee [M25.561]  Yes   • Essential hypertension [I10]  Yes   • Hypothyroidism [E03.9]  Yes      Resolved Hospital Problems   No resolved problems to display.       Assessment & Plan    -Await results of knee aspirate.  Monitoring off antibiotics.  Appreciate orthopedic surgery  -Continue outpatient antihypertensive regimen  -Place Lasix on hold  -Supportive care with pain control        Tomás Drummond MD  [facility]  04/30/23  09:12 EDT      Electronically signed by Tomás Drummond MD at 04/30/23 0913          Consult Notes (all)      Tip Zheng II, MD at 04/30/23 0809      Consult Orders    1. Ortho (on-call MD unless specified) [[id number]] ordered by Consuelo Perez PA at 04/29/23 164                 Orthopaedic Surgery  Consult Note  Dr. DERIC Zheng II  [phone]    HPI:  Patient is a 52 y.o. Not  or  male who presents with complaints of sharp right knee pain for the past day.  Pain was insidious in onset.  He noticed a lot of swelling and discomfort.  Eventually it became so bad that he presented to the hospital where he was noted to have a mildly elevated CRP and sedimentation rate.  He does not claim to have a history of gout although he has had a uric acid resulted several times in the past, a couple of times that has been elevated.  He complains of sharp pains in the right knee worse with any activity.  He is unable to bend the knee or bear any weight due to pain.  Because he takes Xarelto, the emergency room was not able to aspirate the knee.  He was admitted for pain control and  orthopedic evaluation.  His white count was normal and his fever curve has been normal as well.    MEDICAL HISTORY  Past Medical History:   Diagnosis Date   • Abnormal ECG    • Allergic    • Arthritis    • Chest pain    • CTS (carpal tunnel syndrome) A few months.   • DVT (deep venous thrombosis)    • GERD (gastroesophageal reflux disease)    • Hip arthrosis    • Hyperlipidemia    • Hypertension    • Hypokalemia    • Knee swelling    • Myocardial infarction    • Obesity    • Periarthritis of shoulder    • Simple goiter    • Sinus bradycardia    • Vitamin D deficiency    ·   Past Surgical History:   Procedure Laterality Date   • CARDIAC CATHETERIZATION N/A 10/18/2016    Procedure: Left Heart Cath;  Surgeon: Daniel Rosas MD;  Location:  [facility];  Service:    • CHOLECYSTECTOMY     • EPIDURAL Right 11/4/2022    Procedure: Right L5 LUMBAR/SACRAL TRANSFORAMINAL EPIDURAL. Hold xarelto x 3 days prior to procedure;  Surgeon: La Sun MD;  Location: [facility] MAIN OR;  Service: Pain Management;  Laterality: Right;   • JOINT REPLACEMENT     • SALIVARY GLAND SURGERY     • THYROIDECTOMY  2013   • TONSILLECTOMY     • TOTAL HIP ARTHROPLASTY REVISION Left 2015   ·   Prior to Admission medications    Medication Sig Start Date End Date Taking? Authorizing Provider   albuterol sulfate  (90 Base) MCG/ACT inhaler Inhale 2 puffs Every 4 (Four) Hours As Needed for Wheezing. 1/6/23  Yes Jim Joseph APRN   azelastine (ASTELIN) 0.1 % nasal spray 2 sprays into the nostril(s) as directed by provider 2 (Two) Times a Day. Use in each nostril as directed  Patient taking differently: 2 sprays into the nostril(s) as directed by provider As Needed. Use in each nostril as directed 7/8/20  Yes Patricia Abdi APRN   benazepril (LOTENSIN) 40 MG tablet Take 1 tablet by mouth Daily. 11/7/22  Yes Patricia Abdi APRN   esomeprazole (nexIUM) 40 MG capsule TAKE 1 CAPSULE BY MOUTH DAILY AS NEEDED FOR ACID REFLUX  OR SYMPTOMS 2/9/23  Yes Jim Joseph APRN   fluticasone (FLONASE) 50 MCG/ACT nasal spray 2 sprays into the nostril(s) as directed by provider Daily.  Patient taking differently: 2 sprays into the nostril(s) as directed by provider As Needed. 3/6/20  Yes Patricia Abdi APRN   Fluticasone-Salmeterol (ADVAIR/WIXELA) 100-50 MCG/ACT DISKUS Inhale 1 puff 2 (Two) Times a Day. 1/31/23  Yes Jim Joseph APRN   furosemide (Lasix) 20 MG tablet Take 1 tablet by mouth Every Other Day. 4/7/23  Yes Jim Joseph APRN   gabapentin (NEURONTIN) 300 MG capsule Take 1 capsule by mouth Daily.   Yes ProviderKenny MD   levothyroxine (SYNTHROID, LEVOTHROID) 112 MCG tablet Take 1 tablet by mouth Daily. 3/21/23  Yes Jim Joseph APRN   Loratadine (CLARITIN PO) Take 10 mg by mouth Daily As Needed.   Yes ProviderKenny MD   metoprolol succinate XL (TOPROL-XL) 50 MG 24 hr tablet Take 1.5 tablets by mouth Daily. 4/19/23  Yes Jim Joseph APRN   montelukast (SINGULAIR) 10 MG tablet Take 1 tablet by mouth Every Night. 2/13/23  Yes Jim Joseph APRN   potassium chloride 10 MEQ CR tablet Take 1 tablet by mouth Daily. 3/20/23  Yes Jim Joseph APRN   Pseudoephedrine-guaiFENesin (MUCINEX D PO) Take 1 tablet by mouth Daily.   Yes ProviderKenny MD   Xarelto 20 MG tablet TAKE 1 TABLET BY MOUTH DAILY  Patient taking differently: 1 tablet Daily. 1/20/23  Yes Jim Joseph APRN   ·   · No Known Allergies  Most Recent Immunizations   Administered Date(s) Administered   • COVID-19 (MODERNA) 1st,2nd,3rd Dose Monovalent 11/17/2021   • COVID-19 (MODERNA) BIVALENT 12+YRS 11/09/2022   • FluLaval/Fluzone >6mos 11/07/2022   • FluMist 2-49yrs 09/25/2013   • Influenza Injectable Mdck Pf Quad 11/07/2022   • Shingrix 11/09/2022   • Tdap 07/05/2017   • flucelvax quad pfs =>4 YRS 12/05/2019   ·   Social History     Tobacco Use   • Smoking status: Never   • Smokeless tobacco: Never   Substance Use Topics   • Alcohol use: No   ·   "  Social History     Substance and Sexual Activity   Drug Use No   ·     VITALS: /86 (BP Location: Right arm, Patient Position: Lying)   Pulse 90   Temp 98.2 °F (36.8 °C) (Oral)   Resp 18   Ht 177.8 cm (70\")   Wt (!) 156 kg (345 lb)   SpO2 92%   BMI 49.50 kg/m²  Body mass index is 49.5 kg/m².    PHYSICAL EXAM:   · CONSTITUTIONAL: No acute distress  · LUNGS: Equal chest rise, no shortness of air  · CARDIOVASCULAR: palpable peripheral pulses  · SKIN: no skin lesions in the area examined  · LYMPH: no lymphadenopathy in the area examined  · EXTREMITY: Right Lower Extremity  · Tenderness to Palpation: Tenderness to palpation at the Knee  · Gross Deformity: 2+ right knee effusion  · Pulses:  Brisk Capillary Refill  · Sensation: Intact to Saphenous, Sural, Deep Peroneal, Superficial Peroneal, and Tibial Nerves and grossly throughout extremity  · Motor: 5/5 EHL/FHL/TA/GS motor complexes    RADIOLOGY REVIEW:   No radiology results for the last 7 days    LABS:   Results for the past 24 hours:   Recent Results (from the past 24 hour(s))   Comprehensive Metabolic Panel    Collection Time: 04/29/23  3:14 PM    Specimen: Blood   Result Value Ref Range    Glucose 104 (H) 65 - 99 mg/dL    BUN 11 6 - 20 mg/dL    Creatinine 1.24 0.76 - 1.27 mg/dL    Sodium 142 136 - 145 mmol/L    Potassium 4.0 3.5 - 5.2 mmol/L    Chloride 105 98 - 107 mmol/L    CO2 27.1 22.0 - 29.0 mmol/L    Calcium 9.4 8.6 - 10.5 mg/dL    Total Protein 6.9 6.0 - 8.5 g/dL    Albumin 3.9 3.5 - 5.2 g/dL    ALT (SGPT) 27 1 - 41 U/L    AST (SGOT) 22 1 - 40 U/L    Alkaline Phosphatase 79 39 - 117 U/L    Total Bilirubin 1.7 (H) 0.0 - 1.2 mg/dL    Globulin 3.0 gm/dL    A/G Ratio 1.3 g/dL    BUN/Creatinine Ratio 8.9 7.0 - 25.0    Anion Gap 9.9 5.0 - 15.0 mmol/L    eGFR 70.0 >60.0 mL/min/1.73   C-reactive Protein    Collection Time: 04/29/23  3:14 PM    Specimen: Blood   Result Value Ref Range    C-Reactive Protein 7.19 (H) 0.00 - 0.50 mg/dL   Sedimentation Rate "    Collection Time: 04/29/23  3:14 PM    Specimen: Blood   Result Value Ref Range    Sed Rate 24 (H) 0 - 20 mm/hr   CBC Auto Differential    Collection Time: 04/29/23  3:14 PM    Specimen: Blood   Result Value Ref Range    WBC 8.17 3.40 - 10.80 10*3/mm3    RBC 5.02 4.14 - 5.80 10*6/mm3    Hemoglobin 15.3 13.0 - 17.7 g/dL    Hematocrit 46.6 37.5 - 51.0 %    MCV 92.8 79.0 - 97.0 fL    MCH 30.5 26.6 - 33.0 pg    MCHC 32.8 31.5 - 35.7 g/dL    RDW 13.3 12.3 - 15.4 %    RDW-SD 45.4 37.0 - 54.0 fl    MPV 9.9 6.0 - 12.0 fL    Platelets 205 140 - 450 10*3/mm3    Neutrophil % 70.8 42.7 - 76.0 %    Lymphocyte % 13.7 (L) 19.6 - 45.3 %    Monocyte % 12.9 (H) 5.0 - 12.0 %    Eosinophil % 1.2 0.3 - 6.2 %    Basophil % 0.5 0.0 - 1.5 %    Immature Grans % 0.9 (H) 0.0 - 0.5 %    Neutrophils, Absolute 5.79 1.70 - 7.00 10*3/mm3    Lymphocytes, Absolute 1.12 0.70 - 3.10 10*3/mm3    Monocytes, Absolute 1.05 (H) 0.10 - 0.90 10*3/mm3    Eosinophils, Absolute 0.10 0.00 - 0.40 10*3/mm3    Basophils, Absolute 0.04 0.00 - 0.20 10*3/mm3    Immature Grans, Absolute 0.07 (H) 0.00 - 0.05 10*3/mm3    nRBC 0.0 0.0 - 0.2 /100 WBC   Lactic Acid, Plasma    Collection Time: 04/29/23  3:14 PM    Specimen: Blood   Result Value Ref Range    Lactate 1.3 0.5 - 2.0 mmol/L   Basic Metabolic Panel    Collection Time: 04/30/23  5:15 AM    Specimen: Blood   Result Value Ref Range    Glucose 117 (H) 65 - 99 mg/dL    BUN 12 6 - 20 mg/dL    Creatinine 1.29 (H) 0.76 - 1.27 mg/dL    Sodium 140 136 - 145 mmol/L    Potassium 4.2 3.5 - 5.2 mmol/L    Chloride 103 98 - 107 mmol/L    CO2 26.9 22.0 - 29.0 mmol/L    Calcium 9.3 8.6 - 10.5 mg/dL    BUN/Creatinine Ratio 9.3 7.0 - 25.0    Anion Gap 10.1 5.0 - 15.0 mmol/L    eGFR 66.7 >60.0 mL/min/1.73   CBC Auto Differential    Collection Time: 04/30/23  5:57 AM    Specimen: Blood   Result Value Ref Range    WBC 8.67 3.40 - 10.80 10*3/mm3    RBC 4.76 4.14 - 5.80 10*6/mm3    Hemoglobin 14.4 13.0 - 17.7 g/dL    Hematocrit 43.0  "37.5 - 51.0 %    MCV 90.3 79.0 - 97.0 fL    MCH 30.3 26.6 - 33.0 pg    MCHC 33.5 31.5 - 35.7 g/dL    RDW 12.7 12.3 - 15.4 %    RDW-SD 41.8 37.0 - 54.0 fl    MPV 10.1 6.0 - 12.0 fL    Platelets 202 140 - 450 10*3/mm3    Neutrophil % 63.7 42.7 - 76.0 %    Lymphocyte % 17.2 (L) 19.6 - 45.3 %    Monocyte % 16.1 (H) 5.0 - 12.0 %    Eosinophil % 1.7 0.3 - 6.2 %    Basophil % 0.6 0.0 - 1.5 %    Immature Grans % 0.7 (H) 0.0 - 0.5 %    Neutrophils, Absolute 5.52 1.70 - 7.00 10*3/mm3    Lymphocytes, Absolute 1.49 0.70 - 3.10 10*3/mm3    Monocytes, Absolute 1.40 (H) 0.10 - 0.90 10*3/mm3    Eosinophils, Absolute 0.15 0.00 - 0.40 10*3/mm3    Basophils, Absolute 0.05 0.00 - 0.20 10*3/mm3    Immature Grans, Absolute 0.06 (H) 0.00 - 0.05 10*3/mm3    nRBC 0.0 0.0 - 0.2 /100 WBC     Right knee aspiration: The right knee was aspirated of about 80 cc of dark synovial fluid.  No sediment noted.  This was sent for analysis    IMPRESSION:  Patient is a 52 y.o. Not  or  male with right knee effusion    PLAN:   · Admited to: Radha Starks MD  · Disposition: Further plans to be determined based on the results of the aspiration.  I do not believe this to be of infectious etiology.  This is much more likely to be a gouty attack.  However, the result of the aspiration will tell us what we need to know.  If this is just gout, I would be happy to give him a steroid injection in the knee tomorrow to further help alleviate his pain.    R \"Arturo\" Marce DAVIES MD  Orthopaedic Surgery  [facility]  [phone] - [place] Office  [phone] - [place] Office              Electronically signed by Tip Zheng II, MD at 04/30/23 0812       "

## 2023-05-03 NOTE — PLAN OF CARE
Goal Outcome Evaluation:           Progress: no change  Outcome Evaluation: Pt admitted 4-29 for right knee pain. Pt A&Ox4, VSS, on room air, ambulates x stand by, voiding per brp, pain controlled with prn oral pain medication. Pt has had 2 aspirations of the right knee since admission and most recent yesterday, pending results. Pt plan pending

## 2023-05-03 NOTE — PROGRESS NOTES
Aspiration from yesterday even more reassuring that there is no infection.  Only 12,000 white cells.    Injection: The right knee was injected with 40 mg of Kenalog and 3 cc of lidocaine.  This was done under sterile conditions.  Patient tolerated the procedure well.  No complications    Plan: From my standpoint he is good to go.  This will likely take 24 hours for the steroid to really kick in.  It may be more prudent to have him work with therapy again today and tomorrow morning and discharge him home tomorrow.  Patient very much agrees and would prefer to be discharged tomorrow.  That is fine with me.

## 2023-05-03 NOTE — PLAN OF CARE
Goal Outcome Evaluation:  Patient is alert and oriented.  Vitals are stable.  Up to chair.  Medicated with analgesics for pain with positive results.

## 2023-05-04 ENCOUNTER — READMISSION MANAGEMENT (OUTPATIENT)
Dept: CALL CENTER | Facility: HOSPITAL | Age: 53
End: 2023-05-04
Payer: COMMERCIAL

## 2023-05-04 VITALS
HEART RATE: 82 BPM | TEMPERATURE: 98 F | OXYGEN SATURATION: 95 % | SYSTOLIC BLOOD PRESSURE: 168 MMHG | BODY MASS INDEX: 45.1 KG/M2 | HEIGHT: 70 IN | RESPIRATION RATE: 16 BRPM | DIASTOLIC BLOOD PRESSURE: 79 MMHG | WEIGHT: 315 LBS

## 2023-05-04 LAB
ANION GAP SERPL CALCULATED.3IONS-SCNC: 7 MMOL/L (ref 5–15)
BACTERIA SPEC AEROBE CULT: NORMAL
BACTERIA SPEC AEROBE CULT: NORMAL
BASOPHILS # BLD AUTO: 0.07 10*3/MM3 (ref 0–0.2)
BASOPHILS NFR BLD AUTO: 1 % (ref 0–1.5)
BUN SERPL-MCNC: 16 MG/DL (ref 6–20)
BUN/CREAT SERPL: 12.9 (ref 7–25)
CALCIUM SPEC-SCNC: 8.7 MG/DL (ref 8.6–10.5)
CHLORIDE SERPL-SCNC: 106 MMOL/L (ref 98–107)
CO2 SERPL-SCNC: 28 MMOL/L (ref 22–29)
CREAT SERPL-MCNC: 1.24 MG/DL (ref 0.76–1.27)
CRP SERPL-MCNC: 3.7 MG/DL (ref 0–0.5)
DEPRECATED RDW RBC AUTO: 42.4 FL (ref 37–54)
EGFRCR SERPLBLD CKD-EPI 2021: 70 ML/MIN/1.73
EOSINOPHIL # BLD AUTO: 0.22 10*3/MM3 (ref 0–0.4)
EOSINOPHIL NFR BLD AUTO: 3.3 % (ref 0.3–6.2)
ERYTHROCYTE [DISTWIDTH] IN BLOOD BY AUTOMATED COUNT: 12.6 % (ref 12.3–15.4)
ERYTHROCYTE [SEDIMENTATION RATE] IN BLOOD: 50 MM/HR (ref 0–20)
GLUCOSE SERPL-MCNC: 108 MG/DL (ref 65–99)
HCT VFR BLD AUTO: 42.1 % (ref 37.5–51)
HGB BLD-MCNC: 13.9 G/DL (ref 13–17.7)
IMM GRANULOCYTES # BLD AUTO: 0.05 10*3/MM3 (ref 0–0.05)
IMM GRANULOCYTES NFR BLD AUTO: 0.7 % (ref 0–0.5)
LYMPHOCYTES # BLD AUTO: 1.32 10*3/MM3 (ref 0.7–3.1)
LYMPHOCYTES NFR BLD AUTO: 19.6 % (ref 19.6–45.3)
MCH RBC QN AUTO: 30.6 PG (ref 26.6–33)
MCHC RBC AUTO-ENTMCNC: 33 G/DL (ref 31.5–35.7)
MCV RBC AUTO: 92.7 FL (ref 79–97)
MONOCYTES # BLD AUTO: 0.62 10*3/MM3 (ref 0.1–0.9)
MONOCYTES NFR BLD AUTO: 9.2 % (ref 5–12)
NEUTROPHILS NFR BLD AUTO: 4.47 10*3/MM3 (ref 1.7–7)
NEUTROPHILS NFR BLD AUTO: 66.2 % (ref 42.7–76)
NRBC BLD AUTO-RTO: 0 /100 WBC (ref 0–0.2)
PLATELET # BLD AUTO: 295 10*3/MM3 (ref 140–450)
PMV BLD AUTO: 9.7 FL (ref 6–12)
POTASSIUM SERPL-SCNC: 4.3 MMOL/L (ref 3.5–5.2)
RBC # BLD AUTO: 4.54 10*6/MM3 (ref 4.14–5.8)
SODIUM SERPL-SCNC: 141 MMOL/L (ref 136–145)
WBC NRBC COR # BLD: 6.75 10*3/MM3 (ref 3.4–10.8)

## 2023-05-04 PROCEDURE — 97110 THERAPEUTIC EXERCISES: CPT

## 2023-05-04 PROCEDURE — G0378 HOSPITAL OBSERVATION PER HR: HCPCS

## 2023-05-04 PROCEDURE — 25010000002 PIPERACILLIN SOD-TAZOBACTAM PER 1 G: Performed by: INTERNAL MEDICINE

## 2023-05-04 PROCEDURE — 25010000002 VANCOMYCIN 10 G RECONSTITUTED SOLUTION: Performed by: HOSPITALIST

## 2023-05-04 PROCEDURE — 85652 RBC SED RATE AUTOMATED: CPT | Performed by: HOSPITALIST

## 2023-05-04 PROCEDURE — 94799 UNLISTED PULMONARY SVC/PX: CPT

## 2023-05-04 PROCEDURE — 94664 DEMO&/EVAL PT USE INHALER: CPT

## 2023-05-04 PROCEDURE — 85025 COMPLETE CBC W/AUTO DIFF WBC: CPT | Performed by: HOSPITALIST

## 2023-05-04 PROCEDURE — 86140 C-REACTIVE PROTEIN: CPT | Performed by: HOSPITALIST

## 2023-05-04 PROCEDURE — 80048 BASIC METABOLIC PNL TOTAL CA: CPT | Performed by: HOSPITALIST

## 2023-05-04 PROCEDURE — 97161 PT EVAL LOW COMPLEX 20 MIN: CPT

## 2023-05-04 RX ORDER — HYDROCODONE BITARTRATE AND ACETAMINOPHEN 5; 325 MG/1; MG/1
1 TABLET ORAL EVERY 6 HOURS PRN
Qty: 12 TABLET | Refills: 0 | Status: SHIPPED | OUTPATIENT
Start: 2023-05-04 | End: 2023-05-07

## 2023-05-04 RX ADMIN — PANTOPRAZOLE SODIUM 40 MG: 40 TABLET, DELAYED RELEASE ORAL at 05:20

## 2023-05-04 RX ADMIN — METOPROLOL SUCCINATE 75 MG: 25 TABLET, EXTENDED RELEASE ORAL at 11:09

## 2023-05-04 RX ADMIN — HYDROCODONE BITARTRATE AND ACETAMINOPHEN 1 TABLET: 5; 325 TABLET ORAL at 05:42

## 2023-05-04 RX ADMIN — BUDESONIDE AND FORMOTEROL FUMARATE DIHYDRATE 2 PUFF: 160; 4.5 AEROSOL RESPIRATORY (INHALATION) at 08:17

## 2023-05-04 RX ADMIN — LISINOPRIL 40 MG: 40 TABLET ORAL at 11:09

## 2023-05-04 RX ADMIN — LEVOTHYROXINE SODIUM 112 MCG: 0.11 TABLET ORAL at 05:20

## 2023-05-04 RX ADMIN — TAZOBACTAM SODIUM AND PIPERACILLIN SODIUM 4.5 G: 500; 4 INJECTION, SOLUTION INTRAVENOUS at 05:42

## 2023-05-04 RX ADMIN — VANCOMYCIN HYDROCHLORIDE 1750 MG: 10 INJECTION, POWDER, LYOPHILIZED, FOR SOLUTION INTRAVENOUS at 11:09

## 2023-05-04 RX ADMIN — GABAPENTIN 300 MG: 300 CAPSULE ORAL at 11:09

## 2023-05-04 RX ADMIN — CETIRIZINE HYDROCHLORIDE 10 MG: 10 TABLET ORAL at 11:09

## 2023-05-04 NOTE — PLAN OF CARE
Goal Outcome Evaluation:    Patient is a 52 y.o. male admitted to [facility] for acute pain of R knee on 4/29/2023- right knee was injected with 40 mg of Kenalog and 3 cc of lidocaine. Today, patient was Ind for bed mobility, Ind for STS transfers, and SV for ambulation with no AD for 100 ft. No safety concerns demo'd. Patient denies any questions or concerns for PT at this time. No further acute skilled PT needs- recommend home when medically cleared. Discussed with patient is R Knee continues to be symptomatic post discharge, may benefit from OPPT eval.

## 2023-05-04 NOTE — PROGRESS NOTES
Continued Stay Note  [facility]     Patient Name: Grant Edge Jr.  MRN: [id number]  Today's Date: 5/4/2023    Admit Date: 4/29/2023    Plan: home with family assist   Discharge Plan     Row Name 05/04/23 1510       Plan    Final Discharge Disposition Code 01 - home or self-care    Final Note D/c home. Plans to go to outpatient PT.               Discharge Codes    No documentation.               Expected Discharge Date and Time     Expected Discharge Date Expected Discharge Time    May 4, 2023             Sheri Ham RN

## 2023-05-04 NOTE — DISCHARGE SUMMARY
PHYSICIAN DISCHARGE SUMMARY                                                                        [facility]    Patient Identification:  Name: Grant Edge Jr.  Age: 52 y.o.  Sex: male  :  1970  MRN: [id number]  Primary Care Physician: Jim Joseph APRN    Admit date: 2023  Discharge date and time:2023  Discharged Condition: good    Discharge Diagnoses:  Active Hospital Problems    Diagnosis  POA   • **Acute pain of right knee [M25.561]  Yes   • Essential hypertension [I10]  Yes   • Hypothyroidism [E03.9]  Yes      Resolved Hospital Problems   No resolved problems to display.          PMHX:   Past Medical History:   Diagnosis Date   • Abnormal ECG    • Allergic    • Arthritis    • Chest pain    • CTS (carpal tunnel syndrome) A few months.   • DVT (deep venous thrombosis)    • GERD (gastroesophageal reflux disease)    • Hip arthrosis    • Hyperlipidemia    • Hypertension    • Hypokalemia    • Knee swelling    • Myocardial infarction    • Obesity    • Periarthritis of shoulder    • Simple goiter    • Sinus bradycardia    • Vitamin D deficiency      PSHX:   Past Surgical History:   Procedure Laterality Date   • CARDIAC CATHETERIZATION N/A 10/18/2016    Procedure: Left Heart Cath;  Surgeon: Daniel oRsas MD;  Location: [facility] CATH INVASIVE LOCATION;  Service:    • CHOLECYSTECTOMY     • EPIDURAL Right 2022    Procedure: Right L5 LUMBAR/SACRAL TRANSFORAMINAL EPIDURAL. Hold xarelto x 3 days prior to procedure;  Surgeon: La Sun MD;  [place] [facility] [place]  Service: Pain Management;  Laterality: Right;   • JOINT REPLACEMENT     • SALIVARY GLAND SURGERY     • THYROIDECTOMY     • TONSILLECTOMY     • TOTAL HIP ARTHROPLASTY REVISION Left        Hospital Course: Grant Edge Jr.  Is a 52 year old gentleman who presented to the emergency room with pain and swelling of his right knee; it started  yesterday; he denies injury; he has a history of dvt and is on xarelto; he has had subjective fever and chills; his temp was 100.3 when he presented; he denies any prior knee surgery.  The patient was admitted to hospital and seen by orthopedic surgery.  Patient had his knee aspirated and fluid sent for cultures and analysis.  Crystal test was negative and there were many white blood cells but no bacteria seen on Gram stain.  Initially was started on antibiotics with concern for possible septic arthritis.  Cultures were negative and after being in the hospital for few days orthopedic surgery did give him a cortisone injection.  He was able to get up and ambulate better and was feeling well enough to go home.  We will give him off work for a week and follow-up with his primary care in 1 week for continuing care.  He continues to have problems with his knee he can follow-up with orthopedic surgery as needed.  Do not really feel need for any more antibiotics since his cultures were negative.  Doubt that he had any infection.      Consults:     Consults     Date and Time Order Name Status Description    4/29/2023  5:03 PM [facility] (on-call MD unless specified) Details      4/29/2023  4:48 PM Ortho (on-call MD unless specified) Completed         Results from last 7 days   Lab Units 05/04/23  0442   WBC 10*3/mm3 6.75   HEMOGLOBIN g/dL 13.9   HEMATOCRIT % 42.1   PLATELETS 10*3/mm3 295     Results from last 7 days   Lab Units 05/04/23  0442   SODIUM mmol/L 141   POTASSIUM mmol/L 4.3   CHLORIDE mmol/L 106   CO2 mmol/L 28.0   BUN mg/dL 16   CREATININE mg/dL 1.24   GLUCOSE mg/dL 108*   CALCIUM mg/dL 8.7     Significant Diagnostic Studies:   WBC   Date Value Ref Range Status   05/04/2023 6.75 3.40 - 10.80 10*3/mm3 Final     Hemoglobin   Date Value Ref Range Status   05/04/2023 13.9 13.0 - 17.7 g/dL Final     Hematocrit   Date Value Ref Range Status   05/04/2023 42.1 37.5 - 51.0 % Final     Platelets   Date Value Ref Range Status    05/04/2023 295 140 - 450 10*3/mm3 Final     Sodium   Date Value Ref Range Status   05/04/2023 141 136 - 145 mmol/L Final     Potassium   Date Value Ref Range Status   05/04/2023 4.3 3.5 - 5.2 mmol/L Final     Chloride   Date Value Ref Range Status   05/04/2023 106 98 - 107 mmol/L Final     CO2   Date Value Ref Range Status   05/04/2023 28.0 22.0 - 29.0 mmol/L Final     BUN   Date Value Ref Range Status   05/04/2023 16 6 - 20 mg/dL Final     Creatinine   Date Value Ref Range Status   05/04/2023 1.24 0.76 - 1.27 mg/dL Final     Glucose   Date Value Ref Range Status   05/04/2023 108 (H) 65 - 99 mg/dL Final     Calcium   Date Value Ref Range Status   05/04/2023 8.7 8.6 - 10.5 mg/dL Final     No results found for: AST, ALT, ALKPHOS  No results found for: APTT, INR  No results found for: COLORU, CLARITYU, SPECGRAV, PHUR, PROTEINUR, GLUCOSEU, KETONESU, BLOODU, NITRITE, LEUKOCYTESUR, BILIRUBINUR, UROBILINOGEN, RBCUA, WBCUA, BACTERIA, UACOMMENT  No results found for: TROPONINT, TROPONINI, BNP  No components found for: HGBA1C;2  No components found for: TSH;2  Imaging Results (All)     Procedure Component Value Units Date/Time    XR Knee 1 or 2 View Right [[id number]] Collected: 04/29/23 1537     Updated: 04/29/23 1602    Narrative:      TWO-VIEW RIGHT KNEE     HISTORY: Knee pain. No trauma.     FINDINGS: There are mild-to-moderate changes predominantly at the  patellofemoral compartment as also noted on the recent exam dated  03/23/2022. There appears to be a small to moderate joint effusion that  is larger on today's examination.     This report was finalized on 4/29/2023 3:38 PM by Dr. Leonid Petersen M.D.           Lab Results (last 7 days)     Procedure Component Value Units Date/Time    Sedimentation Rate [[id number]]  (Abnormal) Collected: 05/04/23 0442    Specimen: Blood Updated: 05/04/23 0607     Sed Rate 50 mm/hr     Basic Metabolic Panel [[id number]]  (Abnormal) Collected: 05/04/23 0442    Specimen: Blood Updated:  05/04/23 0547     Glucose 108 mg/dL      BUN 16 mg/dL      Creatinine 1.24 mg/dL      Sodium 141 mmol/L      Potassium 4.3 mmol/L      Chloride 106 mmol/L      CO2 28.0 mmol/L      Calcium 8.7 mg/dL      BUN/Creatinine Ratio 12.9     Anion Gap 7.0 mmol/L      eGFR 70.0 mL/min/1.73     Narrative:      GFR Normal >60  Chronic Kidney Disease <60  Kidney Failure <15      C-reactive Protein [[id number]]  (Abnormal) Collected: 05/04/23 0442    Specimen: Blood Updated: 05/04/23 0547     C-Reactive Protein 3.70 mg/dL     CBC & Differential [[id number]]  (Abnormal) Collected: 05/04/23 0442    Specimen: Blood Updated: 05/04/23 0541    Narrative:      The following orders were created for panel order CBC & Differential.  Procedure                               Abnormality         Status                     ---------                               -----------         ------                     CBC Auto Differential[[id number]]        Abnormal            Final result                 Please view results for these tests on the individual orders.    CBC Auto Differential [[id number]]  (Abnormal) Collected: 05/04/23 0442    Specimen: Blood Updated: 05/04/23 0541     WBC 6.75 10*3/mm3      RBC 4.54 10*6/mm3      Hemoglobin 13.9 g/dL      Hematocrit 42.1 %      MCV 92.7 fL      MCH 30.6 pg      MCHC 33.0 g/dL      RDW 12.6 %      RDW-SD 42.4 fl      MPV 9.7 fL      Platelets 295 10*3/mm3      Neutrophil % 66.2 %      Lymphocyte % 19.6 %      Monocyte % 9.2 %      Eosinophil % 3.3 %      Basophil % 1.0 %      Immature Grans % 0.7 %      Neutrophils, Absolute 4.47 10*3/mm3      Lymphocytes, Absolute 1.32 10*3/mm3      Monocytes, Absolute 0.62 10*3/mm3      Eosinophils, Absolute 0.22 10*3/mm3      Basophils, Absolute 0.07 10*3/mm3      Immature Grans, Absolute 0.05 10*3/mm3      nRBC 0.0 /100 WBC     Blood Culture - Blood, Arm, Right [[id number]]  (Normal) Collected: 04/29/23 1541    Specimen: Blood from Arm, Right Updated: 05/03/23 1780      Blood Culture No growth at 4 days    Blood Culture - Blood, Arm, Left [[id number]]  (Normal) Collected: 04/29/23 1514    Specimen: Blood from Arm, Left Updated: 05/03/23 1530     Blood Culture No growth at 4 days    Body Fluid Culture - Body Fluid, Knee, Right [[id number]] Collected: 05/02/23 0655    Specimen: Body Fluid from Knee, Right Updated: 05/03/23 0747     Body Fluid Culture No growth     Gram Stain Few (2+) WBCs seen      No organisms seen    Body Fluid Culture - Body Fluid, Knee, Right [[id number]] Collected: 04/30/23 0855    Specimen: Body Fluid from Knee, Right Updated: 05/03/23 0720     Body Fluid Culture No growth at 3 days     Gram Stain Few (2+) WBCs seen      No organisms seen    Basic Metabolic Panel [[id number]]  (Abnormal) Collected: 05/03/23 0533    Specimen: Blood Updated: 05/03/23 0638     Glucose 109 mg/dL      BUN 14 mg/dL      Creatinine 1.17 mg/dL      Sodium 145 mmol/L      Potassium 4.8 mmol/L      Comment: Slight hemolysis detected by analyzer. Results may be affected.        Chloride 109 mmol/L      CO2 25.3 mmol/L      Calcium 8.8 mg/dL      BUN/Creatinine Ratio 12.0     Anion Gap 10.7 mmol/L      eGFR 75.0 mL/min/1.73     Narrative:      GFR Normal >60  Chronic Kidney Disease <60  Kidney Failure <15      Vancomycin, Trough [[id number]]  (Normal) Collected: 05/03/23 0533    Specimen: Blood Updated: 05/03/23 0636     Vancomycin Trough 11.20 mcg/mL     Narrative:      Therapeutic Ranges for Vancomycin    Vancomycin Random   5.0-40.0 mcg/mL  Vancomycin Trough   5.0-20.0 mcg/mL  Vancomycin Peak     20.0-40.0 mcg/mL    CBC & Differential [[id number]]  (Abnormal) Collected: 05/03/23 0533    Specimen: Blood Updated: 05/03/23 0616    Narrative:      The following orders were created for panel order CBC & Differential.  Procedure                               Abnormality         Status                     ---------                               -----------         ------                     CBC Auto  Differential[[id number]]        Abnormal            Final result                 Please view results for these tests on the individual orders.    CBC Auto Differential [[id number]]  (Abnormal) Collected: 05/03/23 0533    Specimen: Blood Updated: 05/03/23 0616     WBC 5.57 10*3/mm3      RBC 4.54 10*6/mm3      Hemoglobin 14.0 g/dL      Hematocrit 42.6 %      MCV 93.8 fL      MCH 30.8 pg      MCHC 32.9 g/dL      RDW 12.9 %      RDW-SD 43.7 fl      MPV 9.4 fL      Platelets 251 10*3/mm3      Neutrophil % 57.8 %      Lymphocyte % 24.6 %      Monocyte % 10.8 %      Eosinophil % 5.2 %      Basophil % 0.9 %      Immature Grans % 0.7 %      Neutrophils, Absolute 3.22 10*3/mm3      Lymphocytes, Absolute 1.37 10*3/mm3      Monocytes, Absolute 0.60 10*3/mm3      Eosinophils, Absolute 0.29 10*3/mm3      Basophils, Absolute 0.05 10*3/mm3      Immature Grans, Absolute 0.04 10*3/mm3      nRBC 0.0 /100 WBC     Crystal Exam, Fluid - Synovial Fluid, [[id number]] Collected: 05/02/23 0655    Specimen: Synovial Fluid Updated: 05/02/23 0852     Crystals, Fluid None Seen    Body Fluid Cell Count With Differential - Body Fluid, Knee, Right [[id number]]  (Abnormal) Collected: 05/02/23 0655    Specimen: Body Fluid from Knee, Right Updated: 05/02/23 0843    Narrative:      The following orders were created for panel order Body Fluid Cell Count With Differential - Body Fluid, Knee, Right.  Procedure                               Abnormality         Status                     ---------                               -----------         ------                     Body fluid cell count - ...[[id number]]  Abnormal            Final result               Body fluid differential ...[[id number]]                      Final result                 Please view results for these tests on the individual orders.    Body fluid differential - Body Fluid, Knee, Right [[id number]] Collected: 05/02/23 0655    Specimen: Body Fluid from Knee, Right Updated: 05/02/23 0843      Neutrophils, Fluid 87 %      Lymphocytes, Fluid 3 %      Monocytes, Fluid 3 %      Mononuclear, Fluid 7 %     Body fluid cell count - Body Fluid, Knee, Right [[id number]]  (Abnormal) Collected: 05/02/23 0655    Specimen: Body Fluid from Knee, Right Updated: 05/02/23 0843     Color, Fluid Red     Appearance, Fluid Cloudy     RBC, Fluid 70,000 /mm3      Nucleated Cells, Fluid 12,420 /mm3      Method: Automated Sysmex XN Method    Narrative:      No reference range established. Physician to interpret results with clinical findings.    Sedimentation Rate [[id number]]  (Abnormal) Collected: 05/02/23 0501    Specimen: Blood Updated: 05/02/23 0612     Sed Rate 43 mm/hr     Basic Metabolic Panel [[id number]]  (Abnormal) Collected: 05/02/23 0501    Specimen: Blood Updated: 05/02/23 0555     Glucose 112 mg/dL      BUN 16 mg/dL      Creatinine 1.44 mg/dL      Sodium 146 mmol/L      Potassium 4.3 mmol/L      Chloride 108 mmol/L      CO2 29.1 mmol/L      Calcium 9.2 mg/dL      BUN/Creatinine Ratio 11.1     Anion Gap 8.9 mmol/L      eGFR 58.5 mL/min/1.73     Narrative:      GFR Normal >60  Chronic Kidney Disease <60  Kidney Failure <15      C-reactive Protein [[id number]]  (Abnormal) Collected: 05/02/23 0501    Specimen: Blood Updated: 05/02/23 0555     C-Reactive Protein 12.85 mg/dL     CBC & Differential [[id number]]  (Abnormal) Collected: 05/02/23 0501    Specimen: Blood Updated: 05/02/23 0537    Narrative:      The following orders were created for panel order CBC & Differential.  Procedure                               Abnormality         Status                     ---------                               -----------         ------                     CBC Auto Differential[[id number]]        Abnormal            Final result                 Please view results for these tests on the individual orders.    CBC Auto Differential [[id number]]  (Abnormal) Collected: 05/02/23 0501    Specimen: Blood Updated: 05/02/23 0537     WBC 7.34  10*3/mm3      RBC 4.55 10*6/mm3      Hemoglobin 13.6 g/dL      Hematocrit 42.6 %      MCV 93.6 fL      MCH 29.9 pg      MCHC 31.9 g/dL      RDW 12.9 %      RDW-SD 44.2 fl      MPV 10.0 fL      Platelets 234 10*3/mm3      Neutrophil % 60.3 %      Lymphocyte % 23.0 %      Monocyte % 11.2 %      Eosinophil % 4.0 %      Basophil % 0.7 %      Immature Grans % 0.8 %      Neutrophils, Absolute 4.43 10*3/mm3      Lymphocytes, Absolute 1.69 10*3/mm3      Monocytes, Absolute 0.82 10*3/mm3      Eosinophils, Absolute 0.29 10*3/mm3      Basophils, Absolute 0.05 10*3/mm3      Immature Grans, Absolute 0.06 10*3/mm3      nRBC 0.1 /100 WBC     Vancomycin, Trough Please draw prior to vancomycin dose scheduled on 5/2 for 0100. Do not give dose if trough is greater than 21. Thank you! [[id number]]  (Normal) Collected: 05/02/23 0118    Specimen: Blood Updated: 05/02/23 0135     Vancomycin Trough 9.90 mcg/mL     Narrative:      Therapeutic Ranges for Vancomycin    Vancomycin Random   5.0-40.0 mcg/mL  Vancomycin Trough   5.0-20.0 mcg/mL  Vancomycin Peak     20.0-40.0 mcg/mL    Basic Metabolic Panel [[id number]]  (Abnormal) Collected: 05/01/23 0436    Specimen: Blood Updated: 05/01/23 0517     Glucose 114 mg/dL      BUN 15 mg/dL      Creatinine 1.28 mg/dL      Sodium 142 mmol/L      Potassium 4.1 mmol/L      Chloride 106 mmol/L      CO2 28.0 mmol/L      Calcium 8.5 mg/dL      BUN/Creatinine Ratio 11.7     Anion Gap 8.0 mmol/L      eGFR 67.3 mL/min/1.73     Narrative:      GFR Normal >60  Chronic Kidney Disease <60  Kidney Failure <15      CBC (No Diff) [[id number]]  (Normal) Collected: 05/01/23 0436    Specimen: Blood Updated: 05/01/23 0505     WBC 7.92 10*3/mm3      RBC 4.64 10*6/mm3      Hemoglobin 13.9 g/dL      Hematocrit 43.3 %      MCV 93.3 fL      MCH 30.0 pg      MCHC 32.1 g/dL      RDW 13.0 %      RDW-SD 44.1 fl      MPV 10.1 fL      Platelets 195 10*3/mm3     Crystal Exam, Fluid - Synovial Fluid, [[id number]] Collected: 04/30/23 0855     Specimen: Synovial Fluid Updated: 04/30/23 1104     Crystals, Fluid No crystals seen    Body Fluid Cell Count With Differential - Body Fluid, Knee, Right [[id number]]  (Abnormal) Collected: 04/30/23 0918    Specimen: Body Fluid from Knee, Right Updated: 04/30/23 1024    Narrative:      The following orders were created for panel order Body Fluid Cell Count With Differential - Body Fluid, Knee, Right.  Procedure                               Abnormality         Status                     ---------                               -----------         ------                     Body fluid cell count - ...[[id number]]  Abnormal            Final result               Body fluid differential ...[[id number]]                      Final result                 Please view results for these tests on the individual orders.    Body fluid differential - Body Fluid, Knee, Right [[id number]] Collected: 04/30/23 0918    Specimen: Body Fluid from Knee, Right Updated: 04/30/23 1024     Neutrophils, Fluid 89 %      Lymphocytes, Fluid 5 %      Monocytes, Fluid 6 %     Body fluid cell count - Body Fluid, Knee, Right [[id number]]  (Abnormal) Collected: 04/30/23 0918    Specimen: Body Fluid from Knee, Right Updated: 04/30/23 0953     Color, Fluid Yellow     Appearance, Fluid Cloudy     RBC, Fluid 5,000 /mm3      Nucleated Cells, Fluid 45,220 /mm3      Method: Automated Sysmex XN Method    Narrative:      No reference range established. Physician to interpret results with clinical findings.    CBC Auto Differential [[id number]]  (Abnormal) Collected: 04/30/23 0557    Specimen: Blood Updated: 04/30/23 0654     WBC 8.67 10*3/mm3      RBC 4.76 10*6/mm3      Hemoglobin 14.4 g/dL      Hematocrit 43.0 %      MCV 90.3 fL      MCH 30.3 pg      MCHC 33.5 g/dL      RDW 12.7 %      RDW-SD 41.8 fl      MPV 10.1 fL      Platelets 202 10*3/mm3      Neutrophil % 63.7 %      Lymphocyte % 17.2 %      Monocyte % 16.1 %      Eosinophil % 1.7 %      Basophil % 0.6 %       Immature Grans % 0.7 %      Neutrophils, Absolute 5.52 10*3/mm3      Lymphocytes, Absolute 1.49 10*3/mm3      Monocytes, Absolute 1.40 10*3/mm3      Eosinophils, Absolute 0.15 10*3/mm3      Basophils, Absolute 0.05 10*3/mm3      Immature Grans, Absolute 0.06 10*3/mm3      nRBC 0.0 /100 WBC     Basic Metabolic Panel [[id number]]  (Abnormal) Collected: 04/30/23 0515    Specimen: Blood Updated: 04/30/23 0616     Glucose 117 mg/dL      BUN 12 mg/dL      Creatinine 1.29 mg/dL      Sodium 140 mmol/L      Potassium 4.2 mmol/L      Chloride 103 mmol/L      CO2 26.9 mmol/L      Calcium 9.3 mg/dL      BUN/Creatinine Ratio 9.3     Anion Gap 10.1 mmol/L      eGFR 66.7 mL/min/1.73     Narrative:      GFR Normal >60  Chronic Kidney Disease <60  Kidney Failure <15      Comprehensive Metabolic Panel [[id number]]  (Abnormal) Collected: 04/29/23 1514    Specimen: Blood Updated: 04/29/23 1544     Glucose 104 mg/dL      BUN 11 mg/dL      Creatinine 1.24 mg/dL      Sodium 142 mmol/L      Potassium 4.0 mmol/L      Comment: Slight hemolysis detected by analyzer. Results may be affected.        Chloride 105 mmol/L      CO2 27.1 mmol/L      Calcium 9.4 mg/dL      Total Protein 6.9 g/dL      Albumin 3.9 g/dL      ALT (SGPT) 27 U/L      AST (SGOT) 22 U/L      Alkaline Phosphatase 79 U/L      Total Bilirubin 1.7 mg/dL      Globulin 3.0 gm/dL      A/G Ratio 1.3 g/dL      BUN/Creatinine Ratio 8.9     Anion Gap 9.9 mmol/L      eGFR 70.0 mL/min/1.73     Narrative:      GFR Normal >60  Chronic Kidney Disease <60  Kidney Failure <15      C-reactive Protein [[id number]]  (Abnormal) Collected: 04/29/23 1514    Specimen: Blood Updated: 04/29/23 1544     C-Reactive Protein 7.19 mg/dL     Lactic Acid, Plasma [[id number]]  (Normal) Collected: 04/29/23 1514    Specimen: Blood Updated: 04/29/23 1541     Lactate 1.3 mmol/L     Sedimentation Rate [[id number]]  (Abnormal) Collected: 04/29/23 1514    Specimen: Blood Updated: 04/29/23 1527     Sed Rate 24 mm/hr   "   CBC & Differential [[id number]]  (Abnormal) Collected: 04/29/23 1514    Specimen: Blood Updated: 04/29/23 1525    Narrative:      The following orders were created for panel order CBC & Differential.  Procedure                               Abnormality         Status                     ---------                               -----------         ------                     CBC Auto Differential[[id number]]        Abnormal            Final result                 Please view results for these tests on the individual orders.    CBC Auto Differential [[id number]]  (Abnormal) Collected: 04/29/23 1514    Specimen: Blood Updated: 04/29/23 1525     WBC 8.17 10*3/mm3      RBC 5.02 10*6/mm3      Hemoglobin 15.3 g/dL      Hematocrit 46.6 %      MCV 92.8 fL      MCH 30.5 pg      MCHC 32.8 g/dL      RDW 13.3 %      RDW-SD 45.4 fl      MPV 9.9 fL      Platelets 205 10*3/mm3      Neutrophil % 70.8 %      Lymphocyte % 13.7 %      Monocyte % 12.9 %      Eosinophil % 1.2 %      Basophil % 0.5 %      Immature Grans % 0.9 %      Neutrophils, Absolute 5.79 10*3/mm3      Lymphocytes, Absolute 1.12 10*3/mm3      Monocytes, Absolute 1.05 10*3/mm3      Eosinophils, Absolute 0.10 10*3/mm3      Basophils, Absolute 0.04 10*3/mm3      Immature Grans, Absolute 0.07 10*3/mm3      nRBC 0.0 /100 WBC         /82 (BP Location: Right arm, Patient Position: Lying)   Pulse 76   Temp 98 °F (36.7 °C) (Oral)   Resp 16   Ht 177.8 cm (70\")   Wt (!) 156 kg (345 lb)   SpO2 94%   BMI 49.50 kg/m²     Discharge Exam:  General Appearance:    Alert, cooperative, no distress                          Head:    Normocephalic, without obvious abnormality, atraumatic                          Eyes:                            Throat:   Lips, tongue, gums normal                          Neck:   Supple, symmetrical, trachea midline, no JVD                        Lungs:     Clear to auscultation bilaterally, respirations unlabored                Chest Wall:    No " tenderness or deformity                        Heart:    Regular rate and rhythm, S1 and S2 normal, no murmur,no  Rub  or gallop                  Abdomen:     Soft, non-tender, bowel sounds active, no masses, no organomegaly                  Extremities:   Extremities normal, atraumatic, no cyanosis or edema                             Skin:   Skin is warm and dry,  no rashes or palpable lesions                  Neurologic:   no focal deficits noted     Disposition:  Home    Activity as tolerated    Diet as tolerated  Diet Order   Procedures   • Diet: Regular/House Diet; Texture: Regular Texture (IDDSI 7); Fluid Consistency: Thin (IDDSI 0)       Patient Instructions:      Discharge Medications      New Medications      Instructions Start Date   HYDROcodone-acetaminophen 5-325 MG per tablet  Commonly known as: NORCO   1 tablet, Oral, Every 6 Hours PRN         Continue These Medications      Instructions Start Date   albuterol sulfate  (90 Base) MCG/ACT inhaler  Commonly known as: PROVENTIL HFA;VENTOLIN HFA;PROAIR HFA   2 puffs, Inhalation, Every 4 Hours PRN      benazepril 40 MG tablet  Commonly known as: LOTENSIN   40 mg, Oral, Daily      CLARITIN PO   10 mg, Oral, Daily PRN      esomeprazole 40 MG capsule  Commonly known as: nexIUM   TAKE 1 CAPSULE BY MOUTH DAILY AS NEEDED FOR ACID REFLUX OR SYMPTOMS      Fluticasone-Salmeterol 100-50 MCG/ACT DISKUS  Commonly known as: ADVAIR/WIXELA   1 puff, Inhalation, 2 Times Daily      furosemide 20 MG tablet  Commonly known as: Lasix   20 mg, Oral, Every Other Day      gabapentin 300 MG capsule  Commonly known as: NEURONTIN   300 mg, Oral, Daily      levothyroxine 112 MCG tablet  Commonly known as: SYNTHROID, LEVOTHROID   112 mcg, Oral, Daily      metoprolol succinate XL 50 MG 24 hr tablet  Commonly known as: TOPROL-XL   75 mg, Oral, Daily      montelukast 10 MG tablet  Commonly known as: SINGULAIR   10 mg, Oral, Nightly      potassium chloride 10 MEQ CR tablet   10  mEq, Oral, Daily         Stop These Medications    azelastine 0.1 % nasal spray  Commonly known as: ASTELIN     fluticasone 50 MCG/ACT nasal spray  Commonly known as: Flonase     MUCINEX D PO     Xarelto 20 MG tablet  Generic drug: rivaroxaban          Future Appointments   Date Time Provider Department Center   5/9/2023 10:45 AM [facility]  [facility]   6/15/2023  3:20 PM LAB CHAIR 1 [facility]  [facility]   6/15/2023  4:00 PM Edwige Cowart MD [facility]   8/7/2023  3:30 PM Jim Joseph APRN [facility]     Additional Instructions for the Follow-ups that You Need to Schedule     Ambulatory Referral to Physical Therapy Evaluate and treat (acute right knee pain)   As directed      Specialty needed: Evaluate and treat (acute right knee pain)    Follow-up needed: Yes            Follow-up Information     Jim Joseph APRN .    Specialty: Nurse Practitioner  Contact information:  [address] [place] [address]  [phone]                       Discharge Order (From admission, onward)     Start     Ordered    05/04/23 1149  Discharge patient  Once        Expected Discharge Date: 05/04/23    Discharge Disposition: Home or Self Care    Physician of Record for Attribution - Please select from Treatment Team: MIKE ARMAS [[id number]]    Review needed by CMO to determine Physician of Record: No       Question Answer Comment   Physician of Record for Attribution - Please select from Treatment Team MIKE ARMAS    Review needed by CMO to determine Physician of Record No        05/04/23 1152                Total time spent discharging patient including evaluation,post hospitalization follow up,  medication and post hospitalization instructions and education total time exceeds 30 minutes.    Signed:  Mike Armas MD  5/4/2023  11:54 EDT

## 2023-05-04 NOTE — THERAPY EVALUATION
Patient Name: Grant Edge Jr.  : 1970    MRN: [id number]                              Today's Date: 2023       Admit Date: 2023    Visit Dx:     ICD-10-CM ICD-9-CM   1. Acute pain of right knee  M25.561 719.46   2. Elevated sed rate  R70.0 790.1   3. Elevated C-reactive protein (CRP)  R79.82 790.95     Patient Active Problem List   Diagnosis   • Abdominal abscess   • Benign essential hypertension   • Benign prostatic hyperplasia with urinary obstruction   • Atypical chest pain   • Degeneration of intervertebral disc of lumbar region   • Gastroesophageal reflux disease with esophagitis   • Primary hypertriglyceridemia   • Gastroesophageal reflux disease   • Hematochezia   • Hematuria   • Hyperlipidemia   • Hypertension   • Hypokalemia   • Hypothyroidism   • Insomnia   • Arthralgia of hip   • Chronic low back pain   • Lumbar radiculopathy   • Morbid obesity (HCC)   • Nonvenomous insect bite with infection   • Osteoarthritis of hip   • Pleurodynia   • Sinus tachycardia   • Vitamin D deficiency   • Right knee pain   • Pain of lower extremity   • Bilateral headaches   • Health care maintenance   • Old anterior myocardial infarction   • EKG, abnormal   • History of hepatic disease   • Essential hypertension   • Abnormal stress test   • Moderate single current episode of major depressive disorder   • Prediabetes   • Recurrent acute deep vein thrombosis (DVT) of left lower extremity (HCC)   • Hyperuricemia   • H/O gastroesophageal reflux (GERD)   • Seasonal allergies   • Class 3 severe obesity due to excess calories without serious comorbidity with body mass index (BMI) of 40.0 to 44.9 in adult   • Right hip pain   • History of asthma   • Stasis edema of both lower extremities   • Acute pain of right knee     Past Medical History:   Diagnosis Date   • Abnormal ECG    • Allergic    • Arthritis    • Chest pain    • CTS (carpal tunnel syndrome) A few months.   • DVT (deep venous thrombosis)    • GERD  (gastroesophageal reflux disease)    • Hip arthrosis    • Hyperlipidemia    • Hypertension    • Hypokalemia    • Knee swelling    • Myocardial infarction    • Obesity    • Periarthritis of shoulder    • Simple goiter    • Sinus bradycardia    • Vitamin D deficiency      Past Surgical History:   Procedure Laterality Date   • CARDIAC CATHETERIZATION N/A 10/18/2016    Procedure: Left Heart Cath;  Surgeon: Daniel Rosas MD;  Location: [facility] CATH INVASIVE LOCATION;  Service:    • CHOLECYSTECTOMY     • EPIDURAL Right 11/4/2022    Procedure: Right L5 LUMBAR/SACRAL TRANSFORAMINAL EPIDURAL. Hold xarelto x 3 days prior to procedure;  Surgeon: La Sun MD;  Location: [facility] MAIN OR;  Service: Pain Management;  Laterality: Right;   • JOINT REPLACEMENT     • SALIVARY GLAND SURGERY     • THYROIDECTOMY  2013   • TONSILLECTOMY     • TOTAL HIP ARTHROPLASTY REVISION Left 2015      General Information     Row Name 05/04/23 1157          Physical Therapy Time and Intention    Document Type discharge evaluation/summary  -MS     Mode of Treatment physical therapy  -MS     Row Name 05/04/23 1157          General Information    Patient Profile Reviewed yes  -MS     Prior Level of Function independent:;all household mobility  use of cane recently due to pain  -MS     Existing Precautions/Restrictions no known precautions/restrictions  -MS     Barriers to Rehab none identified  -MS     Row Name 05/04/23 1157          Living Environment    People in Home spouse  -MS     Row Name 05/04/23 1157          Home Main Entrance    Number of Stairs, Main Entrance three  -MS     Row Name 05/04/23 1157          Cognition    Orientation Status (Cognition) oriented x 4  -MS     Row Name 05/04/23 1157          Safety Issues, Functional Mobility    Comment, Safety Issues/Impairments (Mobility) Gait belt and non skid socks donned.  -MS           User Key  (r) = Recorded By, (t) = Taken By, (c) = Cosigned By    Initials Name Provider Type     Kerry Aparicio, PT Physical Therapist               Mobility     Row Name 05/04/23 1157          Bed Mobility    Bed Mobility supine-sit  -MS     Supine-Sit [place] (Bed Mobility) independent  -MS     Row Name 05/04/23 1157          Sit-Stand Transfer    Sit-Stand [place] (Transfers) independent  -MS     Row Name 05/04/23 1157          Gait/Stairs (Locomotion)    [place] Level (Gait) supervision  -MS     Distance in Feet (Gait) 100'  -MS     Deviations/Abnormal Patterns (Gait) nona decreased;gait speed decreased  -MS     Bilateral Gait Deviations forward flexed posture  -MS     Comment, (Gait/Stairs) No overt LOB or veering noted. No safety concerns demo'd.  -MS           User Key  (r) = Recorded By, (t) = Taken By, (c) = Cosigned By    Initials Name Provider Type    Kerry Aparicio, PT Physical Therapist               Obj/Interventions     Row Name 05/04/23 1158          Range of Motion Comprehensive    General Range of Motion no range of motion deficits identified  -MS     Row Name 05/04/23 1158          Strength Comprehensive (MMT)    Comment, General Manual Muscle Testing (MMT) Assessment B LEs grossly 5/5 and symmetrical  -MS     Row Name 05/04/23 1158          Balance    Balance Assessment sitting static balance;standing static balance  -MS     Static Sitting Balance independent  -MS     Position, Sitting Balance sitting edge of bed  -MS     Static Standing Balance supervision  -MS     Position/Device Used, Standing Balance unsupported  -MS     Row Name 05/04/23 1158          Sensory Assessment (Somatosensory)    Sensory Assessment (Somatosensory) sensation intact  -MS           User Key  (r) = Recorded By, (t) = Taken By, (c) = Cosigned By    Initials Name Provider Type    Kerry Aparicio, PT Physical Therapist               Goals/Plan    No documentation.                Clinical Impression     Row Name 05/04/23 1158          Pain    Pre/Posttreatment Pain Comment Mild  pain reported at R quad post ambulation- no numerical value.  -MS     Row Name 05/04/23 1158          Therapy Assessment/Plan (PT)    Criteria for Skilled Interventions Met (PT) does not meet criteria for skilled intervention  -MS     Therapy Frequency (PT) evaluation only  -MS     Row Name 05/04/23 1158          Vital Signs    O2 Delivery Pre Treatment room air  -MS     Row Name 05/04/23 1158          Positioning and Restraints    Pre-Treatment Position in bed  -MS     Post Treatment Position chair  -MS     In Chair reclined;call light within reach;encouraged to call for assist;exit alarm on  -MS           User Key  (r) = Recorded By, (t) = Taken By, (c) = Cosigned By    Initials Name Provider Type    Kerry Aparicio, PT Physical Therapist               Outcome Measures     Row Name 05/04/23 1159          How much help from another person do you currently need...    Turning from your back to your side while in flat bed without using bedrails? 4  -MS     Moving from lying on back to sitting on the side of a flat bed without bedrails? 4  -MS     Moving to and from a bed to a chair (including a wheelchair)? 4  -MS     Standing up from a chair using your arms (e.g., wheelchair, bedside chair)? 4  -MS     Climbing 3-5 steps with a railing? 3  -MS     To walk in hospital room? 3  -MS     AM-PAC 6 Clicks Score (PT) 22  -MS     Highest level of mobility 7 --> Walked 25 feet or more  -MS           User Key  (r) = Recorded By, (t) = Taken By, (c) = Cosigned By    Initials Name Provider Type    Kerry Aparicio, PT Physical Therapist                               PT Recommendation and Plan           Time Calculation:    PT Charges     Row Name 05/04/23 1156             Time Calculation    Start Time 1116  -MS      Stop Time 1136  -MS      Time Calculation (min) 20 min  -MS      PT Received On 05/04/23  -MS            User Key  (r) = Recorded By, (t) = Taken By, (c) = Cosigned By    Initials Name Provider Type    MS  Kerry Flores, PT Physical Therapist              Therapy Charges for Today     Code Description Service Date Service Provider Modifiers Qty    05200838892 HC PT EVAL LOW COMPLEXITY 2 5/4/2023 Kerry Flores, PT GP 1    61148879091 HC PT THER PROC EA 15 MIN 5/4/2023 Kerry Flores, PT GP 1          PT G-Codes  AM-PAC 6 Clicks Score (PT): 22  PT Discharge Summary  Anticipated Discharge Disposition (PT): home with outpatient therapy services (if sx do not subside)    Kerry Flores, PT  5/4/2023

## 2023-05-04 NOTE — PLAN OF CARE
Goal Outcome Evaluation:  Plan of Care Reviewed With: patient        Progress: improving  Outcome Evaluation: VSS. Pt has been ambulating with a cane and standby assist and been voiding per brp. PRN pain meds given with noted effect. Educated on b/p monitoring, medication management and pain managemnt. Plan to d/c today. Needs attended.

## 2023-05-05 ENCOUNTER — TRANSITIONAL CARE MANAGEMENT TELEPHONE ENCOUNTER (OUTPATIENT)
Dept: CALL CENTER | Facility: HOSPITAL | Age: 53
End: 2023-05-05
Payer: COMMERCIAL

## 2023-05-05 LAB
BACTERIA FLD CULT: NORMAL
GRAM STN SPEC: NORMAL
GRAM STN SPEC: NORMAL

## 2023-05-05 NOTE — OUTREACH NOTE
Prep Survey    Flowsheet Row Responses   [facility] facility patient discharged from? [facility]   Is LACE score < 7 ? No   Eligibility [facility]   Date of Admission 04/29/23   Date of Discharge 05/04/23   Discharge Disposition Home or Self Care   Discharge diagnosis Right knee pain   Does the patient have one of the following disease processes/diagnoses(primary or secondary)? Other   Does the patient have Home health ordered? No   Is there a DME ordered? No   Prep survey completed? Yes          HERB MCCARTHY - Registered Nurse

## 2023-05-05 NOTE — OUTREACH NOTE
Call Center TCM Note    Flowsheet Row Responses   [facility] patient discharged from? [facility]   Does the patient have one of the following disease processes/diagnoses(primary or secondary)? Other   TCM attempt successful? Yes   Call start time 1603   Call end time 1609   Discharge diagnosis Right knee pain   Person spoke with today (if not patient) and relationship patient   Meds reviewed with patient/caregiver? Yes   Is the patient having any side effects they believe may be caused by any medication additions or changes? No   Does the patient have all medications ordered at discharge? Yes   Is the patient taking all medications as directed (includes completed medication regime)? Yes   Comments Patient has a hospital  discharge followup appt on 5/8/2023 with Jim Joseph.    Does the patient have an appointment with their PCP within 7 days of discharge? Yes   Psychosocial issues? No   Did the patient receive a copy of their discharge instructions? Yes   Nursing interventions Reviewed instructions with patient   What is the patient's perception of their health status since discharge? Improving   Is the patient/caregiver able to teach back signs and symptoms related to disease process for when to call PCP? Yes   Is the patient/caregiver able to teach back signs and symptoms related to disease process for when to call 911? Yes   Is the patient/caregiver able to teach back the hierarchy of who to call/visit for symptoms/problems? PCP, Specialist, Home health nurse, Urgent Care, ED, 911 Yes   If the patient is a current smoker, are they able to teach back resources for cessation? Not a smoker   TCM call completed? Yes   Wrap up additional comments No needs or concerns.    Call end time 1609   Would this patient benefit from a Referral to Amb Social Work? No   Is the patient interested in additional calls from an ambulatory ?  NOTE:  applies to high risk patients requiring additional follow-up. No           Darin Beck RN    5/5/2023, 16:09 EDT

## 2023-05-07 LAB
BACTERIA FLD CULT: NORMAL
GRAM STN SPEC: NORMAL
GRAM STN SPEC: NORMAL

## 2023-05-08 ENCOUNTER — OFFICE VISIT (OUTPATIENT)
Dept: INTERNAL MEDICINE | Facility: CLINIC | Age: 53
End: 2023-05-08
Payer: COMMERCIAL

## 2023-05-08 VITALS
BODY MASS INDEX: 45.1 KG/M2 | HEIGHT: 70 IN | HEART RATE: 90 BPM | OXYGEN SATURATION: 96 % | SYSTOLIC BLOOD PRESSURE: 142 MMHG | WEIGHT: 315 LBS | DIASTOLIC BLOOD PRESSURE: 80 MMHG

## 2023-05-08 DIAGNOSIS — Z09 HOSPITAL DISCHARGE FOLLOW-UP: Primary | ICD-10-CM

## 2023-05-08 DIAGNOSIS — I82.402 RECURRENT ACUTE DEEP VEIN THROMBOSIS (DVT) OF LEFT LOWER EXTREMITY: Chronic | ICD-10-CM

## 2023-05-08 DIAGNOSIS — M25.561 ACUTE PAIN OF RIGHT KNEE: ICD-10-CM

## 2023-05-08 NOTE — PROGRESS NOTES
Transitional Care Follow Up Visit  Subjective     Grant Edge Jr. is a 52 y.o. male who presents for a transitional care management visit.    Within 48 business hours after discharge our office contacted him via telephone to coordinate his care and needs.      I reviewed and discussed the details of that call along with the discharge summary, hospital problems, inpatient lab results, inpatient diagnostic studies, and consultation reports with Grant.     Current outpatient and discharge medications have been reconciled for the patient.  Reviewed by: MELANY Duncan          5/4/2023     8:13 PM   Date of TCM Phone Call   [facility]   Date of Admission 4/29/2023   Date of Discharge 5/4/2023   Discharge Disposition Home or Self Care     Risk for Readmission (LACE) Score: 8 (5/4/2023  6:00 AM)      History of Present Illness      52 y.o. male presenting acute knee pain and hospital follow-up. Injection in knee given in hospital. Orthopedic follow-up next Friday with Dr. Zheng who gave injection in hospital. Previously seen by Dr. Swift for hip replacement. Off blood thinner until tomorrow. Taken 2 hydrocodone for pain.  Denies fever, increased swelling of knee, worsening pain.     Course During Hospital Stay:  Grant Edge Jr.  Is a 52 year old gentleman who presented to the emergency room with pain and swelling of his right knee; it started yesterday; he denies injury; he has a history of dvt and is on xarelto; he has had subjective fever and chills; his temp was 100.3 when he presented; he denies any prior knee surgery.  The patient was admitted to hospital and seen by orthopedic surgery.  Patient had his knee aspirated and fluid sent for cultures and analysis.  Crystal test was negative and there were many white blood cells but no bacteria seen on Gram stain.  Initially was started on antibiotics with concern for possible septic arthritis.  Cultures were negative and after being in the hospital for  few days orthopedic surgery did give him a cortisone injection.  He was able to get up and ambulate better and was feeling well enough to go home.  We will give him off work for a week and follow-up with his primary care in 1 week for continuing care.  He continues to have problems with his knee he can follow-up with orthopedic surgery as needed.  Do not really feel need for any more antibiotics since his cultures were negative.  Doubt that he had any infection.     The following portions of the patient's history were reviewed and updated as appropriate: allergies, current medications, past family history, past medical history, past social history, past surgical history and problem list.    Review of Systems   All other systems reviewed and are negative.      Objective   Physical Exam  Vitals reviewed.   Constitutional:       Appearance: Normal appearance.   Musculoskeletal:         General: Tenderness present. No swelling. Normal range of motion.      Cervical back: Normal range of motion.   Skin:     General: Skin is warm and dry.      Capillary Refill: Capillary refill takes less than 2 seconds.   Neurological:      General: No focal deficit present.      Mental Status: He is alert and oriented to person, place, and time.   Psychiatric:         Mood and Affect: Mood normal.         Behavior: Behavior normal.         Thought Content: Thought content normal.         Judgment: Judgment normal.         Assessment & Plan   Problems Addressed this Visit        Coag and Thromboembolic    Recurrent acute deep vein thrombosis (DVT) of left lower extremity (HCC) (Chronic)       Musculoskeletal and Injuries    Acute pain of right knee   Other Visit Diagnoses     Hospital discharge follow-up    -  Primary      Diagnoses       Codes Comments    Hospital discharge follow-up    -  Primary ICD-10-CM: Z09  ICD-9-CM: V67.59     Recurrent acute deep vein thrombosis (DVT) of left lower extremity (HCC)     ICD-10-CM:  I82.402  ICD-9-CM: 453.40     Acute pain of right knee     ICD-10-CM: M25.561  ICD-9-CM: 719.46           Patient Instructions   Restart blood thinner tomorrow. Continue medications as prescribed. Ice after work. Follow-up with orthopedic, Dr. Zheng as scheduled. Keep cardiology appointment. Stay hydrated with water. Rest. Follow-up as scheduled.

## 2023-05-08 NOTE — PATIENT INSTRUCTIONS
Restart blood thinner tomorrow. Continue medications as prescribed. Ice after work. Follow-up with orthopedic, Dr. Zheng as scheduled. Keep cardiology appointment. Stay hydrated with water. Rest. Follow-up as scheduled.

## 2023-05-09 ENCOUNTER — HOSPITAL ENCOUNTER (OUTPATIENT)
Dept: CARDIOLOGY | Facility: HOSPITAL | Age: 53
Discharge: HOME | End: 2023-05-09
Admitting: STUDENT IN AN ORGANIZED HEALTH CARE EDUCATION/TRAINING PROGRAM
Payer: COMMERCIAL

## 2023-05-09 VITALS — BODY MASS INDEX: 45.1 KG/M2 | HEIGHT: 70 IN | WEIGHT: 315 LBS

## 2023-05-09 DIAGNOSIS — R60.0 BILATERAL LEG EDEMA: ICD-10-CM

## 2023-05-09 DIAGNOSIS — R06.09 DOE (DYSPNEA ON EXERTION): ICD-10-CM

## 2023-05-09 LAB
AORTIC ARCH: 2.7 CM
ASCENDING AORTA: 2.7 CM
BH CV ECHO LEFT VENTRICLE GLOBAL LONGITUDINAL STRAIN: -21.3 %
BH CV ECHO MEAS - ACS: 2.08 CM
BH CV ECHO MEAS - AO MAX PG: 8.6 MMHG
BH CV ECHO MEAS - AO MEAN PG: 4.7 MMHG
BH CV ECHO MEAS - AO ROOT DIAM: 3 CM
BH CV ECHO MEAS - AO V2 MAX: 146.6 CM/SEC
BH CV ECHO MEAS - AO V2 VTI: 27.7 CM
BH CV ECHO MEAS - AVA(I,D): 2.36 CM2
BH CV ECHO MEAS - EDV(CUBED): 81.5 ML
BH CV ECHO MEAS - EDV(MOD-SP2): 91 ML
BH CV ECHO MEAS - EDV(MOD-SP4): 109 ML
BH CV ECHO MEAS - EF(MOD-BP): 63.2 %
BH CV ECHO MEAS - EF(MOD-SP2): 60.4 %
BH CV ECHO MEAS - EF(MOD-SP4): 62.4 %
BH CV ECHO MEAS - ESV(CUBED): 22.5 ML
BH CV ECHO MEAS - ESV(MOD-SP2): 36 ML
BH CV ECHO MEAS - ESV(MOD-SP4): 41 ML
BH CV ECHO MEAS - FS: 34.9 %
BH CV ECHO MEAS - IVS/LVPW: 0.98 CM
BH CV ECHO MEAS - IVSD: 1.06 CM
BH CV ECHO MEAS - LAT PEAK E' VEL: 12.9 CM/SEC
BH CV ECHO MEAS - LV DIASTOLIC VOL/BSA (35-75): 41.7 CM2
BH CV ECHO MEAS - LV MASS(C)D: 157.8 GRAMS
BH CV ECHO MEAS - LV MAX PG: 3.8 MMHG
BH CV ECHO MEAS - LV MEAN PG: 2.2 MMHG
BH CV ECHO MEAS - LV SYSTOLIC VOL/BSA (12-30): 15.7 CM2
BH CV ECHO MEAS - LV V1 MAX: 97.7 CM/SEC
BH CV ECHO MEAS - LV V1 VTI: 20.9 CM
BH CV ECHO MEAS - LVIDD: 4.3 CM
BH CV ECHO MEAS - LVIDS: 2.8 CM
BH CV ECHO MEAS - LVOT AREA: 3.1 CM2
BH CV ECHO MEAS - LVOT DIAM: 1.99 CM
BH CV ECHO MEAS - LVPWD: 1.07 CM
BH CV ECHO MEAS - MED PEAK E' VEL: 15.1 CM/SEC
BH CV ECHO MEAS - MV A DUR: 0.15 SEC
BH CV ECHO MEAS - MV A MAX VEL: 71.6 CM/SEC
BH CV ECHO MEAS - MV DEC SLOPE: 873.8 CM/SEC2
BH CV ECHO MEAS - MV DEC TIME: 0.16 MSEC
BH CV ECHO MEAS - MV E MAX VEL: 111 CM/SEC
BH CV ECHO MEAS - MV E/A: 1.55
BH CV ECHO MEAS - MV MAX PG: 6.8 MMHG
BH CV ECHO MEAS - MV MEAN PG: 2.9 MMHG
BH CV ECHO MEAS - MV P1/2T: 45.5 MSEC
BH CV ECHO MEAS - MV V2 VTI: 24.2 CM
BH CV ECHO MEAS - MVA(P1/2T): 4.8 CM2
BH CV ECHO MEAS - MVA(VTI): 2.7 CM2
BH CV ECHO MEAS - PA ACC TIME: 0.1 SEC
BH CV ECHO MEAS - PA PR(ACCEL): 35 MMHG
BH CV ECHO MEAS - PA V2 MAX: 108.6 CM/SEC
BH CV ECHO MEAS - PULM A REVS DUR: 0.16 SEC
BH CV ECHO MEAS - PULM A REVS VEL: 27 CM/SEC
BH CV ECHO MEAS - PULM DIAS VEL: 25.3 CM/SEC
BH CV ECHO MEAS - PULM S/D: 1.3
BH CV ECHO MEAS - PULM SYS VEL: 32.9 CM/SEC
BH CV ECHO MEAS - QP/QS: 1.08
BH CV ECHO MEAS - RAP SYSTOLE: 3 MMHG
BH CV ECHO MEAS - RV MAX PG: 2.27 MMHG
BH CV ECHO MEAS - RV V1 MAX: 75.4 CM/SEC
BH CV ECHO MEAS - RV V1 VTI: 15.2 CM
BH CV ECHO MEAS - RVOT DIAM: 2.43 CM
BH CV ECHO MEAS - RVSP: 38.7 MMHG
BH CV ECHO MEAS - SI(MOD-SP2): 21 ML/M2
BH CV ECHO MEAS - SI(MOD-SP4): 26 ML/M2
BH CV ECHO MEAS - SUP REN AO DIAM: 2.6 CM
BH CV ECHO MEAS - SV(LVOT): 65.2 ML
BH CV ECHO MEAS - SV(MOD-SP2): 55 ML
BH CV ECHO MEAS - SV(MOD-SP4): 68 ML
BH CV ECHO MEAS - SV(RVOT): 70.6 ML
BH CV ECHO MEAS - TAPSE (>1.6): 2.44 CM
BH CV ECHO MEAS - TR MAX PG: 35.7 MMHG
BH CV ECHO MEAS - TR MAX VEL: 298.6 CM/SEC
BH CV ECHO MEASUREMENTS AVERAGE E/E' RATIO: 7.93
BH CV XLRA - RV BASE: 3 CM
BH CV XLRA - RV LENGTH: 8.1 CM
BH CV XLRA - RV MID: 2.9 CM
BH CV XLRA - TDI S': 21.3 CM/SEC
LEFT ATRIUM VOLUME INDEX: 22.8 ML/M2
MAXIMAL PREDICTED HEART RATE: 168 BPM
SINUS: 2.7 CM
STJ: 2.5 CM
STRESS TARGET HR: 143 BPM

## 2023-05-09 PROCEDURE — 93356 MYOCRD STRAIN IMG SPCKL TRCK: CPT

## 2023-05-09 PROCEDURE — 93306 TTE W/DOPPLER COMPLETE: CPT

## 2023-05-09 PROCEDURE — 93356 MYOCRD STRAIN IMG SPCKL TRCK: CPT | Performed by: INTERNAL MEDICINE

## 2023-05-09 PROCEDURE — 93306 TTE W/DOPPLER COMPLETE: CPT | Performed by: INTERNAL MEDICINE

## 2023-05-15 RX ORDER — ESOMEPRAZOLE MAGNESIUM 40 MG/1
CAPSULE, DELAYED RELEASE ORAL
Qty: 90 CAPSULE | Refills: 0 | Status: SHIPPED | OUTPATIENT
Start: 2023-05-15

## 2023-05-15 RX ORDER — ESOMEPRAZOLE MAGNESIUM 40 MG/1
CAPSULE, DELAYED RELEASE ORAL
Qty: 90 CAPSULE | Refills: 0 | OUTPATIENT
Start: 2023-05-15

## 2023-05-18 ENCOUNTER — TELEPHONE (OUTPATIENT)
Dept: INTERNAL MEDICINE | Facility: CLINIC | Age: 53
End: 2023-05-18
Payer: COMMERCIAL

## 2023-05-18 NOTE — TELEPHONE ENCOUNTER
Caller: Grant Edge Jr.     Relationship: Self    Best call back number: [phone]    What was the call regarding: PATIENT REQUESTS A CALL BACK TO UPDATE ON THE STATUS OF A PRIOR AUTHORIZATION FOR esomeprazole (nexIUM) 40 MG capsule    Do you require a callback: YES             Calm

## 2023-05-24 ENCOUNTER — TELEPHONE (OUTPATIENT)
Dept: PAIN MEDICINE | Facility: CLINIC | Age: 53
End: 2023-05-24

## 2023-05-24 DIAGNOSIS — I87.303 STASIS EDEMA OF BOTH LOWER EXTREMITIES: ICD-10-CM

## 2023-05-24 RX ORDER — POTASSIUM CHLORIDE 750 MG/1
10 TABLET, FILM COATED, EXTENDED RELEASE ORAL DAILY
Qty: 30 TABLET | Refills: 1 | Status: SHIPPED | OUTPATIENT
Start: 2023-05-24

## 2023-05-24 NOTE — TELEPHONE ENCOUNTER
Caller: Grant Edge Jr.    Relationship to patient: Self    Best call back number:[phone]  Patient is needing: SCHEDULE AN INJECTION FOR HIS BACK. ANY DAY AROUND 3PM

## 2023-05-30 ENCOUNTER — PREP FOR SURGERY (OUTPATIENT)
Dept: SURGERY | Facility: SURGERY CENTER | Age: 53
End: 2023-05-30

## 2023-05-30 ENCOUNTER — OFFICE VISIT (OUTPATIENT)
Dept: PAIN MEDICINE | Facility: CLINIC | Age: 53
End: 2023-05-30

## 2023-05-30 ENCOUNTER — PREP FOR SURGERY (OUTPATIENT)
Dept: OTHER | Facility: HOSPITAL | Age: 53
End: 2023-05-30

## 2023-05-30 VITALS
HEIGHT: 70 IN | HEART RATE: 91 BPM | WEIGHT: 315 LBS | SYSTOLIC BLOOD PRESSURE: 162 MMHG | BODY MASS INDEX: 45.1 KG/M2 | OXYGEN SATURATION: 94 % | DIASTOLIC BLOOD PRESSURE: 98 MMHG | TEMPERATURE: 97.5 F

## 2023-05-30 DIAGNOSIS — M54.16 LUMBAR RADICULOPATHY: Primary | ICD-10-CM

## 2023-05-30 DIAGNOSIS — M51.36 DEGENERATION OF INTERVERTEBRAL DISC OF LUMBAR REGION: ICD-10-CM

## 2023-05-30 PROCEDURE — 99214 OFFICE O/P EST MOD 30 MIN: CPT | Performed by: PHYSICIAN ASSISTANT

## 2023-05-30 RX ORDER — GABAPENTIN 300 MG/1
300 CAPSULE ORAL 3 TIMES DAILY
Qty: 90 CAPSULE | Refills: 1 | Status: SHIPPED | OUTPATIENT
Start: 2023-05-30

## 2023-05-30 NOTE — PROGRESS NOTES
CHIEF COMPLAINT  F/U hip pain- patient states that his pain has worsened since his last visit.     Subjective   Grant Edge Jr. is a 52 y.o. male  who presents for follow-up.  He has a history of right hip and leg pain.  This patient obtained over 70-80% relief of pain for 4 months following right L5-S1 TFESI with gradual recrudescence of painful symptoms over the past 6 weeks.  The patient is now having significant pain within the right hip as well as numbness affecting the anterior portion of the upper right thigh which is aggravated by any type of physical activity/walking/standing.    The patient has had trials of both gabapentin and Lyrica and felt that they were both equally helpful for the neuropathic component of his pain and he also had weight gain with each of them.  The patient states that he had some remaining gabapentin tablets left over and he resumed 300 mg 3 times daily which is somewhat helpful and he has not noted any significant weight gain at this time.    Pain today 3/10 VAS in severity.      Back Pain  This is a recurrent problem. The current episode started more than 1 year ago. The problem occurs constantly. The problem has been gradually worsening since onset. The pain is present in the lumbar spine. The quality of the pain is described as burning, shooting and stabbing. Radiates to: RLE. The pain is at a severity of 3/10. The pain is mild. The pain is the same all the time. The symptoms are aggravated by position and standing (walking). Associated symptoms include leg pain and numbness (right leg). Pertinent negatives include no abdominal pain, chest pain, dysuria, fever, headaches or weakness.        PEG Assessment   What number best describes your pain on average in the past week?5  What number best describes how, during the past week, pain has interfered with your enjoyment of life?4  What number best describes how, during the past week, pain has interfered with your general activity?  " 4    Review of Pertinent Medical Data ---                  The following portions of the patient's history were reviewed and updated as appropriate: allergies, current medications, past family history, past medical history, past social history, past surgical history and problem list.    Review of Systems   Constitutional: Negative for activity change, chills, fatigue and fever.   HENT: Negative for congestion.    Eyes: Negative for visual disturbance.   Respiratory: Negative for chest tightness and shortness of breath.    Cardiovascular: Negative for chest pain.   Gastrointestinal: Negative for abdominal pain, constipation and diarrhea.   Genitourinary: Negative for difficulty urinating and dysuria.   Musculoskeletal: Negative for back pain and neck pain.   Neurological: Positive for numbness (right leg). Negative for dizziness, weakness, light-headedness and headaches.   Psychiatric/Behavioral: Positive for sleep disturbance. Negative for agitation. The patient is not nervous/anxious.      I have reviewed and confirmed the accuracy of the ROS as documented by the MA/LPN/RN LAURA Christianson    Vitals:    05/30/23 1355   BP: 162/98   Pulse: 91   Temp: 97.5 °F (36.4 °C)   SpO2: 94%   Weight: (!) 154 kg (338 lb 9.6 oz)   Height: 177.8 cm (70\")   PainSc:   3   PainLoc: Leg  Comment: right         Objective   Physical Exam  Vitals and nursing note reviewed.   Constitutional:       Appearance: Normal appearance. He is obese.   HENT:      Head: Normocephalic.   Pulmonary:      Effort: Pulmonary effort is normal.   Musculoskeletal:      Lumbar back: Spasms and tenderness present. Decreased range of motion. Positive right straight leg raise test.        Back:    Skin:     General: Skin is warm and dry.   Neurological:      General: No focal deficit present.      Mental Status: He is alert and oriented to person, place, and time.      Cranial Nerves: Cranial nerves 2-12 are intact.      Motor: Motor function is intact.     "  Gait: Gait abnormal (antalgic gait).   Psychiatric:         Mood and Affect: Mood normal.         Behavior: Behavior normal.         Thought Content: Thought content normal.         Judgment: Judgment normal.         Assessment & Plan   Diagnoses and all orders for this visit:    1. Lumbar radiculopathy (Primary)  -     gabapentin (NEURONTIN) 300 MG capsule; Take 1 capsule by mouth 3 (Three) Times a Day.  Dispense: 90 capsule; Refill: 1    2. Degeneration of intervertebral disc of lumbar region  -     gabapentin (NEURONTIN) 300 MG capsule; Take 1 capsule by mouth 3 (Three) Times a Day.  Dispense: 90 capsule; Refill: 1        --- I will have the patient return for therapeutic right L5-S1 TFESI for recrudescence of discogenic low back pain with radicular symptoms.  The patient has obtained clearance to hold Xarelto for 3 days prior to injective therapy and he will resume it 24 hours afterwards.  --- RTC in 6 weeks for further evaluation and treatment recommendations to be made         GEOVANNA REPORT  As part of the patient's treatment plan, I am prescribing controlled substances. The patient has been made aware of appropriate use of such medications, including potential risk of somnolence, limited ability to drive and/or work safely, and the potential for dependence or overdose. It has also been made clear that these medications are for use by this patient only, without concomitant use of alcohol or other substances unless prescribed.     Patient has completed prescribing agreement detailing terms of continued prescribing of controlled substances, including monitoring GEOVANNA reports, urine drug screening, and pill counts if necessary. The patient is aware that inappropriate use will results in cessation of prescribing such medications.    As the clinician, I personally reviewed the GEOVANNA from 5/30/2023 while the patient was in the office today.    History and physical exam exhibit continued safe and appropriate use  of controlled substances.     Dictated utilizing Dragon dictation.

## 2023-06-02 ENCOUNTER — TRANSCRIBE ORDERS (OUTPATIENT)
Dept: SURGERY | Facility: SURGERY CENTER | Age: 53
End: 2023-06-02
Payer: COMMERCIAL

## 2023-06-02 DIAGNOSIS — Z41.9 SURGERY, ELECTIVE: Primary | ICD-10-CM

## 2023-07-25 DIAGNOSIS — I87.303 STASIS EDEMA OF BOTH LOWER EXTREMITIES: ICD-10-CM

## 2023-07-25 RX ORDER — POTASSIUM CHLORIDE 750 MG/1
10 TABLET, FILM COATED, EXTENDED RELEASE ORAL DAILY
Qty: 30 TABLET | Refills: 1 | Status: SHIPPED | OUTPATIENT
Start: 2023-07-25

## 2023-07-26 RX ORDER — RIVAROXABAN 20 MG/1
TABLET, FILM COATED ORAL
Qty: 90 TABLET | Refills: 0 | OUTPATIENT
Start: 2023-07-26

## 2023-07-26 NOTE — TELEPHONE ENCOUNTER
Patient called because he was denied for xarelto, I told him I was unsure of denial but I would send it in, sent to [facility]

## 2023-07-31 ENCOUNTER — PREP FOR SURGERY (OUTPATIENT)
Dept: SURGERY | Facility: SURGERY CENTER | Age: 53
End: 2023-07-31
Payer: COMMERCIAL

## 2023-07-31 ENCOUNTER — OFFICE VISIT (OUTPATIENT)
Dept: PAIN MEDICINE | Facility: CLINIC | Age: 53
End: 2023-07-31
Payer: COMMERCIAL

## 2023-07-31 VITALS
HEART RATE: 86 BPM | WEIGHT: 315 LBS | DIASTOLIC BLOOD PRESSURE: 78 MMHG | HEIGHT: 70 IN | BODY MASS INDEX: 45.1 KG/M2 | TEMPERATURE: 97.1 F | OXYGEN SATURATION: 95 % | SYSTOLIC BLOOD PRESSURE: 157 MMHG

## 2023-07-31 DIAGNOSIS — M47.816 LUMBAR FACET ARTHROPATHY: Primary | ICD-10-CM

## 2023-07-31 DIAGNOSIS — M54.16 LUMBAR RADICULOPATHY: ICD-10-CM

## 2023-07-31 DIAGNOSIS — M51.36 DEGENERATION OF INTERVERTEBRAL DISC OF LUMBAR REGION: ICD-10-CM

## 2023-07-31 PROCEDURE — 99214 OFFICE O/P EST MOD 30 MIN: CPT | Performed by: PHYSICIAN ASSISTANT

## 2023-07-31 NOTE — H&P (VIEW-ONLY)
CHIEF COMPLAINT    Procedure follow up for lower back and hip pain. The patient reports the hip pain is present with standing up or getting in and out of the car.     Subjective   Grant Edge Jr. is a 52 y.o. male  who presents to the office for follow-up of procedure.  He completed a Lumbar Transforaminal Epidural Steroid Injection Right L5-S1 Levels on  6/9/23 performed by Dr. Sun for management of hip pain. Patient reports 75% ongoing relief from the procedure particularly noted with decreased numbness within the upper right thigh and leg.  His primary pain complaint at this time is right-sided lumbosacral spine pain with some referred pain into the posterior buttock and hip and occasionally extending into the thigh.  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    Pain today 3/10 VAS in severity.        Back Pain  This is a chronic problem. The current episode started more than 1 year ago. The problem occurs constantly. The problem is unchanged. The pain is present in the lumbar spine. The quality of the pain is described as aching (throbbing). The pain radiates to the right thigh. The pain is at a severity of 3/10. The pain is mild. The pain is Worse during the day. The symptoms are aggravated by position, standing, twisting and bending (work duties). Pertinent negatives include no abdominal pain, dysuria, fever, numbness or weakness.   Hip Pain   The injury mechanism is unknown. The pain is present in the right hip. The quality of the pain is described as aching (throbbing). The pain is at a severity of 3/10. The pain is mild. The pain has been Constant since onset. Associated symptoms include a loss of motion. Pertinent negatives include no numbness. He reports no foreign bodies present. The symptoms are aggravated by movement and weight bearing.      PEG Assessment  "  What number best describes your pain on average in the past week?3  What number best describes how, during the past week, pain has interfered with your enjoyment of life?3  What number best describes how, during the past week, pain has interfered with your general activity?  2    Review of Pertinent Medical Data ---              The following portions of the patient's history were reviewed and updated as appropriate: allergies, current medications, past family history, past medical history, past social history, past surgical history, and problem list.    Review of Systems   Constitutional:  Negative for chills, fatigue and fever.   Respiratory:  Positive for cough. Negative for shortness of breath.    Gastrointestinal:  Negative for abdominal pain, constipation and diarrhea.   Genitourinary:  Negative for difficulty urinating and dysuria.   Musculoskeletal:  Positive for back pain.   Neurological:  Negative for dizziness, weakness, light-headedness and numbness.   Psychiatric/Behavioral:  Negative for sleep disturbance.    I have reviewed and confirmed the accuracy of the ROS as documented by the MA/LPN/RN LAURA Christianson   Vitals:    07/31/23 1515   BP: 157/78   BP Location: Left arm   Patient Position: Sitting   Pulse: 86   Temp: 97.1 øF (36.2 øC)   TempSrc: Temporal   SpO2: 95%   Weight: (!) 156 kg (344 lb 6.4 oz)   Height: 177.8 cm (70\")   PainSc:   3   PainLoc: Back         Objective   Physical Exam  Vitals and nursing note reviewed.   Constitutional:       Appearance: Normal appearance. He is obese.   HENT:      Head: Normocephalic.   Pulmonary:      Effort: Pulmonary effort is normal.   Musculoskeletal:      Lumbar back: Spasms and tenderness (moderate pain to palpation over the right lumbar facet joint spaces) present. Decreased range of motion.        Back:    Skin:     General: Skin is warm and dry.   Neurological:      General: No focal deficit present.      Mental Status: He is alert and oriented to " person, place, and time.      Cranial Nerves: Cranial nerves 2-12 are intact.      Sensory: Sensation is intact.      Motor: Motor function is intact.      Gait: Gait abnormal.   Psychiatric:         Mood and Affect: Mood normal.         Behavior: Behavior normal.         Thought Content: Thought content normal.         Judgment: Judgment normal.           Assessment & Plan   Diagnoses and all orders for this visit:    1. Lumbar facet arthropathy (Primary)    2. Lumbar radiculopathy    3. Degeneration of intervertebral disc of lumbar region        Grant Edge Jr. reports a pain score of 3.  Given his pain assessment as noted, treatment options were discussed and the following options were decided upon as a follow-up plan to address the patient's pain: continuation of current treatment plan for pain, home exercises and therapy, patient not interested in pharmacological measures, use of non-medical modalities (ice, heat, stretching and/or behavior modifications), and return for diagnostic lumbar MBB's .      --- Based on physical exam and MRI findings I feel the patient would benefit from #1 diagnostic right L4-S1 MBB with plan to proceed to RFA if favorable response.  The risk and benefits of the procedure have been discussed with the patient and all of his questions addressed.  --- Follow-up in 1-2 weeks after injective therapy         Diagnostic Facet Joint Procedure:   MBB     The first diagnostic facet joint procedure is considered medically reasonable and necessary for the diagnosis and treatment of chronic pain for this patient due to the patient meeting all of the following criteria:    - 1. Moderate to severe chronic neck or low back pain, predominantly axial, that causes functional deficit measured on pain or disability scale.  - 2. Pain present for minimum of 3 months with documented failure to respond to noninvasive conservative management (as tolerated)  - 3. Absence of untreated radiculopathy or  neurogenic claudication (except for radiculopathy caused by facet joint synovial cyst)  - 4. There is no non-facet pathology per clinical assessment or radiology studies that could explain the source of the patient's pain, including but not limited to fracture, tumor, infection, or significant deformity.    The confirmatory diagnostic facet joint procedure is considered medically reasonable and necessary for the diagnosis and treatment of chronic pain for this patient due to the patient meeting all of the following criteria:    - 1. Moderate to severe chronic neck or low back pain, predominantly axial, that causes functional deficit measured on pain or disability scale.  - 2. Pain present for minimum of 3 months with documented failure to respond to noninvasive conservative management (as tolerated)  - 3. Absence of untreated radiculopathy or neurogenic claudication (except for radiculopathy caused by facet joint synovial cyst)  - 4. There is no non-facet pathology per clinical assessment or radiology studies that could explain the source of the patient's pain, including but not limited to fracture, tumor, infection, or significant deformity.    The patient has also shown a consistent positive response of at least 80% relief of primary (index) pain (with the duration of relief being consistent with the agent used).              Dictated utilizing Dragon dictation.

## 2023-08-03 ENCOUNTER — LAB (OUTPATIENT)
Dept: OTHER | Facility: HOSPITAL | Age: 53
End: 2023-08-03
Payer: COMMERCIAL

## 2023-08-03 ENCOUNTER — OFFICE VISIT (OUTPATIENT)
Dept: ONCOLOGY | Facility: CLINIC | Age: 53
End: 2023-08-03
Payer: COMMERCIAL

## 2023-08-03 VITALS
WEIGHT: 315 LBS | HEART RATE: 86 BPM | OXYGEN SATURATION: 95 % | DIASTOLIC BLOOD PRESSURE: 86 MMHG | SYSTOLIC BLOOD PRESSURE: 134 MMHG | RESPIRATION RATE: 16 BRPM | HEIGHT: 70 IN | BODY MASS INDEX: 45.1 KG/M2 | TEMPERATURE: 98 F

## 2023-08-03 DIAGNOSIS — Z79.01 CURRENT USE OF LONG TERM ANTICOAGULATION: ICD-10-CM

## 2023-08-03 DIAGNOSIS — I82.432 ACUTE DEEP VEIN THROMBOSIS (DVT) OF POPLITEAL VEIN OF LEFT LOWER EXTREMITY: ICD-10-CM

## 2023-08-03 DIAGNOSIS — I82.432 ACUTE DEEP VEIN THROMBOSIS (DVT) OF POPLITEAL VEIN OF LEFT LOWER EXTREMITY: Primary | ICD-10-CM

## 2023-08-03 LAB
BASOPHILS # BLD AUTO: 0.06 10*3/MM3 (ref 0–0.2)
BASOPHILS NFR BLD AUTO: 0.9 % (ref 0–1.5)
DEPRECATED RDW RBC AUTO: 52.4 FL (ref 37–54)
EOSINOPHIL # BLD AUTO: 0.2 10*3/MM3 (ref 0–0.4)
EOSINOPHIL NFR BLD AUTO: 3.1 % (ref 0.3–6.2)
ERYTHROCYTE [DISTWIDTH] IN BLOOD BY AUTOMATED COUNT: 15.4 % (ref 12.3–15.4)
HCT VFR BLD AUTO: 46.5 % (ref 37.5–51)
HGB BLD-MCNC: 15.2 G/DL (ref 13–17.7)
IMM GRANULOCYTES # BLD AUTO: 0.04 10*3/MM3 (ref 0–0.05)
IMM GRANULOCYTES NFR BLD AUTO: 0.6 % (ref 0–0.5)
LYMPHOCYTES # BLD AUTO: 1.65 10*3/MM3 (ref 0.7–3.1)
LYMPHOCYTES NFR BLD AUTO: 25.9 % (ref 19.6–45.3)
MCH RBC QN AUTO: 30.5 PG (ref 26.6–33)
MCHC RBC AUTO-ENTMCNC: 32.7 G/DL (ref 31.5–35.7)
MCV RBC AUTO: 93.2 FL (ref 79–97)
MONOCYTES # BLD AUTO: 0.77 10*3/MM3 (ref 0.1–0.9)
MONOCYTES NFR BLD AUTO: 12.1 % (ref 5–12)
NEUTROPHILS NFR BLD AUTO: 3.65 10*3/MM3 (ref 1.7–7)
NEUTROPHILS NFR BLD AUTO: 57.4 % (ref 42.7–76)
NRBC BLD AUTO-RTO: 0 /100 WBC (ref 0–0.2)
PLATELET # BLD AUTO: 188 10*3/MM3 (ref 140–450)
PMV BLD AUTO: 9.8 FL (ref 6–12)
RBC # BLD AUTO: 4.99 10*6/MM3 (ref 4.14–5.8)
WBC NRBC COR # BLD: 6.37 10*3/MM3 (ref 3.4–10.8)

## 2023-08-03 PROCEDURE — 36415 COLL VENOUS BLD VENIPUNCTURE: CPT

## 2023-08-03 PROCEDURE — 85025 COMPLETE CBC W/AUTO DIFF WBC: CPT | Performed by: INTERNAL MEDICINE

## 2023-08-04 ENCOUNTER — TRANSCRIBE ORDERS (OUTPATIENT)
Dept: SURGERY | Facility: SURGERY CENTER | Age: 53
End: 2023-08-04
Payer: COMMERCIAL

## 2023-08-04 DIAGNOSIS — Z41.9 SURGERY, ELECTIVE: Primary | ICD-10-CM

## 2023-08-07 ENCOUNTER — OFFICE VISIT (OUTPATIENT)
Dept: INTERNAL MEDICINE | Facility: CLINIC | Age: 53
End: 2023-08-07
Payer: COMMERCIAL

## 2023-08-07 VITALS
OXYGEN SATURATION: 92 % | SYSTOLIC BLOOD PRESSURE: 140 MMHG | BODY MASS INDEX: 45.1 KG/M2 | WEIGHT: 315 LBS | DIASTOLIC BLOOD PRESSURE: 90 MMHG | HEART RATE: 87 BPM | HEIGHT: 70 IN

## 2023-08-07 DIAGNOSIS — Z12.11 SCREENING FOR COLON CANCER: ICD-10-CM

## 2023-08-07 DIAGNOSIS — E03.8 OTHER SPECIFIED HYPOTHYROIDISM: Chronic | ICD-10-CM

## 2023-08-07 DIAGNOSIS — E66.01 CLASS 3 SEVERE OBESITY DUE TO EXCESS CALORIES WITHOUT SERIOUS COMORBIDITY WITH BODY MASS INDEX (BMI) OF 40.0 TO 44.9 IN ADULT: Chronic | ICD-10-CM

## 2023-08-07 DIAGNOSIS — R05.1 ACUTE COUGH: ICD-10-CM

## 2023-08-07 DIAGNOSIS — I10 PRIMARY HYPERTENSION: Primary | Chronic | ICD-10-CM

## 2023-08-07 DIAGNOSIS — R73.03 PREDIABETES: ICD-10-CM

## 2023-08-07 PROCEDURE — 99214 OFFICE O/P EST MOD 30 MIN: CPT

## 2023-08-07 RX ORDER — METHYLPREDNISOLONE 4 MG/1
TABLET ORAL
Qty: 21 TABLET | Refills: 0 | Status: SHIPPED | OUTPATIENT
Start: 2023-08-07

## 2023-08-07 NOTE — PATIENT INSTRUCTIONS
Take medrol dose pack as directed. Continue medications as prescribed. Stay active. Stay hydrated with water. Limit sodium. Referral for colonoscopy placed. Scheduling center to call with appointment. Labs today. Follow-up in 6 months annual exam and fasting labs.

## 2023-08-07 NOTE — PROGRESS NOTES
"Chief Complaint  Hypothyroidism and Hypertension    Subjective        Grant Edge Jr. presents to [facility]  History of Present Illness  53-year-old male presenting for hypothyroidism and hypertension follow-up.  Hoarseness of voice has improved.  Still experiencing left flank pain especially when coughing.  Denies abdominal pain, back pain.  Patient was previously sleeping in recliner due to his cough and was pressing into armrest into his left side.  Has been taking medications as prescribed.  Taking benazepril, metoprolol and furosemide for blood pressure.  Taking levothyroxine 112 mcg daily for thyroid disease.  Denies chest pain, abdominal pain, heart palpitations, frequent headaches, excessive sweating.    Objective   Vital Signs:  /90 (BP Location: Right arm, Patient Position: Sitting, Cuff Size: Adult)   Pulse 87   Ht 177 cm (69.69\")   Wt (!) 156 kg (344 lb 6.4 oz)   SpO2 92%   BMI 49.86 kg/mý   Estimated body mass index is 49.86 kg/mý as calculated from the following:    Height as of this encounter: 177 cm (69.69\").    Weight as of this encounter: 156 kg (344 lb 6.4 oz).       Class 3 Severe Obesity (BMI >=40). Obesity-related health conditions include the following: hypertension. Obesity is unchanged. BMI is is above average; no BMI management plan is appropriate. We discussed portion control and increasing exercise.      Physical Exam  Vitals reviewed.   Constitutional:       Appearance: Normal appearance. He is obese.   Musculoskeletal:         General: Normal range of motion.      Cervical back: Normal range of motion.   Skin:     General: Skin is warm and dry.      Capillary Refill: Capillary refill takes less than 2 seconds.   Neurological:      General: No focal deficit present.      Mental Status: He is alert and oriented to person, place, and time.   Psychiatric:         Mood and Affect: Mood normal.         Behavior: Behavior normal.         Thought " Content: Thought content normal.         Judgment: Judgment normal.      Result Review :    Common labs          5/4/2023    04:42 8/3/2023    15:20 8/7/2023    16:18   Common Labs   Glucose 108   106    BUN 16   13    Creatinine 1.24   1.37    Sodium 141   145    Potassium 4.3   3.9    Chloride 106   106    Calcium 8.7   9.1    Total Protein   6.3    Albumin   4.2    Total Bilirubin   0.8    Alkaline Phosphatase   86    AST (SGOT)   21    ALT (SGPT)   18    WBC 6.75  6.37  5.47    Hemoglobin 13.9  15.2  14.9    Hematocrit 42.1  46.5  44.9    Platelets 295  188  217    Hemoglobin A1C   6.40          Current Outpatient Medications on File Prior to Visit   Medication Sig Dispense Refill    albuterol sulfate  (90 Base) MCG/ACT inhaler Inhale 2 puffs 3 (Three) Times a Day. 6.7 g 0    benazepril (LOTENSIN) 40 MG tablet Take 1 tablet by mouth Daily. 90 tablet 1    esomeprazole (nexIUM) 40 MG capsule TAKE 1 CAPSULE BY MOUTH DAILY AS NEEDED FOR ACID REFLUX SYMPTOMS 90 capsule 0    Fluticasone-Salmeterol (ADVAIR/WIXELA) 100-50 MCG/ACT DISKUS Inhale 1 puff 2 (Two) Times a Day. 14 each 2    furosemide (Lasix) 20 MG tablet Take 1 tablet by mouth Every Other Day. 90 tablet 1    gabapentin (NEURONTIN) 300 MG capsule Take 1 capsule by mouth 3 (Three) Times a Day. 90 capsule 1    levothyroxine (SYNTHROID, LEVOTHROID) 112 MCG tablet Take 1 tablet by mouth Daily. 90 tablet 1    Loratadine (CLARITIN PO) Take 10 mg by mouth Daily As Needed.      metoprolol succinate XL (TOPROL-XL) 50 MG 24 hr tablet TAKE 1 AND 1/2 TABLETS BY MOUTH DAILY 145 tablet 0    montelukast (SINGULAIR) 10 MG tablet Take 1 tablet by mouth Every Night. 90 tablet 1    potassium chloride 10 MEQ CR tablet TAKE 1 TABLET BY MOUTH DAILY 30 tablet 1    rivaroxaban (XARELTO) 20 MG tablet Take 1 tablet by mouth Daily. 90 tablet 2     No current facility-administered medications on file prior to visit.              Assessment and Plan   Diagnoses and all orders for  this visit:    1. Primary hypertension (Primary)  -     Comprehensive Metabolic Panel  -     CBC (No Diff)    2. Other specified hypothyroidism  -     TSH Rfx On Abnormal To Free T4    3. Class 3 severe obesity due to excess calories without serious comorbidity with body mass index (BMI) of 40.0 to 44.9 in adult    4. Prediabetes  -     Comprehensive Metabolic Panel  -     Hemoglobin A1c    5. Screening for colon cancer  -     Ambulatory Referral For Screening Colonoscopy    6. Acute cough  -     methylPREDNISolone (MEDROL) 4 MG dose pack; Take as directed on package instructions.  Dispense: 21 tablet; Refill: 0      Patient Instructions   Take medrol dose pack as directed. Continue medications as prescribed. Stay active. Stay hydrated with water. Limit sodium. Referral for colonoscopy placed. Scheduling center to call with appointment. Labs today. Follow-up in 6 months annual exam and fasting labs.          Follow Up   Return in about 6 months (around 2/7/2024) for Annual physical.  Patient was given instructions and counseling regarding his condition or for health maintenance advice. Please see specific information pulled into the AVS if appropriate.

## 2023-08-08 ENCOUNTER — TRANSCRIBE ORDERS (OUTPATIENT)
Dept: SURGERY | Facility: SURGERY CENTER | Age: 53
End: 2023-08-08
Payer: COMMERCIAL

## 2023-08-08 DIAGNOSIS — Z41.9 SURGERY, ELECTIVE: Primary | ICD-10-CM

## 2023-08-08 LAB
ALBUMIN SERPL-MCNC: 4.2 G/DL (ref 3.5–5.2)
ALBUMIN/GLOB SERPL: 2 G/DL
ALP SERPL-CCNC: 86 U/L (ref 39–117)
ALT SERPL-CCNC: 18 U/L (ref 1–41)
AST SERPL-CCNC: 21 U/L (ref 1–40)
BILIRUB SERPL-MCNC: 0.8 MG/DL (ref 0–1.2)
BUN SERPL-MCNC: 13 MG/DL (ref 6–20)
BUN/CREAT SERPL: 9.5 (ref 7–25)
CALCIUM SERPL-MCNC: 9.1 MG/DL (ref 8.6–10.5)
CHLORIDE SERPL-SCNC: 106 MMOL/L (ref 98–107)
CO2 SERPL-SCNC: 25.2 MMOL/L (ref 22–29)
CREAT SERPL-MCNC: 1.37 MG/DL (ref 0.76–1.27)
EGFRCR SERPLBLD CKD-EPI 2021: 62.1 ML/MIN/1.73
ERYTHROCYTE [DISTWIDTH] IN BLOOD BY AUTOMATED COUNT: 13.3 % (ref 12.3–15.4)
GLOBULIN SER CALC-MCNC: 2.1 GM/DL
GLUCOSE SERPL-MCNC: 106 MG/DL (ref 65–99)
HBA1C MFR BLD: 6.4 % (ref 4.8–5.6)
HCT VFR BLD AUTO: 44.9 % (ref 37.5–51)
HGB BLD-MCNC: 14.9 G/DL (ref 13–17.7)
MCH RBC QN AUTO: 30 PG (ref 26.6–33)
MCHC RBC AUTO-ENTMCNC: 33.2 G/DL (ref 31.5–35.7)
MCV RBC AUTO: 90.5 FL (ref 79–97)
PLATELET # BLD AUTO: 217 10*3/MM3 (ref 140–450)
POTASSIUM SERPL-SCNC: 3.9 MMOL/L (ref 3.5–5.2)
PROT SERPL-MCNC: 6.3 G/DL (ref 6–8.5)
RBC # BLD AUTO: 4.96 10*6/MM3 (ref 4.14–5.8)
SODIUM SERPL-SCNC: 145 MMOL/L (ref 136–145)
TSH SERPL DL<=0.005 MIU/L-ACNC: 0.87 UIU/ML (ref 0.27–4.2)
WBC # BLD AUTO: 5.47 10*3/MM3 (ref 3.4–10.8)

## 2023-08-09 NOTE — PROGRESS NOTES
Thyroid levels within normal limits.  Continue thyroid medication as prescribed.    CBC within normal limits.  No signs of infection or anemia.    Nonfasting blood sugar of 106 correlating with hemoglobin A1c of 6.4.  Recommend limiting sweets and carbohydrates.  Avoid heavy carbohydrate meals at night.  Stay hydrated with water throughout the day.  Will continue to monitor in the future.

## 2023-08-15 ENCOUNTER — TELEPHONE (OUTPATIENT)
Dept: PAIN MEDICINE | Facility: CLINIC | Age: 53
End: 2023-08-15

## 2023-08-15 DIAGNOSIS — I10 BENIGN ESSENTIAL HYPERTENSION: ICD-10-CM

## 2023-08-15 RX ORDER — BENAZEPRIL HYDROCHLORIDE 40 MG/1
40 TABLET, FILM COATED ORAL DAILY
Qty: 90 TABLET | Refills: 1 | Status: SHIPPED | OUTPATIENT
Start: 2023-08-15

## 2023-08-15 RX ORDER — ESOMEPRAZOLE MAGNESIUM 40 MG/1
CAPSULE, DELAYED RELEASE ORAL
Qty: 90 CAPSULE | Refills: 0 | Status: SHIPPED | OUTPATIENT
Start: 2023-08-15

## 2023-08-15 NOTE — SIGNIFICANT NOTE
Patient educated on the following :    - If you are receiving Sedation for your procedure Nothing to Eat 6 hours and only clear liquids for 2 hours prior to your procedure.    -You will need to have someone drive you home after your PROCEDURE and remain with you for 24 hours after the PROCEDURE  - The date of your procedure, your are welcome to have one visitor at bedside or remain within 10-15 minutes of [facility]  -You will need to arrive at 1015   on 8/16/23 PROCEDURE  -Please contact [facility] at: [phone] with any questions and/or concerns

## 2023-08-15 NOTE — PERIOPERATIVE NURSING NOTE
During preop call pt stated he had stopped his xarelto on Sunday 8/13/23 even though dr Sun did not advise him to. Pt advised to call office and check with dr Sun as she does not usually stop blood thinners for lmbb. Pt stated he would call and check with office

## 2023-08-15 NOTE — DISCHARGE INSTRUCTIONS
[facility] - Post-procedure Instructions          --  While there are no absolute restrictions, it is recommended that you do not perform strenuous activity today. In the morning, you may resume your level of activity as before your block.    --  If you have a band-aid at your injection site, please remove it later today. Observe the area for any redness, swelling, pus-like drainage, or a temperature over 101ø. If any of these symptoms occur, please call your doctor at [phone]. If after office hours, leave a message and the on-call provider will return your call.    --  Ice may be applied to your injection site. It is recommended you avoid direct heat (heating pad; hot tub) for 1-2 days.    --  Call [facility] at [phone] if you experience persistent headache, persistent bleeding from the injection site, or severe pain not relieved by heat or oral medication.    --  Do not make important decisions today.    --  Due to the effects of the block and/or the I.V. Sedation, DO NOT drive or operate hazardous machinery for 12 hours.  Local anesthetics may cause numbness after procedure and precautions must be taken with regards to operating equipment as well as with walking, even if ambulating with assistance of another person or with an assistive device.    --  Do not drink alcohol for 12 hours.    -- You may return to work tomorrow, or as directed by your referring doctor.    --  Occasionally you may notice a slight increase in your pain after the procedure. This should start to improve within the next 24-48 hours. Radiofrequency ablation procedure pain may last 3-4 weeks.    --  It may take as long as 3-4 days before you notice a gradual improvement in your pain and/or other symptoms.    -- You may continue to take your prescribed pain medication as needed.    --  Some normal possible side effects of steroid use could include fluid retention, increased blood sugar, dull headache,  increased sweating, increased appetite, mood swings and flushing.    --  Diabetics are recommended to watch their blood glucose level closely for 24-48 hours after the injection.    --  Must stay in PACU for 20 min upon arrival and prove no leg weakness before being discharged.    --  IN THE EVENT OF A LIFE THREATENING EMERGENCY, (CHEST PAIN, BREATHING DIFFICULTIES, PARALYSIS.) YOU SHOULD GO TO YOUR NEAREST EMERGENCY ROOM.    --  You should be contacted by our office within 2-3 days to schedule follow up or next appointment date.  If not contacted within 7 days, please call the office at [phone]     If you have even 1 hour of relief, these injections are considered successful.

## 2023-08-15 NOTE — TELEPHONE ENCOUNTER
Rx Refill Note  Requested Prescriptions     Pending Prescriptions Disp Refills    benazepril (LOTENSIN) 40 MG tablet 90 tablet 1     Sig: Take 1 tablet by mouth Daily.     Signed Prescriptions Disp Refills    esomeprazole (nexIUM) 40 MG capsule 90 capsule 0     Sig: TAKE 1 CAPSULE BY MOUTH DAILY AS NEEDED FOR STOMACH ACID OR REFLUX OR SYMPTOMS     Authorizing Provider: MELIA KO     Ordering User: STEPHEN ALDANA      Last office visit with prescribing clinician: 8/7/2023   Last telemedicine visit with prescribing clinician: Visit date not found   Next office visit with prescribing clinician: 2/12/2024

## 2023-08-15 NOTE — TELEPHONE ENCOUNTER
Provider: DR. RENE  Caller: GONZALO  Relationship to Patient: SELF  Phone Number: [phone]  Reason for Call: PATIENT IS CALLING TO CHECK TO ASK IF HE IS SUPPOSED TO STOP TAKING HIS BLOOD THINNER BEFORE THE PROCEDURE TOMORROW. HE STATES HE HAS NOT TAKEN THIS SINCE SATURDAY. PLEASE CALL TO ADVISE - UNABLE TO WARM TRANSFER

## 2023-08-16 ENCOUNTER — HOSPITAL ENCOUNTER (OUTPATIENT)
Facility: SURGERY CENTER | Age: 53
Setting detail: HOSPITAL OUTPATIENT SURGERY
Discharge: HOME | End: 2023-08-16
Attending: ANESTHESIOLOGY | Admitting: ANESTHESIOLOGY
Payer: COMMERCIAL

## 2023-08-16 ENCOUNTER — HOSPITAL ENCOUNTER (OUTPATIENT)
Dept: GENERAL RADIOLOGY | Facility: SURGERY CENTER | Age: 53
Setting detail: HOSPITAL OUTPATIENT SURGERY
End: 2023-08-16
Payer: COMMERCIAL

## 2023-08-16 VITALS
OXYGEN SATURATION: 97 % | SYSTOLIC BLOOD PRESSURE: 144 MMHG | DIASTOLIC BLOOD PRESSURE: 94 MMHG | TEMPERATURE: 97.7 F | RESPIRATION RATE: 18 BRPM | HEART RATE: 79 BPM

## 2023-08-16 DIAGNOSIS — M47.816 LUMBAR FACET ARTHROPATHY: ICD-10-CM

## 2023-08-16 DIAGNOSIS — Z41.9 SURGERY, ELECTIVE: ICD-10-CM

## 2023-08-16 PROCEDURE — 25010000002 BUPIVACAINE (PF) 0.25 % SOLUTION 10 ML VIAL: Performed by: ANESTHESIOLOGY

## 2023-08-16 PROCEDURE — 77002 NEEDLE LOCALIZATION BY XRAY: CPT

## 2023-08-16 PROCEDURE — 76000 FLUOROSCOPY <1 HR PHYS/QHP: CPT

## 2023-08-16 PROCEDURE — 64493 INJ PARAVERT F JNT L/S 1 LEV: CPT | Performed by: ANESTHESIOLOGY

## 2023-08-16 PROCEDURE — 64494 INJ PARAVERT F JNT L/S 2 LEV: CPT | Performed by: ANESTHESIOLOGY

## 2023-08-16 NOTE — OP NOTE
Lumbar Medial Branch Blockade at  Right L4-S1  [facility]      PREOPERATIVE DIAGNOSIS:  Lumbar spondylosis without myelopathy    POSTOPERATIVE DIAGNOSIS:  Lumbar spondylosis without myelopathy    PROCEDURE:   Diagnostic Lumbar Medial Branch Nerve Blockades, with fluoroscopy:  at the L4, L5 transverse processes and the sacral alar groove)   60203-MF -- Lumbar Facet blocks, 1st Level  73020-YD -- Lumbar Facet blocks, 2nd  Level    PRE-PROCEDURE DISCUSSION WITH PATIENT:    Risks and complications were discussed with the patient prior to starting the procedure and informed consent was obtained.      SURGEON:  La Sun MD    REASON FOR PROCEDURE:    The patient complains of pain that seems to have a significant axial component and Tenderness of the affected facet joints on exam under fluoroscopy    SEDATION:  No sedation was used for this procedure  ANESTHETIC:  0.25% bupivacaine  STEROID:  None  TOTAL VOLUME OF SOLUTION:  3ml    DESCRIPTON OF PROCEDURE:  After obtaining informed consent, IV access was not obtained in the preoperative area.   The patient was taken to the operating room.  The patient was placed in the prone position with a pillow under the abdomen. All pressure points were well padded.  EKG, blood pressure, and pulse oximeter were monitored.  The patient was monitored and sedated by the RN under my direction. The lumbosacral area was prepped with Chloraprep and draped in a sterile fashion.     AP fluoroscopic image was used to visualize the junction of the right S1 superior articular process with the sacral ala.  The skin and subcutaneous tissue over the area was anesthetized with 1% lidocaine.  A 22-gauge spinal needle was then advanced percutaneously through the anesthetized skin tract under fluoroscopic guidance in a coaxial view to contact periosteum.  After negative aspiration, a volume of 1 mL of the local anesthetic was injected without resistance.  The image was then  optimized to maximize visualization of the junctions of the L4, L5 superior articular processes with the transverse processes.  The skin and subcutaneous tissue over the areas was anesthetized with 1% lidocaine.  A 22-gauge spinal needle was then advanced percutaneously through the anesthetized skin tracts under fluoroscopic guidance in a coaxial view to contact periosteum at the target sites.  After negative aspiration, a volume of 1 mL of the local anesthetic  was injected without resistance at each of the target sites.        Onset of analgesia was noted.  Vital signs remained stable throughout.      ESTIMATED BLOOD LOSS:  <5 mL  SPECIMENS:  none    COMPLICATIONS:   No complications were noted.    TOLERANCE & DISCHARGE CONDITION:    The patient tolerated the procedure well.  The patient was transported to the recovery area without difficulties.  The patient was discharged to home under the care of family in stable and satisfactory condition.    Pre-procedure pain score: 3/10 at rest, 5/10 with activity  Post-procedure pain score: 0/10    PLAN OF CARE:  The patient was given our standard instruction sheet.  We discussed that Lumbar Medial Branch Blockade is a diagnostic procedure in consideration for radiofrequency ablation if two diagnostic procedures prove to be positive for significant benefit.  An alternative plan could be therapeutic lumbar branch blockades.  The patient is asked to keep an account of pain relief after the procedure today.  The patient will  Return to clinic 1-2 wks.  The patient will resume all medications as per the medication reconciliation sheet.

## 2023-08-20 DIAGNOSIS — J30.2 SEASONAL ALLERGIES: ICD-10-CM

## 2023-08-21 RX ORDER — MONTELUKAST SODIUM 10 MG/1
10 TABLET ORAL NIGHTLY
Qty: 90 TABLET | Refills: 1 | Status: SHIPPED | OUTPATIENT
Start: 2023-08-21

## 2023-08-22 ENCOUNTER — OFFICE VISIT (OUTPATIENT)
Dept: PAIN MEDICINE | Facility: CLINIC | Age: 53
End: 2023-08-22
Payer: COMMERCIAL

## 2023-08-22 ENCOUNTER — PREP FOR SURGERY (OUTPATIENT)
Dept: SURGERY | Facility: SURGERY CENTER | Age: 53
End: 2023-08-22
Payer: COMMERCIAL

## 2023-08-22 VITALS
HEART RATE: 102 BPM | HEIGHT: 70 IN | BODY MASS INDEX: 45.1 KG/M2 | WEIGHT: 315 LBS | TEMPERATURE: 97.3 F | SYSTOLIC BLOOD PRESSURE: 130 MMHG | DIASTOLIC BLOOD PRESSURE: 86 MMHG | OXYGEN SATURATION: 98 %

## 2023-08-22 DIAGNOSIS — G57.11 MERALGIA PARESTHETICA OF RIGHT SIDE: Primary | ICD-10-CM

## 2023-08-22 DIAGNOSIS — M54.16 LUMBAR RADICULOPATHY: ICD-10-CM

## 2023-08-22 DIAGNOSIS — M47.816 LUMBAR FACET ARTHROPATHY: ICD-10-CM

## 2023-08-22 DIAGNOSIS — M79.651 RIGHT THIGH PAIN: ICD-10-CM

## 2023-08-22 PROCEDURE — 99214 OFFICE O/P EST MOD 30 MIN: CPT | Performed by: NURSE PRACTITIONER

## 2023-08-22 RX ORDER — GABAPENTIN 400 MG/1
400 CAPSULE ORAL 3 TIMES DAILY
Qty: 90 CAPSULE | Refills: 0 | Status: SHIPPED | OUTPATIENT
Start: 2023-08-22

## 2023-08-22 NOTE — PROGRESS NOTES
CHIEF COMPLAINT    Follow up back pain.     Subjective   Grant Edge Jr. is a 53 y.o. male  who presents to the office for follow-up of procedure.  He completed a LUMBAR MEDIAL BRANCH BLOCK RIGHT L4-S1 #1  on  8/16/23 performed by Dr. Sun for management of back pain. Patient reports 2-3 hours of approximately 80% relief to his right low back pain.     Today his pain is 5/10VAS in severity. His primary pain complaint is his right anterior thigh pain.  This pain stops at the knee. He states that his Right lateral thigh pain was improved by the TFESI, but his right anterior thigh pain was not.     He continues to work on the [employer] staff for [employer]     Procedure list:  6/19/2023-right L5-S1 TFESI-75% relief to right lower extremity x 1 month and then his pain returned to baseline.   11/4/2022-right L5 TFESI-70 to 80% x 4 months    Previous Procedures:  9/20/2022-right hip injection performed Dr. Swift-no relief  Right hip injection on 6/29/2022  Effect of Injection (%): moderate relief  Length of Relief: ~1 month     LESI x 3 ~9-10 years ago--1st two helped, 3rd did not help--was then referred to ortho and underwent Left EMILY.     Hip Pain   The pain is present in the right hip and right thigh (radiating into the right lateral thigh below the knee. He denies any back pain). The quality of the pain is described as aching. The pain is at a severity of 4/10. The pain has been Worsening since onset. Pertinent negatives include no numbness. The symptoms are aggravated by movement and weight bearing (lying down). Treatments tried: PT, Gabapentin, right hip injection.      PEG Assessment   What number best describes your pain on average in the past week?5  What number best describes how, during the past week, pain has interfered with your enjoyment of life?10  What number best describes how, during the past week, pain has interfered with your general activity?  10    The following portions of the patient's history were  "reviewed and updated as appropriate: allergies, current medications, past family history, past medical history, past social history, past surgical history, and problem list.    Review of Systems   Constitutional:  Negative for chills, fatigue and fever.   Respiratory:  Negative for cough and shortness of breath.    Gastrointestinal:  Negative for abdominal pain, constipation and diarrhea.   Genitourinary:  Negative for difficulty urinating and dysuria.   Musculoskeletal:  Positive for back pain.   Neurological:  Negative for dizziness, weakness, light-headedness and numbness.   Psychiatric/Behavioral:  Negative for sleep disturbance.      --  The aforementioned information the Chief Complaint section and above subjective data including any HPI data, and also the Review of Systems data, has been personally reviewed and affirmed.  --     Vitals:    08/22/23 1507   BP: 130/86   BP Location: Left arm   Patient Position: Sitting   Pulse: 102   Temp: 97.3 øF (36.3 øC)   TempSrc: Temporal   SpO2: 98%   Weight: (!) 155 kg (342 lb)   Height: 177 cm (69.69\")   PainSc:   4   PainLoc: Back     Objective   Physical Exam  Vitals and nursing note reviewed.   Constitutional:       Appearance: Normal appearance. He is well-developed. He is obese.   Eyes:      General: Lids are normal.   Cardiovascular:      Rate and Rhythm: Normal rate.   Pulmonary:      Effort: Pulmonary effort is normal.   Musculoskeletal:      Lumbar back: Tenderness present. Decreased range of motion.        Legs:    Neurological:      Mental Status: He is alert and oriented to person, place, and time.   Psychiatric:         Attention and Perception: Attention normal.         Mood and Affect: Mood normal.         Speech: Speech normal.         Behavior: Behavior normal.         Judgment: Judgment normal.       Assessment & Plan   Diagnoses and all orders for this visit:    1. Meralgia paresthetica of right side (Primary)    2. Lumbar radiculopathy    3. Lumbar " facet arthropathy    4. Right thigh pain      Grant FABIO Minesh Henao reports a pain score of 4.  Given his pain assessment as noted, treatment options were discussed and the following options were decided upon as a follow-up plan to address the patient's pain: steroid injections.    --- Right lateral femoral cutaneous nerve block  Reviewed the procedure at length with the patient.  Included in the review was expectations, complications, risk and benefits.The procedure was described in detail and the risks, benefits and alternatives were discussed with the patient (including but not limited to: bleeding, infection, nerve damage, worsening of pain, inability to perform injection, paralysis, seizures, coma, no pain relief and death) who agreed to proceed.  Discussed the potential for sedation if warranted/wanted.  The procedure will plan to be performed at [facility] with fluoroscopic guidance(unless ultrasound is indicated) and could potentially have steroids and contrast dye used. Questions were answered and in a way the patient could understand.  Patient verbalized understanding and wishes to proceed.  This intervention will be ordered.  Discussed with patient that all procedures are part of a multimodal plan of care and include either formal PT or a home exercise program.  Patient has no evidence of coagulopathy or current infection.    --- If his right low back pain increases can repeat MBB with goal of RFA at that time.   --- Increase Gabapentin to 400 mg TID  --- Follow-up after procedure     GEOVANNA ALANIS  As part of the patient's treatment plan, I am prescribing controlled substances. The patient has been made aware of appropriate use of such medications, including potential risk of somnolence, limited ability to drive and/or work safely, and the potential for dependence or overdose. It has also been made clear that these medications are for use by this patient only, without concomitant use  of alcohol or other substances unless prescribed.     As the clinician, I personally reviewed the GEOVANNA from 8/22/2023 while the patient was in the office today.    History and physical exam exhibit continued safe and appropriate use of controlled substances.    Dictated utilizing Dragon dictation.

## 2023-08-22 NOTE — H&P (VIEW-ONLY)
CHIEF COMPLAINT    Follow up back pain.     Subjective   Grant dEge Jr. is a 53 y.o. male  who presents to the office for follow-up of procedure.  He completed a LUMBAR MEDIAL BRANCH BLOCK RIGHT L4-S1 #1  on  8/16/23 performed by Dr. uSn for management of back pain. Patient reports 2-3 hours of approximately 80% relief to his right low back pain.     Today his pain is 5/10VAS in severity. His primary pain complaint is his right anterior thigh pain.  This pain stops at the knee. He states that his Right lateral thigh pain was improved by the TFESI, but his right anterior thigh pain was not.     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   11/4/2022-right L5 TFESI-70 to 80% x 4 months    Previous Procedures:  9/20/2022-right hip injection performed Dr. Swift-no relief  Right hip injection on 6/29/2022  Effect of Injection (%): moderate relief  Length of Relief: ~1 month     LESI x 3 ~9-10 years ago--1st two helped, 3rd did not help--was then referred to ortho and underwent Left EMILY.     Hip Pain   The pain is present in the right hip and right thigh (radiating into the right lateral thigh below the knee. He denies any back pain). The quality of the pain is described as aching. The pain is at a severity of 4/10. The pain has been Worsening since onset. Pertinent negatives include no numbness. The symptoms are aggravated by movement and weight bearing (lying down). Treatments tried: PT, Gabapentin, right hip injection.      PEG Assessment   What number best describes your pain on average in the past week?5  What number best describes how, during the past week, pain has interfered with your enjoyment of life?10  What number best describes how, during the past week, pain has interfered with your general activity?  10    The following portions of the patient's history were  "reviewed and updated as appropriate: allergies, current medications, past family history, past medical history, past social history, past surgical history, and problem list.    Review of Systems   Constitutional:  Negative for chills, fatigue and fever.   Respiratory:  Negative for cough and shortness of breath.    Gastrointestinal:  Negative for abdominal pain, constipation and diarrhea.   Genitourinary:  Negative for difficulty urinating and dysuria.   Musculoskeletal:  Positive for back pain.   Neurological:  Negative for dizziness, weakness, light-headedness and numbness.   Psychiatric/Behavioral:  Negative for sleep disturbance.      --  The aforementioned information the Chief Complaint section and above subjective data including any HPI data, and also the Review of Systems data, has been personally reviewed and affirmed.  --     Vitals:    08/22/23 1507   BP: 130/86   BP Location: Left arm   Patient Position: Sitting   Pulse: 102   Temp: 97.3 øF (36.3 øC)   TempSrc: Temporal   SpO2: 98%   Weight: (!) 155 kg (342 lb)   Height: 177 cm (69.69\")   PainSc:   4   PainLoc: Back     Objective   Physical Exam  Vitals and nursing note reviewed.   Constitutional:       Appearance: Normal appearance. He is well-developed. He is obese.   Eyes:      General: Lids are normal.   Cardiovascular:      Rate and Rhythm: Normal rate.   Pulmonary:      Effort: Pulmonary effort is normal.   Musculoskeletal:      Lumbar back: Tenderness present. Decreased range of motion.        Legs:    Neurological:      Mental Status: He is alert and oriented to person, place, and time.   Psychiatric:         Attention and Perception: Attention normal.         Mood and Affect: Mood normal.         Speech: Speech normal.         Behavior: Behavior normal.         Judgment: Judgment normal.       Assessment & Plan   Diagnoses and all orders for this visit:    1. Meralgia paresthetica of right side (Primary)    2. Lumbar radiculopathy    3. Lumbar " facet arthropathy    4. Right thigh pain      Grant FABIO Minesh Henao reports a pain score of 4.  Given his pain assessment as noted, treatment options were discussed and the following options were decided upon as a follow-up plan to address the patient's pain: steroid injections.    --- Right lateral femoral cutaneous nerve block  Reviewed the procedure at length with the patient.  Included in the review was expectations, complications, risk and benefits.The procedure was described in detail and the risks, benefits and alternatives were discussed with the patient (including but not limited to: bleeding, infection, nerve damage, worsening of pain, inability to perform injection, paralysis, seizures, coma, no pain relief and death) who agreed to proceed.  Discussed the potential for sedation if warranted/wanted.  The procedure will plan to be performed at [facility] with fluoroscopic guidance(unless ultrasound is indicated) and could potentially have steroids and contrast dye used. Questions were answered and in a way the patient could understand.  Patient verbalized understanding and wishes to proceed.  This intervention will be ordered.  Discussed with patient that all procedures are part of a multimodal plan of care and include either formal PT or a home exercise program.  Patient has no evidence of coagulopathy or current infection.    --- If his right low back pain increases can repeat MBB with goal of RFA at that time.   --- Increase Gabapentin to 400 mg TID  --- Follow-up after procedure     GEOVANNA ALANIS  As part of the patient's treatment plan, I am prescribing controlled substances. The patient has been made aware of appropriate use of such medications, including potential risk of somnolence, limited ability to drive and/or work safely, and the potential for dependence or overdose. It has also been made clear that these medications are for use by this patient only, without concomitant use  of alcohol or other substances unless prescribed.     As the clinician, I personally reviewed the GEOVANNA from 8/22/2023 while the patient was in the office today.    History and physical exam exhibit continued safe and appropriate use of controlled substances.    Dictated utilizing Dragon dictation.

## 2023-08-23 PROBLEM — G57.11 MERALGIA PARESTHETICA OF RIGHT SIDE: Status: ACTIVE | Noted: 2023-08-08

## 2023-08-25 DIAGNOSIS — Z87.09 HISTORY OF ASTHMA: ICD-10-CM

## 2023-08-25 RX ORDER — FLUTICASONE PROPIONATE AND SALMETEROL 50; 100 UG/1; UG/1
POWDER RESPIRATORY (INHALATION)
Qty: 60 EACH | Refills: 0 | Status: SHIPPED | OUTPATIENT
Start: 2023-08-25

## 2023-08-29 NOTE — DISCHARGE INSTRUCTIONS
[facility] - Post-procedure Instructions          --  While there are no absolute restrictions, it is recommended that you do not perform strenuous activity today. In the morning, you may resume your level of activity as before your block.    --  If you have a band-aid at your injection site, please remove it later today. Observe the area for any redness, swelling, pus-like drainage, or a temperature over 101ø. If any of these symptoms occur, please call your doctor at [phone]. If after office hours, leave a message and the on-call provider will return your call.    --  Ice may be applied to your injection site. It is recommended you avoid direct heat (heating pad; hot tub) for 1-2 days.    --  Call [facility] at [phone] if you experience persistent headache, persistent bleeding from the injection site, or severe pain not relieved by heat or oral medication.    --  Do not make important decisions today.    --  Due to the effects of the block and/or the I.V. Sedation, DO NOT drive or operate hazardous machinery for 12 hours.  Local anesthetics may cause numbness after procedure and precautions must be taken with regards to operating equipment as well as with walking, even if ambulating with assistance of another person or with an assistive device.    --  Do not drink alcohol for 12 hours.    -- You may return to work tomorrow, or as directed by your referring doctor.    --  Occasionally you may notice a slight increase in your pain after the procedure. This should start to improve within the next 24-48 hours. Radiofrequency ablation procedure pain may last 3-4 weeks.    --  It may take as long as 3-4 days before you notice a gradual improvement in your pain and/or other symptoms.    -- You may continue to take your prescribed pain medication as needed.    --  Some normal possible side effects of steroid use could include fluid retention, increased blood sugar, dull headache,  increased sweating, increased appetite, mood swings and flushing.    --  Diabetics are recommended to watch their blood glucose level closely for 24-48 hours after the injection.    --  Must stay in PACU for 20 min upon arrival and prove no leg weakness before being discharged.    --  IN THE EVENT OF A LIFE THREATENING EMERGENCY, (CHEST PAIN, BREATHING DIFFICULTIES, PARALYSIS.) YOU SHOULD GO TO YOUR NEAREST EMERGENCY ROOM.    --  You should be contacted by our office within 2-3 days to schedule follow up or next appointment date.  If not contacted within 7 days, please call the office at [phone]

## 2023-08-30 ENCOUNTER — HOSPITAL ENCOUNTER (OUTPATIENT)
Dept: GENERAL RADIOLOGY | Facility: SURGERY CENTER | Age: 53
Setting detail: HOSPITAL OUTPATIENT SURGERY
End: 2023-08-30
Payer: COMMERCIAL

## 2023-08-30 ENCOUNTER — HOSPITAL ENCOUNTER (OUTPATIENT)
Facility: SURGERY CENTER | Age: 53
Setting detail: HOSPITAL OUTPATIENT SURGERY
Discharge: HOME | End: 2023-08-30
Attending: ANESTHESIOLOGY | Admitting: ANESTHESIOLOGY
Payer: COMMERCIAL

## 2023-08-30 VITALS
TEMPERATURE: 97.8 F | RESPIRATION RATE: 16 BRPM | SYSTOLIC BLOOD PRESSURE: 144 MMHG | HEART RATE: 81 BPM | OXYGEN SATURATION: 97 % | DIASTOLIC BLOOD PRESSURE: 90 MMHG

## 2023-08-30 DIAGNOSIS — Z41.9 SURGERY, ELECTIVE: ICD-10-CM

## 2023-08-30 PROCEDURE — 25010000002 DEXAMETHASONE SODIUM PHOSPHATE 100 MG/10ML SOLUTION 10 ML VIAL: Performed by: ANESTHESIOLOGY

## 2023-08-30 PROCEDURE — 25010000002 BUPIVACAINE (PF) 0.25 % SOLUTION 10 ML VIAL: Performed by: ANESTHESIOLOGY

## 2023-08-30 PROCEDURE — 64450 NJX AA&/STRD OTHER PN/BRANCH: CPT | Performed by: ANESTHESIOLOGY

## 2023-08-30 PROCEDURE — 77002 NEEDLE LOCALIZATION BY XRAY: CPT

## 2023-08-30 NOTE — OP NOTE
Lateral Femoral Cutaneous Nerve Block with Ultrasound-Guidance  [facility]    PREOPERATIVE DIAGNOSIS:  Meralgia Paresthetica     POSTOPERATIVE DIAGNOSIS:  Same as preop diagnosis    PROCEDURE:   Ultrasound-guided Lateral Femoral Cutaneous Nerve Block on the Right      PRE-PROCEDURE DISCUSSION WITH PATIENT:    Risks and complications were discussed with the patient prior to starting the procedure and informed consent was obtained.  We discussed various topics including but not limited to bleeding, infection, injury, nerve injury, vascular injury, paralysis, risk of neuraxial blockade, coma, death, postprocedural painful flare-up, postprocedural site soreness, and a lack of pain relief.        SURGEON:  La Sun MD    REASON FOR PROCEDURE:    Meralgia Paresthetica     SEDATION:  No sedation was used for this procedure  ANESTHETIC:  Marcaine 0.25%  STEROID:   Dexamethasone 10mg  TOTAL SOLUTION: 5mL      DESCRIPTON OF PROCEDURE:  After obtaining informed consent, an I.V. was not started in the preoperative area. The patient taken to the operating room and was placed in the supine position.  All pressure points were well padded.  EKG, blood pressure, and pulse oximeter were monitored.  The appropriate area was prepped with Chloraprep and draped in a sterile fashion.     A linear ultrasound probe was then placed distal to the inguinal ligament. The probe was moved medially to visualize the femoral vasculature, then moved laterally to visualize the sartorius muscle.  The lateral femoral cutaneous nerve was visualized.  The skin and subcutaneous tissue at the medial edge of the probe was anesthetized with 1% lidocaine.  A 22-gauge spinal needle was then advanced through the anesthetized skin tract under continuous ultrasound guidance to penetrate the fascia vinnie.    Then under ultrasound guidance, the needle insertion point was anesthetized subcutaneously with 1ml of Lidocaine 1%, and then a 22g  needle was inserted in-plane to the level of the fascial layer.  Aspiration was confirmed negative.  Then the contents of the local anesthetic solution as above were slowly injected, sequentially checking to confirm negative aspiration.      The needle was removed intact.  Vital signs were stable throughout.        ESTIMATED BLOOD LOSS:  <5 mL  SPECIMENS:  none    COMPLICATIONS:   No complications were noted.    TOLERANCE & DISCHARGE CONDITION:    The patient tolerated the procedure well.  The patient was transported to the recovery area without difficulties.  The patient was discharged to home under the care of family in stable and satisfactory condition.    PLAN OF CARE:  The patient was given our standard instruction sheet.  The patient will Return to clinic 1-2 wks.  The patient will resume all medications as per the medication reconciliation sheet.

## 2023-09-05 ENCOUNTER — OFFICE VISIT (OUTPATIENT)
Dept: PAIN MEDICINE | Facility: CLINIC | Age: 53
End: 2023-09-05
Payer: COMMERCIAL

## 2023-09-05 ENCOUNTER — PREP FOR SURGERY (OUTPATIENT)
Dept: SURGERY | Facility: SURGERY CENTER | Age: 53
End: 2023-09-05
Payer: COMMERCIAL

## 2023-09-05 VITALS
OXYGEN SATURATION: 95 % | SYSTOLIC BLOOD PRESSURE: 122 MMHG | TEMPERATURE: 96.8 F | BODY MASS INDEX: 45.1 KG/M2 | DIASTOLIC BLOOD PRESSURE: 82 MMHG | HEART RATE: 96 BPM | WEIGHT: 315 LBS | HEIGHT: 70 IN

## 2023-09-05 DIAGNOSIS — M54.16 LUMBAR RADICULOPATHY: Primary | ICD-10-CM

## 2023-09-05 DIAGNOSIS — M79.651 RIGHT THIGH PAIN: ICD-10-CM

## 2023-09-05 DIAGNOSIS — M25.551 RIGHT HIP PAIN: ICD-10-CM

## 2023-09-05 DIAGNOSIS — M47.816 LUMBAR FACET ARTHROPATHY: ICD-10-CM

## 2023-09-05 DIAGNOSIS — G57.11 MERALGIA PARESTHETICA OF RIGHT SIDE: ICD-10-CM

## 2023-09-05 PROCEDURE — 99214 OFFICE O/P EST MOD 30 MIN: CPT | Performed by: NURSE PRACTITIONER

## 2023-09-05 NOTE — PROGRESS NOTES
CHIEF COMPLAINT  Back and leg pain    Subjective   Grant Edge Jr. is a 53 y.o. male  who presents to the office for follow-up of procedure.  He completed a Ultrasound-guided Lateral Femoral Cutaneous Nerve Block on the Right  on  8/30/2023 performed by Dr. Sun for management of back pain. Patient reports 20% relief from this procedure.     Today his pain is 4/10VAS in severity. His pain remains in his right hip and     Previously on gabapentin, this was stopped due to weight gain.  He was then transitioned to Lyrica which was also stopped due to weight gain.  He then requested to return to gabapentin.  He is now maintained on gabapentin 400 mg 3 times daily. He does not feel that the Gabapentin is helping. He states that he still has Lyrica at home and he wants to try this medication again. He states he does not need a refill of this.     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   11/4/2022-right L5 TFESI-70 to 80% x 4 months     Previous Procedures:  9/20/2022-right hip injection performed Dr. Swift-no relief  Right hip injection on 6/29/2022  Effect of Injection (%): moderate relief  Length of Relief: ~1 month     LESI x 3 ~9-10 years ago--1st two helped, 3rd did not help--was then referred to ortho and underwent Left EMILY.     Hip Pain   The pain is present in the right hip and right thigh (radiating into the right lateral thigh below the knee. He denies any back pain). The quality of the pain is described as aching. The pain is at a severity of 4/10. The pain has been Worsening since onset. Pertinent negatives include no numbness. The symptoms are aggravated by movement and weight bearing (lying down). Treatments tried: PT, Gabapentin, right hip injection.      PEG Assessment   What  "number best describes your pain on average in the past week?5  What number best describes how, during the past week, pain has interfered with your enjoyment of life?5  What number best describes how, during the past week, pain has interfered with your general activity?  5    The following portions of the patient's history were reviewed and updated as appropriate: allergies, current medications, past family history, past medical history, past social history, past surgical history, and problem list.    Review of Systems   Constitutional:  Negative for chills, fatigue and fever.   Respiratory:  Negative for cough and shortness of breath.    Gastrointestinal:  Negative for abdominal pain, constipation and diarrhea.   Genitourinary:  Negative for difficulty urinating and dysuria.   Musculoskeletal:  Positive for back pain.   Neurological:  Negative for dizziness, light-headedness and numbness.   Psychiatric/Behavioral:  Negative for sleep disturbance.      --  The aforementioned information the Chief Complaint section and above subjective data including any HPI data, and also the Review of Systems data, has been personally reviewed and affirmed.  --     Vitals:    09/05/23 1520   BP: 122/82   BP Location: Left arm   Pulse: 96   Temp: 96.8 °F (36 °C)   TempSrc: Temporal   SpO2: 95%   Weight: (!) 156 kg (343 lb)   Height: 177 cm (69.69\")   PainSc:   4   PainLoc: Back     Objective   Physical Exam  Vitals and nursing note reviewed.   Constitutional:       Appearance: Normal appearance. He is well-developed.   Eyes:      General: Lids are normal.   Cardiovascular:      Rate and Rhythm: Normal rate.   Pulmonary:      Effort: Pulmonary effort is normal.   Musculoskeletal:      Lumbar back: Tenderness present. Decreased range of motion.      Right hip: Decreased range of motion.   Neurological:      Mental Status: He is alert and oriented to person, place, and time.      Gait: Gait abnormal (antalgic).   Psychiatric:         " Speech: Speech normal.         Behavior: Behavior normal.         Judgment: Judgment normal.       Assessment & Plan   Diagnoses and all orders for this visit:    1. Lumbar radiculopathy (Primary)    2. Lumbar facet arthropathy    3. Right thigh pain    4. Right hip pain    5. Meralgia paresthetica of right side      Grant Edge . reports a pain score of 4.  Given his pain assessment as noted, treatment options were discussed and the following options were decided upon as a follow-up plan to address the patient's pain:  injections and medication .    --- LESI at L4-5 right of midline if possible. Hold xarelto x 72 hours prior to procedure  Reviewed the procedure at length with the patient.  Included in the review was expectations, complications, risk and benefits.The procedure was described in detail and the risks, benefits and alternatives were discussed with the patient (including but not limited to: bleeding, infection, nerve damage, worsening of pain, inability to perform injection, paralysis, seizures, coma, no pain relief and death) who agreed to proceed.  Discussed the potential for sedation if warranted/wanted.  The procedure will plan to be performed at [facility] with fluoroscopic guidance(unless ultrasound is indicated) and could potentially have steroids and contrast dye used. Questions were answered and in a way the patient could understand.  Patient verbalized understanding and wishes to proceed.  This intervention will be ordered.  Discussed with patient that all procedures are part of a multimodal plan of care and include either formal PT or a home exercise program.  Patient has no evidence of coagulopathy or current infection.  --- Stop gabapentin  --- Start Lyrica 75 mg BID. Patient states he does not need a refill at this time. Reviewed if this is helpful to let me know and we will refill when needed. Patient states understanding.   --- If no therapeutic relief with LESI  then I recommend returning to orthopedics for his right thigh pain.   --- Discussed importance of weight loss with regards to his pain.   --- Follow-up after procedure     GEOVANNA REPORT    As the clinician, I personally reviewed the GEOVANNA from 9/5/2023 while the patient was in the office today.    History and physical exam exhibit continued safe and appropriate use of controlled substances.    Dictated utilizing Dragon dictation.

## 2023-09-05 NOTE — H&P (VIEW-ONLY)
CHIEF COMPLAINT  Back and leg pain    Subjective   Grant Edge Jr. is a 53 y.o. male  who presents to the office for follow-up of procedure.  He completed a Ultrasound-guided Lateral Femoral Cutaneous Nerve Block on the Right  on  8/30/2023 performed by Dr. Sun for management of back pain. Patient reports 20% relief from this procedure.     Today his pain is 4/10VAS in severity. His pain remains in his right hip and     Previously on gabapentin, this was stopped due to weight gain.  He was then transitioned to Lyrica which was also stopped due to weight gain.  He then requested to return to gabapentin.  He is now maintained on gabapentin 400 mg 3 times daily. He does not feel that the Gabapentin is helping. He states that he still has Lyrica at home and he wants to try this medication again. He states he does not need a refill of this.     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   11/4/2022-right L5 TFESI-70 to 80% x 4 months     Previous Procedures:  9/20/2022-right hip injection performed Dr. Swift-no relief  Right hip injection on 6/29/2022  Effect of Injection (%): moderate relief  Length of Relief: ~1 month     LESI x 3 ~9-10 years ago--1st two helped, 3rd did not help--was then referred to ortho and underwent Left EMILY.     Hip Pain   The pain is present in the right hip and right thigh (radiating into the right lateral thigh below the knee. He denies any back pain). The quality of the pain is described as aching. The pain is at a severity of 4/10. The pain has been Worsening since onset. Pertinent negatives include no numbness. The symptoms are aggravated by movement and weight bearing (lying down). Treatments tried: PT, Gabapentin, right hip injection.      PEG Assessment   What  "number best describes your pain on average in the past week?5  What number best describes how, during the past week, pain has interfered with your enjoyment of life?5  What number best describes how, during the past week, pain has interfered with your general activity?  5    The following portions of the patient's history were reviewed and updated as appropriate: allergies, current medications, past family history, past medical history, past social history, past surgical history, and problem list.    Review of Systems   Constitutional:  Negative for chills, fatigue and fever.   Respiratory:  Negative for cough and shortness of breath.    Gastrointestinal:  Negative for abdominal pain, constipation and diarrhea.   Genitourinary:  Negative for difficulty urinating and dysuria.   Musculoskeletal:  Positive for back pain.   Neurological:  Negative for dizziness, light-headedness and numbness.   Psychiatric/Behavioral:  Negative for sleep disturbance.      --  The aforementioned information the Chief Complaint section and above subjective data including any HPI data, and also the Review of Systems data, has been personally reviewed and affirmed.  --     Vitals:    09/05/23 1520   BP: 122/82   BP Location: Left arm   Pulse: 96   Temp: 96.8 °F (36 °C)   TempSrc: Temporal   SpO2: 95%   Weight: (!) 156 kg (343 lb)   Height: 177 cm (69.69\")   PainSc:   4   PainLoc: Back     Objective   Physical Exam  Vitals and nursing note reviewed.   Constitutional:       Appearance: Normal appearance. He is well-developed.   Eyes:      General: Lids are normal.   Cardiovascular:      Rate and Rhythm: Normal rate.   Pulmonary:      Effort: Pulmonary effort is normal.   Musculoskeletal:      Lumbar back: Tenderness present. Decreased range of motion.      Right hip: Decreased range of motion.   Neurological:      Mental Status: He is alert and oriented to person, place, and time.      Gait: Gait abnormal (antalgic).   Psychiatric:         " Speech: Speech normal.         Behavior: Behavior normal.         Judgment: Judgment normal.       Assessment & Plan   Diagnoses and all orders for this visit:    1. Lumbar radiculopathy (Primary)    2. Lumbar facet arthropathy    3. Right thigh pain    4. Right hip pain    5. Meralgia paresthetica of right side      Grant Edge . reports a pain score of 4.  Given his pain assessment as noted, treatment options were discussed and the following options were decided upon as a follow-up plan to address the patient's pain:  injections and medication .    --- LESI at L4-5 right of midline if possible. Hold xarelto x 72 hours prior to procedure  Reviewed the procedure at length with the patient.  Included in the review was expectations, complications, risk and benefits.The procedure was described in detail and the risks, benefits and alternatives were discussed with the patient (including but not limited to: bleeding, infection, nerve damage, worsening of pain, inability to perform injection, paralysis, seizures, coma, no pain relief and death) who agreed to proceed.  Discussed the potential for sedation if warranted/wanted.  The procedure will plan to be performed at [facility] with fluoroscopic guidance(unless ultrasound is indicated) and could potentially have steroids and contrast dye used. Questions were answered and in a way the patient could understand.  Patient verbalized understanding and wishes to proceed.  This intervention will be ordered.  Discussed with patient that all procedures are part of a multimodal plan of care and include either formal PT or a home exercise program.  Patient has no evidence of coagulopathy or current infection.  --- Stop gabapentin  --- Start Lyrica 75 mg BID. Patient states he does not need a refill at this time. Reviewed if this is helpful to let me know and we will refill when needed. Patient states understanding.   --- If no therapeutic relief with LESI  then I recommend returning to orthopedics for his right thigh pain.   --- Discussed importance of weight loss with regards to his pain.   --- Follow-up after procedure     GEOVANNA REPORT    As the clinician, I personally reviewed the GEOVANNA from 9/5/2023 while the patient was in the office today.    History and physical exam exhibit continued safe and appropriate use of controlled substances.    Dictated utilizing Dragon dictation.

## 2023-09-06 DIAGNOSIS — M54.16 LUMBAR RADICULOPATHY: Primary | ICD-10-CM

## 2023-09-06 RX ORDER — PREGABALIN 75 MG/1
75 CAPSULE ORAL 2 TIMES DAILY
Qty: 60 CAPSULE | Refills: 0 | Status: SHIPPED | OUTPATIENT
Start: 2023-09-06

## 2023-09-07 ENCOUNTER — TRANSCRIBE ORDERS (OUTPATIENT)
Dept: SURGERY | Facility: SURGERY CENTER | Age: 53
End: 2023-09-07
Payer: COMMERCIAL

## 2023-09-07 DIAGNOSIS — Z41.9 SURGERY, ELECTIVE: Primary | ICD-10-CM

## 2023-09-11 ENCOUNTER — HOSPITAL ENCOUNTER (EMERGENCY)
Facility: HOSPITAL | Age: 53
Discharge: HOME | End: 2023-09-11
Attending: EMERGENCY MEDICINE | Admitting: EMERGENCY MEDICINE
Payer: COMMERCIAL

## 2023-09-11 ENCOUNTER — APPOINTMENT (OUTPATIENT)
Dept: CARDIOLOGY | Facility: HOSPITAL | Age: 53
End: 2023-09-11
Payer: COMMERCIAL

## 2023-09-11 VITALS
BODY MASS INDEX: 45.1 KG/M2 | DIASTOLIC BLOOD PRESSURE: 92 MMHG | HEART RATE: 80 BPM | WEIGHT: 315 LBS | RESPIRATION RATE: 18 BRPM | HEIGHT: 70 IN | SYSTOLIC BLOOD PRESSURE: 158 MMHG | OXYGEN SATURATION: 95 % | TEMPERATURE: 98.5 F

## 2023-09-11 DIAGNOSIS — Z79.01 CHRONIC ANTICOAGULATION: ICD-10-CM

## 2023-09-11 DIAGNOSIS — M79.89 RIGHT LEG SWELLING: Primary | ICD-10-CM

## 2023-09-11 DIAGNOSIS — Z86.718 HISTORY OF DVT (DEEP VEIN THROMBOSIS): ICD-10-CM

## 2023-09-11 LAB
BH CV LOWER VASCULAR LEFT COMMON FEMORAL AUGMENT: NORMAL
BH CV LOWER VASCULAR LEFT COMMON FEMORAL COMPETENT: NORMAL
BH CV LOWER VASCULAR LEFT COMMON FEMORAL COMPRESS: NORMAL
BH CV LOWER VASCULAR LEFT COMMON FEMORAL PHASIC: NORMAL
BH CV LOWER VASCULAR LEFT COMMON FEMORAL SPONT: NORMAL
BH CV LOWER VASCULAR RIGHT COMMON FEMORAL AUGMENT: NORMAL
BH CV LOWER VASCULAR RIGHT COMMON FEMORAL COMPETENT: NORMAL
BH CV LOWER VASCULAR RIGHT COMMON FEMORAL COMPRESS: NORMAL
BH CV LOWER VASCULAR RIGHT COMMON FEMORAL PHASIC: NORMAL
BH CV LOWER VASCULAR RIGHT COMMON FEMORAL SPONT: NORMAL
BH CV LOWER VASCULAR RIGHT GASTRONEMIUS COMPRESS: NORMAL
BH CV LOWER VASCULAR RIGHT GREATER SAPH AK COMPRESS: NORMAL
BH CV LOWER VASCULAR RIGHT GREATER SAPH BK COMPRESS: NORMAL
BH CV LOWER VASCULAR RIGHT LESSER SAPH COMPRESS: NORMAL
BH CV LOWER VASCULAR RIGHT MID FEMORAL AUGMENT: NORMAL
BH CV LOWER VASCULAR RIGHT MID FEMORAL COMPETENT: NORMAL
BH CV LOWER VASCULAR RIGHT MID FEMORAL COMPRESS: NORMAL
BH CV LOWER VASCULAR RIGHT MID FEMORAL PHASIC: NORMAL
BH CV LOWER VASCULAR RIGHT MID FEMORAL SPONT: NORMAL
BH CV LOWER VASCULAR RIGHT PERONEAL COMPRESS: NORMAL
BH CV LOWER VASCULAR RIGHT POPLITEAL AUGMENT: NORMAL
BH CV LOWER VASCULAR RIGHT POPLITEAL COMPETENT: NORMAL
BH CV LOWER VASCULAR RIGHT POPLITEAL COMPRESS: NORMAL
BH CV LOWER VASCULAR RIGHT POPLITEAL PHASIC: NORMAL
BH CV LOWER VASCULAR RIGHT POPLITEAL SPONT: NORMAL
BH CV LOWER VASCULAR RIGHT POSTERIOR TIBIAL COMPRESS: NORMAL
BH CV LOWER VASCULAR RIGHT PROFUNDA FEMORAL COMPRESS: NORMAL
BH CV LOWER VASCULAR RIGHT PROXIMAL FEMORAL COMPRESS: NORMAL
BH CV LOWER VASCULAR RIGHT SAPHENOFEMORAL JUNCTION COMPRESS: NORMAL
BH CV VAS PRELIMINARY FINDINGS SCRIPTING: 1

## 2023-09-11 PROCEDURE — 93971 EXTREMITY STUDY: CPT

## 2023-09-11 PROCEDURE — 99284 EMERGENCY DEPT VISIT MOD MDM: CPT

## 2023-09-11 NOTE — ED NOTES
Patient to ER via car from home for R leg swelling x 1 week    Patient reports hx of dvt patient is on blood thinners

## 2023-09-11 NOTE — ED PROVIDER NOTES
EMERGENCY DEPARTMENT ENCOUNTER    Room Number:  14/14  PCP: Jim Joseph APRN  Historian: Patient      HPI:  Chief Complaint: Leg swelling  A complete HPI/ROS/PMH/PSH/SH/FH are unobtainable due to: Nothing  Context: Grant Edge Jr. is a 53 y.o. male who presents to the ED c/o right leg swelling for the last week or so.  Patient reports a history of blood clots and he wants to make sure he does not have another blood clot.  He is chronically anticoagulated on Xarelto and he reports compliance with his medication.  He has not been off of this recently for any reason.  His first diagnosis of the blood clot was in his right leg about 8 years ago after hip replacement surgery.  He was on a blood thinner for short period of time and then was taken off of them.  He then developed a second blood clot which prompted chronic anticoagulation.  He is followed by Dr. Cowart and hematology.  He denies chest pain or shortness of breath.    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            PAST MEDICAL HISTORY  Active Ambulatory Problems     Diagnosis Date Noted    Abdominal abscess 03/07/2016    Benign essential hypertension 03/07/2016    Benign prostatic hyperplasia with urinary obstruction 03/07/2016    Atypical chest pain 03/07/2016    Degeneration of intervertebral disc of lumbar region 03/07/2016    Gastroesophageal reflux disease with esophagitis 03/07/2016    Primary hypertriglyceridemia 03/07/2016    Gastroesophageal reflux disease 03/07/2016    Hematochezia 03/07/2016    Hematuria 03/07/2016    Hyperlipidemia 03/07/2016    Hypertension 03/07/2016    Hypokalemia 03/07/2016    Hypothyroidism 03/07/2016    Insomnia 03/07/2016    Arthralgia of hip 03/07/2016    Chronic low back pain 03/07/2016    Lumbar radiculopathy 03/07/2016    Morbid obesity 03/07/2016    Nonvenomous insect bite with infection 03/07/2016    Osteoarthritis of hip 03/07/2016    Pleurodynia 03/07/2016     Sinus tachycardia 03/07/2016    Vitamin D deficiency 03/07/2016    Right knee pain 03/07/2016    Pain of lower extremity 03/07/2016    Bilateral headaches 03/07/2016    Health care maintenance 08/22/2016    Old anterior myocardial infarction 08/22/2016    EKG, abnormal 08/22/2016    History of hepatic disease 04/21/2015    Essential hypertension 10/12/2016    Abnormal stress test 10/12/2016    Moderate single current episode of major depressive disorder 07/05/2017    Prediabetes 12/05/2019    Recurrent acute deep vein thrombosis (DVT) of left lower extremity 06/03/2020    Hyperuricemia 07/10/2020    H/O gastroesophageal reflux (GERD) 04/21/2015    Seasonal allergies 07/12/2012    Class 3 severe obesity due to excess calories without serious comorbidity with body mass index (BMI) of 40.0 to 44.9 in adult 03/14/2022    Right hip pain 09/07/2022    History of asthma 12/12/2022    Stasis edema of both lower extremities 02/27/2023    Acute pain of right knee 04/29/2023    Lumbar facet arthropathy 07/31/2023    Meralgia paresthetica of right side 08/08/2023     Resolved Ambulatory Problems     Diagnosis Date Noted    Acute bronchitis 03/07/2016    Seasonal allergic rhinitis 03/07/2016    Cough 03/07/2016    Shortness of breath 03/07/2016    Green stool 03/07/2016    Left lower quadrant pain 03/07/2016    Chest pain on breathing 08/22/2016     Past Medical History:   Diagnosis Date    Abnormal ECG     Allergic     Arthritis     Chest pain     CTS (carpal tunnel syndrome) A few months.    DVT (deep venous thrombosis)     GERD (gastroesophageal reflux disease)     Hip arthrosis     Knee swelling     Myocardial infarction     Obesity     Periarthritis of shoulder     Simple goiter     Sinus bradycardia          PAST SURGICAL HISTORY  Past Surgical History:   Procedure Laterality Date    CARDIAC CATHETERIZATION N/A 10/18/2016    Procedure: Left Heart Cath;  Surgeon: Daniel Rosas MD;  Location: [facility] INVASIVE  LOCATION;  Service:     CHOLECYSTECTOMY      EPIDURAL Right 11/4/2022    Procedure: Right L5 LUMBAR/SACRAL TRANSFORAMINAL EPIDURAL. Hold xarelto x 3 days prior to procedure;  Surgeon: La Sun MD;  Location: [facility];  Service: Pain Management;  Laterality: Right;    EPIDURAL Right 6/19/2023    Procedure: LUMBAR/SACRAL TRANSFORAMINAL EPIDURAL RIGHT L5-S1  24167 HOLD XARELTO X3 DAYS;  Surgeon: La Sun MD;  Location: [facility];  Service: Pain Management;  Laterality: Right;    JOINT REPLACEMENT      MEDIAL BRANCH BLOCK Right 8/16/2023    Procedure: LUMBAR MEDIAL BRANCH BLOCK RIGHT L4-S1 #1  00880, 59442;  Surgeon: La Sun MD;  Location: [facility];  Service: Pain Management;  Laterality: Right;    NERVE BLOCK Right 8/30/2023    Procedure: right lateral femoral cutaneous nerve block 42679;  Surgeon: La Sun MD;  Location: [facility];  Service: Pain Management;  Laterality: Right;    SALIVARY GLAND SURGERY      THYROIDECTOMY  2013    TONSILLECTOMY      TOTAL HIP ARTHROPLASTY REVISION Left 2015         FAMILY HISTORY  Family History   Problem Relation Age of Onset    Diabetes Father     Heart disease Father     Arthritis Father     Hyperlipidemia Father     Hypertension Father          SOCIAL HISTORY  Social History     Socioeconomic History    Marital status:    Tobacco Use    Smoking status: Never    Smokeless tobacco: Never   Vaping Use    Vaping Use: Never used   Substance and Sexual Activity    Alcohol use: No    Drug use: No    Sexual activity: Yes     Partners: Female     Birth control/protection: None         ALLERGIES  Patient has no known allergies.        REVIEW OF SYSTEMS  Review of Systems   Review of all 14 systems is negative other than stated in the HPI above.      PHYSICAL EXAM  ED Triage Vitals [09/11/23 0804]   Temp Heart Rate Resp BP SpO2   98.5 °F (36.9 °C) 109 18 -- 95 %      Temp src Heart Rate Source Patient Position BP Location FiO2 (%)    -- -- -- -- --       Physical Exam      GENERAL: Awake and alert, obese, no acute distress  HENT: nares patent  EYES: no scleral icterus  CV: regular rhythm, normal rate  RESPIRATORY: normal effort  MUSCULOSKELETAL: no deformity, trace edema above the sock line bilateral lower extremities, right greater than left.  The right leg compartments are soft and compressible, no erythema or warmth.  NEURO: alert, moves all extremities, follows commands  PSYCH:  calm, cooperative  SKIN: warm, dry    Vital signs and nursing notes reviewed.          LAB RESULTS  Recent Results (from the past 24 hour(s))   Duplex Venous Lower Extremity - Right CAR    Collection Time: 09/11/23  9:22 AM   Result Value Ref Range    Right Common Femoral Spont Y     Right Common Femoral Competent Y     Right Common Femoral Phasic Y     Right Common Femoral Compress C     Right Common Femoral Augment Y     Right Saphenofemoral Junction Compress C     Right Profunda Femoral Compress C     Right Proximal Femoral Compress C     Right Mid Femoral Spont Y     Right Mid Femoral Competent Y     Right Mid Femoral Phasic Y     Right Mid Femoral Compress C     Right Mid Femoral Augment Y     Right Popliteal Spont Y     Right Popliteal Competent Y     Right Popliteal Phasic Y     Right Popliteal Compress C     Right Popliteal Augment Y     Right Posterior Tibial Compress C     Right Peroneal Compress C     Right Gastronemius Compress C     Right Greater Saph AK Compress C     Right Greater Saph BK Compress C     Right Lesser Saph Compress C     Left Common Femoral Spont Y     Left Common Femoral Competent Y     Left Common Femoral Phasic Y     Left Common Femoral Compress C     Left Common Femoral Augment Y     BH CV VAS PRELIMINARY FINDINGS SCRIPTING 1.0        Ordered the above labs and reviewed the results.        RADIOLOGY  Duplex Venous Lower Extremity - Right CAR    Result Date: 9/11/2023    Normal right lower extremity venous duplex scan although some  compression views limited by patient tolerance.      Ordered the above noted radiological studies. Reviewed by me in PACS.            PROCEDURES  Procedures        MEDICATIONS GIVEN IN ER  Medications - No data to display                MEDICAL DECISION MAKING, PROGRESS, and CONSULTS    All labs have been independently reviewed by me.  All radiology studies have been reviewed by me and I have also reviewed the radiology report.   EKG's independently viewed and interpreted by me.  Discussion below represents my analysis of pertinent findings related to patient's condition, differential diagnosis, treatment plan and final disposition.    Orders placed during this visit:  No orders of the defined types were placed in this encounter.          Differential diagnosis includes but is not limited to:    Venous insufficiency  DVT  SVT  Acute CHF  Renal failure        Independent interpretation of labs, radiology studies, and discussions with consultants:  ED Course as of 09/11/23 1013   Mon Sep 11, 2023   1004 Discussed with Merary in vascular lab who reports that patient is negative for DVT right lower extremity. [JR]   1011 Patient updated on negative venous duplex right lower extremity.  I explained the need to wear compression stockings, elevate his feet frequently, particularly at night.  Follow-up with PCP if swelling persist.  If he develops chest pain or shortness of breath, return to the ER. [JR]      ED Course User Index  [JR] Grant Narayan MD         DIAGNOSIS  Final diagnoses:   Right leg swelling   History of DVT (deep vein thrombosis)   Chronic anticoagulation         DISPOSITION  DISCHARGE    Patient discharged in stable condition.    Reviewed implications of results, diagnosis, meds, responsibility to follow up, warning signs and symptoms of possible worsening, potential complications and reasons to return to ER.    Patient/Family voiced understanding of above instructions.    Discussed plan for  discharge, as there is no emergent indication for admission. Patient referred to primary care provider for BP management due to today's BP. Pt/family is agreeable and understands need for follow up and repeat testing.  Pt is aware that discharge does not mean that nothing is wrong but it indicates no emergency is present that requires admission and they must continue care with follow-up as given below or physician of their choice.     FOLLOW-UP  Jim Joseph, APRN  [address] [place] [address]  [phone]    Schedule an appointment as soon as possible for a visit            Medication List      No changes were made to your prescriptions during this visit.                   Latest Documented Vital Signs:  As of 10:13 EDT  BP- 149/92 HR- 89 Temp- 98.5 °F (36.9 °C) O2 sat- 97%              --    Please note that portions of this were completed with a voice recognition program.       Note Disclaimer: At [facility], we believe that sharing information builds trust and better relationships. You are receiving this note because you are receiving care at [facility] or recently visited. It is possible you will see health information before a provider has talked with you about it. This kind of information can be easy to misunderstand. To help you fully understand what it means for your health, we urge you to discuss this note with your provider.             Grant Narayan MD  09/11/23 4335

## 2023-09-11 NOTE — DISCHARGE INSTRUCTIONS
Wear compression stockings daily.  Elevate your legs is much as possible, particular at night.  If swelling persist, follow-up with your primary care provider.  If you develop chest pain or shortness of breath, return to the ER.

## 2023-09-11 NOTE — Clinical Note
[facility] EMERGENCY DEPARTMENT  [address] [place] [address]  Phone: [phone]    Grant Edge was seen and treated in our emergency department on 9/11/2023.  He may return to work on 09/12/2023.         Thank you for choosing [facility].    Grant Narayan MD

## 2023-09-27 DIAGNOSIS — I87.303 STASIS EDEMA OF BOTH LOWER EXTREMITIES: ICD-10-CM

## 2023-09-27 RX ORDER — POTASSIUM CHLORIDE 750 MG/1
10 TABLET, FILM COATED, EXTENDED RELEASE ORAL DAILY
Qty: 30 TABLET | Refills: 1 | Status: SHIPPED | OUTPATIENT
Start: 2023-09-27

## 2023-09-28 NOTE — DISCHARGE INSTRUCTIONS
[facility] - Post-procedure Instructions          --  While there are no absolute restrictions, it is recommended that you do not perform strenuous activity today. In the morning, you may resume your level of activity as before your block.    --  If you have a band-aid at your injection site, please remove it later today. Observe the area for any redness, swelling, pus-like drainage, or a temperature over 101°. If any of these symptoms occur, please call your doctor at [phone]. If after office hours, leave a message and the on-call provider will return your call.    --  Ice may be applied to your injection site. It is recommended you avoid direct heat (heating pad; hot tub) for 1-2 days.    --  Call [facility] at [phone] if you experience persistent headache, persistent bleeding from the injection site, or severe pain not relieved by heat or oral medication.    --  Do not make important decisions today.    --  Due to the effects of the block and/or the I.V. Sedation, DO NOT drive or operate hazardous machinery for 12 hours.  Local anesthetics may cause numbness after procedure and precautions must be taken with regards to operating equipment as well as with walking, even if ambulating with assistance of another person or with an assistive device.    --  Do not drink alcohol for 12 hours.    -- You may return to work tomorrow, or as directed by your referring doctor.    --  Occasionally you may notice a slight increase in your pain after the procedure. This should start to improve within the next 24-48 hours. Radiofrequency ablation procedure pain may last 3-4 weeks.    --  It may take as long as 3-4 days before you notice a gradual improvement in your pain and/or other symptoms.    -- You may continue to take your prescribed pain medication as needed.    --  Some normal possible side effects of steroid use could include fluid retention, increased blood sugar, dull headache,  increased sweating, increased appetite, mood swings and flushing.    --  Diabetics are recommended to watch their blood glucose level closely for 24-48 hours after the injection.    --  Must stay in PACU for 20 min upon arrival and prove no leg weakness before being discharged.    --  IN THE EVENT OF A LIFE THREATENING EMERGENCY, (CHEST PAIN, BREATHING DIFFICULTIES, PARALYSIS…) YOU SHOULD GO TO YOUR NEAREST EMERGENCY ROOM.    --  You should be contacted by our office within 2-3 days to schedule follow up or next appointment date.  If not contacted within 7 days, please call the office at [phone]     Resume Xarelto in 24 hours.

## 2023-09-29 ENCOUNTER — HOSPITAL ENCOUNTER (OUTPATIENT)
Dept: GENERAL RADIOLOGY | Facility: SURGERY CENTER | Age: 53
Setting detail: HOSPITAL OUTPATIENT SURGERY
End: 2023-09-29
Payer: COMMERCIAL

## 2023-09-29 ENCOUNTER — HOSPITAL ENCOUNTER (OUTPATIENT)
Facility: SURGERY CENTER | Age: 53
Setting detail: HOSPITAL OUTPATIENT SURGERY
Discharge: HOME | End: 2023-09-29
Attending: ANESTHESIOLOGY | Admitting: ANESTHESIOLOGY
Payer: COMMERCIAL

## 2023-09-29 VITALS
SYSTOLIC BLOOD PRESSURE: 163 MMHG | OXYGEN SATURATION: 98 % | TEMPERATURE: 98.4 F | WEIGHT: 315 LBS | RESPIRATION RATE: 16 BRPM | HEIGHT: 70 IN | DIASTOLIC BLOOD PRESSURE: 112 MMHG | BODY MASS INDEX: 45.1 KG/M2 | HEART RATE: 74 BPM

## 2023-09-29 DIAGNOSIS — Z41.9 SURGERY, ELECTIVE: ICD-10-CM

## 2023-09-29 PROCEDURE — 76000 FLUOROSCOPY <1 HR PHYS/QHP: CPT

## 2023-09-29 PROCEDURE — 77002 NEEDLE LOCALIZATION BY XRAY: CPT

## 2023-09-29 PROCEDURE — 25510000001 IOPAMIDOL 61 % SOLUTION 30 ML VIAL: Performed by: ANESTHESIOLOGY

## 2023-09-29 PROCEDURE — 25010000002 BUPIVACAINE (PF) 0.25 % SOLUTION 10 ML VIAL: Performed by: ANESTHESIOLOGY

## 2023-09-29 PROCEDURE — 62323 NJX INTERLAMINAR LMBR/SAC: CPT | Performed by: ANESTHESIOLOGY

## 2023-09-29 PROCEDURE — 25010000002 DEXAMETHASONE SODIUM PHOSPHATE 100 MG/10ML SOLUTION 10 ML VIAL: Performed by: ANESTHESIOLOGY

## 2023-09-29 NOTE — OP NOTE
L4/L5 Interlaminar Lumbar Epidural Steroid Injection   [facility]    PREOPERATIVE DIAGNOSIS:   Lumbar Radiculopathy  POSTOPERATIVE DIAGNOSIS:  Same as preop diagnosis    PROCEDURE:   Lumbar Epidural Steroid Injection, Therapeutic Interlaminar Injection, with epidurogram, at  L4/L5 level    PRE-PROCEDURE DISCUSSION WITH PATIENT:    Risks and complications were discussed with the patient prior to starting the procedure and informed consent was obtained.  We discussed various topics including but not limited to bleeding, infection, injury, paralysis, nerve injury, dural puncture, coma, death, worsening of clinical picture, lack of pain relief, and postprocedural soreness.    SURGEON:  La Sun MD    REASON FOR PROCEDURE:    Diagnostic injection at this level is needed    SEDATION:  No sedation was used for this procedure  ANESTHETIC:  Marcaine 0.25%  STEROID:   10mg dexamethasone    DESCRIPTON OF PROCEDURE:    After obtaining informed consent, I.V. was not started in the preop area.   The patient was taken to the operating room and placed in the prone position.  EKG, blood pressure, and pulse oximeter were monitored throughout, and sedation was provided as needed by the RN under my guidance. All pressure points were well padded.  The lumbar spine area was prepped with Chloraprep and draped in a sterile fashion.      AP fluoroscopic image was used to visualize the L4/L5 interspace.  The skin and subcutaneous tissue over the area was anesthetized with 1% Lidocaine.  An 18-Gauge Tuohy needle was then advanced through the anesthetized skin tract under fluoroscopic guidance in a coaxial view using a loss of resistance technique.  Lateral fluoroscopy was used to verify appropriate needle depth.  Once the needle tip was felt to be in the posterior epidural space, aspiration was noted to be negative for blood or CSF.  A volume of 1mL of Isovue was then injected under live fluoroscopy in an AP view  which produced good epidural spread with no evidence of loculation, vascular run-off or intrathecal spread.  Subsequently, a total volume of 5mL consisting of 10mg of dexamethasone, 0.5mL of 0.25% bupivcacaine and normal saline was injected without resistance.  The needle was removed intact.     ESTIMATED BLOOD LOSS:  <5 mL  SPECIMENS:  None    COMPLICATIONS:     No complications were noted., There was no indication of vascular uptake on live injection of contrast dye., and There was no indication of intrathecal uptake on live injection of contrast dye.    TOLERANCE & DISCHARGE CONDITION:    The patient tolerated the procedure well.  The patient was transported to the recovery area without difficulties.  The patient was discharged to home under the care of family in stable and satisfactory condition.    PLAN OF CARE:  The patient was given our standard instruction sheet.  The patient will Return to clinic 4-6 wks  The patient will resume all medications as per the medication reconciliation sheet.

## 2023-10-03 ENCOUNTER — OFFICE VISIT (OUTPATIENT)
Dept: ORTHOPEDIC SURGERY | Facility: CLINIC | Age: 53
End: 2023-10-03
Payer: COMMERCIAL

## 2023-10-03 VITALS — TEMPERATURE: 97.6 F | WEIGHT: 315 LBS | HEIGHT: 70 IN | BODY MASS INDEX: 45.1 KG/M2

## 2023-10-03 DIAGNOSIS — M17.11 PRIMARY OSTEOARTHRITIS OF RIGHT KNEE: ICD-10-CM

## 2023-10-03 DIAGNOSIS — R52 PAIN: Primary | ICD-10-CM

## 2023-10-03 DIAGNOSIS — M16.11 PRIMARY OSTEOARTHRITIS OF RIGHT HIP: ICD-10-CM

## 2023-10-03 PROCEDURE — 99214 OFFICE O/P EST MOD 30 MIN: CPT | Performed by: NURSE PRACTITIONER

## 2023-10-03 NOTE — PROGRESS NOTES
Patient: Grant Edge Jr.  YOB: 1970 53 y.o. male  Medical Record Number: [id number]    Chief Complaints:   Chief Complaint   Patient presents with    Right Knee - Follow-up       History of Present Illness:Grant Edge Jr. is a 53 y.o. male who presents with complaints of severe right knee and right hip pain.  Patient unfortunately had increased pain and swelling in the right knee in May went to the emergency room was hospitalized with possible infection that was thankfully ruled out by Dr. Zheng, knee aspiration showed no infection, knee was then injected with Kenalog which helped significantly.  He reports over the last several weeks he has had increased pain in both his right knee and right hip especially with increased walking, only slightly better with rest.  Patient is on Xarelto and also has a history of elevated kidney function test so he is not able to take anti-inflammatories    Allergies: No Known Allergies    Medications:   Current Outpatient Medications   Medication Sig Dispense Refill    albuterol sulfate  (90 Base) MCG/ACT inhaler Inhale 2 puffs 3 (Three) Times a Day. 6.7 g 0    benazepril (LOTENSIN) 40 MG tablet Take 1 tablet by mouth Daily. 90 tablet 1    esomeprazole (nexIUM) 40 MG capsule TAKE 1 CAPSULE BY MOUTH DAILY AS NEEDED FOR STOMACH ACID OR REFLUX OR SYMPTOMS 90 capsule 0    Fluticasone-Salmeterol (Advair Diskus) 100-50 MCG/ACT DISKUS INHALE 1 PUFF BY MOUTH TWICE DAILY 60 each 0    furosemide (Lasix) 20 MG tablet Take 1 tablet by mouth Every Other Day. 90 tablet 1    levothyroxine (SYNTHROID, LEVOTHROID) 112 MCG tablet Take 1 tablet by mouth Daily. 90 tablet 1    Loratadine (CLARITIN PO) Take 10 mg by mouth Daily As Needed.      metoprolol succinate XL (TOPROL-XL) 50 MG 24 hr tablet TAKE 1 AND 1/2 TABLETS BY MOUTH DAILY 145 tablet 0    montelukast (SINGULAIR) 10 MG tablet TAKE 1 TABLET BY MOUTH EVERY NIGHT 90 tablet 1    potassium chloride 10 MEQ CR tablet TAKE 1  "TABLET BY MOUTH DAILY 30 tablet 1    pregabalin (LYRICA) 75 MG capsule Take 1 capsule by mouth 2 (Two) Times a Day. 60 capsule 0    rivaroxaban (XARELTO) 20 MG tablet Take 1 tablet by mouth Daily. 90 tablet 2     No current facility-administered medications for this visit.         The following portions of the patient's history were reviewed and updated as appropriate: allergies, current medications, past family history, past medical history, past social history, past surgical history and problem list.    Review of Systems:   14 point review of systems was performed. All systems negative except pertinent positives/negatives listed in HPI above    Physical Exam:   Vitals:    10/03/23 1425   Temp: 97.6 °F (36.4 °C)   TempSrc: Temporal   Weight: (!) 157 kg (345 lb 3.2 oz)   Height: 177.8 cm (70\")   PainSc:   4   PainLoc: Knee       General: A and O x 3, ASA, NAD    Body mass index is 49.53 kg/m².  Skin clear no unusual lesions noted  Right knee patient has no appreciable effusion limited range of motion secondary to pain with a positive Ebenezer negative Lockman calf soft and nontender right hip the patient has decreased internal ex rotation with a [facility] log[facility]      Radiology:  Xrays 3views (ap,lateral, sunrise) right knee and 2 views of right hip were ordered and reviewed today secondary to increased pain show bone-on-bone end-stage osteoarthritis of both knee and hip.  Comparative views show definite progression in arthritic changes    Assessment/Plan: End-stage osteoarthritis right knee and right hip    Patient and I discussed options, unfortunately his BMI is too elevated to proceed with surgery at this time he understands that it needs to be less than 40 and he will start working on that, we have set a 50 pound weight loss goal, with regards to pain we will proceed with a right hip fluoroscopy guided cortisone injection hopefully that can provide him with some relief until we can proceed " with surgery, we briefly discussed total hip replacement versus total knee replacement, we will also see how much that injection helps with the knee as well.  We may at some point reinjected with cortisone into the right knee we will start with the hip first.  He will follow-up with us in approximately 6 to 8 weeks to see how he responds to the hip injection      Ivonne Beauchamp, APRN  10/3/2023

## 2023-10-13 DIAGNOSIS — Z87.09 HISTORY OF ASTHMA: ICD-10-CM

## 2023-10-13 RX ORDER — FLUTICASONE PROPIONATE AND SALMETEROL 50; 100 UG/1; UG/1
POWDER RESPIRATORY (INHALATION)
Qty: 60 EACH | Refills: 0 | Status: SHIPPED | OUTPATIENT
Start: 2023-10-13

## 2023-10-26 ENCOUNTER — HOSPITAL ENCOUNTER (OUTPATIENT)
Dept: GENERAL RADIOLOGY | Facility: HOSPITAL | Age: 53
Discharge: HOME | End: 2023-10-26
Admitting: NURSE PRACTITIONER
Payer: COMMERCIAL

## 2023-10-26 DIAGNOSIS — M16.11 PRIMARY OSTEOARTHRITIS OF RIGHT HIP: ICD-10-CM

## 2023-10-26 PROCEDURE — 25010000002 LIDOCAINE 1 % SOLUTION: Performed by: RADIOLOGY

## 2023-10-26 PROCEDURE — 25010000002 BUPIVACAINE (PF) 0.25 % SOLUTION: Performed by: RADIOLOGY

## 2023-10-26 PROCEDURE — 77002 NEEDLE LOCALIZATION BY XRAY: CPT

## 2023-10-26 PROCEDURE — 25510000001 IOPAMIDOL 61 % SOLUTION: Performed by: RADIOLOGY

## 2023-10-26 PROCEDURE — 25010000002 METHYLPREDNISOLONE PER 80 MG: Performed by: RADIOLOGY

## 2023-10-26 RX ORDER — LIDOCAINE HYDROCHLORIDE 10 MG/ML
10 INJECTION, SOLUTION INFILTRATION; PERINEURAL
Status: COMPLETED | OUTPATIENT
Start: 2023-10-26 | End: 2023-10-26

## 2023-10-26 RX ORDER — BUPIVACAINE HYDROCHLORIDE 2.5 MG/ML
10 INJECTION, SOLUTION EPIDURAL; INFILTRATION; INTRACAUDAL
Status: COMPLETED | OUTPATIENT
Start: 2023-10-26 | End: 2023-10-26

## 2023-10-26 RX ORDER — METHYLPREDNISOLONE ACETATE 80 MG/ML
80 INJECTION, SUSPENSION INTRA-ARTICULAR; INTRALESIONAL; INTRAMUSCULAR; SOFT TISSUE ONCE
Status: COMPLETED | OUTPATIENT
Start: 2023-10-26 | End: 2023-10-26

## 2023-10-26 RX ADMIN — METHYLPREDNISOLONE ACETATE 80 MG: 80 INJECTION, SUSPENSION INTRA-ARTICULAR; INTRALESIONAL; INTRAMUSCULAR; SOFT TISSUE at 09:30

## 2023-10-26 RX ADMIN — BUPIVACAINE HYDROCHLORIDE 5 ML: 2.5 INJECTION, SOLUTION EPIDURAL; INFILTRATION; INTRACAUDAL; PERINEURAL at 09:35

## 2023-10-26 RX ADMIN — IOPAMIDOL 1 ML: 612 INJECTION, SOLUTION INTRAVENOUS at 09:30

## 2023-10-26 RX ADMIN — LIDOCAINE HYDROCHLORIDE 3 ML: 10 INJECTION, SOLUTION INFILTRATION; PERINEURAL at 09:30

## 2023-10-27 DIAGNOSIS — I10 BENIGN ESSENTIAL HYPERTENSION: ICD-10-CM

## 2023-10-27 RX ORDER — METOPROLOL SUCCINATE 50 MG/1
TABLET, EXTENDED RELEASE ORAL
Qty: 135 TABLET | Refills: 0 | Status: SHIPPED | OUTPATIENT
Start: 2023-10-27

## 2023-11-14 RX ORDER — ESOMEPRAZOLE MAGNESIUM 40 MG/1
CAPSULE, DELAYED RELEASE ORAL
Qty: 90 CAPSULE | Refills: 0 | Status: SHIPPED | OUTPATIENT
Start: 2023-11-14

## 2023-11-20 ENCOUNTER — OFFICE VISIT (OUTPATIENT)
Dept: INTERNAL MEDICINE | Facility: CLINIC | Age: 53
End: 2023-11-20
Payer: COMMERCIAL

## 2023-11-20 VITALS
BODY MASS INDEX: 45.1 KG/M2 | DIASTOLIC BLOOD PRESSURE: 84 MMHG | SYSTOLIC BLOOD PRESSURE: 152 MMHG | HEART RATE: 87 BPM | OXYGEN SATURATION: 97 % | WEIGHT: 315 LBS | HEIGHT: 70 IN

## 2023-11-20 DIAGNOSIS — L23.9 ALLERGIC DERMATITIS: Primary | ICD-10-CM

## 2023-11-20 PROCEDURE — 99213 OFFICE O/P EST LOW 20 MIN: CPT

## 2023-11-20 RX ORDER — METHYLPREDNISOLONE 4 MG/1
TABLET ORAL
Qty: 21 TABLET | Refills: 0 | Status: SHIPPED | OUTPATIENT
Start: 2023-11-20

## 2023-11-20 NOTE — PROGRESS NOTES
"Chief Complaint  Itching (All over )    Subjective        Grant Edge  presents to [facility]  History of Present Illness  53-year-old male presenting with itchiness.  Itchiness started on Thursday.  States is feeling \"itchy all over\".  This last all day long.  Has been trying over-the-counter allergy medications to help.  Found a spot on his right lower abdomen that he thinks he may have been bitten.  Currently scabbed over.  States it was previously bright red.  Itching        Objective   Vital Signs:  /84 (BP Location: Left arm, Patient Position: Sitting, Cuff Size: Adult)   Pulse 87   Ht 177.8 cm (70\")   Wt (!) 149 kg (328 lb 12.8 oz)   SpO2 97%   BMI 47.18 kg/m²   Estimated body mass index is 47.18 kg/m² as calculated from the following:    Height as of this encounter: 177.8 cm (70\").    Weight as of this encounter: 149 kg (328 lb 12.8 oz).               Physical Exam  Vitals reviewed.   Constitutional:       Appearance: Normal appearance.   Pulmonary:      Effort: Pulmonary effort is normal.      Breath sounds: Normal breath sounds.   Musculoskeletal:         General: Normal range of motion.      Cervical back: Normal range of motion.   Skin:     General: Skin is warm and dry.      Capillary Refill: Capillary refill takes less than 2 seconds.      Findings: No erythema or rash.   Neurological:      General: No focal deficit present.      Mental Status: He is alert and oriented to person, place, and time.   Psychiatric:         Mood and Affect: Mood normal.         Behavior: Behavior normal.         Thought Content: Thought content normal.         Judgment: Judgment normal.        Result Review :    Common labs          5/4/2023    04:42 8/3/2023    15:20 8/7/2023    16:18   Common Labs   Glucose 108   106    BUN 16   13    Creatinine 1.24   1.37    Sodium 141   145    Potassium 4.3   3.9    Chloride 106   106    Calcium 8.7   9.1    Total Protein   6.3    Albumin "   4.2    Total Bilirubin   0.8    Alkaline Phosphatase   86    AST (SGOT)   21    ALT (SGPT)   18    WBC 6.75  6.37  5.47    Hemoglobin 13.9  15.2  14.9    Hematocrit 42.1  46.5  44.9    Platelets 295  188  217    Hemoglobin A1C   6.40          Current Outpatient Medications on File Prior to Visit   Medication Sig Dispense Refill    Advair Diskus 100-50 MCG/ACT DISKUS INHALE 1 PUFF BY MOUTH TWICE DAILY 60 each 0    albuterol sulfate  (90 Base) MCG/ACT inhaler Inhale 2 puffs 3 (Three) Times a Day. 6.7 g 0    benazepril (LOTENSIN) 40 MG tablet Take 1 tablet by mouth Daily. 90 tablet 1    esomeprazole (nexIUM) 40 MG capsule TAKE 1 CAPSULE BY MOUTH DAILY AS NEEDED FOR STOMACH ACID OR REFLUX OR SYMPTOMS 90 capsule 0    furosemide (Lasix) 20 MG tablet Take 1 tablet by mouth Every Other Day. 90 tablet 1    levothyroxine (SYNTHROID, LEVOTHROID) 112 MCG tablet Take 1 tablet by mouth Daily. 90 tablet 1    Loratadine (CLARITIN PO) Take 10 mg by mouth Daily As Needed.      metoprolol succinate XL (TOPROL-XL) 50 MG 24 hr tablet TAKE 1 AND 1/2 TABLETS BY MOUTH DAILY 135 tablet 0    montelukast (SINGULAIR) 10 MG tablet TAKE 1 TABLET BY MOUTH EVERY NIGHT 90 tablet 1    potassium chloride 10 MEQ CR tablet TAKE 1 TABLET BY MOUTH DAILY 30 tablet 1    pregabalin (LYRICA) 75 MG capsule Take 1 capsule by mouth 2 (Two) Times a Day. 60 capsule 0    rivaroxaban (XARELTO) 20 MG tablet Take 1 tablet by mouth Daily. 90 tablet 2     No current facility-administered medications on file prior to visit.              Assessment and Plan   Diagnoses and all orders for this visit:    1. Allergic dermatitis (Primary)  -     methylPREDNISolone (MEDROL) 4 MG dose pack; Take as directed on package instructions.  Dispense: 21 tablet; Refill: 0      Patient Instructions   Take medrol dose pack as prescribed. Continue medications as prescribed. Monitor blood pressure at home. If blood pressure remains elevated, increase furosemide to 1 tablet daily.  Follow-up as needed.         Follow Up   Return if symptoms worsen or fail to improve.  Patient was given instructions and counseling regarding his condition or for health maintenance advice. Please see specific information pulled into the AVS if appropriate.

## 2023-11-20 NOTE — PATIENT INSTRUCTIONS
Take medrol dose pack as prescribed. Continue medications as prescribed. Monitor blood pressure at home. If blood pressure remains elevated, increase furosemide to 1 tablet daily. Follow-up as needed.

## 2023-11-27 ENCOUNTER — CLINICAL SUPPORT (OUTPATIENT)
Dept: INTERNAL MEDICINE | Facility: CLINIC | Age: 53
End: 2023-11-27
Payer: COMMERCIAL

## 2023-11-27 DIAGNOSIS — I87.303 STASIS EDEMA OF BOTH LOWER EXTREMITIES: ICD-10-CM

## 2023-11-27 DIAGNOSIS — Z23 NEED FOR INFLUENZA VACCINATION: Primary | ICD-10-CM

## 2023-11-27 PROCEDURE — 90686 IIV4 VACC NO PRSV 0.5 ML IM: CPT

## 2023-11-27 PROCEDURE — 90471 IMMUNIZATION ADMIN: CPT

## 2023-11-27 RX ORDER — POTASSIUM CHLORIDE 750 MG/1
10 TABLET, FILM COATED, EXTENDED RELEASE ORAL DAILY
Qty: 30 TABLET | Refills: 1 | Status: SHIPPED | OUTPATIENT
Start: 2023-11-27

## 2023-11-30 ENCOUNTER — OFFICE VISIT (OUTPATIENT)
Dept: ORTHOPEDIC SURGERY | Facility: CLINIC | Age: 53
End: 2023-11-30
Payer: COMMERCIAL

## 2023-11-30 VITALS — HEIGHT: 70 IN | WEIGHT: 315 LBS | BODY MASS INDEX: 45.1 KG/M2 | TEMPERATURE: 97.5 F

## 2023-11-30 DIAGNOSIS — M16.11 PRIMARY OSTEOARTHRITIS OF RIGHT HIP: Primary | ICD-10-CM

## 2023-11-30 DIAGNOSIS — M17.11 PRIMARY OSTEOARTHRITIS OF RIGHT KNEE: ICD-10-CM

## 2023-11-30 PROCEDURE — 99214 OFFICE O/P EST MOD 30 MIN: CPT | Performed by: ORTHOPAEDIC SURGERY

## 2023-11-30 NOTE — PROGRESS NOTES
Patient: Grant Edge Jr.  YOB: 1970 53 y.o. male  Medical Record Number: [id number]    Chief Complaint:   Chief Complaint   Patient presents with    Right Knee - Follow-up       History of Present Illness:Grant Edge Jr. is a 53 y.o. male who presents for follow-up of right hip and knee pain.  His hip hurts more than the knee but both are fairly severe.  He did describes pain within the groin and also the C-shaped region around the hip it limits his basic activities of daily living and walking tolerance and is markedly worsened over the last few months.  He had a fluoroscopy guided hip injection which did help alleviate symptoms by was very short-lived.    Allergies: No Known Allergies    Medications:   Current Outpatient Medications   Medication Sig Dispense Refill    Advair Diskus 100-50 MCG/ACT DISKUS INHALE 1 PUFF BY MOUTH TWICE DAILY 60 each 0    albuterol sulfate  (90 Base) MCG/ACT inhaler Inhale 2 puffs 3 (Three) Times a Day. 6.7 g 0    benazepril (LOTENSIN) 40 MG tablet Take 1 tablet by mouth Daily. 90 tablet 1    esomeprazole (nexIUM) 40 MG capsule TAKE 1 CAPSULE BY MOUTH DAILY AS NEEDED FOR STOMACH ACID OR REFLUX OR SYMPTOMS 90 capsule 0    furosemide (Lasix) 20 MG tablet Take 1 tablet by mouth Every Other Day. 90 tablet 1    levothyroxine (SYNTHROID, LEVOTHROID) 112 MCG tablet Take 1 tablet by mouth Daily. 90 tablet 1    Loratadine (CLARITIN PO) Take 10 mg by mouth Daily As Needed.      metoprolol succinate XL (TOPROL-XL) 50 MG 24 hr tablet TAKE 1 AND 1/2 TABLETS BY MOUTH DAILY 135 tablet 0    montelukast (SINGULAIR) 10 MG tablet TAKE 1 TABLET BY MOUTH EVERY NIGHT 90 tablet 1    potassium chloride 10 MEQ CR tablet TAKE 1 TABLET BY MOUTH DAILY 30 tablet 1    rivaroxaban (XARELTO) 20 MG tablet Take 1 tablet by mouth Daily. 90 tablet 2    methylPREDNISolone (MEDROL) 4 MG dose pack Take as directed on package instructions. (Patient not taking: Reported on 11/30/2023) 21 tablet 0     "pregabalin (LYRICA) 75 MG capsule Take 1 capsule by mouth 2 (Two) Times a Day. (Patient not taking: Reported on 11/30/2023) 60 capsule 0     No current facility-administered medications for this visit.         The following portions of the patient's history were reviewed and updated as appropriate: allergies, current medications, past family history, past medical history, past social history, past surgical history and problem list.    Review of Systems:   Pertinent positives/negative listed in HPI above    Physical Exam:   Vitals:    11/30/23 1542   Temp: 97.5 °F (36.4 °C)   Weight: (!) 144 kg (316 lb 9.6 oz)   Height: 177.8 cm (70\")   PainSc:   3   PainLoc: Knee       General: A and O x 3, ASA, NAD      Hip:  right    LEG ALIGNMENT:     Neutral        LEG LENGTH DISCREPANCY   :    none    GAIT:     Antalgic    SKIN:     No abnormality    RANGE OF MOTION:     Limited by joint irritability    STRENGTH:     Limited by joint irratibility    DISTAL PULSES:    Paplable    DISTAL SENSATION :   Intact    LYMPHATICS:     No   lymphadenopathy    OTHER:          +   Stinchfeld test      -    log roll      -   Tenderness to palpation trochanteric bursa     There is minimal knee irritability with range of motion no joint effusion he has mild medial crepitus    Radiology:    Xrays 2views right hip  (AP bilateral hips and lateral hip) taken previously demonstrating advanced, end-satge osteoarthritis with bone on bone articluation, periarticular osteophytes, and subchondral cysts    X-rays AP lateral sunrise of the right knee taken previously were reviewed which shows medial and patellofemoral osteoarthritis with near bone-on-bone medial compartment articulation.      Assessment/Plan:  Right hip advanced osteoarthritis end-stage.  He also has some right knee arthritis but I think primarily the hip is the source of his pain.  We would address the hip first and hopefully that will help much of his knee symptoms    Continuation of " conservative management vs. EMILY discussed.  The patient wishes to proceed with total hip replacement.  At this point the patient has failed the full gamut of conservative treatment and stating complete understanding of the risks/benefits/ anternatives wishes to proceed with surgical treatment.    Risk and benefits of surgery were reviewed.  Including, but not limited to, blood clots, anesthesia risk, infection, leg length discrepancy, fracture, skin/leg numbness, failure of the implant, need for future surgeries, continued pain, hematoma, need for transfusion, and death, among others.  The patient understands and wishes to proceed.     The spectrum of treatment options were discussed with the patient in detail including both the nonoperative and operative treatment modalities and their respective risks and benefits.  After thorough discussion, the patient has elected to undergo surgical treatment.  The details of the surgical procedure were explained including the location of probable incisions and a description of the likely implants to be used.  Models and diagrams were used as educational resources. The patient understands the likely convalescence after surgery, as well as the rehabilitation required.  We thoroughly discussed the risks, benefits, and alternatives to surgery.  The risks include but are not limited to the risk of infection, joint stiffness, blood clots (including DVT and/or pulmonary embolus along with the risk of death), neurologic and/or vascular injury, fracture, dislocation, nonunion, malunion, need for further surgery including hardware failure requiring revision, and continued pain.  It was explained that if tissue has been repaired or reconstructed, there is also a chance of failure which may require further management.  Following the completion of the discussion, the patient expressed understanding of this planned course of care, all their questions were answered and consent will be  obtained preoperatively.    Operative Plan: Posterior approach Total Hip Replacement 23 HR STAY -needs to lose 20 pounds and like for him to be below 300 pounds in order to operate.    There are no diagnoses linked to this encounter.    Lionel Swift MD  11/30/2023

## 2023-12-13 ENCOUNTER — PATIENT MESSAGE (OUTPATIENT)
Dept: INTERNAL MEDICINE | Facility: CLINIC | Age: 53
End: 2023-12-13
Payer: COMMERCIAL

## 2023-12-13 DIAGNOSIS — L23.9 ALLERGIC DERMATITIS: Primary | ICD-10-CM

## 2023-12-14 NOTE — TELEPHONE ENCOUNTER
From: Grant Edge Jr.  To: Jim Joseph  Sent: 12/13/2023 10:48 PM EST  Subject: Itching    Hello. I am still dealing with this itching problem. Could you refer me to an allergist? I might need to start taking shots again.     Thanks

## 2023-12-21 ENCOUNTER — OFFICE VISIT (OUTPATIENT)
Dept: INTERNAL MEDICINE | Facility: CLINIC | Age: 53
End: 2023-12-21
Payer: COMMERCIAL

## 2023-12-21 VITALS
HEART RATE: 77 BPM | SYSTOLIC BLOOD PRESSURE: 142 MMHG | DIASTOLIC BLOOD PRESSURE: 84 MMHG | BODY MASS INDEX: 45.1 KG/M2 | OXYGEN SATURATION: 98 % | HEIGHT: 70 IN | WEIGHT: 315 LBS

## 2023-12-21 DIAGNOSIS — T14.8XXA MUSCLE STRAIN: Primary | ICD-10-CM

## 2023-12-21 NOTE — PROGRESS NOTES
"Chief Complaint  Back Pain    Subjective        Grant Edge Jr. presents to [facility] PRIMARY CARE  History of Present Illness  53-year-old male presenting with left-sided flank pain.  Was at work and tossing trash into outside dumpster.  Felt a slight tug under his left arm.  Later that day was having significant soreness in same area.  Denies radiating pain or decreased range of motion.  States is uncomfortable when walking, laying down or lifting objects.      Objective   Vital Signs:  /84 (BP Location: Left arm, Patient Position: Sitting, Cuff Size: Adult)   Pulse 77   Ht 177.8 cm (70\")   Wt (!) 148 kg (327 lb)   SpO2 98%   BMI 46.92 kg/m²   Estimated body mass index is 46.92 kg/m² as calculated from the following:    Height as of this encounter: 177.8 cm (70\").    Weight as of this encounter: 148 kg (327 lb).               Physical Exam  Vitals reviewed.   Constitutional:       Appearance: Normal appearance.   Musculoskeletal:         General: Tenderness present. No swelling. Normal range of motion.      Cervical back: Normal range of motion.   Skin:     General: Skin is warm and dry.      Capillary Refill: Capillary refill takes less than 2 seconds.   Neurological:      General: No focal deficit present.      Mental Status: He is alert and oriented to person, place, and time.   Psychiatric:         Mood and Affect: Mood normal.         Behavior: Behavior normal.         Thought Content: Thought content normal.         Judgment: Judgment normal.        Result Review :    Common labs          5/4/2023    04:42 8/3/2023    15:20 8/7/2023    16:18   Common Labs   Glucose 108   106    BUN 16   13    Creatinine 1.24   1.37    Sodium 141   145    Potassium 4.3   3.9    Chloride 106   106    Calcium 8.7   9.1    Total Protein   6.3    Albumin   4.2    Total Bilirubin   0.8    Alkaline Phosphatase   86    AST (SGOT)   21    ALT (SGPT)   18    WBC 6.75  6.37  5.47    Hemoglobin 13.9  " 15.2  14.9    Hematocrit 42.1  46.5  44.9    Platelets 295  188  217    Hemoglobin A1C   6.40          Current Outpatient Medications on File Prior to Visit   Medication Sig Dispense Refill    Advair Diskus 100-50 MCG/ACT DISKUS INHALE 1 PUFF BY MOUTH TWICE DAILY 60 each 0    albuterol sulfate  (90 Base) MCG/ACT inhaler Inhale 2 puffs 3 (Three) Times a Day. 6.7 g 0    benazepril (LOTENSIN) 40 MG tablet Take 1 tablet by mouth Daily. 90 tablet 1    esomeprazole (nexIUM) 40 MG capsule TAKE 1 CAPSULE BY MOUTH DAILY AS NEEDED FOR STOMACH ACID OR REFLUX OR SYMPTOMS 90 capsule 0    furosemide (Lasix) 20 MG tablet Take 1 tablet by mouth Every Other Day. 90 tablet 1    levothyroxine (SYNTHROID, LEVOTHROID) 112 MCG tablet Take 1 tablet by mouth Daily. 90 tablet 1    Loratadine (CLARITIN PO) Take 10 mg by mouth Daily As Needed.      methylPREDNISolone (MEDROL) 4 MG dose pack Take as directed on package instructions. 21 tablet 0    metoprolol succinate XL (TOPROL-XL) 50 MG 24 hr tablet TAKE 1 AND 1/2 TABLETS BY MOUTH DAILY 135 tablet 0    montelukast (SINGULAIR) 10 MG tablet TAKE 1 TABLET BY MOUTH EVERY NIGHT 90 tablet 1    potassium chloride 10 MEQ CR tablet TAKE 1 TABLET BY MOUTH DAILY 30 tablet 1    pregabalin (LYRICA) 75 MG capsule Take 1 capsule by mouth 2 (Two) Times a Day. 60 capsule 0    rivaroxaban (XARELTO) 20 MG tablet Take 1 tablet by mouth Daily. 90 tablet 2     No current facility-administered medications on file prior to visit.              Assessment and Plan   Diagnoses and all orders for this visit:    1. Muscle strain (Primary)  -     traMADol-acetaminophen (Ultracet) 37.5-325 MG per tablet; Take 1 tablet by mouth Every 6 (Six) Hours As Needed for Moderate Pain.  Dispense: 12 tablet; Refill: 0      Patient Instructions   Take pain medicine as directed for moderate pain. Consider gabapentin for pain if needed. Limit activity as tolerated. Avoid lifting above shoulders. Follow-up as needed.           Follow Up   Return if symptoms worsen or fail to improve.  Patient was given instructions and counseling regarding his condition or for health maintenance advice. Please see specific information pulled into the AVS if appropriate.

## 2023-12-21 NOTE — PATIENT INSTRUCTIONS
Take pain medicine as directed for moderate pain. Consider gabapentin for pain if needed. Limit activity as tolerated. Avoid lifting above shoulders. Follow-up as needed.

## 2023-12-26 DIAGNOSIS — E03.8 OTHER SPECIFIED HYPOTHYROIDISM: ICD-10-CM

## 2023-12-26 RX ORDER — LEVOTHYROXINE SODIUM 112 UG/1
112 TABLET ORAL DAILY
Qty: 90 TABLET | Refills: 1 | Status: SHIPPED | OUTPATIENT
Start: 2023-12-26

## 2024-01-26 DIAGNOSIS — I87.303 STASIS EDEMA OF BOTH LOWER EXTREMITIES: ICD-10-CM

## 2024-01-26 RX ORDER — POTASSIUM CHLORIDE 750 MG/1
10 TABLET, FILM COATED, EXTENDED RELEASE ORAL DAILY
Qty: 30 TABLET | Refills: 1 | Status: SHIPPED | OUTPATIENT
Start: 2024-01-26

## 2024-01-30 ENCOUNTER — TELEMEDICINE (OUTPATIENT)
Dept: INTERNAL MEDICINE | Facility: CLINIC | Age: 54
End: 2024-01-30
Payer: COMMERCIAL

## 2024-01-30 DIAGNOSIS — U07.1 COVID: Primary | ICD-10-CM

## 2024-01-30 DIAGNOSIS — Z87.09 HISTORY OF ASTHMA: ICD-10-CM

## 2024-01-30 DIAGNOSIS — I10 BENIGN ESSENTIAL HYPERTENSION: ICD-10-CM

## 2024-01-30 PROCEDURE — 99213 OFFICE O/P EST LOW 20 MIN: CPT

## 2024-01-30 NOTE — PROGRESS NOTES
"Chief Complaint  No chief complaint on file.    Subjective        Grant Edge . presents to [facility] PRIMARY CARE  History of Present Illness  53-year-old male presenting for COVID follow-up.  Went to urgent care on 1/27 and diagnosed with COVID.  Symptoms started on 1/25.  Prescribed prednisone, Tessalon Perles, cetirizine and fluticasone.  Tessalon Perles have not been helping with cough.  Patient has been using leftover Promethazine DM cough syrup.  Denies any breathing issues and states that he feels better than he did last week.      Objective   Vital Signs:  There were no vitals taken for this visit.  Estimated body mass index is 46.63 kg/m² as calculated from the following:    Height as of 1/27/24: 177.8 cm (70\").    Weight as of 1/27/24: 147 kg (325 lb).               Physical Exam  Vitals reviewed: Unable to perform physical exam due to video visit..        Result Review :      Common labs          5/4/2023    04:42 8/3/2023    15:20 8/7/2023    16:18   Common Labs   Glucose 108   106    BUN 16   13    Creatinine 1.24   1.37    Sodium 141   145    Potassium 4.3   3.9    Chloride 106   106    Calcium 8.7   9.1    Total Protein   6.3    Albumin   4.2    Total Bilirubin   0.8    Alkaline Phosphatase   86    AST (SGOT)   21    ALT (SGPT)   18    WBC 6.75  6.37  5.47    Hemoglobin 13.9  15.2  14.9    Hematocrit 42.1  46.5  44.9    Platelets 295  188  217    Hemoglobin A1C   6.40          Current Outpatient Medications on File Prior to Visit   Medication Sig Dispense Refill    albuterol sulfate  (90 Base) MCG/ACT inhaler Inhale 2 puffs 3 (Three) Times a Day. 6.7 g 0    albuterol sulfate  (90 Base) MCG/ACT inhaler Inhale 2 puffs Every 4 (Four) Hours As Needed for Wheezing 8.5 g 0    benazepril (LOTENSIN) 40 MG tablet Take 1 tablet by mouth Daily. 90 tablet 1    benzonatate (TESSALON) 200 MG capsule Take 1 capsule by mouth 3 (Three) Times a Day As Needed for Cough for up to 7 " days. 21 capsule 0    cetirizine (zyrTEC) 10 MG tablet Take 1 tablet by mouth Daily for 30 days. 30 tablet 0    esomeprazole (nexIUM) 40 MG capsule TAKE 1 CAPSULE BY MOUTH DAILY AS NEEDED FOR STOMACH ACID OR REFLUX OR SYMPTOMS 90 capsule 0    fluticasone (FLONASE) 50 MCG/ACT nasal spray Use 2 sprays into the nostril(s) as directed by provider Daily for 14 days. 16 g 0    furosemide (Lasix) 20 MG tablet Take 1 tablet by mouth Every Other Day. 90 tablet 1    levothyroxine (SYNTHROID, LEVOTHROID) 112 MCG tablet TAKE 1 TABLET BY MOUTH DAILY 90 tablet 1    Loratadine (CLARITIN PO) Take 10 mg by mouth Daily As Needed.      montelukast (SINGULAIR) 10 MG tablet TAKE 1 TABLET BY MOUTH EVERY NIGHT 90 tablet 1    potassium chloride 10 MEQ CR tablet TAKE 1 TABLET BY MOUTH DAILY 30 tablet 1    [] predniSONE (DELTASONE) 20 MG tablet Take 2 tablets by mouth Daily for 5 days. 10 tablet 0    pregabalin (LYRICA) 75 MG capsule Take 1 capsule by mouth 2 (Two) Times a Day. 60 capsule 0    rivaroxaban (XARELTO) 20 MG tablet Take 1 tablet by mouth Daily. 90 tablet 2    traMADol-acetaminophen (Ultracet) 37.5-325 MG per tablet Take 1 tablet by mouth Every 6 (Six) Hours As Needed for Moderate Pain. 12 tablet 0     No current facility-administered medications on file prior to visit.              Assessment and Plan     Diagnoses and all orders for this visit:    1. COVID (Primary)      Patient Instructions   Completes prednisone as prescribed.  Continue to stay hydrated with water.  Wear a mask around others for the next 5 days.  Follow-up on Friday, 2024 if symptoms worsen in order for antibiotic or steroid to be prescribed.  You have chosen to receive care through a telehealth visit.  Do you consent to use a video/audio connection for your medical care today? Yes    During visit, patient was at his residence and provider at medical practice office.       I spent 14 minutes caring for Grant on this date of service. This time  includes time spent by me in the following activities:preparing for the visit, reviewing tests, obtaining and/or reviewing a separately obtained history, performing a medically appropriate examination and/or evaluation , counseling and educating the patient/family/caregiver, ordering medications, tests, or procedures, and documenting information in the medical record  Follow Up     Return if symptoms worsen or fail to improve.  Patient was given instructions and counseling regarding his condition or for health maintenance advice. Please see specific information pulled into the AVS if appropriate.

## 2024-01-30 NOTE — PATIENT INSTRUCTIONS
Completes prednisone as prescribed.  Continue to stay hydrated with water.  Wear a mask around others for the next 5 days.  Follow-up on Friday, 2/2/2024 if symptoms worsen in order for antibiotic or steroid to be prescribed.

## 2024-01-31 ENCOUNTER — PATIENT MESSAGE (OUTPATIENT)
Dept: INTERNAL MEDICINE | Facility: CLINIC | Age: 54
End: 2024-01-31
Payer: COMMERCIAL

## 2024-01-31 RX ORDER — METOPROLOL SUCCINATE 50 MG/1
TABLET, EXTENDED RELEASE ORAL
Qty: 135 TABLET | Refills: 0 | Status: SHIPPED | OUTPATIENT
Start: 2024-01-31

## 2024-01-31 RX ORDER — FLUTICASONE PROPIONATE AND SALMETEROL 50; 100 UG/1; UG/1
POWDER RESPIRATORY (INHALATION)
Qty: 60 EACH | Refills: 0 | Status: SHIPPED | OUTPATIENT
Start: 2024-01-31

## 2024-01-31 NOTE — TELEPHONE ENCOUNTER
From: Grant Edge Jr.  To: Jim Joseph  Sent: 1/31/2024 2:26 AM EST  Subject: Left Eye    Hello. When I talked to you yesterday my left eye was just a little red. Now it has been really red and kind of running. Is this a part of the Covid? If so is there anything you can call in for it? If not what should I do?  Thanks

## 2024-02-02 ENCOUNTER — PATIENT MESSAGE (OUTPATIENT)
Dept: INTERNAL MEDICINE | Facility: CLINIC | Age: 54
End: 2024-02-02
Payer: COMMERCIAL

## 2024-02-02 DIAGNOSIS — J06.9 UPPER RESPIRATORY TRACT INFECTION, UNSPECIFIED TYPE: Primary | ICD-10-CM

## 2024-02-02 RX ORDER — CEFDINIR 300 MG/1
300 CAPSULE ORAL 2 TIMES DAILY
Qty: 14 CAPSULE | Refills: 0 | Status: SHIPPED | OUTPATIENT
Start: 2024-02-02 | End: 2024-02-09

## 2024-02-02 NOTE — TELEPHONE ENCOUNTER
From: Grant Edge Jr.  To: Jim Joseph  Sent: 2/2/2024 11:39 AM EST  Subject: Antibiotic     Hello I am feeling better but not all the way. I am taking mucinex. I can still hear it trying to break up when I cough. Do you think I need the antibiotic you were talking about the other day to be on the safe side or I should be alright?

## 2024-02-07 NOTE — PROGRESS NOTES
Subjective   Grant Edge Jr. is a 53 y.o. male. Referred for Left LE DVT     History of Present Illness   Mr. Edge is a 49-year-old male with history of hypertension, previous history of provoked DVT following total hip replacement about 6 to 7 years ago presents for further evaluation and management of left lower extremity DVT.  He noticed bilateral lower extremity swelling about 2 weeks ago following which he presented to the emergency room.  Bilateral lower extremity Doppler was performed on 5/30/2020 on which an acute left lower extremity DVT was noted in the popliteal vein.  All other veins appeared normal.  He was started on Eliquis.  He was seen by Patricia Peter on 6/3/2020 and started on hydrochlorothiazide for bilateral lower extremity edema.  He has also been referred to us for further management of the DVT.  He denies any history of tobacco use, denies any recent travel.  Denies any recent changes in his health besides the bilateral lower extremity swelling.  No significant weight loss, fatigue, night sweats, lymphadenopathy, cough, dyspnea, nausea, vomiting, hematemesis, melena, hematochezia.  Last colonoscopy was about 5 to 6 years ago in which there was a benign polyp.    Interval history  Mr. Edge presents to the clinic today for follow-up.  Continues on Xarelto 20 mg daily.  He is tolerating Xarelto well without any difficulties with bruising or bleeding.  He denies any lower extremity edema, chest pain, shortness of breath.  No new complaints.    He is due for colonoscopy, reports he just needs to call to schedule this.    The following portions of the patient's history were reviewed and updated as appropriate: allergies, current medications, past family history, past medical history, past social history, past surgical history and problem list.    Past Medical History:   Diagnosis Date    Abnormal ECG     Allergic     Arthritis     Chest pain     CTS (carpal tunnel syndrome) A few months.    DVT  (deep venous thrombosis)     GERD (gastroesophageal reflux disease)     Hip arthrosis     Hyperlipidemia     Hypertension     Hypokalemia     Knee swelling     Myocardial infarction     Obesity     Periarthritis of shoulder     Simple goiter     Sinus bradycardia     Vitamin D deficiency     hypothyroidism  Following total thyroidectomy    Past Surgical History:   Procedure Laterality Date    CARDIAC CATHETERIZATION N/A 10/18/2016    Procedure: Left Heart Cath;  Surgeon: Daniel Rosas MD;  Location: [facility] CATH INVASIVE LOC[facility];  Service:     CHOLECYSTECTOMY      EPIDURAL Right 11/4/2022    Procedure: Right L5 LUMBAR/SACRAL TRANSFORAMINAL EPIDURAL. Hold xarelto x 3 days prior to procedure;  Surgeon: La Sun MD;  Location: [facility];  Service: Pain Management;  Laterality: Right;    EPIDURAL Right 6/19/2023    Procedure: LUMBAR/SACRAL TRANSFORAMINAL EPIDURAL RIGHT L5-S1  61678 HOLD XARELTO X3 DAYS;  Surgeon: La Sun MD;  Location: [facility];  Service: Pain Management;  Laterality: Right;    JOINT REPLACEMENT      LUMBAR EPIDURAL INJECTION N/A 9/29/2023    Procedure: lumbar epidural steroid injection at L4/5 57051;  Surgeon: La Sun MD;  Location: [facility];  Service: Pain Management;  Laterality: N/A;    MEDIAL BRANCH BLOCK Right 8/16/2023    Procedure: LUMBAR MEDIAL BRANCH BLOCK RIGHT L4-S1 #1  34025, 89227;  Surgeon: La Sun MD;  Location: [facility];  Service: Pain Management;  Laterality: Right;    NERVE BLOCK Right 8/30/2023    Procedure: right lateral femoral cutaneous nerve block 63476;  Surgeon: La Sun MD;  Location: [facility];  Service: Pain Management;  Laterality: Right;    SALIVARY GLAND SURGERY      THYROIDECTOMY  2013    TONSILLECTOMY      TOTAL HIP ARTHROPLASTY REVISION Left 2015        Family History   Problem Relation Age of Onset    Diabetes Father     Heart disease Father     Arthritis Father     Hyperlipidemia Father      Hypertension Father    Maternal aunt - blood clots   Maternal aunt - lupus     Social History     Socioeconomic History    Marital status:    Tobacco Use    Smoking status: Never    Smokeless tobacco: Never   Vaping Use    Vaping Use: Never used   Substance and Sexual Activity    Alcohol use: No    Drug use: No    Sexual activity: Yes     Partners: Female     Birth control/protection: None      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   HENT:  Negative for congestion, drooling, facial swelling, mouth sores, sore throat and trouble swallowing.    Eyes:  Negative for blurred vision, double vision and visual disturbance.   Respiratory:  Negative for apnea, cough, chest tightness, shortness of breath, wheezing and stridor.    Cardiovascular:  Negative for chest pain, palpitations and leg swelling.   Gastrointestinal:  Negative for abdominal distention, constipation, diarrhea, nausea, vomiting and indigestion.   Endocrine: Negative for cold intolerance and heat intolerance.   Genitourinary:  Negative for dysuria, hematuria and urgency.   Musculoskeletal:  Negative for arthralgias, back pain and myalgias.   Skin:  Negative for color change, dry skin, skin lesions and wound.   Neurological:  Negative for dizziness, seizures, syncope, facial asymmetry, weakness, light-headedness and confusion.   Hematological:  Negative for adenopathy. Does not bruise/bleed easily.   Psychiatric/Behavioral:  Negative for agitation, sleep disturbance and stress. The patient is not nervous/anxious.        Review of systems as mentioned in the HPI    Objective   There were no vitals taken for this visit.     Physical Exam   Constitutional: He is oriented to person, place, and time. He appears well-developed and well-nourished. He  is obese.  HENT:   Head: Normocephalic.   Eyes: No scleral icterus.   Cardiovascular: Normal rate and regular rhythm.   Pulmonary/Chest: Effort normal and breath sounds normal. No respiratory distress.   Abdominal: Normal appearance.   Musculoskeletal: Normal range of motion.   Neurological: He is alert and oriented to person, place, and time.   Psychiatric: His behavior is normal. Judgment and thought content normal.       I have reexamined the patient and the results are consistent with the previously documented exam. MELANY Ramsay      Admission on 01/27/2024, Discharged on 01/27/2024   Component Date Value Ref Range Status    Rapid Influenza A Ag 01/27/2024 Negative  Negative Final    Rapid Influenza B Ag 01/27/2024 Negative  Negative Final    Internal Control 01/27/2024 Passed  Passed Final    Lot Number 01/27/2024 [id number]   Final    Expiration Date 01/27/2024 11/30/25   Final    SARS Antigen 01/27/2024 Detected (A)  Not Detected, Presumptive Negative Final    Internal Control 01/27/2024 Passed  Passed Final    Lot Number 01/27/2024 [id number]   Final    Expiration Date 01/27/2024 06/11/2024   Final        No radiology results for the last 30 days.     Reviewed his left lower extremity Doppler and noted to have left lower extremity DVT.  CBC from 5/30/2020 reviewed and noted to have normal WBC, hemoglobin, platelet count.  CMP from 5/30/2020 reviewed by me creatinine mildly elevated at 1.31    Reviewed thrombo-failure work-up and documented below  Beta-2 glycoprotein and anticardiolipin antibody negative  Protein S antigen free normal  Protein S functional normal  Antithrombin III level normal  Prothrombin gene mutation negative  Factor V Leiden mutation negative.    CBC 6/16/2020 reviewed and within normal limits    Assessment & Plan      Mr. Edge is a 51-year-old -American male with a previous history of left lower extremity DVT following a left hip replacement surgery, now with newly  diagnosed left lower extremity DVT.     *DVT  This is his second episode of DVT which is unprovoked.    His the first episode was provoked secondary to surgery.    Given that he has had 2 DVTs so far and the most recent one being unprovoked thrombophilia work-up has been performed and all labs are negative.    Given that he has had 1 provoked DVT in the past but now the most recent one is unprovoked, although thrombophilia work-up is negative would recommend long-term anticoagulation to prevent any further DVTs and PEs.  Eliquis no longer covered by insurance and had switched to Xarelto  He will continue Xarelto indefinitely  No evidence of recurrent DVT or PE  Counseled on regular exercise and compliance with Xarelto  2/8/2024: Continues Xarelto daily without signs or symptoms of bleeding.     Also recommend age appropriate [id number]-date on prostate cancer screening.    He is past due for colonoscopy.  Explained the importance of recommended screening and risk of DVT and PE with underlying malignancies.  Discussed once again today the need for up-to-date colonoscopy.  Patient wishes to schedule this himself as he previously saw Dr. James.    He received a phone call from the gastroenterology office however he has to schedule his colonoscopy.  Explained to him that he would have to hold the Xarelto 2 days prior to the procedure.    *Bilateral lower extremity swelling-could be secondary to amlodipine.  Resolved    *Hypertension-on hydrochlorothiazide, benazepril, metoprolol.  Blood pressure 156/98    *Hypothyroidism-continue Synthroid, stable    *Obesity-previously discussed with Mr. Edge that obesity is a risk factor for DVT.  Recommend regular exercise and healthy diet.  BMI 49.8.  He had a recent knee issue requiring admission to the hospital limiting his exercise ability.    *Knee effusion-thought to be secondary to osteoarthritis    PLAN:   Continue Xarelto 20 mg daily.  Return in 6 months for MD  follow-up.

## 2024-02-08 ENCOUNTER — LAB (OUTPATIENT)
Dept: OTHER | Facility: HOSPITAL | Age: 54
End: 2024-02-08
Payer: COMMERCIAL

## 2024-02-08 ENCOUNTER — OFFICE VISIT (OUTPATIENT)
Dept: ONCOLOGY | Facility: CLINIC | Age: 54
End: 2024-02-08
Payer: COMMERCIAL

## 2024-02-08 ENCOUNTER — OFFICE VISIT (OUTPATIENT)
Dept: OTHER | Facility: HOSPITAL | Age: 54
End: 2024-02-08
Payer: COMMERCIAL

## 2024-02-08 VITALS
OXYGEN SATURATION: 94 % | BODY MASS INDEX: 45.1 KG/M2 | TEMPERATURE: 98.2 F | HEART RATE: 96 BPM | WEIGHT: 315 LBS | SYSTOLIC BLOOD PRESSURE: 156 MMHG | HEIGHT: 70 IN | DIASTOLIC BLOOD PRESSURE: 98 MMHG | RESPIRATION RATE: 16 BRPM

## 2024-02-08 DIAGNOSIS — I82.432 ACUTE DEEP VEIN THROMBOSIS (DVT) OF POPLITEAL VEIN OF LEFT LOWER EXTREMITY: Primary | ICD-10-CM

## 2024-02-08 DIAGNOSIS — I82.432 ACUTE DEEP VEIN THROMBOSIS (DVT) OF POPLITEAL VEIN OF LEFT LOWER EXTREMITY: ICD-10-CM

## 2024-02-08 LAB
BASOPHILS # BLD AUTO: 0.09 10*3/MM3 (ref 0–0.2)
BASOPHILS NFR BLD AUTO: 0.9 % (ref 0–1.5)
DEPRECATED RDW RBC AUTO: 49 FL (ref 37–54)
EOSINOPHIL # BLD AUTO: 0.24 10*3/MM3 (ref 0–0.4)
EOSINOPHIL NFR BLD AUTO: 2.5 % (ref 0.3–6.2)
ERYTHROCYTE [DISTWIDTH] IN BLOOD BY AUTOMATED COUNT: 14.6 % (ref 12.3–15.4)
HCT VFR BLD AUTO: 46.3 % (ref 37.5–51)
HGB BLD-MCNC: 15.4 G/DL (ref 13–17.7)
IMM GRANULOCYTES # BLD AUTO: 0.21 10*3/MM3 (ref 0–0.05)
IMM GRANULOCYTES NFR BLD AUTO: 2.2 % (ref 0–0.5)
LYMPHOCYTES # BLD AUTO: 2.09 10*3/MM3 (ref 0.7–3.1)
LYMPHOCYTES NFR BLD AUTO: 21.5 % (ref 19.6–45.3)
MCH RBC QN AUTO: 30.1 PG (ref 26.6–33)
MCHC RBC AUTO-ENTMCNC: 33.3 G/DL (ref 31.5–35.7)
MCV RBC AUTO: 90.6 FL (ref 79–97)
MONOCYTES # BLD AUTO: 0.9 10*3/MM3 (ref 0.1–0.9)
MONOCYTES NFR BLD AUTO: 9.2 % (ref 5–12)
NEUTROPHILS NFR BLD AUTO: 6.2 10*3/MM3 (ref 1.7–7)
NEUTROPHILS NFR BLD AUTO: 63.7 % (ref 42.7–76)
NRBC BLD AUTO-RTO: 0 /100 WBC (ref 0–0.2)
PLATELET # BLD AUTO: 273 10*3/MM3 (ref 140–450)
PMV BLD AUTO: 10.2 FL (ref 6–12)
RBC # BLD AUTO: 5.11 10*6/MM3 (ref 4.14–5.8)
WBC NRBC COR # BLD AUTO: 9.73 10*3/MM3 (ref 3.4–10.8)

## 2024-02-08 PROCEDURE — 36415 COLL VENOUS BLD VENIPUNCTURE: CPT

## 2024-02-08 PROCEDURE — 85025 COMPLETE CBC W/AUTO DIFF WBC: CPT | Performed by: NURSE PRACTITIONER

## 2024-02-09 DIAGNOSIS — J30.2 SEASONAL ALLERGIES: ICD-10-CM

## 2024-02-09 RX ORDER — MONTELUKAST SODIUM 10 MG/1
10 TABLET ORAL NIGHTLY
Qty: 90 TABLET | Refills: 1 | Status: SHIPPED | OUTPATIENT
Start: 2024-02-09

## 2024-02-12 ENCOUNTER — OFFICE VISIT (OUTPATIENT)
Dept: INTERNAL MEDICINE | Facility: CLINIC | Age: 54
End: 2024-02-12
Payer: COMMERCIAL

## 2024-02-12 VITALS
SYSTOLIC BLOOD PRESSURE: 124 MMHG | HEART RATE: 91 BPM | OXYGEN SATURATION: 96 % | HEIGHT: 70 IN | WEIGHT: 315 LBS | DIASTOLIC BLOOD PRESSURE: 90 MMHG | BODY MASS INDEX: 45.1 KG/M2

## 2024-02-12 DIAGNOSIS — J30.2 SEASONAL ALLERGIES: ICD-10-CM

## 2024-02-12 DIAGNOSIS — N13.8 BENIGN PROSTATIC HYPERPLASIA WITH URINARY OBSTRUCTION: Primary | ICD-10-CM

## 2024-02-12 DIAGNOSIS — I10 BENIGN ESSENTIAL HYPERTENSION: ICD-10-CM

## 2024-02-12 DIAGNOSIS — E66.01 CLASS 3 SEVERE OBESITY DUE TO EXCESS CALORIES WITHOUT SERIOUS COMORBIDITY WITH BODY MASS INDEX (BMI) OF 40.0 TO 44.9 IN ADULT: Chronic | ICD-10-CM

## 2024-02-12 DIAGNOSIS — Z00.00 ANNUAL PHYSICAL EXAM: ICD-10-CM

## 2024-02-12 DIAGNOSIS — I10 PRIMARY HYPERTENSION: Chronic | ICD-10-CM

## 2024-02-12 DIAGNOSIS — E78.2 MIXED HYPERLIPIDEMIA: Chronic | ICD-10-CM

## 2024-02-12 DIAGNOSIS — Z87.09 HISTORY OF ASTHMA: ICD-10-CM

## 2024-02-12 DIAGNOSIS — N40.1 BENIGN PROSTATIC HYPERPLASIA WITH URINARY OBSTRUCTION: Primary | ICD-10-CM

## 2024-02-12 DIAGNOSIS — K21.00 GASTROESOPHAGEAL REFLUX DISEASE WITH ESOPHAGITIS WITHOUT HEMORRHAGE: ICD-10-CM

## 2024-02-12 DIAGNOSIS — R73.03 PREDIABETES: Chronic | ICD-10-CM

## 2024-02-12 PROCEDURE — 99396 PREV VISIT EST AGE 40-64: CPT

## 2024-02-12 RX ORDER — ESOMEPRAZOLE MAGNESIUM 40 MG/1
40 CAPSULE, DELAYED RELEASE ORAL
Qty: 90 CAPSULE | Refills: 1 | Status: SHIPPED | OUTPATIENT
Start: 2024-02-12

## 2024-02-12 RX ORDER — BENAZEPRIL HYDROCHLORIDE 40 MG/1
40 TABLET ORAL DAILY
Qty: 90 TABLET | Refills: 1 | Status: SHIPPED | OUTPATIENT
Start: 2024-02-12

## 2024-02-13 LAB
ALBUMIN SERPL-MCNC: 4.3 G/DL (ref 3.5–5.2)
ALBUMIN/GLOB SERPL: 1.7 G/DL
ALP SERPL-CCNC: 101 U/L (ref 39–117)
ALT SERPL-CCNC: 19 U/L (ref 1–41)
APPEARANCE UR: CLEAR
AST SERPL-CCNC: 17 U/L (ref 1–40)
BACTERIA #/AREA URNS HPF: ABNORMAL /HPF
BILIRUB SERPL-MCNC: 1.4 MG/DL (ref 0–1.2)
BILIRUB UR QL STRIP: NEGATIVE
BUN SERPL-MCNC: 9 MG/DL (ref 6–20)
BUN/CREAT SERPL: 7.5 (ref 7–25)
CALCIUM SERPL-MCNC: 9.3 MG/DL (ref 8.6–10.5)
CASTS URNS MICRO: ABNORMAL
CHLORIDE SERPL-SCNC: 103 MMOL/L (ref 98–107)
CHOLEST SERPL-MCNC: 160 MG/DL (ref 0–200)
CHOLEST/HDLC SERPL: 3.56 {RATIO}
CO2 SERPL-SCNC: 28.4 MMOL/L (ref 22–29)
COLOR UR: YELLOW
CREAT SERPL-MCNC: 1.2 MG/DL (ref 0.76–1.27)
EGFRCR SERPLBLD CKD-EPI 2021: 72.3 ML/MIN/1.73
EPI CELLS #/AREA URNS HPF: ABNORMAL /HPF
GLOBULIN SER CALC-MCNC: 2.5 GM/DL
GLUCOSE SERPL-MCNC: 91 MG/DL (ref 65–99)
GLUCOSE UR QL STRIP: NEGATIVE
HBA1C MFR BLD: 6.3 % (ref 4.8–5.6)
HDLC SERPL-MCNC: 45 MG/DL (ref 40–60)
HGB UR QL STRIP: ABNORMAL
KETONES UR QL STRIP: NEGATIVE
LDLC SERPL CALC-MCNC: 96 MG/DL (ref 0–100)
LEUKOCYTE ESTERASE UR QL STRIP: NEGATIVE
NITRITE UR QL STRIP: NEGATIVE
PH UR STRIP: 6.5 [PH] (ref 5–8)
POTASSIUM SERPL-SCNC: 4 MMOL/L (ref 3.5–5.2)
PROT SERPL-MCNC: 6.8 G/DL (ref 6–8.5)
PROT UR QL STRIP: NEGATIVE
RBC #/AREA URNS HPF: ABNORMAL /HPF
SODIUM SERPL-SCNC: 141 MMOL/L (ref 136–145)
SP GR UR STRIP: 1.02 (ref 1–1.03)
T4 FREE SERPL-MCNC: 1.45 NG/DL (ref 0.93–1.7)
TRIGL SERPL-MCNC: 104 MG/DL (ref 0–150)
TSH SERPL DL<=0.005 MIU/L-ACNC: 1.06 UIU/ML (ref 0.27–4.2)
UROBILINOGEN UR STRIP-MCNC: ABNORMAL MG/DL
VLDLC SERPL CALC-MCNC: 19 MG/DL (ref 5–40)
WBC #/AREA URNS HPF: ABNORMAL /HPF

## 2024-03-12 DIAGNOSIS — M16.11 PRIMARY OSTEOARTHRITIS OF RIGHT HIP: Primary | ICD-10-CM

## 2024-03-15 DIAGNOSIS — Z87.09 HISTORY OF ASTHMA: ICD-10-CM

## 2024-03-18 RX ORDER — FLUTICASONE PROPIONATE AND SALMETEROL 100; 50 UG/1; UG/1
1 POWDER RESPIRATORY (INHALATION) 2 TIMES DAILY
Qty: 60 EACH | Refills: 0 | Status: SHIPPED | OUTPATIENT
Start: 2024-03-18

## 2024-03-28 DIAGNOSIS — I87.303 STASIS EDEMA OF BOTH LOWER EXTREMITIES: ICD-10-CM

## 2024-03-28 RX ORDER — POTASSIUM CHLORIDE 750 MG/1
10 TABLET, FILM COATED, EXTENDED RELEASE ORAL DAILY
Qty: 30 TABLET | Refills: 2 | Status: SHIPPED | OUTPATIENT
Start: 2024-03-28

## 2024-04-03 ENCOUNTER — HOSPITAL ENCOUNTER (OUTPATIENT)
Dept: GENERAL RADIOLOGY | Facility: HOSPITAL | Age: 54
Discharge: HOME | End: 2024-04-03
Admitting: NURSE PRACTITIONER
Payer: COMMERCIAL

## 2024-04-03 DIAGNOSIS — M16.11 PRIMARY OSTEOARTHRITIS OF RIGHT HIP: ICD-10-CM

## 2024-04-03 PROCEDURE — 77002 NEEDLE LOCALIZATION BY XRAY: CPT

## 2024-04-03 PROCEDURE — 25510000001 IOPAMIDOL 61 % SOLUTION: Performed by: NURSE PRACTITIONER

## 2024-04-03 PROCEDURE — 25010000002 BUPIVACAINE (PF) 0.25 % SOLUTION: Performed by: NURSE PRACTITIONER

## 2024-04-03 PROCEDURE — 25010000002 LIDOCAINE 1 % SOLUTION: Performed by: NURSE PRACTITIONER

## 2024-04-03 PROCEDURE — 25010000002 METHYLPREDNISOLONE PER 80 MG: Performed by: NURSE PRACTITIONER

## 2024-04-03 RX ORDER — LIDOCAINE HYDROCHLORIDE 10 MG/ML
20 INJECTION, SOLUTION INFILTRATION; PERINEURAL ONCE
Status: COMPLETED | OUTPATIENT
Start: 2024-04-03 | End: 2024-04-03

## 2024-04-03 RX ORDER — BUPIVACAINE HYDROCHLORIDE 2.5 MG/ML
10 INJECTION, SOLUTION EPIDURAL; INFILTRATION; INTRACAUDAL ONCE
Status: COMPLETED | OUTPATIENT
Start: 2024-04-03 | End: 2024-04-03

## 2024-04-03 RX ORDER — METHYLPREDNISOLONE ACETATE 80 MG/ML
80 INJECTION, SUSPENSION INTRA-ARTICULAR; INTRALESIONAL; INTRAMUSCULAR; SOFT TISSUE ONCE
Status: COMPLETED | OUTPATIENT
Start: 2024-04-03 | End: 2024-04-03

## 2024-04-03 RX ADMIN — BUPIVACAINE HYDROCHLORIDE 2 ML: 2.5 INJECTION, SOLUTION EPIDURAL; INFILTRATION; INTRACAUDAL; PERINEURAL at 12:45

## 2024-04-03 RX ADMIN — METHYLPREDNISOLONE ACETATE 80 MG: 80 INJECTION, SUSPENSION INTRA-ARTICULAR; INTRALESIONAL; INTRAMUSCULAR; SOFT TISSUE at 12:45

## 2024-04-03 RX ADMIN — IOPAMIDOL 0.5 ML: 612 INJECTION, SOLUTION INTRAVENOUS at 12:58

## 2024-04-03 RX ADMIN — LIDOCAINE HYDROCHLORIDE 2 ML: 10 INJECTION, SOLUTION INFILTRATION; PERINEURAL at 12:45

## 2024-04-24 DIAGNOSIS — I10 BENIGN ESSENTIAL HYPERTENSION: ICD-10-CM

## 2024-04-25 RX ORDER — METOPROLOL SUCCINATE 50 MG/1
TABLET, EXTENDED RELEASE ORAL
Qty: 135 TABLET | Refills: 0 | Status: SHIPPED | OUTPATIENT
Start: 2024-04-25

## 2024-04-26 DIAGNOSIS — Z87.09 HISTORY OF ASTHMA: ICD-10-CM

## 2024-04-29 RX ORDER — FLUTICASONE PROPIONATE AND SALMETEROL 100; 50 UG/1; UG/1
1 POWDER RESPIRATORY (INHALATION) 2 TIMES DAILY
Qty: 60 EACH | Refills: 0 | Status: SHIPPED | OUTPATIENT
Start: 2024-04-29

## 2024-04-30 ENCOUNTER — OFFICE VISIT (OUTPATIENT)
Dept: INTERNAL MEDICINE | Facility: CLINIC | Age: 54
End: 2024-04-30
Payer: COMMERCIAL

## 2024-04-30 VITALS
TEMPERATURE: 97.1 F | DIASTOLIC BLOOD PRESSURE: 86 MMHG | BODY MASS INDEX: 45.1 KG/M2 | HEART RATE: 85 BPM | HEIGHT: 70 IN | OXYGEN SATURATION: 96 % | WEIGHT: 315 LBS | SYSTOLIC BLOOD PRESSURE: 136 MMHG

## 2024-04-30 DIAGNOSIS — I10 PRIMARY HYPERTENSION: Primary | Chronic | ICD-10-CM

## 2024-04-30 PROCEDURE — 99213 OFFICE O/P EST LOW 20 MIN: CPT

## 2024-04-30 NOTE — PROGRESS NOTES
"Chief Complaint  Hypertension    Subjective        Grant Edge Jr. presents to [facility] PRIMARY [facility]ARE  History of Present Illness  53-year-old male presenting with hypertension.  Was at work yesterday and blood pressure was checked by nurse.  Blood pressure was 180/108.  States that he felt a little \"offkilter\" like allergies.  Denies chest pain, headaches, arm pain or changes in breathing.  At home blood pressure was 160/96.  States he has not been taking his Lasix for the past week. No swelling present.   Hypertension        Objective   Vital Signs:  /86 (BP Location: Left arm, Patient Position: Sitting, Cuff Size: Large Adult)   Pulse 85   Temp 97.1 °F (36.2 °C)   Ht 177 cm (69.69\")   Wt (!) 146 kg (321 lb 12.8 oz)   SpO2 96%   BMI 46.59 kg/m²   Estimated body mass index is 46.59 kg/m² as calculated from the following:    Height as of this encounter: 177 cm (69.69\").    Weight as of this encounter: 146 kg (321 lb 12.8 oz).               Physical Exam  Vitals reviewed.   Constitutional:       Appearance: Normal appearance.   Cardiovascular:      Rate and Rhythm: Normal rate and regular rhythm.      Pulses: Normal pulses.      Heart sounds: Normal heart sounds.   Pulmonary:      Effort: Pulmonary effort is normal.      Breath sounds: Normal breath sounds.   Musculoskeletal:         General: Normal range of motion.      Cervical back: Normal range of motion.   Skin:     General: Skin is warm and dry.      Capillary Refill: Capillary refill takes less than 2 seconds.   Neurological:      General: No focal deficit present.      Mental Status: He is alert and oriented to person, place, and time.   Psychiatric:         Mood and Affect: Mood normal.         Behavior: Behavior normal.         Thought Content: Thought content normal.         Judgment: Judgment normal.        Result Review :      Common labs          8/7/2023    16:18 2/8/2024    15:40 2/12/2024    16:23   Common Labs "   Glucose 106   91    BUN 13   9    Creatinine 1.37   1.20    Sodium 145   141    Potassium 3.9   4.0    Chloride 106   103    Calcium 9.1   9.3    Total Protein 6.3   6.8    Albumin 4.2   4.3    Total Bilirubin 0.8   1.4    Alkaline Phosphatase 86   101    AST (SGOT) 21   17    ALT (SGPT) 18   19    WBC 5.47  9.73     Hemoglobin 14.9  15.4     Hematocrit 44.9  46.3     Platelets 217  273     Total Cholesterol   160    Triglycerides   104    HDL Cholesterol   45    LDL Cholesterol    96    Hemoglobin A1C 6.40   6.30          Current Outpatient Medications on File Prior to Visit   Medication Sig Dispense Refill    benazepril (LOTENSIN) 40 MG tablet Take 1 tablet by mouth Daily. 90 tablet 1    cetirizine (zyrTEC) 10 MG tablet Take 1 tablet by mouth Daily for 30 days. 30 tablet 0    esomeprazole (nexIUM) 40 MG capsule Take 1 capsule by mouth Every Morning Before Breakfast. 90 capsule 1    fluticasone (FLONASE) 50 MCG/ACT nasal spray Use 2 sprays into the nostril(s) as directed by provider Daily for 14 days. 16 g 0    furosemide (Lasix) 20 MG tablet Take 1 tablet by mouth Every Other Day. 90 tablet 1    levothyroxine (SYNTHROID, LEVOTHROID) 112 MCG tablet TAKE 1 TABLET BY MOUTH DAILY 90 tablet 1    metoprolol succinate XL (TOPROL-XL) 50 MG 24 hr tablet TAKE 1 AND 1/2 TABLETS BY MOUTH DAILY 135 tablet 0    montelukast (SINGULAIR) 10 MG tablet TAKE 1 TABLET BY MOUTH EVERY NIGHT 90 tablet 1    potassium chloride 10 MEQ CR tablet TAKE 1 TABLET BY MOUTH DAILY 30 tablet 2    rivaroxaban (XARELTO) 20 MG tablet Take 1 tablet by mouth Daily. 90 tablet 2    Wixela Inhub 100-50 MCG/ACT DISKUS INHALE 1 PUFF BY MOUTH TWICE DAILY 60 each 0    Loratadine (CLARITIN PO) Take 10 mg by mouth Daily As Needed. (Patient not taking: Reported on 4/30/2024)      traMADol-acetaminophen (Ultracet) 37.5-325 MG per tablet Take 1 tablet by mouth Every 6 (Six) Hours As Needed for Moderate Pain. (Patient not taking: Reported on 4/30/2024) 12 tablet 0      No current facility-administered medications on file prior to visit.                Assessment and Plan     Diagnoses and all orders for this visit:    1. Primary hypertension (Primary)        Patient Instructions   Take furosemide every other day as prescribed. Monitor blood pressure at home. Stay hydrated with water.  Limit intake of sweets and carbohydrates. Follow-up as scheduled.           Follow Up     Return for Next scheduled follow up.  Patient was given instructions and counseling regarding his condition or for health maintenance advice. Please see specific information pulled into the AVS if appropriate.

## 2024-04-30 NOTE — PATIENT INSTRUCTIONS
Take furosemide every other day as prescribed. Monitor blood pressure at home. Stay hydrated with water.  Limit intake of sweets and carbohydrates. Follow-up as scheduled.

## 2024-05-08 RX ORDER — RIVAROXABAN 20 MG/1
20 TABLET, FILM COATED ORAL DAILY
Qty: 90 TABLET | Refills: 0 | Status: SHIPPED | OUTPATIENT
Start: 2024-05-08

## 2024-06-05 DIAGNOSIS — Z87.09 HISTORY OF ASTHMA: ICD-10-CM

## 2024-06-05 RX ORDER — FLUTICASONE PROPIONATE AND SALMETEROL 100; 50 UG/1; UG/1
1 POWDER RESPIRATORY (INHALATION) 2 TIMES DAILY
Qty: 60 EACH | Refills: 0 | Status: SHIPPED | OUTPATIENT
Start: 2024-06-05

## 2024-06-05 NOTE — TELEPHONE ENCOUNTER
Rx Refill Note  Requested Prescriptions     Pending Prescriptions Disp Refills    Fluticasone-Salmeterol (ADVAIR/WIXELA) 100-50 MCG/ACT DISKUS [Pharmacy Med Name: FLUTICASONE/SALM DISK 100/50MCG 60S] 60 each 0     Sig: INHALE 1 PUFF BY MOUTH TWICE DAILY      Last office visit with prescribing clinician: 4/30/2024   Last telemedicine visit with prescribing clinician: 1/30/2024   Next office visit with prescribing clinician: 6/13/2024

## 2024-06-07 ENCOUNTER — OFFICE VISIT (OUTPATIENT)
Dept: INTERNAL MEDICINE | Facility: CLINIC | Age: 54
End: 2024-06-07
Payer: COMMERCIAL

## 2024-06-07 ENCOUNTER — TELEPHONE (OUTPATIENT)
Dept: INTERNAL MEDICINE | Facility: CLINIC | Age: 54
End: 2024-06-07

## 2024-06-07 VITALS
OXYGEN SATURATION: 95 % | SYSTOLIC BLOOD PRESSURE: 160 MMHG | DIASTOLIC BLOOD PRESSURE: 108 MMHG | HEIGHT: 70 IN | HEART RATE: 89 BPM | TEMPERATURE: 99 F | WEIGHT: 315 LBS | BODY MASS INDEX: 45.1 KG/M2

## 2024-06-07 DIAGNOSIS — J06.9 UPPER RESPIRATORY TRACT INFECTION, UNSPECIFIED TYPE: Primary | ICD-10-CM

## 2024-06-07 PROCEDURE — 99213 OFFICE O/P EST LOW 20 MIN: CPT

## 2024-06-07 RX ORDER — DEXTROMETHORPHAN HYDROBROMIDE AND PROMETHAZINE HYDROCHLORIDE 15; 6.25 MG/5ML; MG/5ML
5 SYRUP ORAL 4 TIMES DAILY PRN
Qty: 180 ML | Refills: 1 | Status: SHIPPED | OUTPATIENT
Start: 2024-06-07

## 2024-06-07 RX ORDER — PREDNISONE 10 MG/1
TABLET ORAL
Qty: 26 TABLET | Refills: 0 | Status: SHIPPED | OUTPATIENT
Start: 2024-06-07

## 2024-06-07 RX ORDER — AMOXICILLIN 500 MG/1
1000 CAPSULE ORAL 2 TIMES DAILY
Qty: 28 CAPSULE | Refills: 0 | Status: SHIPPED | OUTPATIENT
Start: 2024-06-07 | End: 2024-06-14

## 2024-06-07 NOTE — TELEPHONE ENCOUNTER
Caller: Grant Edge Jr.    Relationship: Self    Best call back number: [phone]    What medication are you requesting: PRESCRIPTIONS FROM TODAY 6-7-24    If a prescription is needed, what is your preferred pharmacy and phone number: [id number] [place], [place] - [address] [facility] [address] [facility] [phone] [facility] [phone]      Additional notes:  PATIENT STATES THAT PHARMACY WHERE PRESCRIPTIONS WERE SENT IS CLOSED.   PATIENT ASKS THAT PRESCRIPTIONS BE SENT TO PHARMACY LISTED ABOVE

## 2024-06-07 NOTE — PROGRESS NOTES
"Chief Complaint  Cough and URI    Subjective        Grnat Edge Jr. presents to [facility] PRIMARY [facility]  History of Present Illness  53-year-old male presenting with upper respiratory symptoms.  Symptoms started on Thursday and felt like a sinus infection.  Sinus pressure is improved but drainage continues.  Is starting to have some increased wheezing.  Taking inhalers as prescribed and needed to take albuterol a bit more frequently.  Cough has been significant and was really bad last night.  Denies fever, body aches, sore throat, ear pain.  Taking Mucinex.  Cough    URI   Associated symptoms include coughing.       Objective   Vital Signs:  BP (!) 160/108 (BP Location: Left arm, Patient Position: Sitting, Cuff Size: Large Adult)   Pulse 89   Temp 99 °F (37.2 °C)   Ht 177 cm (69.69\")   Wt (!) 146 kg (322 lb 12.8 oz)   SpO2 95%   BMI 46.73 kg/m²   Estimated body mass index is 46.73 kg/m² as calculated from the following:    Height as of this encounter: 177 cm (69.69\").    Weight as of this encounter: 146 kg (322 lb 12.8 oz).               Physical Exam  Vitals reviewed.   Constitutional:       Appearance: Normal appearance.   Cardiovascular:      Rate and Rhythm: Normal rate and regular rhythm.      Pulses: Normal pulses.      Heart sounds: Normal heart sounds.   Pulmonary:      Effort: Pulmonary effort is normal.      Breath sounds: Wheezing present.   Musculoskeletal:         General: Normal range of motion.      Cervical back: Normal range of motion.   Skin:     General: Skin is warm and dry.      Capillary Refill: Capillary refill takes less than 2 seconds.   Neurological:      General: No focal deficit present.      Mental Status: He is alert and oriented to person, place, and time.   Psychiatric:         Mood and Affect: Mood normal.         Behavior: Behavior normal.         Thought Content: Thought content normal.         Judgment: Judgment normal.        Result Review :      Common " labs          8/7/2023    16:18 2/8/2024    15:40 2/12/2024    16:23   Common Labs   Glucose 106   91    BUN 13   9    Creatinine 1.37   1.20    Sodium 145   141    Potassium 3.9   4.0    Chloride 106   103    Calcium 9.1   9.3    Total Protein 6.3   6.8    Albumin 4.2   4.3    Total Bilirubin 0.8   1.4    Alkaline Phosphatase 86   101    AST (SGOT) 21   17    ALT (SGPT) 18   19    WBC 5.47  9.73     Hemoglobin 14.9  15.4     Hematocrit 44.9  46.3     Platelets 217  273     Total Cholesterol   160    Triglycerides   104    HDL Cholesterol   45    LDL Cholesterol    96    Hemoglobin A1C 6.40   6.30          Current Outpatient Medications on File Prior to Visit   Medication Sig Dispense Refill    benazepril (LOTENSIN) 40 MG tablet Take 1 tablet by mouth Daily. 90 tablet 1    cetirizine (zyrTEC) 10 MG tablet Take 1 tablet by mouth Daily for 30 days. 30 tablet 0    esomeprazole (nexIUM) 40 MG capsule Take 1 capsule by mouth Every Morning Before Breakfast. 90 capsule 1    fluticasone (FLONASE) 50 MCG/ACT nasal spray Use 2 sprays into the nostril(s) as directed by provider Daily for 14 days. 16 g 0    Fluticasone-Salmeterol (ADVAIR/WIXELA) 100-50 MCG/ACT DISKUS INHALE 1 PUFF BY MOUTH TWICE DAILY 60 each 0    furosemide (Lasix) 20 MG tablet Take 1 tablet by mouth Every Other Day. 90 tablet 1    levothyroxine (SYNTHROID, LEVOTHROID) 112 MCG tablet TAKE 1 TABLET BY MOUTH DAILY 90 tablet 1    metoprolol succinate XL (TOPROL-XL) 50 MG 24 hr tablet TAKE 1 AND 1/2 TABLETS BY MOUTH DAILY 135 tablet 0    montelukast (SINGULAIR) 10 MG tablet TAKE 1 TABLET BY MOUTH EVERY NIGHT 90 tablet 1    potassium chloride 10 MEQ CR tablet TAKE 1 TABLET BY MOUTH DAILY 30 tablet 2    rivaroxaban (Xarelto) 20 MG tablet TAKE 1 TABLET BY MOUTH DAILY 90 tablet 0     No current facility-administered medications on file prior to visit.                Assessment and Plan     Diagnoses and all orders for this visit:    1. Upper respiratory tract  infection, unspecified type (Primary)  -     predniSONE (DELTASONE) 10 MG tablet; Take 4 tablets by mouth on days 1-2. Take 3 tablets by mouth on days 3-4. Take 2 tablets by mouth on days 5-8. Take 1 tablet by mouth on days 9-12.  Dispense: 26 tablet; Refill: 0  -     amoxicillin (AMOXIL) 500 MG capsule; Take 2 capsules by mouth 2 (Two) Times a Day for 7 days.  Dispense: 28 capsule; Refill: 0  -     promethazine-dextromethorphan (PROMETHAZINE-DM) 6.25-15 MG/5ML syrup; Take 5 mL by mouth 4 (Four) Times a Day As Needed for Cough.  Dispense: 180 mL; Refill: 1        Patient Instructions   Take antibiotic and steroids as prescribed. Take cough syrup at bedtime. Stay hydrated with water.  Continue Mucinex. Contact [facility] to schedule appointment if possible. Notify office if referral needed. Follow-up as scheduled.            Follow Up     Return for Next scheduled follow up.  Patient was given instructions and counseling regarding his condition or for health maintenance advice. Please see specific information pulled into the AVS if appropriate.

## 2024-06-07 NOTE — PATIENT INSTRUCTIONS
Take antibiotic and steroids as prescribed. Take cough syrup at bedtime. Stay hydrated with water.  Continue Mucinex. Contact [facility] to schedule appointment if possible. Notify office if referral needed. Follow-up as scheduled.

## 2024-06-10 NOTE — TELEPHONE ENCOUNTER
Patient called at end of day Friday stating that the pharmacy we sent his medications to was closed.

## 2024-06-10 NOTE — TELEPHONE ENCOUNTER
Called and spoke with patient, he was able to go to another [facility] location and have them pull the prescriptions over

## 2024-06-20 ENCOUNTER — OFFICE VISIT (OUTPATIENT)
Dept: INTERNAL MEDICINE | Facility: CLINIC | Age: 54
End: 2024-06-20
Payer: COMMERCIAL

## 2024-06-20 VITALS
HEART RATE: 78 BPM | WEIGHT: 315 LBS | BODY MASS INDEX: 45.1 KG/M2 | SYSTOLIC BLOOD PRESSURE: 132 MMHG | DIASTOLIC BLOOD PRESSURE: 84 MMHG | HEIGHT: 70 IN | OXYGEN SATURATION: 95 % | TEMPERATURE: 98.6 F

## 2024-06-20 DIAGNOSIS — I10 PRIMARY HYPERTENSION: Primary | Chronic | ICD-10-CM

## 2024-06-20 DIAGNOSIS — R73.03 PREDIABETES: Chronic | ICD-10-CM

## 2024-06-20 PROCEDURE — 99214 OFFICE O/P EST MOD 30 MIN: CPT

## 2024-06-20 NOTE — PROGRESS NOTES
"Chief Complaint  Hypertension (4 months f/u)    Subjective        Grant Edge  presents to [facility] PRIMARY [facility]  History of Present Illness  53-year-old male presenting for hypertension follow-up.  Blood pressure has improved.  Taking benazepril daily and furosemide every other day.  Breathing is better.  Is finishing up prednisone taper.  Still has a slight cough.  Denies any wheezing.  Has not taken furosemide today.  This summer is more physically active at work.  Is unable to exercise while at home due to physical exhaustion from work.  Right hip also causes pain that prevents him from moving his frequently.      Objective   Vital Signs:  /84 (BP Location: Left arm, Patient Position: Sitting, Cuff Size: Large Adult)   Pulse 78   Temp 98.6 °F (37 °C)   Ht 177 cm (69.69\")   Wt (!) 148 kg (327 lb 3.2 oz)   SpO2 95%   BMI 47.37 kg/m²   Estimated body mass index is 47.37 kg/m² as calculated from the following:    Height as of this encounter: 177 cm (69.69\").    Weight as of this encounter: 148 kg (327 lb 3.2 oz).               Physical Exam  Vitals reviewed.   Constitutional:       Appearance: Normal appearance.   Cardiovascular:      Rate and Rhythm: Normal rate and regular rhythm.      Pulses: Normal pulses.      Heart sounds: Normal heart sounds.   Pulmonary:      Effort: Pulmonary effort is normal.      Breath sounds: Normal breath sounds.   Musculoskeletal:         General: Normal range of motion.      Cervical back: Normal range of motion.   Skin:     General: Skin is warm and dry.      Capillary Refill: Capillary refill takes less than 2 seconds.   Neurological:      General: No focal deficit present.      Mental Status: He is alert and oriented to person, place, and time.   Psychiatric:         Mood and Affect: Mood normal.         Behavior: Behavior normal.         Thought Content: Thought content normal.         Judgment: Judgment normal.        Result Review " :      Common labs          8/7/2023    16:18 2/8/2024    15:40 2/12/2024    16:23   Common Labs   Glucose 106   91    BUN 13   9    Creatinine 1.37   1.20    Sodium 145   141    Potassium 3.9   4.0    Chloride 106   103    Calcium 9.1   9.3    Total Protein 6.3   6.8    Albumin 4.2   4.3    Total Bilirubin 0.8   1.4    Alkaline Phosphatase 86   101    AST (SGOT) 21   17    ALT (SGPT) 18   19    WBC 5.47  9.73     Hemoglobin 14.9  15.4     Hematocrit 44.9  46.3     Platelets 217  273     Total Cholesterol   160    Triglycerides   104    HDL Cholesterol   45    LDL Cholesterol    96    Hemoglobin A1C 6.40   6.30          Current Outpatient Medications on File Prior to Visit   Medication Sig Dispense Refill    benazepril (LOTENSIN) 40 MG tablet Take 1 tablet by mouth Daily. 90 tablet 1    cetirizine (zyrTEC) 10 MG tablet Take 1 tablet by mouth Daily for 30 days. 30 tablet 0    esomeprazole (nexIUM) 40 MG capsule Take 1 capsule by mouth Every Morning Before Breakfast. 90 capsule 1    fluticasone (FLONASE) 50 MCG/ACT nasal spray Use 2 sprays into the nostril(s) as directed by provider Daily for 14 days. 16 g 0    Fluticasone-Salmeterol (ADVAIR/WIXELA) 100-50 MCG/ACT DISKUS INHALE 1 PUFF BY MOUTH TWICE DAILY 60 each 0    furosemide (Lasix) 20 MG tablet Take 1 tablet by mouth Every Other Day. 90 tablet 1    levothyroxine (SYNTHROID, LEVOTHROID) 112 MCG tablet TAKE 1 TABLET BY MOUTH DAILY 90 tablet 1    metoprolol succinate XL (TOPROL-XL) 50 MG 24 hr tablet TAKE 1 AND 1/2 TABLETS BY MOUTH DAILY 135 tablet 0    montelukast (SINGULAIR) 10 MG tablet TAKE 1 TABLET BY MOUTH EVERY NIGHT 90 tablet 1    potassium chloride 10 MEQ CR tablet TAKE 1 TABLET BY MOUTH DAILY 30 tablet 2    predniSONE (DELTASONE) 10 MG tablet Take 4 tablets by mouth on days 1-2. Take 3 tablets by mouth on days 3-4. Take 2 tablets by mouth on days 5-8. Take 1 tablet by mouth on days 9-12. 26 tablet 0    promethazine-dextromethorphan (PROMETHAZINE-DM)  6.25-15 MG/5ML syrup Take 5 mL by mouth 4 (Four) Times a Day As Needed for Cough. 180 mL 1    rivaroxaban (Xarelto) 20 MG tablet TAKE 1 TABLET BY MOUTH DAILY 90 tablet 0     No current facility-administered medications on file prior to visit.                Assessment and Plan     Diagnoses and all orders for this visit:    1. Primary hypertension (Primary)    2. Prediabetes  -     Hemoglobin A1c  -     Comprehensive Metabolic Panel        Patient Instructions   Continue medications as prescribed.  Monitor blood pressure at home daily.  Limit sodium intake.  Stay hydrated with water.  Increase activity as tolerated. Contact Dr. James's office for colonoscopy to be scheduled.  Follow-up in 4 months for recheck for fasting labs and PSA.           Follow Up     Return in about 4 months (around 10/20/2024) for Recheck.  Patient was given instructions and counseling regarding his condition or for health maintenance advice. Please see specific information pulled into the AVS if appropriate.

## 2024-06-20 NOTE — PATIENT INSTRUCTIONS
Continue medications as prescribed.  Monitor blood pressure at home daily.  Limit sodium intake.  Stay hydrated with water.  Increase activity as tolerated. Contact Dr. James's office for colonoscopy to be scheduled.  Follow-up in 4 months for recheck for fasting labs and PSA.

## 2024-06-21 LAB
ALBUMIN SERPL-MCNC: 4 G/DL (ref 3.5–5.2)
ALBUMIN/GLOB SERPL: 1.7 G/DL
ALP SERPL-CCNC: 99 U/L (ref 39–117)
ALT SERPL-CCNC: 21 U/L (ref 1–41)
AST SERPL-CCNC: 19 U/L (ref 1–40)
BILIRUB SERPL-MCNC: 0.9 MG/DL (ref 0–1.2)
BUN SERPL-MCNC: 17 MG/DL (ref 6–20)
BUN/CREAT SERPL: 14.9 (ref 7–25)
CALCIUM SERPL-MCNC: 9.2 MG/DL (ref 8.6–10.5)
CHLORIDE SERPL-SCNC: 104 MMOL/L (ref 98–107)
CO2 SERPL-SCNC: 27.7 MMOL/L (ref 22–29)
CREAT SERPL-MCNC: 1.14 MG/DL (ref 0.76–1.27)
EGFRCR SERPLBLD CKD-EPI 2021: 76.9 ML/MIN/1.73
GLOBULIN SER CALC-MCNC: 2.3 GM/DL
GLUCOSE SERPL-MCNC: 78 MG/DL (ref 65–99)
HBA1C MFR BLD: 6.2 % (ref 4.8–5.6)
POTASSIUM SERPL-SCNC: 4.6 MMOL/L (ref 3.5–5.2)
PROT SERPL-MCNC: 6.3 G/DL (ref 6–8.5)
SODIUM SERPL-SCNC: 143 MMOL/L (ref 136–145)

## 2024-06-21 NOTE — PROGRESS NOTES
Glucose is 78 correlating with hemoglobin A1c of 6.2.  No signs of kidney injury, liver injury or electrolyte imbalance.Continue managing blood sugars through diet.  Will continue to monitor in the future.

## 2024-06-28 DIAGNOSIS — E03.8 OTHER SPECIFIED HYPOTHYROIDISM: ICD-10-CM

## 2024-06-28 RX ORDER — LEVOTHYROXINE SODIUM 112 UG/1
112 TABLET ORAL DAILY
Qty: 90 TABLET | Refills: 1 | Status: SHIPPED | OUTPATIENT
Start: 2024-06-28

## 2024-07-11 DIAGNOSIS — I87.303 STASIS EDEMA OF BOTH LOWER EXTREMITIES: ICD-10-CM

## 2024-07-12 RX ORDER — POTASSIUM CHLORIDE 750 MG/1
10 TABLET, FILM COATED, EXTENDED RELEASE ORAL DAILY
Qty: 30 TABLET | Refills: 2 | Status: SHIPPED | OUTPATIENT
Start: 2024-07-12

## 2024-07-22 DIAGNOSIS — Z87.09 HISTORY OF ASTHMA: ICD-10-CM

## 2024-07-23 ENCOUNTER — OFFICE VISIT (OUTPATIENT)
Dept: INTERNAL MEDICINE | Facility: CLINIC | Age: 54
End: 2024-07-23
Payer: COMMERCIAL

## 2024-07-23 ENCOUNTER — HOSPITAL ENCOUNTER (OUTPATIENT)
Facility: HOSPITAL | Age: 54
Discharge: HOME | End: 2024-07-23
Payer: COMMERCIAL

## 2024-07-23 VITALS
WEIGHT: 315 LBS | HEART RATE: 77 BPM | SYSTOLIC BLOOD PRESSURE: 160 MMHG | TEMPERATURE: 98.1 F | BODY MASS INDEX: 45.1 KG/M2 | DIASTOLIC BLOOD PRESSURE: 92 MMHG | OXYGEN SATURATION: 96 % | HEIGHT: 70 IN

## 2024-07-23 DIAGNOSIS — M62.838 MUSCLE SPASM: ICD-10-CM

## 2024-07-23 DIAGNOSIS — M54.50 ACUTE LEFT-SIDED LOW BACK PAIN WITHOUT SCIATICA: Primary | ICD-10-CM

## 2024-07-23 PROCEDURE — 99213 OFFICE O/P EST LOW 20 MIN: CPT

## 2024-07-23 PROCEDURE — 72100 X-RAY EXAM L-S SPINE 2/3 VWS: CPT

## 2024-07-23 RX ORDER — CYCLOBENZAPRINE HCL 5 MG
5 TABLET ORAL 3 TIMES DAILY PRN
Qty: 30 TABLET | Refills: 1 | Status: SHIPPED | OUTPATIENT
Start: 2024-07-23 | End: 2024-07-26

## 2024-07-23 RX ORDER — FLUTICASONE PROPIONATE AND SALMETEROL 100; 50 UG/1; UG/1
1 POWDER RESPIRATORY (INHALATION) 2 TIMES DAILY
Qty: 60 EACH | Refills: 0 | Status: SHIPPED | OUTPATIENT
Start: 2024-07-23

## 2024-07-23 NOTE — LETTER
July 23, 2024     Patient: Grant Edge Jr.   YOB: 1970   Date of Visit: 7/23/2024       To Whom It May Concern:    It is my medical opinion that Grant Edge may return to work on 7/27/24 .           Sincerely,        MELANY Duncan    CC: No Recipients

## 2024-07-23 NOTE — PATIENT INSTRUCTIONS
Continue medications as prescribed. Take cyclobenzaprine as needed for muscle spasm. Recommend OTC lidocaine patch (IcyHot or Salonpas) as needed. X-ray today. Contact office if pain worse not improved with cyclobenzaprine and stretching. Apply heat as needed. Follow-up as needed.

## 2024-07-23 NOTE — PROGRESS NOTES
"Chief Complaint  Back Pain    Subjective        Grant Edge Jr. presents to [facility] PRIMARY [facility]  History of Present Illness  53-year-old male presenting with left-sided lower back pain.  On 7/18 patient started feeling his lower back being aggravated.  On 7/19 helped at his local [employer] moving supplies.  Woke up in the middle night on 7/19 and felt his back locked up.  Denies numbness or tingling down lower extremities.  Treating with Tylenol with little relief.  Range of motion is significantly decreased and painful to walk.  States that he is able to walk better after a while.  Back Pain        Objective   Vital Signs:  /92 (BP Location: Left arm, Patient Position: Sitting, Cuff Size: Large Adult)   Pulse 77   Temp 98.1 °F (36.7 °C)   Ht 177 cm (69.69\")   Wt (!) 147 kg (325 lb)   SpO2 96%   BMI 47.05 kg/m²   Estimated body mass index is 47.05 kg/m² as calculated from the following:    Height as of this encounter: 177 cm (69.69\").    Weight as of this encounter: 147 kg (325 lb).               Physical Exam  Vitals reviewed.   Constitutional:       Appearance: Normal appearance.   Musculoskeletal:         General: Tenderness present. No swelling.      Cervical back: Normal range of motion.      Lumbar back: Tenderness present. No swelling. Decreased range of motion.        Back:    Skin:     General: Skin is warm and dry.      Capillary Refill: Capillary refill takes less than 2 seconds.   Neurological:      General: No focal deficit present.      Mental Status: He is alert and oriented to person, place, and time.   Psychiatric:         Mood and Affect: Mood normal.         Behavior: Behavior normal.         Thought Content: Thought content normal.         Judgment: Judgment normal.        Result Review :      Common labs          2/8/2024    15:40 2/12/2024    16:23 6/20/2024    13:29   Common Labs   Glucose  91  78    BUN  9  17    Creatinine  1.20  1.14    Sodium  141  143  "   Potassium  4.0  4.6    Chloride  103  104    Calcium  9.3  9.2    Total Protein  6.8  6.3    Albumin  4.3  4.0    Total Bilirubin  1.4  0.9    Alkaline Phosphatase  101  99    AST (SGOT)  17  19    ALT (SGPT)  19  21    WBC 9.73      Hemoglobin 15.4      Hematocrit 46.3      Platelets 273      Total Cholesterol  160     Triglycerides  104     HDL Cholesterol  45     LDL Cholesterol   96     Hemoglobin A1C  6.30  6.20          Current Outpatient Medications on File Prior to Visit   Medication Sig Dispense Refill    benazepril (LOTENSIN) 40 MG tablet Take 1 tablet by mouth Daily. 90 tablet 1    cetirizine (zyrTEC) 10 MG tablet Take 1 tablet by mouth Daily for 30 days. 30 tablet 0    esomeprazole (nexIUM) 40 MG capsule Take 1 capsule by mouth Every Morning Before Breakfast. 90 capsule 1    fluticasone (FLONASE) 50 MCG/ACT nasal spray Use 2 sprays into the nostril(s) as directed by provider Daily for 14 days. 16 g 0    furosemide (Lasix) 20 MG tablet Take 1 tablet by mouth Every Other Day. 90 tablet 1    levothyroxine (SYNTHROID, LEVOTHROID) 112 MCG tablet TAKE 1 TABLET BY MOUTH DAILY 90 tablet 1    metoprolol succinate XL (TOPROL-XL) 50 MG 24 hr tablet TAKE 1 AND 1/2 TABLETS BY MOUTH DAILY 135 tablet 0    montelukast (SINGULAIR) 10 MG tablet TAKE 1 TABLET BY MOUTH EVERY NIGHT 90 tablet 1    potassium chloride 10 MEQ CR tablet TAKE 1 TABLET BY MOUTH DAILY 30 tablet 2    predniSONE (DELTASONE) 10 MG tablet Take 4 tablets by mouth on days 1-2. Take 3 tablets by mouth on days 3-4. Take 2 tablets by mouth on days 5-8. Take 1 tablet by mouth on days 9-12. 26 tablet 0    promethazine-dextromethorphan (PROMETHAZINE-DM) 6.25-15 MG/5ML syrup Take 5 mL by mouth 4 (Four) Times a Day As Needed for Cough. 180 mL 1    rivaroxaban (Xarelto) 20 MG tablet TAKE 1 TABLET BY MOUTH DAILY 90 tablet 0    Wixela Inhub 100-50 MCG/ACT DISKUS INHALE 1 PUFF BY MOUTH TWICE DAILY 60 each 0    [DISCONTINUED] Fluticasone-Salmeterol (ADVAIR/WIXELA)  100-50 MCG/ACT DISKUS INHALE 1 PUFF BY MOUTH TWICE DAILY 60 each 0     No current facility-administered medications on file prior to visit.                Assessment and Plan     Diagnoses and all orders for this visit:    1. Acute left-sided low back pain without sciatica (Primary)  -     XR Spine Lumbar 2 or 3 View    2. Muscle spasm  -     cyclobenzaprine (FLEXERIL) 5 MG tablet; Take 1 tablet by mouth 3 (Three) Times a Day As Needed for Muscle Spasms.  Dispense: 30 tablet; Refill: 1        Patient Instructions   Continue medications as prescribed. Take cyclobenzaprine as needed for muscle spasm. Recommend OTC lidocaine patch (IcyHot or Salonpas) as needed. X-ray today. Contact office if pain worse not improved with cyclobenzaprine and stretching. Apply heat as needed. Follow-up as needed.            Follow Up     No follow-ups on file.  Patient was given instructions and counseling regarding his condition or for health maintenance advice. Please see specific information pulled into the AVS if appropriate.

## 2024-07-23 NOTE — TELEPHONE ENCOUNTER
Rx Refill Note  Requested Prescriptions     Pending Prescriptions Disp Refills    Wixela Inhub 100-50 MCG/ACT DISKUS [Pharmacy Med Name: WIXELA INHUB DISKUS 100/50MCG 60S] 60 each 0     Sig: INHALE 1 PUFF BY MOUTH TWICE DAILY      Last office visit with prescribing clinician: 6/20/2024   Last telemedicine visit with prescribing clinician: 1/30/2024   Next office visit with prescribing clinician: 7/23/2024                         Would you like a call back once the refill request has been completed: [] Yes [] No    If the office needs to give you a call back, can they leave a voicemail: [] Yes [] No    Chris Crockett MA  07/23/24, 08:12 EDT

## 2024-07-23 NOTE — PROGRESS NOTES
No fractures, bulges or slipped discs in x-ray.  Recommend taking muscle relaxers as prescribed, applying heat and stretching.  Follow-up as needed.

## 2024-07-24 ENCOUNTER — PATIENT MESSAGE (OUTPATIENT)
Dept: INTERNAL MEDICINE | Facility: CLINIC | Age: 54
End: 2024-07-24
Payer: COMMERCIAL

## 2024-07-24 DIAGNOSIS — M62.838 MUSCLE SPASM: Primary | ICD-10-CM

## 2024-07-25 DIAGNOSIS — I10 BENIGN ESSENTIAL HYPERTENSION: ICD-10-CM

## 2024-07-25 RX ORDER — METOPROLOL SUCCINATE 50 MG/1
TABLET, EXTENDED RELEASE ORAL
Qty: 135 TABLET | Refills: 0 | Status: SHIPPED | OUTPATIENT
Start: 2024-07-25

## 2024-07-25 NOTE — TELEPHONE ENCOUNTER
From: Grant Edge Jr.  To: Jim Joseph  Sent: 7/24/2024 2:12 PM EDT  Subject: Kash Results     He’ll I got my result back from the X-rays yesterday. Could you please call me and let me know really what it means?  Thanks

## 2024-07-26 RX ORDER — TIZANIDINE HYDROCHLORIDE 4 MG/1
4 CAPSULE, GELATIN COATED ORAL 3 TIMES DAILY PRN
Qty: 30 CAPSULE | Refills: 1 | Status: SHIPPED | OUTPATIENT
Start: 2024-07-26

## 2024-07-29 ENCOUNTER — OFFICE VISIT (OUTPATIENT)
Dept: INTERNAL MEDICINE | Facility: CLINIC | Age: 54
End: 2024-07-29
Payer: COMMERCIAL

## 2024-07-29 VITALS
HEART RATE: 90 BPM | RESPIRATION RATE: 18 BRPM | BODY MASS INDEX: 46.65 KG/M2 | DIASTOLIC BLOOD PRESSURE: 92 MMHG | SYSTOLIC BLOOD PRESSURE: 136 MMHG | OXYGEN SATURATION: 97 % | WEIGHT: 315 LBS | HEIGHT: 69 IN

## 2024-07-29 DIAGNOSIS — M54.50 ACUTE LEFT-SIDED LOW BACK PAIN WITHOUT SCIATICA: Primary | ICD-10-CM

## 2024-07-29 PROCEDURE — 99213 OFFICE O/P EST LOW 20 MIN: CPT

## 2024-07-29 NOTE — LETTER
July 29, 2024     Patient: Grant Edge .   YOB: 1970   Date of Visit: 7/29/2024       To Whom It May Concern:    It is my medical opinion that Grant Edge may return to work on 7/30/24 . Recommend limiting duties to allow time to heal.           Sincerely,        MELANY Duncan    CC: No Recipients

## 2024-07-29 NOTE — PROGRESS NOTES
"Chief Complaint  Back Pain and Follow-up    Subjective        Grant Edge . presents to [facility]  History of Present Illness  53-year-old male presenting with left-sided low back pain.  Pain in his back is limiting how much he can work.  Believes that it is improved somewhat with rest.  Has chronic right hip issues that may be contributing to his left-sided back pain.  Patient has been compensating by overusing his left side.  Started using a cane when at home to help with symptoms.  Also applying topical analgesic that helped somewhat.  Has tried tizanidine with little relief.  Unsure if it works better than cyclobenzaprine or if he is just feeling on its own.      Objective   Vital Signs:  /92 (BP Location: Left arm, Patient Position: Sitting, Cuff Size: Large Adult)   Pulse 90   Resp 18   Ht 175.3 cm (69\")   Wt (!) 147 kg (325 lb)   SpO2 97%   BMI 47.99 kg/m²   Estimated body mass index is 47.99 kg/m² as calculated from the following:    Height as of this encounter: 175.3 cm (69\").    Weight as of this encounter: 147 kg (325 lb).               Physical Exam  Vitals reviewed.   Constitutional:       Appearance: Normal appearance.   Musculoskeletal:      Cervical back: Normal range of motion.        Back:    Skin:     General: Skin is warm and dry.      Capillary Refill: Capillary refill takes less than 2 seconds.   Neurological:      General: No focal deficit present.      Mental Status: He is alert and oriented to person, place, and time.   Psychiatric:         Mood and Affect: Mood normal.         Behavior: Behavior normal.         Thought Content: Thought content normal.         Judgment: Judgment normal.        Result Review :      Common labs          2/8/2024    15:40 2/12/2024    16:23 6/20/2024    13:29   Common Labs   Glucose  91  78    BUN  9  17    Creatinine  1.20  1.14    Sodium  141  143    Potassium  4.0  4.6    Chloride  103  104    Calcium  9.3  " 9.2    Total Protein  6.8  6.3    Albumin  4.3  4.0    Total Bilirubin  1.4  0.9    Alkaline Phosphatase  101  99    AST (SGOT)  17  19    ALT (SGPT)  19  21    WBC 9.73      Hemoglobin 15.4      Hematocrit 46.3      Platelets 273      Total Cholesterol  160     Triglycerides  104     HDL Cholesterol  45     LDL Cholesterol   96     Hemoglobin A1C  6.30  6.20          Current Outpatient Medications on File Prior to Visit   Medication Sig Dispense Refill    benazepril (LOTENSIN) 40 MG tablet Take 1 tablet by mouth Daily. 90 tablet 1    esomeprazole (nexIUM) 40 MG capsule Take 1 capsule by mouth Every Morning Before Breakfast. 90 capsule 1    furosemide (Lasix) 20 MG tablet Take 1 tablet by mouth Every Other Day. 90 tablet 1    levothyroxine (SYNTHROID, LEVOTHROID) 112 MCG tablet TAKE 1 TABLET BY MOUTH DAILY 90 tablet 1    metoprolol succinate XL (TOPROL-XL) 50 MG 24 hr tablet TAKE 1 AND 1/2 TABLETS BY MOUTH DAILY 135 tablet 0    montelukast (SINGULAIR) 10 MG tablet TAKE 1 TABLET BY MOUTH EVERY NIGHT 90 tablet 1    potassium chloride 10 MEQ CR tablet TAKE 1 TABLET BY MOUTH DAILY 30 tablet 2    predniSONE (DELTASONE) 10 MG tablet Take 4 tablets by mouth on days 1-2. Take 3 tablets by mouth on days 3-4. Take 2 tablets by mouth on days 5-8. Take 1 tablet by mouth on days 9-12. 26 tablet 0    promethazine-dextromethorphan (PROMETHAZINE-DM) 6.25-15 MG/5ML syrup Take 5 mL by mouth 4 (Four) Times a Day As Needed for Cough. 180 mL 1    rivaroxaban (Xarelto) 20 MG tablet TAKE 1 TABLET BY MOUTH DAILY 90 tablet 0    TiZANidine (ZANAFLEX) 4 MG capsule Take 1 capsule by mouth 3 (Three) Times a Day As Needed for Muscle Spasms. 30 capsule 1    Wixela Inhub 100-50 MCG/ACT DISKUS INHALE 1 PUFF BY MOUTH TWICE DAILY 60 each 0    cetirizine (zyrTEC) 10 MG tablet Take 1 tablet by mouth Daily for 30 days. 30 tablet 0    fluticasone (FLONASE) 50 MCG/ACT nasal spray Use 2 sprays into the nostril(s) as directed by provider Daily for 14 days.  16 g 0     No current facility-administered medications on file prior to visit.                Assessment and Plan     Diagnoses and all orders for this visit:    1. Acute left-sided low back pain without sciatica (Primary)  -     Ambulatory Referral to Physical Therapy        Patient Instructions   Continue medications as prescribed. Referral to physical therapy ordered. Scheduling center to call with appointment. Recommend limiting lifting and decreasing amount of weight when lifting. Stay hydrated with water. Follow-up as needed.            Follow Up     No follow-ups on file.  Patient was given instructions and counseling regarding his condition or for health maintenance advice. Please see specific information pulled into the AVS if appropriate.

## 2024-07-29 NOTE — PATIENT INSTRUCTIONS
Continue medications as prescribed. Referral to physical therapy ordered. Scheduling center to call with appointment. Recommend limiting lifting and decreasing amount of weight when lifting. Stay hydrated with water. Follow-up as needed.

## 2024-08-01 ENCOUNTER — TREATMENT (OUTPATIENT)
Dept: PHYSICAL THERAPY | Facility: CLINIC | Age: 54
End: 2024-08-01
Payer: COMMERCIAL

## 2024-08-01 DIAGNOSIS — M54.50 ACUTE LEFT-SIDED LOW BACK PAIN WITHOUT SCIATICA: Primary | ICD-10-CM

## 2024-08-01 DIAGNOSIS — R29.3 POSTURE ABNORMALITY: ICD-10-CM

## 2024-08-01 DIAGNOSIS — M25.551 RIGHT HIP PAIN: ICD-10-CM

## 2024-08-01 DIAGNOSIS — R26.89 DECREASED FUNCTIONAL MOBILITY: ICD-10-CM

## 2024-08-01 DIAGNOSIS — R26.9 GAIT DISTURBANCE: ICD-10-CM

## 2024-08-01 NOTE — PROGRESS NOTES
"    Physical Therapy Initial Evaluation and Plan of Care  [facility]  [place] - [address], [address]     [place] [address]   Phone: [phone]   Fax: [phone]    Patient: Grant Edge Jr.   : 1970  Diagnosis/ICD-10 Code:  Acute left-sided low back pain without sciatica [M54.50]  Referring practitioner: MELANY Duncan  Date of Initial Visit: 2024  Today's Date: 2024  Patient seen for 1 session         Visit Diagnoses:    ICD-10-CM ICD-9-CM   1. Acute left-sided low back pain without sciatica  M54.50 724.2   2. Posture abnormality  R29.3 781.92   3. Decreased functional mobility  R26.89 781.99   4. Right hip pain  M25.551 719.45   5. Gait disturbance  R26.9 781.2         Subjective Questionnaire: Back Index: 17/50 (34% disability)      Subjective Evaluation    History of Present Illness  Date of onset: 2024  Mechanism of injury: Patient presents to physical therapy for diagnosis of L sided low back pain. Patient reports low back pain began last week. Patient denies any inciting event, but reports when he was walking to the car and felt a \"catch\" in his back. Patient reports his R hip has significant osteoarthritis and alters his gait. Patient believes the alteration in his gait may be the reason for his low back pain. Patient reports undergoing radiographs of lumbar spine that demonstrates mild age related changes. Patient reports completing some stretches, such as extending his lumbar spine has helped reduce pain. Patient has trailed icy/hot, heat, and ice, but finds minimal relief.   Patient low back pain has resolved some since the initial onset last Monday.   Patient reports having spontaneous low back pain several months ago, but resolved on its own.     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 Vertebral body heights are maintained. Mild L5-S1 disc degeneration. Mild lower lumbar facet arthropathy. Partially visualized left total hip arthroplasty.)    Treatments  Previous treatment: medication  Patient Goals  Patient goals for therapy: increased motion and decreased pain  Patient goal: perform work related duties as a          Pertinent PMH: L EMILY, end-stage osteoarthritis of R hip and R knee    Objective          Static Posture     Comments  Patient stands with L trunk lean, R lower extremity in slight abduction and external rotation. Most of body weight into L lower extremity.     Palpation     Additional Palpation Details  Tenderness upon palpation to L flank along L obliques, and L QL. Otherwise un-notable     Active Range of Motion     Lumbar   Flexion: 100 (painful upon return) degrees   Extension: 30 (reduces pain) degrees   Left lateral flexion: 28 degrees   Right lateral flexion: 30 (pulling sensation in L flank) degrees   Left rotation: Active left lumbar rotation: 25% limited.   Right rotation: Active right lumbar rotation: 25% limited.   Left Hip   Flexion: 110 degrees   External rotation (90/90): 45 degrees   Internal rotation (90/90): 20 degrees     Right Hip   Flexion: 85 degrees with pain  Left Knee   Flexion: WFL  Extension: WFL    Right Knee   Flexion: 110 degrees   Extension: 4 (lacking) degrees     Additional  Active Range of Motion Details  Unable to attain R hip rotation measurements due to pain    Strength/Myotome Testing     Left Hip   Planes of Motion   Flexion: 5  Extension: 5  Abduction: 4+  Adduction: 5    Right Hip   Planes of Motion   Flexion: 4-  Extension: 4-  Abduction: 3+  Adduction: 5    Left Knee   Flexion: 5  Extension: 5    Right Knee   Flexion: 4  Extension: 4-    Left Ankle/Foot   Dorsiflexion: 5  Plantar flexion: 5    Right Ankle/Foot   Dorsiflexion: 5  Plantar flexion: 5    Ambulation     Comments   Patient ambulates on level surface with trunk lean to the L, with R lower extremity in slight abduction and external rotation with circumduction of R lower extremity. Decreased lumbar and pelvic rotation throughout gait mechanics.        Assessment & Plan       Assessment  Impairments: abnormal or restricted ROM, activity intolerance, impaired physical strength, lacks appropriate home exercise program and pain with function   Assessment details: The patient is a 52 y/o male who presents to physical therapy today for diagnosis of acute left sided low back pain without sciatica. Patient has PMH significant for severe R hip and R knee osteoarthritis. Upon initial evaluation, the patient demonstrates the following impairments: decreased mobility and ROM of lumbar spine, R hip and R knee, decreased muscle power of bilateral LE, poor muscle activation evident with scoring 1/5 on the Sarhmann abdominal muscle grading system, and poor postural awareness in standing/sitting. Due to these impairments, the patient is unable to perform or has difficulty with the following functional tasks: walking longer than 30 minutes, lifting, reaching to the floor, and sleeping, which impacts his ability to participate as a local . The patient would benefit from skilled PT services to address functional limitations and impairments and to improve patient quality of life.      Prognosis: fair    Goals  Plan Goals:  ST weeks  1. Pt will be independent and compliant with initial HEP  2. Pt will report a 50% improvement in symptoms since starting therapy  3. Pt will report pain level at worst <2/10 during  activity in 5 weeks.  4. Pt will be independent with postural corrections in 2 weeks in order to decrease strain on back.     LTG: 10 weeks  1. Pt will be independent with final HEP for self-management of condition by DC.  2. Pt will improve score on Oswesty to less than 20% by DC.   3. Pt will report an 80% improvement in symptoms by DC in order to allow return to PLOF.  4. Pt will improve lumbar AROM to WFL in order to complete ADLs with improved function by DC.   5. Pt will improve LE strength to 4/5 throughout, and ambulate on level surface x10 minutes without increase in low back pain.      Plan  Therapy options: will be seen for skilled therapy services  Planned modality interventions: cryotherapy, electrical stimulation/Russian stimulation, TENS, traction, thermotherapy (hydrocollator packs) and ultrasound  Planned therapy interventions: abdominal trunk stabilization, body mechanics training, flexibility, functional ROM exercises, gait training, joint mobilization, home exercise program, manual therapy, neuromuscular re-education, postural training, soft tissue mobilization, spinal/joint mobilization, strengthening, stretching, therapeutic activities, motor coordination training and transfer training  Frequency: 2x week  Duration in weeks: 10        History # of Personal Factors and/or Comorbidities: MODERATE (1-2)  Examination of Body System(s): # of elements: MODERATE (3)  Clinical Presentation: EVOLVING  Clinical Decision Making: MODERATE      Timed:         Manual Therapy:         mins  96410;     Therapeutic Exercise:    30     mins  26329;     Neuromuscular Michael:        mins  60680;    Therapeutic Activity:          mins  68031;     Gait Training:           mins  59893;     Ultrasound:          mins   98445;    Ionto                                   mins   92650  Self Care                            mins   16696      Un-Timed:  Electrical Stimulation:         mins  38335 ( );  Dry Needling          mins self-pay  Traction          mins 08134  Low Eval          Mins  29150  Mod Eval     25     Mins  17841  High Eval                            Mins  02539  Canalith Repos         mins 88588      Timed Treatment:   30   mins   Total Treatment:     55   mins          PT: Neil Mccabe PT     License Number: [place] [id number]# [id number]. [place] LIC# [id number]  Electronically signed by Neil Mccabe PT, 08/01/24, 3:36 PM EDT    Certification Period: 8/1/2024 thru 10/29/2024  I certify that the therapy services are furnished while this patient is under my care.  The services outlined above are required by this patient, and will be reviewed every 90 days.         Physician Signature:__________________________________________________    PHYSICIAN: Jim Joseph APRN  NPI: [id number]                                      DATE:      Please sign and return via fax to [phone] . Thank you, [facility].

## 2024-08-05 ENCOUNTER — TREATMENT (OUTPATIENT)
Dept: PHYSICAL THERAPY | Facility: CLINIC | Age: 54
End: 2024-08-05
Payer: COMMERCIAL

## 2024-08-05 DIAGNOSIS — R29.3 POSTURE ABNORMALITY: ICD-10-CM

## 2024-08-05 DIAGNOSIS — R26.9 GAIT DISTURBANCE: ICD-10-CM

## 2024-08-05 DIAGNOSIS — R26.89 DECREASED FUNCTIONAL MOBILITY: ICD-10-CM

## 2024-08-05 DIAGNOSIS — M25.551 RIGHT HIP PAIN: ICD-10-CM

## 2024-08-05 DIAGNOSIS — M54.50 ACUTE LEFT-SIDED LOW BACK PAIN WITHOUT SCIATICA: Primary | ICD-10-CM

## 2024-08-05 NOTE — PROGRESS NOTES
"Physical Therapy Daily Treatment Note  [facility]  [place] - [address], [address]     [place] [address]   Phone: [phone]   Fax: [phone]      Patient: Garnt Edge Jr.   : 1970  Referring practitioner: MELANY Duncan  Date of Initial Visit: Type: THERAPY  Noted: 2024  Today's Date: 2024  Patient seen for 2 sessions       Visit Diagnoses:    ICD-10-CM ICD-9-CM   1. Acute left-sided low back pain without sciatica  M54.50 724.2   2. Posture abnormality  R29.3 781.92   3. Decreased functional mobility  R26.89 781.99   4. Right hip pain  M25.551 719.45   5. Gait disturbance  R26.9 781.2         Subjective:  Grant Edge reports: Doing \"ok\", school started back, so patient has extensive more time on feet than before. Patient reports low levels of low back pain. However, R hip/knee have becoming worse. Patient ambulates into the clinic today with SPC due to pain levels.       Objective   See Exercise, Manual, and Modality Logs for complete treatment.       Assessment:  Patient with mild improvements in low back pain. Patient does not tolerated supine position well, so today's interventions provided in seated position. Patient completes seated AAROM with ball rollouts followed by gentle lower extremity strengthening. Patient has history significant for severe R hip and knee osteoarthritis leading to significant changes in gait mechanics. Patient tolerates movement interventions well with no increase in pain.       Plan:   Cont to provide interventions to improve R hip/knee mobility and strength. Seated core/LE strengthening.         Timed:         Manual Therapy:         mins  14077;     Therapeutic Exercise:    31     mins  03078;     Neuromuscular Michael:        mins  34217;    Therapeutic Activity:          mins  66632;     Gait Training:           mins  66473;     Ultrasound:          mins  80957;    Ionto                                   mins  " [id number]  Self Care                            mins  55458  Traction          mins 13673      Un-Timed:  Canalith Repos         mins 43152  Electrical Stimulation:         mins  00443 ( );  Dry Needling          mins self-pay  Traction          mins 64132        Timed Treatment:   31   mins   Total Treatment:     45   mins    Neil Mccabe PT  License Number: [place] [id number]# [id number]. [place] LIC# [id number]    Physical Therapist

## 2024-08-06 RX ORDER — RIVAROXABAN 20 MG/1
20 TABLET, FILM COATED ORAL DAILY
Qty: 90 TABLET | Refills: 0 | Status: SHIPPED | OUTPATIENT
Start: 2024-08-06

## 2024-08-07 ENCOUNTER — TREATMENT (OUTPATIENT)
Dept: PHYSICAL THERAPY | Facility: CLINIC | Age: 54
End: 2024-08-07
Payer: COMMERCIAL

## 2024-08-07 DIAGNOSIS — M25.551 RIGHT HIP PAIN: ICD-10-CM

## 2024-08-07 DIAGNOSIS — R26.89 DECREASED FUNCTIONAL MOBILITY: ICD-10-CM

## 2024-08-07 DIAGNOSIS — R29.3 POSTURE ABNORMALITY: ICD-10-CM

## 2024-08-07 DIAGNOSIS — R26.9 GAIT DISTURBANCE: ICD-10-CM

## 2024-08-07 DIAGNOSIS — M54.50 ACUTE LEFT-SIDED LOW BACK PAIN WITHOUT SCIATICA: Primary | ICD-10-CM

## 2024-08-07 NOTE — PROGRESS NOTES
Physical Therapy Daily Treatment Note  [facility]  [place] - [address], [address]     [place] [address]   Phone: [phone]   Fax: [phone]      Patient: Grant Edge Jr.   : 1970  Referring practitioner: MELANY Duncan  Date of Initial Visit: Type: THERAPY  Noted: 2024  Today's Date: 2024  Patient seen for 3 sessions       Visit Diagnoses:    ICD-10-CM ICD-9-CM   1. Acute left-sided low back pain without sciatica  M54.50 724.2   2. Posture abnormality  R29.3 781.92   3. Decreased functional mobility  R26.89 781.99   4. Right hip pain  M25.551 719.45   5. Gait disturbance  R26.9 781.2         Subjective:  Grant Edge reports: improvements in low back pain throughout the day, main complaint is low back pain with side-lying/going to sleep. Patient continues to have greatest difficulty/pain with R hip/knee.       Objective   See Exercise, Manual, and Modality Logs for complete treatment.       Assessment:  Patient ambulates into the clinic with SPC, and demonstrates improved mechanics compared to IE and reports decreased levels of low back pain. Patient tends to weight shift towards L when standing. Patient continues to demonstrate significant weakness and pain with R hip/knee with all movement interventions. Patient continues to demonstrate need for physical therapy.       Plan:   Continue interventions to improve lumbar mobility and R lower extremity strengthening in seated position. Transition to more standing exercises if pain levels to R LE are well managed        Timed:         Manual Therapy:         mins  16460;     Therapeutic Exercise:    22     mins  98300;     Neuromuscular Michael:        mins  30537;    Therapeutic Activity:     10     mins  62071;     Gait Training:           mins  58439;     Ultrasound:          mins  40525;    Ionto                                   mins  01613  Self Care                            mins   71535  Traction          mins 91201      Un-Timed:  Canalith Repos         mins 18879  Electrical Stimulation:         mins  07589 ( );  Dry Needling          mins self-pay  Traction          mins 57750        Timed Treatment:   32   mins   Total Treatment:     42   mins    Neil Mccabe PT  License Number: [place] [id number]# [id number]. [place] LIC# [id number]    Physical Therapist

## 2024-08-12 ENCOUNTER — TELEPHONE (OUTPATIENT)
Dept: PHYSICAL THERAPY | Facility: OTHER | Age: 54
End: 2024-08-12
Payer: COMMERCIAL

## 2024-08-12 DIAGNOSIS — I10 BENIGN ESSENTIAL HYPERTENSION: ICD-10-CM

## 2024-08-12 DIAGNOSIS — K21.00 GASTROESOPHAGEAL REFLUX DISEASE WITH ESOPHAGITIS WITHOUT HEMORRHAGE: ICD-10-CM

## 2024-08-12 NOTE — TELEPHONE ENCOUNTER
Caller: Grant Edge Jr.    Relationship: Self    What was the call regarding: PATIENT CANCELLED APPT TODAY BECAUSE THEY HAVE TO WORK

## 2024-08-13 ENCOUNTER — TREATMENT (OUTPATIENT)
Dept: PHYSICAL THERAPY | Facility: CLINIC | Age: 54
End: 2024-08-13
Payer: COMMERCIAL

## 2024-08-13 DIAGNOSIS — R29.3 POSTURE ABNORMALITY: ICD-10-CM

## 2024-08-13 DIAGNOSIS — M25.551 RIGHT HIP PAIN: ICD-10-CM

## 2024-08-13 DIAGNOSIS — M54.50 ACUTE LEFT-SIDED LOW BACK PAIN WITHOUT SCIATICA: Primary | ICD-10-CM

## 2024-08-13 DIAGNOSIS — R26.89 DECREASED FUNCTIONAL MOBILITY: ICD-10-CM

## 2024-08-13 DIAGNOSIS — R26.9 GAIT DISTURBANCE: ICD-10-CM

## 2024-08-13 RX ORDER — ESOMEPRAZOLE MAGNESIUM 40 MG/1
40 CAPSULE, DELAYED RELEASE ORAL
Qty: 90 CAPSULE | Refills: 0 | Status: SHIPPED | OUTPATIENT
Start: 2024-08-13

## 2024-08-13 RX ORDER — BENAZEPRIL HYDROCHLORIDE 40 MG/1
40 TABLET ORAL DAILY
Qty: 90 TABLET | Refills: 0 | Status: SHIPPED | OUTPATIENT
Start: 2024-08-13

## 2024-08-13 NOTE — PROGRESS NOTES
Physical Therapy Daily Treatment Note    [facility] Physical Therapy [place]  [address]  [place] [address]    Visit # 4        Patient: Grant Edge Jr.   : 1970  Referring practitioner: MELANY Duncan  Date of Initial Evaluation:  Type: THERAPY  Noted: 2024  Today's Date: 2024           ICD-10-CM ICD-9-CM   1. Acute left-sided low back pain without sciatica  M54.50 724.2   2. Posture abnormality  R29.3 781.92   3. Right hip pain  M25.551 719.45   4. Gait disturbance  R26.9 781.2   5. Decreased functional mobility  R26.89 781.99       Subjective  Grant Edge Jr. reports:   (R) hip really sore today, does feel his back has gotten better with the exercises he's been working on.    Objective   See Exercise, Manual, and Modality Logs for complete treatment   Note: any exercises/interventions not performed today have been marked as deferred on flowsheets.     Pt Education:  HEP review  Exercise rationale/ pain free exercise performance  Posture/Postural awareness  Alternate exercise positions  Verbal/Tactile cues to ensure correct exercise performance/technique    Assessment/Plan  Good overall tolerance to and effort with today's interventions.  Continues to use SPC for gait assist, continues to lean more toward (L) LE with fatigue.      Would continue to benefit from skilled PT progressing with functional WB, ROM and strengthening of (R) LE as able.     Progress per Plan of Care and Progress strengthening /stabilization /functional activity         Timed:         Manual Therapy:         mins  92145     Therapeutic Exercise:     33    mins  79290     Neuromuscular Michael:        mins  61731    Therapeutic Activity:      10    mins  98069     Gait Training:           mins  23981     Ultrasound:          mins  77177    Ionto                                   mins  54928  Self Care                            mins  97522    Un-Timed:  Electrical Stimulation:          mins 80870 ( )  Traction          mins 10414    Timed Treatment:   43   mins   Total Treatment:     50   mins    KISHOR Perez License #[id number]  Physical Therapist Assistant

## 2024-08-19 ENCOUNTER — TREATMENT (OUTPATIENT)
Dept: PHYSICAL THERAPY | Facility: CLINIC | Age: 54
End: 2024-08-19
Payer: COMMERCIAL

## 2024-08-19 DIAGNOSIS — M54.50 ACUTE LEFT-SIDED LOW BACK PAIN WITHOUT SCIATICA: Primary | ICD-10-CM

## 2024-08-19 DIAGNOSIS — R26.89 DECREASED FUNCTIONAL MOBILITY: ICD-10-CM

## 2024-08-19 DIAGNOSIS — R29.3 POSTURE ABNORMALITY: ICD-10-CM

## 2024-08-19 DIAGNOSIS — M25.551 RIGHT HIP PAIN: ICD-10-CM

## 2024-08-19 DIAGNOSIS — R26.9 GAIT DISTURBANCE: ICD-10-CM

## 2024-08-19 PROCEDURE — 97530 THERAPEUTIC ACTIVITIES: CPT

## 2024-08-19 PROCEDURE — 97110 THERAPEUTIC EXERCISES: CPT

## 2024-08-19 NOTE — PROGRESS NOTES
Physical Therapy Daily Treatment Note  [facility] Therapy  [place] - [address], [address]     [place] [address]   Phone: [phone]   Fax: [phone]      Patient: Grant Edge Jr.   : 1970  Referring practitioner: MELANY Duncan  Date of Initial Visit: Type: THERAPY  Noted: 2024  Today's Date: 2024  Patient seen for 5 sessions       Visit Diagnoses:    ICD-10-CM ICD-9-CM   1. Acute left-sided low back pain without sciatica  M54.50 724.2   2. Posture abnormality  R29.3 781.92   3. Right hip pain  M25.551 719.45   4. Gait disturbance  R26.9 781.2   5. Decreased functional mobility  R26.89 781.99         Subjective:  Grant Edge reports: his low back is feeling improved with exercises thus far. Only complaint with low back pain is occasional pain with sleeping/laying on his side. Patient reports continued high levels of pain and disability with R hip/knee.       Objective   See Exercise, Manual, and Modality Logs for complete treatment.       Assessment:  Patient tolerates today's movement interventions well without adverse effects. Patient with severe OA of R hip/knee that significantly impacts his gait mechanics, which is likely underlying cause for low back pain. Patient given AAROM and strengthening exercises to R lower extremity, and verbal cues given for equal weightbearing, to reduce stress through lower back. Patient would continue to benefit from physical therapy to address deficits, improve functional mobility, and decrease pain with work activities.        Plan:   Cont strength training and mobility training to low back and bilateral hips.       Timed:         Manual Therapy:         mins  31350;     Therapeutic Exercise:    25     mins  44861;     Neuromuscular Michael:        mins  55703;    Therapeutic Activity:      15    mins  43814;     Gait Training:           mins  02290;     Ultrasound:          mins  36725;    Ionto                                    mins  92165  Self Care                            mins  84496  Traction          mins 02760      Un-Timed:  Canalith Repos         mins 24321  Electrical Stimulation:         mins  96846 ( );  Dry Needling          mins self-pay  Traction          mins 68618        Timed Treatment:   40   mins   Total Treatment:     50   mins    Neil Mccabe PT  License Number: [place] [id number]# [id number]. [place] LIC# [id number]    Physical Therapist

## 2024-08-21 DIAGNOSIS — J30.2 SEASONAL ALLERGIES: ICD-10-CM

## 2024-08-22 DIAGNOSIS — Z87.09 HISTORY OF ASTHMA: ICD-10-CM

## 2024-08-22 RX ORDER — MONTELUKAST SODIUM 10 MG/1
10 TABLET ORAL NIGHTLY
Qty: 90 TABLET | Refills: 1 | Status: SHIPPED | OUTPATIENT
Start: 2024-08-22

## 2024-08-22 RX ORDER — FLUTICASONE PROPIONATE AND SALMETEROL 100; 50 UG/1; UG/1
1 POWDER RESPIRATORY (INHALATION) 2 TIMES DAILY
Qty: 60 EACH | Refills: 2 | Status: SHIPPED | OUTPATIENT
Start: 2024-08-22

## 2024-08-22 NOTE — TELEPHONE ENCOUNTER
Rx Refill Note  Requested Prescriptions     Pending Prescriptions Disp Refills    Fluticasone-Salmeterol (Wixela Inhub) 100-50 MCG/ACT DISKUS [Pharmacy Med Name: WIXELA INHUB DISKUS 100/50MCG 60S] 60 each 2     Sig: INHALE 1 PUFF BY MOUTH TWICE DAILY      Last office visit with prescribing clinician: 7/29/2024   Last telemedicine visit with prescribing clinician: Visit date not found   Next office visit with prescribing clinician: 10/22/2024

## 2024-08-26 ENCOUNTER — TELEPHONE (OUTPATIENT)
Dept: ORTHOPEDICS | Facility: OTHER | Age: 54
End: 2024-08-26
Payer: COMMERCIAL

## 2024-08-28 ENCOUNTER — TREATMENT (OUTPATIENT)
Dept: PHYSICAL THERAPY | Facility: CLINIC | Age: 54
End: 2024-08-28
Payer: COMMERCIAL

## 2024-08-28 DIAGNOSIS — R26.89 DECREASED FUNCTIONAL MOBILITY: ICD-10-CM

## 2024-08-28 DIAGNOSIS — R29.3 POSTURE ABNORMALITY: ICD-10-CM

## 2024-08-28 DIAGNOSIS — M25.551 RIGHT HIP PAIN: ICD-10-CM

## 2024-08-28 DIAGNOSIS — M54.50 ACUTE LEFT-SIDED LOW BACK PAIN WITHOUT SCIATICA: Primary | ICD-10-CM

## 2024-08-28 DIAGNOSIS — R26.9 GAIT DISTURBANCE: ICD-10-CM

## 2024-08-28 NOTE — PROGRESS NOTES
Physical Therapy Daily Treatment Note  [facility]  [place] - [address], [address]     [place] [address]   Phone: [phone]   Fax: [phone]      Patient: Grant Edge Jr.   : 1970  Referring practitioner: MELANY Duncan  Date of Initial Visit: Type: THERAPY  Noted: 2024  Today's Date: 2024  Patient seen for 6 sessions       Visit Diagnoses:    ICD-10-CM ICD-9-CM   1. Acute left-sided low back pain without sciatica  M54.50 724.2   2. Posture abnormality  R29.3 781.92   3. Right hip pain  M25.551 719.45   4. Gait disturbance  R26.9 781.2   5. Decreased functional mobility  R26.89 781.99         Subjective:  Grant Edge reports: low back has been feeling much better. Continuing to have significant mobility deficits and muscle power deficits into R hip and knee.      Objective   See Exercise, Manual, and Modality Logs for complete treatment.       Assessment:  Patient tolerates today's movement interventions well without adverse effects. Patient reports great improvements in low back pain, with minimal disability due to lumbar spine. However, patient's R hip and knee OA are limit patient's functional mobility. Patient demonstrates significant mobility and strength deficits into R hip and tolerates seated exercises for improvements in R hip strength and mobility. Time spent today progressing gait mechanics, but patient demonstrates ability to normalize gait pattern and reports decreased pain, but demonstrates trendelenburg gait pattern after 40-50ft. Progress note needed next visit.       Plan:   Progress note next visit, focus more towards functional strengthening and stability training in standing. R hip strengthening.         Timed:         Manual Therapy:         mins  69725;     Therapeutic Exercise:    30     mins  94535;     Neuromuscular Michael:        mins  58392;    Therapeutic Activity:          mins  79193;     Gait Training:      15      mins  49244;     Ultrasound:          mins  57348;    Ionto                                   mins  69107  Self Care                            mins  97355  Traction          mins 79039      Un-Timed:  Canalith Repos         mins 08190  Electrical Stimulation:         mins  61365 ( );  Dry Needling          mins self-pay  Traction          mins 15461        Timed Treatment:   45   mins   Total Treatment:     45   mins    Neil Mccabe PT  License Number: [place] [id number]# [id number] [place] LIC# [id number]    Physical Therapist

## 2024-08-29 ENCOUNTER — LAB (OUTPATIENT)
Dept: OTHER | Facility: HOSPITAL | Age: 54
End: 2024-08-29
Payer: COMMERCIAL

## 2024-08-29 ENCOUNTER — OFFICE VISIT (OUTPATIENT)
Dept: ONCOLOGY | Facility: CLINIC | Age: 54
End: 2024-08-29
Payer: COMMERCIAL

## 2024-08-29 VITALS
SYSTOLIC BLOOD PRESSURE: 148 MMHG | TEMPERATURE: 97.6 F | WEIGHT: 315 LBS | DIASTOLIC BLOOD PRESSURE: 88 MMHG | BODY MASS INDEX: 46.65 KG/M2 | HEIGHT: 69 IN | OXYGEN SATURATION: 97 % | RESPIRATION RATE: 16 BRPM | HEART RATE: 90 BPM

## 2024-08-29 DIAGNOSIS — I82.432 ACUTE DEEP VEIN THROMBOSIS (DVT) OF POPLITEAL VEIN OF LEFT LOWER EXTREMITY: Primary | ICD-10-CM

## 2024-08-29 DIAGNOSIS — I82.432 ACUTE DEEP VEIN THROMBOSIS (DVT) OF POPLITEAL VEIN OF LEFT LOWER EXTREMITY: ICD-10-CM

## 2024-08-29 LAB
ALBUMIN SERPL-MCNC: 3.9 G/DL (ref 3.5–5.2)
ALBUMIN/GLOB SERPL: 1.3 G/DL
ALP SERPL-CCNC: 111 U/L (ref 39–117)
ALT SERPL W P-5'-P-CCNC: 17 U/L (ref 1–41)
ANION GAP SERPL CALCULATED.3IONS-SCNC: 7.3 MMOL/L (ref 5–15)
AST SERPL-CCNC: 17 U/L (ref 1–40)
BASOPHILS # BLD AUTO: 0.07 10*3/MM3 (ref 0–0.2)
BASOPHILS NFR BLD AUTO: 1 % (ref 0–1.5)
BILIRUB SERPL-MCNC: 1 MG/DL (ref 0–1.2)
BUN SERPL-MCNC: 10 MG/DL (ref 6–20)
BUN/CREAT SERPL: 7.6 (ref 7–25)
CALCIUM SPEC-SCNC: 9.4 MG/DL (ref 8.6–10.5)
CHLORIDE SERPL-SCNC: 105 MMOL/L (ref 98–107)
CO2 SERPL-SCNC: 29.7 MMOL/L (ref 22–29)
CREAT SERPL-MCNC: 1.32 MG/DL (ref 0.76–1.27)
DEPRECATED RDW RBC AUTO: 48.9 FL (ref 37–54)
EGFRCR SERPLBLD CKD-EPI 2021: 64.1 ML/MIN/1.73
EOSINOPHIL # BLD AUTO: 0.18 10*3/MM3 (ref 0–0.4)
EOSINOPHIL NFR BLD AUTO: 2.5 % (ref 0.3–6.2)
ERYTHROCYTE [DISTWIDTH] IN BLOOD BY AUTOMATED COUNT: 14.5 % (ref 12.3–15.4)
GLOBULIN UR ELPH-MCNC: 3.1 GM/DL
GLUCOSE SERPL-MCNC: 102 MG/DL (ref 65–99)
HCT VFR BLD AUTO: 48 % (ref 37.5–51)
HGB BLD-MCNC: 15.2 G/DL (ref 13–17.7)
IMM GRANULOCYTES # BLD AUTO: 0.03 10*3/MM3 (ref 0–0.05)
IMM GRANULOCYTES NFR BLD AUTO: 0.4 % (ref 0–0.5)
LYMPHOCYTES # BLD AUTO: 1.86 10*3/MM3 (ref 0.7–3.1)
LYMPHOCYTES NFR BLD AUTO: 25.8 % (ref 19.6–45.3)
MCH RBC QN AUTO: 29.1 PG (ref 26.6–33)
MCHC RBC AUTO-ENTMCNC: 31.7 G/DL (ref 31.5–35.7)
MCV RBC AUTO: 92 FL (ref 79–97)
MONOCYTES # BLD AUTO: 0.72 10*3/MM3 (ref 0.1–0.9)
MONOCYTES NFR BLD AUTO: 10 % (ref 5–12)
NEUTROPHILS NFR BLD AUTO: 4.35 10*3/MM3 (ref 1.7–7)
NEUTROPHILS NFR BLD AUTO: 60.3 % (ref 42.7–76)
NRBC BLD AUTO-RTO: 0 /100 WBC (ref 0–0.2)
PLATELET # BLD AUTO: 225 10*3/MM3 (ref 140–450)
PMV BLD AUTO: 10.6 FL (ref 6–12)
POTASSIUM SERPL-SCNC: 3.9 MMOL/L (ref 3.5–5.2)
PROT SERPL-MCNC: 7 G/DL (ref 6–8.5)
RBC # BLD AUTO: 5.22 10*6/MM3 (ref 4.14–5.8)
SODIUM SERPL-SCNC: 142 MMOL/L (ref 136–145)
WBC NRBC COR # BLD AUTO: 7.21 10*3/MM3 (ref 3.4–10.8)

## 2024-08-29 PROCEDURE — 80053 COMPREHEN METABOLIC PANEL: CPT | Performed by: INTERNAL MEDICINE

## 2024-08-29 PROCEDURE — 99214 OFFICE O/P EST MOD 30 MIN: CPT | Performed by: INTERNAL MEDICINE

## 2024-08-29 PROCEDURE — 36415 COLL VENOUS BLD VENIPUNCTURE: CPT

## 2024-08-29 PROCEDURE — 85025 COMPLETE CBC W/AUTO DIFF WBC: CPT | Performed by: INTERNAL MEDICINE

## 2024-08-29 NOTE — PROGRESS NOTES
Subjective   Grant Edge Jr. is a 54 y.o. male. Referred for Left LE DVT     History of Present Illness   Mr. Edge is a 49-year-old male with history of hypertension, previous history of provoked DVT following total hip replacement about 6 to 7 years ago presents for further evaluation and management of left lower extremity DVT.  He noticed bilateral lower extremity swelling about 2 weeks ago following which he presented to the emergency room.  Bilateral lower extremity Doppler was performed on 5/30/2020 on which an acute left lower extremity DVT was noted in the popliteal vein.  All other veins appeared normal.  He was started on Eliquis.  He was seen by Patricia Peter on 6/3/2020 and started on hydrochlorothiazide for bilateral lower extremity edema.  He has also been referred to us for further management of the DVT.  He denies any history of tobacco use, denies any recent travel.  Denies any recent changes in his health besides the bilateral lower extremity swelling.  No significant weight loss, fatigue, night sweats, lymphadenopathy, cough, dyspnea, nausea, vomiting, hematemesis, melena, hematochezia.  Last colonoscopy was about 5 to 6 years ago in which there was a benign polyp.    Interval history  Mr. Edge presents to the clinic today for follow-up.    Continues on Xarelto 20 mg daily.  He denies any new swelling or pain or discomfort in his lower extremities, chest pain cough, palpitations or lightheadedness or dizziness.  Overall doing well and has been compliant with Xarelto  He has lost some weight.    The following portions of the patient's history were reviewed and updated as appropriate: allergies, current medications, past family history, past medical history, past social history, past surgical history and problem list.    Past Medical History:   Diagnosis Date    Abnormal ECG     Allergic     Arthritis     Chest pain     CTS (carpal tunnel syndrome) A few months.    DVT (deep venous thrombosis)      GERD (gastroesophageal reflux disease)     Hip arthrosis     Hyperlipidemia     Hypertension     Hypokalemia     Knee swelling     Myocardial infarction     Obesity     Periarthritis of shoulder     Simple goiter     Sinus bradycardia     Vitamin D deficiency     hypothyroidism  Following total thyroidectomy    Past Surgical History:   Procedure Laterality Date    CARDIAC CATHETERIZATION N/A 10/18/2016    Procedure: Left Heart Cath;  Surgeon: Daniel Rosas MD;  Location: [facility] CATH INVASIVE LOCATION;  Service:     CHOLECYSTECTOMY      EPIDURAL Right 11/4/2022    Procedure: Right L5 LUMBAR/SACRAL TRANSFORAMINAL EPIDURAL. Hold xarelto x 3 days prior to procedure;  Surgeon: La Sun MD;  Location: [facility];  Service: Pain Management;  Laterality: Right;    EPIDURAL Right 6/19/2023    Procedure: LUMBAR/SACRAL TRANSFORAMINAL EPIDURAL RIGHT L5-S1  00205 HOLD XARELTO X3 DAYS;  Surgeon: La Sun MD;  Location: [facility];  Service: Pain Management;  Laterality: Right;    JOINT REPLACEMENT      LUMBAR EPIDURAL INJECTION N/A 9/29/2023    Procedure: lumbar epidural steroid injection at L4/5 64818;  Surgeon: La Sun MD;  Location: [facility];  Service: Pain Management;  Laterality: N/A;    MEDIAL BRANCH BLOCK Right 8/16/2023    Procedure: LUMBAR MEDIAL BRANCH BLOCK RIGHT L4-S1 #1  59027, 73333;  Surgeon: La Sun MD;  Location: [facility];  Service: Pain Management;  Laterality: Right;    NERVE BLOCK Right 8/30/2023    Procedure: right lateral femoral cutaneous nerve block 17863;  Surgeon: La Sun MD;  [place] [facility];  Service: Pain Management;  Laterality: Right;    SALIVARY GLAND SURGERY      THYROIDECTOMY  2013    TONSILLECTOMY      TOTAL HIP ARTHROPLASTY REVISION Left 2015        Family History   Problem Relation Age of Onset    Diabetes Father     Heart disease Father     Arthritis Father     Hyperlipidemia Father     Hypertension Father    Maternal  "aunt - blood clots   Maternal aunt - lupus     Social History     Socioeconomic History    Marital status:    Tobacco Use    Smoking status: Never    Smokeless tobacco: Never   Vaping Use    Vaping status: Never Used   Substance and Sexual Activity    Alcohol use: No    Drug use: No    Sexual activity: Yes     Partners: Female     Birth control/protection: None      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   HENT:  Negative for congestion, drooling, facial swelling, mouth sores, sore throat and trouble swallowing.    Eyes:  Negative for blurred vision, double vision and visual disturbance.   Respiratory:  Negative for apnea, cough, chest tightness, shortness of breath, wheezing and stridor.    Cardiovascular:  Negative for chest pain, palpitations and leg swelling.   Gastrointestinal:  Negative for abdominal distention, constipation, diarrhea, nausea, vomiting and indigestion.   Endocrine: Negative for cold intolerance and heat intolerance.   Genitourinary:  Negative for dysuria, hematuria and urgency.   Musculoskeletal:  Negative for arthralgias, back pain and myalgias.   Skin:  Negative for color change, dry skin, skin lesions and wound.   Neurological:  Negative for dizziness, seizures, syncope, facial asymmetry, weakness, light-headedness and confusion.   Hematological:  Negative for adenopathy. Does not bruise/bleed easily.   Psychiatric/Behavioral:  Negative for agitation, sleep disturbance and stress. The patient is not nervous/anxious.      Review of systems as mentioned HPI otherwise neck    Objective   Blood pressure 148/88, pulse 90, temperature 97.6 °F (36.4 °C), temperature source Oral, resp. rate 16, height 175 cm (68.9\"), weight (!) 144 kg (317 lb), SpO2 97%.     Physical Exam "   Constitutional: He is oriented to person, place, and time. He appears well-developed and well-nourished. He is obese.  HENT:   Head: Normocephalic.   Eyes: No scleral icterus.   Cardiovascular: Normal rate and regular rhythm.   Pulmonary/Chest: Effort normal and breath sounds normal. No respiratory distress.   Abdominal: Normal appearance.   Musculoskeletal: Normal range of motion.   Neurological: He is alert and oriented to person, place, and time.   Psychiatric: His behavior is normal. Judgment and thought content normal.     I have reexamined the patient and the results are consistent with the previously documented exam. Edwige Cowart MD       Lab on 08/29/2024   Component Date Value Ref Range Status    WBC 08/29/2024 7.21  3.40 - 10.80 10*3/mm3 Final    RBC 08/29/2024 5.22  4.14 - 5.80 10*6/mm3 Final    Hemoglobin 08/29/2024 15.2  13.0 - 17.7 g/dL Final    Hematocrit 08/29/2024 48.0  37.5 - 51.0 % Final    MCV 08/29/2024 92.0  79.0 - 97.0 fL Final    MCH 08/29/2024 29.1  26.6 - 33.0 pg Final    MCHC 08/29/2024 31.7  31.5 - 35.7 g/dL Final    RDW 08/29/2024 14.5  12.3 - 15.4 % Final    RDW-SD 08/29/2024 48.9  37.0 - 54.0 fl Final    MPV 08/29/2024 10.6  6.0 - 12.0 fL Final    Platelets 08/29/2024 225  140 - 450 10*3/mm3 Final    Neutrophil % 08/29/2024 60.3  42.7 - 76.0 % Final    Lymphocyte % 08/29/2024 25.8  19.6 - 45.3 % Final    Monocyte % 08/29/2024 10.0  5.0 - 12.0 % Final    Eosinophil % 08/29/2024 2.5  0.3 - 6.2 % Final    Basophil % 08/29/2024 1.0  0.0 - 1.5 % Final    Immature Grans % 08/29/2024 0.4  0.0 - 0.5 % Final    Neutrophils, Absolute 08/29/2024 4.35  1.70 - 7.00 10*3/mm3 Final    Lymphocytes, Absolute 08/29/2024 1.86  0.70 - 3.10 10*3/mm3 Final    Monocytes, Absolute 08/29/2024 0.72  0.10 - 0.90 10*3/mm3 Final    Eosinophils, Absolute 08/29/2024 0.18  0.00 - 0.40 10*3/mm3 Final    Basophils, Absolute 08/29/2024 0.07  0.00 - 0.20 10*3/mm3 Final    Immature Grans, Absolute 08/29/2024 0.03   0.00 - 0.05 10*3/mm3 Final    nRBC 08/29/2024 0.0  0.0 - 0.2 /100 WBC Final        No radiology results for the last 30 days.     Reviewed his left lower extremity Doppler and noted to have left lower extremity DVT.  CBC from 5/30/2020 reviewed and noted to have normal WBC, hemoglobin, platelet count.  CMP from 5/30/2020 reviewed by me creatinine mildly elevated at 1.31    Reviewed thrombo-failure work-up and documented below  Beta-2 glycoprotein and anticardiolipin antibody negative  Protein S antigen free normal  Protein S functional normal  Antithrombin III level normal  Prothrombin gene mutation negative  Factor V Leiden mutation negative.    8/29/2024 CBC reviewed and within normal limits  CMP reviewed and creatinine 1.32 otherwise with normal limit    Assessment & Plan      Mr. Edge is a 54-year-old -American male with a previous history of left lower extremity DVT following a left hip replacement surgery, now with newly diagnosed left lower extremity DVT.     *DVT  This is his second episode of DVT which is unprovoked.    His the first episode was provoked secondary to surgery.    Given that he has had 2 DVTs so far and the most recent one being unprovoked thrombophilia work-up has been performed and all labs are negative.    Given that he has had 1 provoked DVT in the past but now the most recent one is unprovoked, although thrombophilia work-up is negative would recommend long-term anticoagulation to prevent any further DVTs and PEs.  Eliquis no longer covered by insurance and had switched to Xarelto  He will continue Xarelto indefinitely  No evidence of recurrent DVT or PE  Counseled on regular exercise and compliance with Xarelto  8/29/2024-continue Xarelto.    Also recommend age appropriate [id number]-date on prostate cancer screening.    He is past due for colonoscopy.  Explained the importance of recommended screening and risk of DVT and PE with underlying malignancies.  Discussed once  again today the need for up-to-date colonoscopy.  Patient wishes to schedule this himself as he previously saw Dr. James.    He received a phone call from the gastroenterology office however he has to schedule his colonoscopy.  Explained to him that he would have to hold the Xarelto 2 days prior to the procedure.    *Bilateral lower extremity swelling-could be secondary to amlodipine.  Resolved    *Hypertension-on hydrochlorothiazide, benazepril, metoprolol.  Blood pressure 148/88    *Hypothyroidism-continue Synthroid, stable    *Obesity-previously discussed with Mr. Edge that obesity is a risk factor for DVT.  Recommend regular exercise and healthy diet.  BMI 49.8.  He had a recent knee issue requiring admission to the hospital limiting his exercise ability.    *Knee effusion-thought to be secondary to osteoarthritis    PLAN:   Continue Xarelto 20 mg daily.  APRN in 6 months and MD in 1 year    Patient is on medications requiring close monitoring for toxicity.  CBC and CMP from today reviewed

## 2024-09-03 ENCOUNTER — TREATMENT (OUTPATIENT)
Dept: PHYSICAL THERAPY | Facility: CLINIC | Age: 54
End: 2024-09-03
Payer: COMMERCIAL

## 2024-09-03 DIAGNOSIS — R29.3 POSTURE ABNORMALITY: ICD-10-CM

## 2024-09-03 DIAGNOSIS — M54.50 ACUTE LEFT-SIDED LOW BACK PAIN WITHOUT SCIATICA: Primary | ICD-10-CM

## 2024-09-03 DIAGNOSIS — R26.89 DECREASED FUNCTIONAL MOBILITY: ICD-10-CM

## 2024-09-03 DIAGNOSIS — M25.551 RIGHT HIP PAIN: ICD-10-CM

## 2024-09-03 DIAGNOSIS — R26.9 GAIT DISTURBANCE: ICD-10-CM

## 2024-09-03 PROCEDURE — 97110 THERAPEUTIC EXERCISES: CPT

## 2024-09-03 NOTE — PROGRESS NOTES
Physical Therapy Progress Note  [facility]  [place] - [address], [address]     [place] [address]   Phone: [phone]   Fax: [phone]      Patient: Grant Edge Jr.   : 1970  Referring practitioner: MELANY Duncan  Date of Initial Visit: Type: THERAPY  Noted: 2024  Today's Date: 9/3/2024  Patient seen for 7 sessions       Visit Diagnoses:    ICD-10-CM ICD-9-CM   1. Acute left-sided low back pain without sciatica  M54.50 724.2   2. Posture abnormality  R29.3 781.92   3. Right hip pain  M25.551 719.45   4. Gait disturbance  R26.9 781.2   5. Decreased functional mobility  R26.89 781.99         Subjective:  Grant Edge reports: 100% improvement in low back symptoms. Patient reports having minimal to no pain in the lumbar spine. Patient reports his R hip and knee tend to be the most limiting at this time. Patient reports 4-5/10 pain into R hip. Patient reports difficulty with driving, getting into/out of the car, and reports difficulty with walking, stairs, and reaching to the floor. Additionally, patient reports great difficulty with ambulation.       Objective   See Exercise, Manual, and Modality Logs for complete treatment.     Static Posture      Comments  Patient stands with less lateral L trunk lean, R lower extremity in slight abduction and external rotation. Most of body weight into L lower extremity.      Palpation      Additional Palpation Details  Tenderness upon palpation to R hip external rotators.      Active Range of Motion      Lumbar   Flexion: 110 degrees - no pain reported  Extension: 30 degrees   Left lateral flexion: 35 degrees   Right lateral flexion: 35 degrees   Left rotation: Active left lumbar rotation: 25% limited.   Right rotation: Active right lumbar rotation: 25% limited.   Left Hip   Flexion: 110 degrees   External rotation (90/90): 45 degrees   Internal rotation (90/90): 20 degrees      Right Hip   Flexion: 85 degrees  with pain  Abd 32 degrees with pain  External rotation (90/90): 30 degrees  Inernal rotation (90/90): 15 degrees    Left Knee   Flexion: WFL  Extension: WFL     Right Knee   Flexion: 118 degrees   Extension: 4 (lacking) degrees     Strength/Myotome Testing      Left Hip   Planes of Motion   Flexion: 5  Extension: 5  Abduction: 4+  Adduction: 5     Right Hip   Planes of Motion   Flexion: 4-  Extension: 4-  Abduction: 4-  Adduction: 5     Left Knee   Flexion: 5  Extension: 5     Right Knee   Flexion: 4  Extension: 4     Left Ankle/Foot   Dorsiflexion: 5  Plantar flexion: 5     Right Ankle/Foot   Dorsiflexion: 5  Plantar flexion: 5     Ambulation      Comments   Patient ambulates on level surface with trunk lean to the L, with R lower extremity in slight abduction and external rotation with circumduction of R lower extremity. Decreased lumbar and pelvic rotation throughout gait mechanics. Utilizes SPC appropriately and with decreased    Assessment:  Patient is a 53 y/o male who presents to physical therapy with diagnosis of chronic low back pain likely secondary to compensatory movement patterns due to chronic R hip pain. Patient ambulates with SPC and is able to somewhat normalize gait pattern, and reduce stress through low back. Patient demonstrates improvements in low back ROM, and mild improvements in LE strength. Patient reports 0/10 low back pain at the moment, and reports 100% improvement overall with low back pain. However, patient demonstrates signifinact mobility and strength deficits of R lower extremity and would benefit from additional physical therapy to address R hip and knee deficits to improve overall quality of life. Additional therapy goals have been established to reflect.       Plan:   Continue with POC outlined with increased focus towards hip mobility, strength, stability and further normalization of gait patterns.     Plan Goals: ST weeks  1. Pt will be independent and compliant with initial  HEP - MET  2. Pt will report a 50% improvement in symptoms since starting therapy - MET  3. Pt will report pain level at worst <2/10 during  activity in 5 weeks. - MET  4. Pt will be independent with postural corrections in 2 weeks in order to decrease strain on back. - progressing     LTG: 10 weeks  1. Pt will be independent with final HEP for self-management of condition by DC.  2. Pt will improve score on Oswesty to less than 20% by DC.   3. Pt will report an 80% improvement in symptoms by DC in order to allow return to PLOF. - MET  4. Pt will improve lumbar AROM to WFL in order to complete ADLs with improved function by DC. - MET  5. Pt will improve LE strength to 4/5 throughout, and ambulate on level surface x10 minutes without increase in low back pain.  6. Pt will improve R hip ROM to 100 degrees of hip flexion in order to sit more comfortably. - NEW  7. Pt will demonstrate 4/5 R hip flexion and report easier time transferring into/out automobile - NEW    Timed:         Manual Therapy:         mins  94803;     Therapeutic Exercise:    30     mins  96413;     Neuromuscular Michael:        mins  75367;    Therapeutic Activity:          mins  41007;     Gait Training:           mins  07099;     Ultrasound:          mins  11820;    Ionto                                   mins  08888  Self Care                            mins  91667  Traction          mins 56611      Un-Timed:  Canalith Repos         mins 24398  Electrical Stimulation:         mins  62535 ( );  Dry Needling          mins self-pay  Traction          mins 19932        Timed Treatment:   30   mins   Total Treatment:     42   mins    Neil Mccabe PT  License Number: [place] [id number]# [id number]. [place] LIC# [id number]    Physical Therapist

## 2024-09-05 ENCOUNTER — TREATMENT (OUTPATIENT)
Dept: PHYSICAL THERAPY | Facility: CLINIC | Age: 54
End: 2024-09-05
Payer: COMMERCIAL

## 2024-09-05 DIAGNOSIS — R29.3 POSTURE ABNORMALITY: ICD-10-CM

## 2024-09-05 DIAGNOSIS — M25.551 RIGHT HIP PAIN: ICD-10-CM

## 2024-09-05 DIAGNOSIS — R26.89 DECREASED FUNCTIONAL MOBILITY: ICD-10-CM

## 2024-09-05 DIAGNOSIS — M54.50 ACUTE LEFT-SIDED LOW BACK PAIN WITHOUT SCIATICA: Primary | ICD-10-CM

## 2024-09-05 DIAGNOSIS — R26.9 GAIT DISTURBANCE: ICD-10-CM

## 2024-09-05 NOTE — PROGRESS NOTES
"Physical Therapy Daily Treatment Note  [facility]  [place] - [address], [address]     [place] [address]   Phone: [phone]   Fax: [phone]      Patient: Grant Edge Jr.   : 1970  Referring practitioner: MELANY Duncan  Date of Initial Visit: Type: THERAPY  Noted: 2024  Today's Date: 2024  Patient seen for 8 sessions       Visit Diagnoses:    ICD-10-CM ICD-9-CM   1. Acute left-sided low back pain without sciatica  M54.50 724.2   2. Posture abnormality  R29.3 781.92   3. Right hip pain  M25.551 719.45   4. Gait disturbance  R26.9 781.2   5. Decreased functional mobility  R26.89 781.99         Subjective:  Grant Edge reports: continued improvements in low back pain. Reports having strenuous day at work, with complaints of R hip and knee pain. \"Getting into and out of the car is difficult.\"      Objective   See Exercise, Manual, and Modality Logs for complete treatment.       Assessment:  Patient tolerates today's movement interventions well without adverse effects. Patient with significant mobility and muscle power deficits of L hip and knee, and responds okay to movement interventions focused at improving L hip mobility and strength. Increased focus spent today towards functional strengthening in closed chain positions. Patient continues to benefit from skilled physical therapy.       Plan:   Continue with closed chain strength, consider side stepping onto box to mimic transfers into/out of car.      Timed:         Manual Therapy:         mins  00283;     Therapeutic Exercise:    20     mins  02021;     Neuromuscular Michael:        mins  05551;    Therapeutic Activity:     10     mins  21071;     Gait Training:           mins  35963;     Ultrasound:          mins  19938;    Ionto                                   mins  08789  Self Care                            mins  40306  Traction          mins 91393      Un-Timed:  Canalith Repos         " mins 37545  Electrical Stimulation:         mins  89768 ( );  Dry Needling          mins self-pay  Traction          mins 39278        Timed Treatment:   30   mins   Total Treatment:     42   mins    Neil Mccabe PT  License Number: [place] LIC# [id number]. [place] LIC# [id number]    Physical Therapist

## 2024-09-08 NOTE — PROGRESS NOTES
Date of Service: 09-08-24 @ 11:00           CARDIOLOGY     PROGRESS  NOTE   ________________________________________________    CHIEF COMPLAINT:Patient is a 89y old  Female who presents with a chief complaint of weakness (07 Sep 2024 22:14)  no complain  	  REVIEW OF SYSTEMS:  CONSTITUTIONAL: No fever, weight loss, or fatigue  EYES: No eye pain, visual disturbances, or discharge  ENT:  No difficulty hearing, tinnitus, vertigo; No sinus or throat pain  NECK: No pain or stiffness  RESPIRATORY: No cough, wheezing, chills or hemoptysis; No Shortness of Breath  CARDIOVASCULAR: No chest pain, palpitations, passing out, dizziness, or leg swelling  GASTROINTESTINAL: No abdominal or epigastric pain. No nausea, vomiting, or hematemesis; No diarrhea or constipation. No melena or hematochezia.  GENITOURINARY: No dysuria, frequency, hematuria, or incontinence  NEUROLOGICAL: No headaches, memory loss, loss of strength, numbness, or tremors  SKIN: No itching, burning, rashes, or lesions   LYMPH Nodes: No enlarged glands  ENDOCRINE: No heat or cold intolerance; No hair loss  MUSCULOSKELETAL: No joint pain or swelling; No muscle, back, or extremity pain  PSYCHIATRIC: No depression, anxiety, mood swings, or difficulty sleeping  HEME/LYMPH: No easy bruising, or bleeding gums  ALLERGY AND IMMUNOLOGIC: No hives or eczema	    [x ] All others negative	  [ ] Unable to obtain    PHYSICAL EXAM:  T(C): 36.7 (09-08-24 @ 04:41), Max: 37.3 (09-07-24 @ 20:34)  HR: 80 (09-08-24 @ 04:41) (79 - 80)  BP: 173/77 (09-08-24 @ 04:41) (137/75 - 173/77)  RR: 18 (09-08-24 @ 04:41) (18 - 18)  SpO2: 92% (09-08-24 @ 04:41) (92% - 92%)  Wt(kg): --  I&O's Summary      Appearance: Normal	  HEENT:   Normal oral mucosa, PERRL, EOMI	  Lymphatic: No lymphadenopathy  Cardiovascular: Normal S1 S2, No JVD, +murmurs, No edema  Respiratory: rhonchi  Psychiatry: dementia  Gastrointestinal:  Soft, Non-tender, + BS	  Skin: No rashes, No ecchymoses, No cyanosis	  Extremities: Normal range of motion, No clubbing, cyanosis or edema  Vascular: Peripheral pulses palpable 2+ bilaterally    MEDICATIONS  (STANDING):  atorvastatin 10 milliGRAM(s) Oral at bedtime  ciprofloxacin     Tablet 500 milliGRAM(s) Oral every 12 hours  heparin   Injectable 5000 Unit(s) SubCutaneous every 8 hours  latanoprost 0.005% Ophthalmic Solution 1 Drop(s) Both EYES at bedtime  melatonin 5 milliGRAM(s) Oral at bedtime  memantine 10 milliGRAM(s) Oral two times a day  metroNIDAZOLE    Tablet 500 milliGRAM(s) Oral every 8 hours  mirtazapine Soltab 45 milliGRAM(s) Oral at bedtime  pantoprazole    Tablet 40 milliGRAM(s) Oral before breakfast  sertraline 50 milliGRAM(s) Oral daily  sodium chloride 0.9%. 1000 milliLiter(s) (60 mL/Hr) IV Continuous <Continuous>      TELEMETRY: 	    ECG:  	  RADIOLOGY:  OTHER: 	  	  LABS:	 	    CARDIAC MARKERS:                          11.2   8.94  )-----------( 156      ( 08 Sep 2024 07:31 )             34.7     09-08    141  |  110<H>  |  24<H>  ----------------------------<  90  4.2   |  21<L>  |  1.06    Ca    11.3<H>      08 Sep 2024 07:31      proBNP:   Lipid Profile:   HgA1c:   TSH:     < from: Xray Chest 1 View AP/PA (09.06.24 @ 09:29) >  Hazy opacities in the right upper lobe whichmay represent atelectasis   and/or infection.    < from: CT Abdomen and Pelvis w/ IV Cont (09.06.24 @ 02:53) >  Mild wall thickening versus underdistention of the ascending through   proximal sigmoid colon. Recommendclinical correlation for colitis.    Trace left hydronephrosis without obstructing stone identified. Recommend   correlation with urinalysis.      Assessment and plan  ---------------------------  89 yo F with hx dementia GERD, HLD, carotid stenosis, depression p/w weakness and difficulty ambulating. Patient states she was walking when she felt like her legs were going to give out. She felt weak at the time and felt like she may fall so she sat down on a nearby couch. One of the people at the facility told her she should get checked out and called for her to come here. She states she just felt weak and denies chest pain, sob, lightheadness, dizziness when this occurred. It has never happened before. She felt better after she sat down on the couch. Currently no complaints and wants to go back home. does not know why she is here. no fevers, chills, palpitations, dysuria, diarrhea, constipation. some abdominal discomfort in lower area.    pt with ?carotid stenosis with ?near syncope  observe HR, bp elevated ?agitation  check orthostatic  echo  asa 81 mg daily  physical therapy  GOC  dvt prophylaxis  check ua and cultures. noted  will order cipro, will consider GI eval  repeat calcium level  chest x ray noted pneumoniae ?aspiration, check speech and swallow  may dc iv abx, start on augmentin    	         Patient informed Date of Service: 09-08-24 @ 11:00           CARDIOLOGY     PROGRESS  NOTE   ________________________________________________    CHIEF COMPLAINT:Patient is a 89y old  Female who presents with a chief complaint of weakness (07 Sep 2024 22:14)  no complain  	  REVIEW OF SYSTEMS:  CONSTITUTIONAL: No fever, weight loss, or fatigue  EYES: No eye pain, visual disturbances, or discharge  ENT:  No difficulty hearing, tinnitus, vertigo; No sinus or throat pain  NECK: No pain or stiffness  RESPIRATORY: No cough, wheezing, chills or hemoptysis; No Shortness of Breath  CARDIOVASCULAR: No chest pain, palpitations, passing out, dizziness, or leg swelling  GASTROINTESTINAL: No abdominal or epigastric pain. No nausea, vomiting, or hematemesis; No diarrhea or constipation. No melena or hematochezia.  GENITOURINARY: No dysuria, frequency, hematuria, or incontinence  NEUROLOGICAL: No headaches, memory loss, loss of strength, numbness, or tremors  SKIN: No itching, burning, rashes, or lesions   LYMPH Nodes: No enlarged glands  ENDOCRINE: No heat or cold intolerance; No hair loss  MUSCULOSKELETAL: No joint pain or swelling; No muscle, back, or extremity pain  PSYCHIATRIC: No depression, anxiety, mood swings, or difficulty sleeping  HEME/LYMPH: No easy bruising, or bleeding gums  ALLERGY AND IMMUNOLOGIC: No hives or eczema	    [x ] All others negative	  [ ] Unable to obtain    PHYSICAL EXAM:  T(C): 36.7 (09-08-24 @ 04:41), Max: 37.3 (09-07-24 @ 20:34)  HR: 80 (09-08-24 @ 04:41) (79 - 80)  BP: 173/77 (09-08-24 @ 04:41) (137/75 - 173/77)  RR: 18 (09-08-24 @ 04:41) (18 - 18)  SpO2: 92% (09-08-24 @ 04:41) (92% - 92%)  Wt(kg): --  I&O's Summary      Appearance: Normal	  HEENT:   Normal oral mucosa, PERRL, EOMI	  Lymphatic: No lymphadenopathy  Cardiovascular: Normal S1 S2, No JVD, +murmurs, No edema  Respiratory: rhonchi  Psychiatry: dementia  Gastrointestinal:  Soft, Non-tender, + BS	  Skin: No rashes, No ecchymoses, No cyanosis	  Extremities: Normal range of motion, No clubbing, cyanosis or edema  Vascular: Peripheral pulses palpable 2+ bilaterally    MEDICATIONS  (STANDING):  atorvastatin 10 milliGRAM(s) Oral at bedtime  ciprofloxacin     Tablet 500 milliGRAM(s) Oral every 12 hours  heparin   Injectable 5000 Unit(s) SubCutaneous every 8 hours  latanoprost 0.005% Ophthalmic Solution 1 Drop(s) Both EYES at bedtime  melatonin 5 milliGRAM(s) Oral at bedtime  memantine 10 milliGRAM(s) Oral two times a day  metroNIDAZOLE    Tablet 500 milliGRAM(s) Oral every 8 hours  mirtazapine Soltab 45 milliGRAM(s) Oral at bedtime  pantoprazole    Tablet 40 milliGRAM(s) Oral before breakfast  sertraline 50 milliGRAM(s) Oral daily  sodium chloride 0.9%. 1000 milliLiter(s) (60 mL/Hr) IV Continuous <Continuous>      TELEMETRY: 	    ECG:  	  RADIOLOGY:  OTHER: 	  	  LABS:	 	    CARDIAC MARKERS:                          11.2   8.94  )-----------( 156      ( 08 Sep 2024 07:31 )             34.7     09-08    141  |  110<H>  |  24<H>  ----------------------------<  90  4.2   |  21<L>  |  1.06    Ca    11.3<H>      08 Sep 2024 07:31      proBNP:   Lipid Profile:   HgA1c:   TSH:     < from: Xray Chest 1 View AP/PA (09.06.24 @ 09:29) >  Hazy opacities in the right upper lobe whichmay represent atelectasis   and/or infection.    < from: CT Abdomen and Pelvis w/ IV Cont (09.06.24 @ 02:53) >  Mild wall thickening versus underdistention of the ascending through   proximal sigmoid colon. Recommendclinical correlation for colitis.    Trace left hydronephrosis without obstructing stone identified. Recommend   correlation with urinalysis.      Assessment and plan  ---------------------------  89 yo F with hx dementia GERD, HLD, carotid stenosis, depression p/w weakness and difficulty ambulating. Patient states she was walking when she felt like her legs were going to give out. She felt weak at the time and felt like she may fall so she sat down on a nearby couch. One of the people at the facility told her she should get checked out and called for her to come here. She states she just felt weak and denies chest pain, sob, lightheadness, dizziness when this occurred. It has never happened before. She felt better after she sat down on the couch. Currently no complaints and wants to go back home. does not know why she is here. no fevers, chills, palpitations, dysuria, diarrhea, constipation. some abdominal discomfort in lower area.    pt with ?carotid stenosis with ?near syncope  observe HR, bp elevated ?agitation  check orthostatic  echo  asa 81 mg daily  physical therapy  GOC  dvt prophylaxis  check ua and cultures. noted  will order cipro, will consider GI eval  repeat calcium level  chest x ray noted pneumoniae ?aspiration, check speech and swallow  will adjust bp meds

## 2024-09-19 ENCOUNTER — TREATMENT (OUTPATIENT)
Dept: PHYSICAL THERAPY | Facility: CLINIC | Age: 54
End: 2024-09-19
Payer: COMMERCIAL

## 2024-09-19 DIAGNOSIS — M54.50 ACUTE LEFT-SIDED LOW BACK PAIN WITHOUT SCIATICA: Primary | ICD-10-CM

## 2024-09-19 DIAGNOSIS — R29.3 POSTURE ABNORMALITY: ICD-10-CM

## 2024-09-19 DIAGNOSIS — R26.89 DECREASED FUNCTIONAL MOBILITY: ICD-10-CM

## 2024-09-19 DIAGNOSIS — R26.9 GAIT DISTURBANCE: ICD-10-CM

## 2024-09-19 DIAGNOSIS — M25.551 RIGHT HIP PAIN: ICD-10-CM

## 2024-09-24 ENCOUNTER — TELEPHONE (OUTPATIENT)
Dept: ORTHOPEDICS | Facility: OTHER | Age: 54
End: 2024-09-24
Payer: COMMERCIAL

## 2024-10-01 ENCOUNTER — OFFICE VISIT (OUTPATIENT)
Dept: INTERNAL MEDICINE | Facility: CLINIC | Age: 54
End: 2024-10-01
Payer: COMMERCIAL

## 2024-10-01 ENCOUNTER — TREATMENT (OUTPATIENT)
Dept: PHYSICAL THERAPY | Facility: CLINIC | Age: 54
End: 2024-10-01
Payer: COMMERCIAL

## 2024-10-01 VITALS
SYSTOLIC BLOOD PRESSURE: 138 MMHG | WEIGHT: 315 LBS | TEMPERATURE: 98 F | BODY MASS INDEX: 47.74 KG/M2 | HEIGHT: 68 IN | OXYGEN SATURATION: 94 % | DIASTOLIC BLOOD PRESSURE: 91 MMHG | HEART RATE: 85 BPM

## 2024-10-01 DIAGNOSIS — M25.551 RIGHT HIP PAIN: ICD-10-CM

## 2024-10-01 DIAGNOSIS — Z23 NEED FOR INFLUENZA VACCINATION: ICD-10-CM

## 2024-10-01 DIAGNOSIS — R29.3 POSTURE ABNORMALITY: ICD-10-CM

## 2024-10-01 DIAGNOSIS — R26.89 DECREASED FUNCTIONAL MOBILITY: ICD-10-CM

## 2024-10-01 DIAGNOSIS — M54.50 ACUTE LEFT-SIDED LOW BACK PAIN WITHOUT SCIATICA: Primary | ICD-10-CM

## 2024-10-01 DIAGNOSIS — R19.7 DIARRHEA, UNSPECIFIED TYPE: ICD-10-CM

## 2024-10-01 DIAGNOSIS — R21 RASH: Primary | ICD-10-CM

## 2024-10-01 PROCEDURE — 99213 OFFICE O/P EST LOW 20 MIN: CPT

## 2024-10-01 PROCEDURE — 97110 THERAPEUTIC EXERCISES: CPT

## 2024-10-01 PROCEDURE — 97530 THERAPEUTIC ACTIVITIES: CPT

## 2024-10-01 PROCEDURE — 90471 IMMUNIZATION ADMIN: CPT

## 2024-10-01 PROCEDURE — 90656 IIV3 VACC NO PRSV 0.5 ML IM: CPT

## 2024-10-01 RX ORDER — TRIAMCINOLONE ACETONIDE 5 MG/G
1 OINTMENT TOPICAL 2 TIMES DAILY
Qty: 15 G | Refills: 0 | Status: SHIPPED | OUTPATIENT
Start: 2024-10-01
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
## 2024-10-01 NOTE — PATIENT INSTRUCTIONS
Continue medications as prescribed. Apply triamcinolone ointment to clean and dry area twice a day. Recommend Imodium for diarrhea. Stay hydrated with water. Notify office in a week if symptoms continue and new medication to be sent to pharmacy. Look into insurance coverage of semaglutide (Wegovy) or tirzepatide (Zepbound) for weight management. Follow-up as needed.

## 2024-10-01 NOTE — PROGRESS NOTES
"Chief Complaint  Diarrhea (Past week) and Spots and/or Floaters (Rt leg)    Subjective        Grant Edge . presents to [facility]  History of Present Illness  54-year-old male presenting with diarrhea and leg rash.  States he has been having watery stools for about a week.  His going 4-5 times a day.  Diarrhea has not uncommon for him since gallbladder has been removed but the length of time was concerning.  He is eating and drinking well.  Denies any fever, abdominal cramping, bloody stool.    Said itchiness on his lower right leg for months.  States there is just 1 spot and keeps being itchy.  Has tried applying lotions which have not helped.  Diarrhea     Spots and/or Floaters        Objective   Vital Signs:  /91 (BP Location: Left arm, Patient Position: Sitting, Cuff Size: Large Adult)   Pulse 85   Temp 98 °F (36.7 °C) (Oral)   Ht 172.7 cm (68\")   Wt (!) 145 kg (320 lb)   SpO2 94%   BMI 48.66 kg/m²   Estimated body mass index is 48.66 kg/m² as calculated from the following:    Height as of this encounter: 172.7 cm (68\").    Weight as of this encounter: 145 kg (320 lb).               Physical Exam  Vitals reviewed.   Constitutional:       Appearance: Normal appearance.   Musculoskeletal:         General: Normal range of motion.      Cervical back: Normal range of motion.   Skin:     General: Skin is warm and dry.      Capillary Refill: Capillary refill takes less than 2 seconds.      Findings: Rash present.   Neurological:      General: No focal deficit present.      Mental Status: He is alert and oriented to person, place, and time.   Psychiatric:         Mood and Affect: Mood normal.         Behavior: Behavior normal.         Thought Content: Thought content normal.         Judgment: Judgment normal.        Result Review :      Common labs          2/12/2024    16:23 6/20/2024    13:29 8/29/2024    15:01   Common Labs   Glucose 91  78  102    BUN 9  17  10  "   Creatinine 1.20  1.14  1.32    Sodium 141  143  142    Potassium 4.0  4.6  3.9    Chloride 103  104  105    Calcium 9.3  9.2  9.4    Total Protein 6.8  6.3     Albumin 4.3  4.0  3.9    Total Bilirubin 1.4  0.9  1.0    Alkaline Phosphatase 101  99  111    AST (SGOT) 17  19  17    ALT (SGPT) 19  21  17    WBC   7.21    Hemoglobin   15.2    Hematocrit   48.0    Platelets   225    Total Cholesterol 160      Triglycerides 104      HDL Cholesterol 45      LDL Cholesterol  96      Hemoglobin A1C 6.30  6.20           Current Outpatient Medications on File Prior to Visit   Medication Sig Dispense Refill    benazepril (LOTENSIN) 40 MG tablet TAKE 1 TABLET BY MOUTH DAILY 90 tablet 0    esomeprazole (nexIUM) 40 MG capsule TAKE 1 CAPSULE BY MOUTH EVERY MORNING BEFORE BREAKFAST 90 capsule 0    furosemide (Lasix) 20 MG tablet Take 1 tablet by mouth Every Other Day. 90 tablet 1    levothyroxine (SYNTHROID, LEVOTHROID) 112 MCG tablet TAKE 1 TABLET BY MOUTH DAILY 90 tablet 1    metoprolol succinate XL (TOPROL-XL) 50 MG 24 hr tablet TAKE 1 AND 1/2 TABLETS BY MOUTH DAILY 135 tablet 0    montelukast (SINGULAIR) 10 MG tablet TAKE 1 TABLET BY MOUTH EVERY NIGHT 90 tablet 1    potassium chloride 10 MEQ CR tablet TAKE 1 TABLET BY MOUTH DAILY 30 tablet 2    promethazine-dextromethorphan (PROMETHAZINE-DM) 6.25-15 MG/5ML syrup Take 5 mL by mouth 4 (Four) Times a Day As Needed for Cough. 180 mL 1    Wixela Inhub 100-50 MCG/ACT DISKUS INHALE 1 PUFF BY MOUTH TWICE DAILY 60 each 2    Xarelto 20 MG tablet TAKE 1 TABLET BY MOUTH DAILY 90 tablet 0    cetirizine (zyrTEC) 10 MG tablet Take 1 tablet by mouth Daily for 30 days. 30 tablet 0    fluticasone (FLONASE) 50 MCG/ACT nasal spray Use 2 sprays into the nostril(s) as directed by provider Daily for 14 days. 16 g 0    TiZANidine (ZANAFLEX) 4 MG capsule Take 1 capsule by mouth 3 (Three) Times a Day As Needed for Muscle Spasms. (Patient not taking: Reported on 10/1/2024) 30 capsule 1    [DISCONTINUED]  predniSONE (DELTASONE) 10 MG tablet Take 4 tablets by mouth on days 1-2. Take 3 tablets by mouth on days 3-4. Take 2 tablets by mouth on days 5-8. Take 1 tablet by mouth on days 9-12. (Patient not taking: Reported on 8/29/2024) 26 tablet 0     No current facility-administered medications on file prior to visit.                Assessment and Plan     Diagnoses and all orders for this visit:    1. Rash (Primary)  -     triamcinolone (KENALOG) 0.5 % ointment; Apply 1 Application topically to the appropriate area as directed 2 (Two) Times a Day.  Dispense: 15 g; Refill: 0    2. Diarrhea, unspecified type    3. Need for influenza vaccination  -     Fluzone >6mos        Patient Instructions   Continue medications as prescribed. Apply triamcinolone ointment to clean and dry area twice a day. Recommend Imodium for diarrhea. Stay hydrated with water. Notify office in a week if symptoms continue and new medication to be sent to pharmacy. Look into insurance coverage of semaglutide (Wegovy) or tirzepatide (Zepbound) for weight management. Follow-up as needed.            Follow Up     Return if symptoms worsen or fail to improve.  Patient was given instructions and counseling regarding his condition or for health maintenance advice. Please see specific information pulled into the AVS if appropriate.

## 2024-10-03 ENCOUNTER — TREATMENT (OUTPATIENT)
Dept: PHYSICAL THERAPY | Facility: CLINIC | Age: 54
End: 2024-10-03
Payer: COMMERCIAL

## 2024-10-03 DIAGNOSIS — R29.3 POSTURE ABNORMALITY: ICD-10-CM

## 2024-10-03 DIAGNOSIS — R26.89 DECREASED FUNCTIONAL MOBILITY: ICD-10-CM

## 2024-10-03 DIAGNOSIS — M25.551 RIGHT HIP PAIN: ICD-10-CM

## 2024-10-03 DIAGNOSIS — M54.50 ACUTE LEFT-SIDED LOW BACK PAIN WITHOUT SCIATICA: Primary | ICD-10-CM

## 2024-10-03 DIAGNOSIS — R26.9 GAIT DISTURBANCE: ICD-10-CM

## 2024-10-03 NOTE — PROGRESS NOTES
Physical Therapy Daily Treatment Note  [facility]  [place] - [address], [address]     [place] [address]   Phone: [phone]   Fax: [phone]      Patient: Grant Edge Jr.   : 1970  Referring practitioner: MELANY Duncan  Date of Initial Visit: Type: THERAPY  Noted: 2024  Today's Date: 10/3/2024  Patient seen for 11 sessions       Visit Diagnoses:    ICD-10-CM ICD-9-CM   1. Acute left-sided low back pain without sciatica  M54.50 724.2   2. Posture abnormality  R29.3 781.92   3. Right hip pain  M25.551 719.45   4. Decreased functional mobility  R26.89 781.99   5. Gait disturbance  R26.9 781.2         Subjective:  Grant Edge reports: having a better day today than earlier this week. Continuing to have good improvements in low back pain. Continues to complain of R hip and R knee pain and associated stiffness.       Objective   See Exercise, Manual, and Modality Logs for complete treatment.       Assessment:  Patient presents for his 11th physical therapy visit for complaints of low back pain. Patient's low back pain has improved greatly, and has minimal complaints of back pain at this time. However, patient exhibits signs and symptoms consistent with end stage arthritis of R hip and knee, which likely cause compensatory motion into low back, likely being cause of low back pain. Final two sessions will focus towards movement interventions to improve mobility and strength of R lower extremity, although may not see great improvements. Patient likely to d/c at end of POC barring any setbacks.      Plan:   Continue with strength training to R lower extremity,  and provide interventions to improve R LE mobility.         Timed:         Manual Therapy:         mins  99103;     Therapeutic Exercise:    28     mins  74521;     Neuromuscular Michael:        mins  36290;    Therapeutic Activity:     12     mins  69928;     Gait Training:           mins  11969;      Ultrasound:          mins  91014;    Ionto                                   mins  32728  Self Care                            mins  87460  Traction          mins 27675      Un-Timed:  Canalith Repos         mins 62262  Electrical Stimulation:         mins  78055 ( );  Dry Needling          mins self-pay  Traction          mins 51978        Timed Treatment:   40   mins   Total Treatment:     40   mins    Neil Mccabe PT  License Number: [place] [id number]# [id number]. [place] LIC# [id number]    Physical Therapist

## 2024-10-08 ENCOUNTER — TELEPHONE (OUTPATIENT)
Dept: PHYSICAL THERAPY | Facility: CLINIC | Age: 54
End: 2024-10-08
Payer: COMMERCIAL

## 2024-10-10 ENCOUNTER — TREATMENT (OUTPATIENT)
Dept: PHYSICAL THERAPY | Facility: CLINIC | Age: 54
End: 2024-10-10
Payer: COMMERCIAL

## 2024-10-10 DIAGNOSIS — R26.9 GAIT DISTURBANCE: ICD-10-CM

## 2024-10-10 DIAGNOSIS — R26.89 DECREASED FUNCTIONAL MOBILITY: ICD-10-CM

## 2024-10-10 DIAGNOSIS — M25.551 RIGHT HIP PAIN: ICD-10-CM

## 2024-10-10 DIAGNOSIS — M54.50 ACUTE LEFT-SIDED LOW BACK PAIN WITHOUT SCIATICA: Primary | ICD-10-CM

## 2024-10-10 DIAGNOSIS — R29.3 POSTURE ABNORMALITY: ICD-10-CM

## 2024-10-10 NOTE — PROGRESS NOTES
Chief Complaint: Gross Hematuria, UTI, history of nephrolithiasis  History of Present Illness: Mr. Sipple is a pleasant 32-year-old male who presented to the emergency department on 1/19/20 for SOB, cough, chills and fever. He was found to have a UTI, being managed by Infectious Disease. Urology was consulted due to gross hematuria. Of note, patient was recently started on Xarelto for a LE DVT. Xarelto has since been discontinued and urine color is improving.  Patient is recently known to our practice by Dr. Patrick Gloria MD. He has history of spina bifida and neurogenic bladder, requiring CIC several times daily by his mother. He has history of UTIs as well as a complicated history of nephrolithiasis, previously treated at the [facility] with ureteroscopy, due to his anatomy. He is also followed by Dr. Moeller ([facility]) every 6 months.  Past Medical History: spina bifida, pressure ulcers, left AKA, nephrolithiasis, UTI, morbid obesity, DM 2, HTN  Past Surgical History:  left AKA, ureteroscopy  Family History: no family history of  malignancy  Social History: patient lives with his mother, no smoking, alcohol or illicit drug use  Allergies: ZYVOX, ROCEPHIN, MORPHINE, LATEX, INVANZ, CODEINE, CECLOR, BETADINE, BACTRIM.     Medications: Nadolol, Lactobacillus, Ferrous sulfate, Oxybutynin ER 30 mg daily, Multivitamins, Metformin, Zinc sulfate.    Review of Systems:  Constitutional: see HPI.  Eyes: negative  ENT: negative  Respiratory: see HPI  Cardiovascular: negative  Gastrointestinal: negative  Genitourinary: see HPI  Musculoskeletal: see HPI  Skin: see HPI  Neurological: negative  Endocrine: negative  Psychiatric: negative  Heme/Lymph: negative  Allergy/Immunology: negative    Physical Examination:   Vital Signs (last 24 hrs)_____ Last Charted___________Minimum____________ Maximum____________  Temp    98.5  (JAN 25 08:28) 97.9  (JAN 24 22:17) 98.5  (JAN 25 08:28)  Heart Rate   85  (JAN 25 08:28) 66  (JAN 25  Physical Therapy Discharge Note  [facility]  [place] - [address], [address]     [place] [address]   Phone: [phone]   Fax: [phone]      Patient: Grant Edge Jr.   : 1970  Referring practitioner: MELANY Duncan  Date of Initial Visit: Type: THERAPY  Noted: 2024  Today's Date: 10/10/2024  Patient seen for 12 sessions       Visit Diagnoses:    ICD-10-CM ICD-9-CM   1. Acute left-sided low back pain without sciatica  M54.50 724.2   2. Posture abnormality  R29.3 781.92   3. Right hip pain  M25.551 719.45   4. Decreased functional mobility  R26.89 781.99   5. Gait disturbance  R26.9 781.2         Subjective:  Grant Edge reports: 100% improvement in low back pain. Patient reports continued pain and difficulty with R hip and R knee. Patient reports work has been busy and his low back has been doing pretty well. Patient feels R hip tends to be the most bothersome at this point.       Objective   See Exercise, Manual, and Modality Logs for complete treatment.     Active Range of Motion      Lumbar   Flexion: 110 degrees - no pain reported  Extension: 30 degrees   Left lateral flexion: 35 degrees   Right lateral flexion: 35 degrees   Left rotation: Active left lumbar rotation: 25% limited.   Right rotation: Active right lumbar rotation: 25% limited.   Left Hip   Flexion: 110 degrees   External rotation (90/90): 45 degrees   Internal rotation (90/90): 20 degrees      Right Hip   Flexion: 85 degrees with pain  Abd 32 degrees with pain  External rotation (90/90): 30 degrees  Inernal rotation (90/90): 15 degrees     Left Knee   Flexion: WFL  Extension: WFL     Right Knee   Flexion: 118 degrees   Extension: 4 (lacking) degrees      Strength/Myotome Testing      Left Hip   Planes of Motion   Flexion: 5  Extension: 5  Abduction: 4+  Adduction: 5     Right Hip   Planes of Motion   Flexion: 4-  Extension: 4-  Abduction: 4-  Adduction: 5     Left Knee   Flexion:  06:00) 92  (JAN 24 16:12)  Resp Rate       18  (JAN 25 08:28) 17  (JAN 24 12:19) 20  (JAN 24 22:17)  SBP    118  (JAN 25 08:28) 118  (JAN 24 12:19) 128  (JAN 24 15:36)  DBP    73  (JAN 25 08:28) 70  (JAN 24 12:19) 82  (JAN 24 16:12)      Intake Output Balance    01/24/2020 7a-3p   850  2075 -1225   3p-11p   322  1300  -978   11p-7a   220   600  -380   Totals  1392  3975 -2583    01/23/2020 7a-3p   532  2345 -1812   3p-11p   655   450   205   11p-7a   100   600  -500   Totals  1287  3395 -2107    Constitutional: NAD, resting comfortably in bed  HEENT: normocephalic, atraumatic  Neck: short, wide neck  Respiratory: symmetric bilaterally  Gastrointestinal: obese, nontender  Genitourinary: Catheter in place, draining clear, red urine  Musculoskeletal: left AKA  Skin: no bruising  Psychiatric: appropriate mood and affect    Labs (Last four charted values)  WBC                  H 15.7 (JAN 25) H 16.6 (JAN 24) H 13.3 (JAN 24) H 11.2 (JAN 23)  Hgb                  13.7 (JAN 25) 13.9 (JAN 24) 12.9 (JAN 24) 12.5 (JAN 23)  Hct                  43 (JAN 25) 44 (JAN 24) 40 (JAN 24) 39 (JAN 23)  Plt                  323 (JAN 25) 373 (JAN 24) 305 (JAN 24) 302 (JAN 23)  Na                   L 135 (JAN 25) 142 (JAN 24) L 135 (JAN 23) L 135 (JAN 22)  K                    4.0 (JAN 25) 4.1 (JAN 24) 3.6 (JAN 23) 4.2 (JAN 22)  CO2                  27 (JAN 25) 27 (JAN 24) 22 (JAN 23) 20 (JAN 22)  Cl                   L 96 (JAN 25) 103 (JAN 24) 101 (JAN 23) 101 (JAN 22)  Cr                   0.83 (JAN 25) 0.78 (JAN 24) 0.98 (JAN 23) 0.99 (JAN 22)  BUN                  H 27 (JAN 25) H 21 (JAN 24) 20 (JAN 23) 14 (JAN 22)  Glucose              H 171 (JAN 25) H 215 (JAN 24) H 392 (JAN 23) H 198 (JAN 22)  Mg                   1.7 (JAN 25) 2.0 (JAN 24) H 3.1 (JAN 23) 2.0 (JAN 22)  Phos                 3.8 (JAN 25) 3.5 (JAN 24) 2.9 (JAN 23) 3.3 (JAN 22)  Ca                   8.9 (JAN 25) 9.2 (JAN 24) 8.6 (JAN 23) 9.8 (JAN 22)  PT                    5  Extension: 5     Right Knee   Flexion: 4  Extension: 4     Left Ankle/Foot   Dorsiflexion: 5  Plantar flexion: 5     Right Ankle/Foot   Dorsiflexion: 5  Plantar flexion: 5     Ambulation      Comments   Patient ambulates on level surface with trunk lean to the L, with R lower extremity in slight abduction and external rotation with circumduction of R lower extremity. Decreased lumbar and pelvic rotation throughout gait mechanics. Utilizes SPC appropriately and with decreased    Assessment:  Patient is a 53 y/o male who presents to physical therapy with diagnosis of low back pain with secondary issues of end stage R hip and R knee osteoarthritis. Patient reports 100% improvement in low back symptoms, and report no onset of low back pain over the past several weeks. Patient continues to exhibit great deficits into R hip and R knee mobility and strength. Patient has not shown any significant changes Patient has met all goals related to low back. Patient given printout update of movement interventions in regards to improving R hip mobility and strength. Patient to be d/c from skilled physical therapy at this time for independent completion of Hep.       Plan:   D/c    Plan Goals: ST weeks  1. Pt will be independent and compliant with initial HEP - MET  2. Pt will report a 50% improvement in symptoms since starting therapy - MET  3. Pt will report pain level at worst <2/10 during  activity in 5 weeks. - MET  4. Pt will be independent with postural corrections in 2 weeks in order to decrease strain on back. - Not MET     LTG: 10 weeks  1. Pt will be independent with final HEP for self-management of condition by DC. - MET  2. Pt will improve score on Oswesty to less than 20% by DC.  - MET  3. Pt will report an 80% improvement in symptoms by DC in order to allow return to PLOF. - MET  4. Pt will improve lumbar AROM to WFL in order to complete ADLs with improved function by DC. - MET  5. Pt will improve LE  10.1 (JAN 22) 10.6 (JAN 21) 10.4 (JAN 19)   INR                  0.9 (JAN 22) 1.0 (JAN 21) 1.0 (JAN 19)   PTT                  H 37 (JAN 23) 26 (JAN 23) 30 (JAN 23) 26 (JAN 22)  Troponin             0.01 (JAN 22) 0.03 (JAN 21) 0.03 (JAN 21) 0.02 (JAN 19)  Urine and Blood cultures x 2: Proteus    Radiology:   Chest Xray (1/24/20)  IMPRESSION: Persistent bilateral pleural effusions.  Perihilar strandy opacities are again seen.  There is no significant change    CT Pulmonary Angio (1/21/20)  IMPRESSION: No evidence of pulmonary embolism.  No aortic aneurysm or dissection.  Consolidation with air bronchograms in the lower lobes right greater than left.  Bilateral pleural effusions right greater than left.  Bilateral renal stones.  Severe deformity of the thorax  and spine.    LE US (1/20/20  IMPRESSION: Very limited study.  There is evidence of venous thrombosis in the right posterior tibial veins.  The left common  femoral vein could not be surveyed.    Assessment:   1. Spina Bifida  2. Respiratory Failure  3. UTI  4. Nephrolithiasis  5. Gross hematuria    Plan:   1. Monitor vitals/labs.  2. Continue antibiotics per ID, midline placed, anticipating outpatient treatment with Gentamicin.  3. Proceed with Renal/Bladder US.  4. Hematuria is improving. Will order bladder irrigation with 30 cc. Continue PRN.  5. Given Proteus infection x 2 in 3 months, patient likely has colonized nephrolithiasis, which may warrant treatment. He will require adequate UTI treatment prior to any surgical intervention. Patient/mother to decide if treatment with Dr. Gloria vs. Reagan is desired.  6. Continue Oxybutynin ER 30 mg daily (home medication).  7. Maintain [place] until hematuria has resolved. Then may resume CIC.    This patient was discussed and reviewed with Dr. Maria Eugenia Garcia MD.  Thank you for allowing us to see this patinet in consultation.   strength to 4/5 throughout, and ambulate on level surface x10 minutes without increase in low back pain. - MET  6. Pt will improve R hip ROM to 100 degrees of hip flexion in order to sit more comfortably. - NEW  7. Pt will demonstrate 4/5 R hip flexion and report easier time transferring into/out automobile - not MET    Timed:         Manual Therapy:         mins  04652;     Therapeutic Exercise:    22     mins  35022;     Neuromuscular Michael:        mins  78016;    Therapeutic Activity:     10     mins  27258;     Gait Training:           mins  38144;     Ultrasound:          mins  17276;    Ionto                                   mins  39337  Self Care                            mins  98185  Traction          mins 39856      Un-Timed:  Canalith Repos         mins 53219  Electrical Stimulation:         mins  56551 ( );  Dry Needling          mins self-pay  Traction          mins 68394        Timed Treatment:   32   mins   Total Treatment:     42   mins    Neil Mccabe PT  License Number: [place] [id number]# [id number]. [place] LIC# [id number]    Physical Therapist

## 2024-10-22 ENCOUNTER — OFFICE VISIT (OUTPATIENT)
Dept: INTERNAL MEDICINE | Facility: CLINIC | Age: 54
End: 2024-10-22
Payer: COMMERCIAL

## 2024-10-22 VITALS
WEIGHT: 315 LBS | DIASTOLIC BLOOD PRESSURE: 85 MMHG | HEART RATE: 86 BPM | OXYGEN SATURATION: 93 % | HEIGHT: 68 IN | TEMPERATURE: 98.5 F | BODY MASS INDEX: 47.74 KG/M2 | SYSTOLIC BLOOD PRESSURE: 145 MMHG

## 2024-10-22 DIAGNOSIS — E78.2 MIXED HYPERLIPIDEMIA: Chronic | ICD-10-CM

## 2024-10-22 DIAGNOSIS — I10 BENIGN ESSENTIAL HYPERTENSION: Chronic | ICD-10-CM

## 2024-10-22 DIAGNOSIS — Z12.5 SCREENING FOR MALIGNANT NEOPLASM OF PROSTATE: Primary | ICD-10-CM

## 2024-10-22 DIAGNOSIS — I10 BENIGN ESSENTIAL HYPERTENSION: ICD-10-CM

## 2024-10-22 DIAGNOSIS — R73.03 PREDIABETES: ICD-10-CM

## 2024-10-22 DIAGNOSIS — E66.01 MORBID OBESITY: Chronic | ICD-10-CM

## 2024-10-22 LAB
ALBUMIN SERPL-MCNC: 4.1 G/DL (ref 3.5–5.2)
ALBUMIN/GLOB SERPL: 1.5 G/DL
ALP SERPL-CCNC: 114 U/L (ref 39–117)
ALT SERPL-CCNC: 16 U/L (ref 1–41)
APPEARANCE UR: CLEAR
AST SERPL-CCNC: 19 U/L (ref 1–40)
BACTERIA #/AREA URNS HPF: ABNORMAL /HPF
BILIRUB SERPL-MCNC: 1.3 MG/DL (ref 0–1.2)
BILIRUB UR QL STRIP: NEGATIVE
BUN SERPL-MCNC: 10 MG/DL (ref 6–20)
BUN/CREAT SERPL: 8.3 (ref 7–25)
CALCIUM SERPL-MCNC: 9.4 MG/DL (ref 8.6–10.5)
CASTS URNS MICRO: ABNORMAL
CHLORIDE SERPL-SCNC: 103 MMOL/L (ref 98–107)
CHOLEST SERPL-MCNC: 141 MG/DL (ref 0–200)
CHOLEST/HDLC SERPL: 3.71 {RATIO}
CO2 SERPL-SCNC: 26.1 MMOL/L (ref 22–29)
COLOR UR: YELLOW
CREAT SERPL-MCNC: 1.21 MG/DL (ref 0.76–1.27)
EGFRCR SERPLBLD CKD-EPI 2021: 71.2 ML/MIN/1.73
EPI CELLS #/AREA URNS HPF: ABNORMAL /HPF
GLOBULIN SER CALC-MCNC: 2.7 GM/DL
GLUCOSE SERPL-MCNC: 109 MG/DL (ref 65–99)
GLUCOSE UR QL STRIP: NEGATIVE
HBA1C MFR BLD: 6.2 % (ref 4.8–5.6)
HDLC SERPL-MCNC: 38 MG/DL (ref 40–60)
HGB UR QL STRIP: ABNORMAL
KETONES UR QL STRIP: ABNORMAL
LDLC SERPL CALC-MCNC: 78 MG/DL (ref 0–100)
LEUKOCYTE ESTERASE UR QL STRIP: NEGATIVE
NITRITE UR QL STRIP: NEGATIVE
PH UR STRIP: 7 [PH] (ref 5–8)
POTASSIUM SERPL-SCNC: 4.2 MMOL/L (ref 3.5–5.2)
PROT SERPL-MCNC: 6.8 G/DL (ref 6–8.5)
PROT UR QL STRIP: NEGATIVE
PSA SERPL-MCNC: 2.14 NG/ML (ref 0–4)
RBC #/AREA URNS HPF: ABNORMAL /HPF
SODIUM SERPL-SCNC: 140 MMOL/L (ref 136–145)
SP GR UR STRIP: 1.02 (ref 1–1.03)
TRIGL SERPL-MCNC: 144 MG/DL (ref 0–150)
UROBILINOGEN UR STRIP-MCNC: ABNORMAL MG/DL
VLDLC SERPL CALC-MCNC: 25 MG/DL (ref 5–40)
WBC #/AREA URNS HPF: ABNORMAL /HPF

## 2024-10-22 NOTE — PATIENT INSTRUCTIONS
Continue medications as prescribed. Increase activity as tolerated. Stay hydrated with water. Labs today. Labs to determine which medication to be sent (Ozempic or Wegovy). Follow-up between 3rd and 4th dose of semaglutide.

## 2024-10-23 DIAGNOSIS — E66.01 MORBID OBESITY: Primary | ICD-10-CM

## 2024-10-23 RX ORDER — METOPROLOL SUCCINATE 50 MG/1
TABLET, EXTENDED RELEASE ORAL
Qty: 135 TABLET | Refills: 0 | Status: SHIPPED | OUTPATIENT
Start: 2024-10-23

## 2024-10-23 NOTE — PROGRESS NOTES
PSA is normal.  No signs of prostate cancer.    No signs of kidney injury, liver injury or electrolyte imbalance.    Urinalysis unremarkable.    Fasting blood sugar 109 correlating with hemoglobin A1c of 6.2.  Is considered prediabetic.  Continue to limit sweets and carbohydrates.  Prescription for Wegovy to be sent to pharmacy.  Take as discussed in visit.  Follow-up in office between third and fourth dose.

## 2024-11-05 ENCOUNTER — PATIENT MESSAGE (OUTPATIENT)
Dept: INTERNAL MEDICINE | Facility: CLINIC | Age: 54
End: 2024-11-05
Payer: COMMERCIAL

## 2024-11-05 ENCOUNTER — OFFICE VISIT (OUTPATIENT)
Dept: ORTHOPEDIC SURGERY | Facility: CLINIC | Age: 54
End: 2024-11-05
Payer: COMMERCIAL

## 2024-11-05 VITALS — BODY MASS INDEX: 45.1 KG/M2 | TEMPERATURE: 97.8 F | WEIGHT: 315 LBS | HEIGHT: 70 IN

## 2024-11-05 DIAGNOSIS — M17.11 PRIMARY OSTEOARTHRITIS OF RIGHT KNEE: ICD-10-CM

## 2024-11-05 DIAGNOSIS — R52 PAIN: Primary | ICD-10-CM

## 2024-11-05 RX ORDER — LIDOCAINE HYDROCHLORIDE 10 MG/ML
5 INJECTION, SOLUTION EPIDURAL; INFILTRATION; INTRACAUDAL; PERINEURAL
Status: COMPLETED | OUTPATIENT
Start: 2024-11-05 | End: 2024-11-05

## 2024-11-05 RX ORDER — RIVAROXABAN 20 MG/1
20 TABLET, FILM COATED ORAL DAILY
Qty: 90 TABLET | Refills: 0 | Status: SHIPPED | OUTPATIENT
Start: 2024-11-05

## 2024-11-05 RX ORDER — METHYLPREDNISOLONE ACETATE 80 MG/ML
80 INJECTION, SUSPENSION INTRA-ARTICULAR; INTRALESIONAL; INTRAMUSCULAR; SOFT TISSUE
Status: COMPLETED | OUTPATIENT
Start: 2024-11-05 | End: 2024-11-05

## 2024-11-05 RX ADMIN — LIDOCAINE HYDROCHLORIDE 5 ML: 10 INJECTION, SOLUTION EPIDURAL; INFILTRATION; INTRACAUDAL; PERINEURAL at 15:15

## 2024-11-05 RX ADMIN — METHYLPREDNISOLONE ACETATE 80 MG: 80 INJECTION, SUSPENSION INTRA-ARTICULAR; INTRALESIONAL; INTRAMUSCULAR; SOFT TISSUE at 15:15

## 2024-11-05 NOTE — PROGRESS NOTES
11/5/2024    Grant FABIO Edge Jr. is here today for worsening knee pain. Pt has undergone injection of the knee in the past with good resolution of symptoms. Pt is requesting a repeat injection.     KNEE Injection Procedure Note:    Large Joint Arthrocentesis: R knee  Date/Time: 11/5/2024 3:15 PM  Consent given by: patient  Site marked: site marked  Timeout: Immediately prior to procedure a time out was called to verify the correct patient, procedure, equipment, support staff and site/side marked as required   Supporting Documentation  Indications: pain and joint swelling   Procedure Details  Location: knee - R knee  Preparation: Patient was prepped and draped in the usual sterile fashion  Needle size: 22 G  Approach: anterolateral  Medications administered: 80 mg methylPREDNISolone acetate 80 MG/ML; 5 mL lidocaine PF 1% 1 %  Patient tolerance: patient tolerated the procedure well with no immediate complications    (3 mL of lidocaine was used for anesthetic purposes)    Imaging: X-rays 3 views right knee (AP, lateral, sunrise views) were ordered and reviewed for evaluation of knee pain which demonstrate moderate joint space narrowing to the medial compartment with advanced joint space narrowing noted to the patellofemoral compartment with bone-on-bone articulation, sclerotic and cystic changes as well as peritubular osteophytes present.  In comparison to previous films patient does not demonstrate significant arthritic progression.      At the conclusion of the injection I discussed the importance of continued quad strengthening exercises on a daily basis.  We did discuss continuation of weight loss techniques as the patient needs to lose around 26 pounds to be below 300 before proceeding forward with either surgery.  Patient will need a total hip and a total knee at some point.  I will see the patient back if the symptoms should fail to improve or worsen.    Sujit Sánchez, APRN  11/5/2024

## 2024-11-12 DIAGNOSIS — I10 BENIGN ESSENTIAL HYPERTENSION: ICD-10-CM

## 2024-11-13 RX ORDER — BENAZEPRIL HYDROCHLORIDE 40 MG/1
40 TABLET ORAL DAILY
Qty: 90 TABLET | Refills: 0 | Status: SHIPPED | OUTPATIENT
Start: 2024-11-13

## 2024-11-14 DIAGNOSIS — K21.00 GASTROESOPHAGEAL REFLUX DISEASE WITH ESOPHAGITIS WITHOUT HEMORRHAGE: ICD-10-CM

## 2024-11-15 RX ORDER — ESOMEPRAZOLE MAGNESIUM 40 MG/1
40 CAPSULE, DELAYED RELEASE ORAL
Qty: 90 CAPSULE | Refills: 0 | Status: SHIPPED | OUTPATIENT
Start: 2024-11-15

## 2024-11-29 DIAGNOSIS — I87.303 STASIS EDEMA OF BOTH LOWER EXTREMITIES: ICD-10-CM

## 2024-12-02 RX ORDER — POTASSIUM CHLORIDE 750 MG/1
10 TABLET, EXTENDED RELEASE ORAL DAILY
Qty: 30 TABLET | Refills: 2 | Status: SHIPPED | OUTPATIENT
Start: 2024-12-02

## 2024-12-05 ENCOUNTER — OFFICE VISIT (OUTPATIENT)
Dept: INTERNAL MEDICINE | Facility: CLINIC | Age: 54
End: 2024-12-05
Payer: COMMERCIAL

## 2024-12-05 VITALS
OXYGEN SATURATION: 95 % | HEART RATE: 88 BPM | BODY MASS INDEX: 44.44 KG/M2 | TEMPERATURE: 97.8 F | SYSTOLIC BLOOD PRESSURE: 147 MMHG | HEIGHT: 70 IN | DIASTOLIC BLOOD PRESSURE: 88 MMHG | WEIGHT: 310.4 LBS

## 2024-12-05 DIAGNOSIS — E66.01 MORBID OBESITY: Primary | ICD-10-CM

## 2024-12-05 DIAGNOSIS — L91.8 SKIN TAG: ICD-10-CM

## 2024-12-05 DIAGNOSIS — Z76.89 ENCOUNTER FOR WEIGHT MANAGEMENT: ICD-10-CM

## 2024-12-05 PROCEDURE — 99213 OFFICE O/P EST LOW 20 MIN: CPT

## 2024-12-05 NOTE — PATIENT INSTRUCTIONS
Finish 0.25 mg dose and then increase to 0.5 mg weekly semaglutide. Make sure to eat protein with meals. Stay hydrated with water. Monitor for side effects. Follow-up in 4 weeks for recheck.

## 2024-12-05 NOTE — PROGRESS NOTES
"Chief Complaint  Follow-up (Has a spot on back wants you to look at )    Subjective        Grant Edge . presents to [facility]  History of Present Illness  54-year-old male presenting for weight management follow-up.  Has been taking semaglutide as prescribed.  Tolerating well.  Has lost 15 pounds in 3 weeks.    Has a skin tag on his left shoulder.  States it has not grown he just noticed it and scratching few days ago.      Objective   Vital Signs:  /88 (BP Location: Left arm, Patient Position: Sitting, Cuff Size: Adult)   Pulse 88   Temp 97.8 °F (36.6 °C) (Temporal)   Ht 177.8 cm (70\")   Wt (!) 141 kg (310 lb 6.4 oz)   SpO2 95%   BMI 44.54 kg/m²   Estimated body mass index is 44.54 kg/m² as calculated from the following:    Height as of this encounter: 177.8 cm (70\").    Weight as of this encounter: 141 kg (310 lb 6.4 oz).               Physical Exam  Vitals reviewed.   Constitutional:       Appearance: Normal appearance.   Musculoskeletal:         General: Normal range of motion.        Arms:       Cervical back: Normal range of motion.   Skin:     General: Skin is warm and dry.      Capillary Refill: Capillary refill takes less than 2 seconds.   Neurological:      General: No focal deficit present.      Mental Status: He is alert and oriented to person, place, and time.   Psychiatric:         Mood and Affect: Mood normal.         Behavior: Behavior normal.         Thought Content: Thought content normal.         Judgment: Judgment normal.        Result Review :      Common labs          6/20/2024    13:29 8/29/2024    15:01 10/22/2024    03:54   Common Labs   Glucose 78  102  109    BUN 17  10  10    Creatinine 1.14  1.32  1.21    Sodium 143  142  140    Potassium 4.6  3.9  4.2    Chloride 104  105  103    Calcium 9.2  9.4  9.4    Total Protein 6.3   6.8    Albumin 4.0  3.9  4.1    Total Bilirubin 0.9  1.0  1.3    Alkaline Phosphatase 99  111  114    AST (SGOT) 19 "  17  19    ALT (SGPT) 21  17  16    WBC  7.21     Hemoglobin  15.2     Hematocrit  48.0     Platelets  225     Total Cholesterol   141    Triglycerides   144    HDL Cholesterol   38    LDL Cholesterol    78    Hemoglobin A1C 6.20   6.20    PSA   2.140          Current Outpatient Medications on File Prior to Visit   Medication Sig Dispense Refill    benazepril (LOTENSIN) 40 MG tablet TAKE 1 TABLET BY MOUTH DAILY 90 tablet 0    esomeprazole (nexIUM) 40 MG capsule Take 1 capsule by mouth Every Morning Before Breakfast. 90 capsule 0    furosemide (Lasix) 20 MG tablet Take 1 tablet by mouth Every Other Day. 90 tablet 1    levothyroxine (SYNTHROID, LEVOTHROID) 112 MCG tablet TAKE 1 TABLET BY MOUTH DAILY 90 tablet 1    metoprolol succinate XL (TOPROL-XL) 50 MG 24 hr tablet TAKE 1 AND 1/2 TABLETS BY MOUTH DAILY 135 tablet 0    montelukast (SINGULAIR) 10 MG tablet TAKE 1 TABLET BY MOUTH EVERY NIGHT 90 tablet 1    potassium chloride 10 MEQ CR tablet TAKE 1 TABLET BY MOUTH DAILY 30 tablet 2    promethazine-dextromethorphan (PROMETHAZINE-DM) 6.25-15 MG/5ML syrup Take 5 mL by mouth 4 (Four) Times a Day As Needed for Cough. 180 mL 1    triamcinolone (KENALOG) 0.5 % ointment Apply 1 Application topically to the appropriate area as directed 2 (Two) Times a Day. 15 g 0    Wixela Inhub 100-50 MCG/ACT DISKUS INHALE 1 PUFF BY MOUTH TWICE DAILY 60 each 2    Xarelto 20 MG tablet TAKE 1 TABLET BY MOUTH DAILY 90 tablet 0    cetirizine (zyrTEC) 10 MG tablet Take 1 tablet by mouth Daily for 30 days. 30 tablet 0    fluticasone (FLONASE) 50 MCG/ACT nasal spray Use 2 sprays into the nostril(s) as directed by provider Daily for 14 days. 16 g 0    TiZANidine (ZANAFLEX) 4 MG capsule Take 1 capsule by mouth 3 (Three) Times a Day As Needed for Muscle Spasms. (Patient not taking: Reported on 10/1/2024) 30 capsule 1     No current facility-administered medications on file prior to visit.                Assessment and Plan     Diagnoses and all orders  for this visit:    1. Morbid obesity (Primary)  -     Semaglutide-Weight Management 0.5 MG/0.5ML solution auto-injector; Inject 0.5 mL under the skin into the appropriate area as directed 1 (One) Time Per Week.  Dispense: 2 mL; Refill: 0    2. Encounter for weight management  -     Semaglutide-Weight Management 0.5 MG/0.5ML solution auto-injector; Inject 0.5 mL under the skin into the appropriate area as directed 1 (One) Time Per Week.  Dispense: 2 mL; Refill: 0    3. Skin tag        Patient Instructions   Finish 0.25 mg dose and then increase to 0.5 mg weekly semaglutide. Make sure to eat protein with meals. Stay hydrated with water. Monitor for side effects. Follow-up in 4 weeks for recheck.            Follow Up     Return in about 4 weeks (around 1/2/2025) for Recheck.  Patient was given instructions and counseling regarding his condition or for health maintenance advice. Please see specific information pulled into the AVS if appropriate.

## 2024-12-30 DIAGNOSIS — E03.8 OTHER SPECIFIED HYPOTHYROIDISM: ICD-10-CM

## 2024-12-31 DIAGNOSIS — Z87.09 HISTORY OF ASTHMA: ICD-10-CM

## 2024-12-31 RX ORDER — LEVOTHYROXINE SODIUM 112 UG/1
112 TABLET ORAL DAILY
Qty: 90 TABLET | Refills: 1 | Status: SHIPPED | OUTPATIENT
Start: 2024-12-31

## 2024-12-31 RX ORDER — FLUTICASONE PROPIONATE AND SALMETEROL 100; 50 UG/1; UG/1
1 POWDER RESPIRATORY (INHALATION) 2 TIMES DAILY
Qty: 60 EACH | Refills: 2 | Status: SHIPPED | OUTPATIENT
Start: 2024-12-31

## 2024-12-31 NOTE — TELEPHONE ENCOUNTER
Rx Refill Note  Requested Prescriptions     Pending Prescriptions Disp Refills    Wixela Inhub 100-50 MCG/ACT DISKUS [Pharmacy Med Name: WIXELA INHUB DISKUS 100/50MCG 60S] 60 each 2     Sig: INHALE 1 PUFF BY MOUTH TWICE DAILY      Last office visit with prescribing clinician: 12/5/2024   Last telemedicine visit with prescribing clinician: Visit date not found   Next office visit with prescribing clinician: 1/2/2025       Angelita Becker MA  12/31/24, 14:08 EST

## 2025-01-07 ENCOUNTER — OFFICE VISIT (OUTPATIENT)
Dept: INTERNAL MEDICINE | Facility: CLINIC | Age: 55
End: 2025-01-07
Payer: COMMERCIAL

## 2025-01-07 VITALS
BODY MASS INDEX: 42.52 KG/M2 | DIASTOLIC BLOOD PRESSURE: 98 MMHG | SYSTOLIC BLOOD PRESSURE: 144 MMHG | TEMPERATURE: 98.1 F | HEART RATE: 96 BPM | OXYGEN SATURATION: 96 % | HEIGHT: 70 IN | WEIGHT: 297 LBS

## 2025-01-07 DIAGNOSIS — E66.01 MORBID OBESITY: Primary | ICD-10-CM

## 2025-01-07 DIAGNOSIS — Z76.89 ENCOUNTER FOR WEIGHT MANAGEMENT: ICD-10-CM

## 2025-01-07 NOTE — PATIENT INSTRUCTIONS
Increase dosage to 1 mg weekly. Monitor for side effects. Continue to make healthy choices in diet. Follow-up in 4 weeks for recheck.

## 2025-01-08 NOTE — PROGRESS NOTES
"Chief Complaint  Follow-up and Hip Pain    Subjective        Grant Edge . presents to [facility]  History of Present Illness  54-year-old male presenting for weight management follow-up.  Has been taking 0.5 mg semaglutide and tolerating well.  Has lost 30 pounds in the past 8 weeks.  Has been improving food choices in his diet.  He is able to tolerate more physical activities.  Hip Pain         Objective   Vital Signs:  /98 (BP Location: Left arm, Patient Position: Sitting, Cuff Size: Adult)   Pulse 96   Temp 98.1 °F (36.7 °C) (Temporal)   Ht 177.8 cm (70\")   Wt 135 kg (297 lb)   SpO2 96%   BMI 42.62 kg/m²   Estimated body mass index is 42.62 kg/m² as calculated from the following:    Height as of this encounter: 177.8 cm (70\").    Weight as of this encounter: 135 kg (297 lb).               Physical Exam  Vitals reviewed.   Constitutional:       Appearance: Normal appearance.   Musculoskeletal:         General: Normal range of motion.      Cervical back: Normal range of motion.   Skin:     General: Skin is warm and dry.      Capillary Refill: Capillary refill takes less than 2 seconds.   Neurological:      General: No focal deficit present.      Mental Status: He is alert and oriented to person, place, and time.   Psychiatric:         Mood and Affect: Mood normal.         Behavior: Behavior normal.         Thought Content: Thought content normal.         Judgment: Judgment normal.        Result Review :      Common labs          6/20/2024    13:29 8/29/2024    15:01 10/22/2024    03:54   Common Labs   Glucose 78  102  109    BUN 17  10  10    Creatinine 1.14  1.32  1.21    Sodium 143  142  140    Potassium 4.6  3.9  4.2    Chloride 104  105  103    Calcium 9.2  9.4  9.4    Total Protein 6.3   6.8    Albumin 4.0  3.9  4.1    Total Bilirubin 0.9  1.0  1.3    Alkaline Phosphatase 99  111  114    AST (SGOT) 19  17  19    ALT (SGPT) 21  17  16    WBC  7.21     Hemoglobin  " 15.2     Hematocrit  48.0     Platelets  225     Total Cholesterol   141    Triglycerides   144    HDL Cholesterol   38    LDL Cholesterol    78    Hemoglobin A1C 6.20   6.20    PSA   2.140          Current Outpatient Medications on File Prior to Visit   Medication Sig Dispense Refill    benazepril (LOTENSIN) 40 MG tablet TAKE 1 TABLET BY MOUTH DAILY 90 tablet 0    esomeprazole (nexIUM) 40 MG capsule Take 1 capsule by mouth Every Morning Before Breakfast. 90 capsule 0    furosemide (Lasix) 20 MG tablet Take 1 tablet by mouth Every Other Day. 90 tablet 1    levothyroxine (SYNTHROID, LEVOTHROID) 112 MCG tablet TAKE 1 TABLET BY MOUTH DAILY 90 tablet 1    metoprolol succinate XL (TOPROL-XL) 50 MG 24 hr tablet TAKE 1 AND 1/2 TABLETS BY MOUTH DAILY 135 tablet 0    montelukast (SINGULAIR) 10 MG tablet TAKE 1 TABLET BY MOUTH EVERY NIGHT 90 tablet 1    potassium chloride 10 MEQ CR tablet TAKE 1 TABLET BY MOUTH DAILY 30 tablet 2    promethazine-dextromethorphan (PROMETHAZINE-DM) 6.25-15 MG/5ML syrup Take 5 mL by mouth 4 (Four) Times a Day As Needed for Cough. 180 mL 1    triamcinolone (KENALOG) 0.5 % ointment Apply 1 Application topically to the appropriate area as directed 2 (Two) Times a Day. 15 g 0    Wixela Inhub 100-50 MCG/ACT DISKUS INHALE 1 PUFF BY MOUTH TWICE DAILY 60 each 2    Xarelto 20 MG tablet TAKE 1 TABLET BY MOUTH DAILY 90 tablet 0    cetirizine (zyrTEC) 10 MG tablet Take 1 tablet by mouth Daily for 30 days. 30 tablet 0    fluticasone (FLONASE) 50 MCG/ACT nasal spray Use 2 sprays into the nostril(s) as directed by provider Daily for 14 days. 16 g 0    TiZANidine (ZANAFLEX) 4 MG capsule Take 1 capsule by mouth 3 (Three) Times a Day As Needed for Muscle Spasms. (Patient not taking: Reported on 10/1/2024) 30 capsule 1     No current facility-administered medications on file prior to visit.                Assessment and Plan     Diagnoses and all orders for this visit:    1. Morbid obesity (Primary)  -      Semaglutide-Weight Management 1 MG/0.5ML solution auto-injector; Inject 0.5 mL under the skin into the appropriate area as directed 1 (One) Time Per Week.  Dispense: 2 mL; Refill: 0    2. Encounter for weight management  -     Semaglutide-Weight Management 1 MG/0.5ML solution auto-injector; Inject 0.5 mL under the skin into the appropriate area as directed 1 (One) Time Per Week.  Dispense: 2 mL; Refill: 0        Patient Instructions   Increase dosage to 1 mg weekly. Monitor for side effects. Continue to make healthy choices in diet. Follow-up in 4 weeks for recheck.            Follow Up     Return in about 4 weeks (around 2/4/2025) for Recheck.  Patient was given instructions and counseling regarding his condition or for health maintenance advice. Please see specific information pulled into the AVS if appropriate.

## 2025-01-10 ENCOUNTER — TELEPHONE (OUTPATIENT)
Dept: INTERNAL MEDICINE | Facility: CLINIC | Age: 55
End: 2025-01-10
Payer: COMMERCIAL

## 2025-01-10 NOTE — TELEPHONE ENCOUNTER
Caller: Grant Edge Jr.    Relationship to patient: Self      Best call back number: [phone]     Provider: STEFANI    Medication PA needed: WEGOVY    Reason for call/Prior Auth: INSURANCE COMPANY IS REQUESTING.  ARTURO

## 2025-01-13 ENCOUNTER — TELEPHONE (OUTPATIENT)
Dept: INTERNAL MEDICINE | Facility: CLINIC | Age: 55
End: 2025-01-13

## 2025-01-13 NOTE — TELEPHONE ENCOUNTER
Caller: Grant Edge Jr.    Relationship: Self    Best call back number:     [phone]       What is the best time to reach you: ANY    Who are you requesting to speak with (clinical staff, provider,  specific staff member): CLINICAL STAFF    Do you know the name of the person who called: FRANCISCO JAVIER    What was the call regarding: PATIENT IS WANTING A STATUS UPDATE ON PA FOR WEGOVY. HE IS DUE TO START HIS NEXT DOSE TODAY    Is it okay if the provider responds through MyChart: PHONE CALL

## 2025-01-13 NOTE — TELEPHONE ENCOUNTER
Hub staff attempted to follow warm transfer process and was unsuccessful     Caller: Grant Edge Jr.    Relationship to patient: Self    Best call back number:   Telephone Information:   Mobile [phone]         Patient is needing: PATIENT IS RETURNING A CALL TO [facility] WITH INFORMATION NEEDED FOR PRIOR AUTHORIZATION

## 2025-01-15 ENCOUNTER — TELEPHONE (OUTPATIENT)
Dept: INTERNAL MEDICINE | Facility: CLINIC | Age: 55
End: 2025-01-15

## 2025-01-15 NOTE — TELEPHONE ENCOUNTER
Caller: Grant Edge Jr.    Relationship to patient: Self    Best call back number: [phone]    Patient is needing:     PATIENT WOULD LIKE A CALL BACK TO DISCUSS THE WEGOVY.

## 2025-01-16 DIAGNOSIS — E66.01 MORBID OBESITY: ICD-10-CM

## 2025-01-16 DIAGNOSIS — I10 BENIGN ESSENTIAL HYPERTENSION: ICD-10-CM

## 2025-01-16 DIAGNOSIS — Z76.89 ENCOUNTER FOR WEIGHT MANAGEMENT: ICD-10-CM

## 2025-01-16 NOTE — TELEPHONE ENCOUNTER
Rx Refill Note  Requested Prescriptions     Pending Prescriptions Disp Refills    Semaglutide-Weight Management 1 MG/0.5ML solution auto-injector 2 mL 0     Sig: Inject 0.5 mL under the skin into the appropriate area as directed 1 (One) Time Per Week.      Last office visit with prescribing clinician: 1/7/2025   Last telemedicine visit with prescribing clinician: Visit date not found   Next office visit with prescribing clinician: 2/7/2025                         Would you like a call back once the refill request has been completed: [] Yes [] No    If the office needs to give you a call back, can they leave a voicemail: [] Yes [] No    Franck Obrien MA  01/16/25, 09:49 EST

## 2025-01-17 RX ORDER — METOPROLOL SUCCINATE 50 MG/1
TABLET, EXTENDED RELEASE ORAL
Qty: 135 TABLET | Refills: 0 | Status: SHIPPED | OUTPATIENT
Start: 2025-01-17

## 2025-01-23 ENCOUNTER — TELEPHONE (OUTPATIENT)
Dept: INTERNAL MEDICINE | Facility: CLINIC | Age: 55
End: 2025-01-23
Payer: COMMERCIAL

## 2025-01-23 NOTE — TELEPHONE ENCOUNTER
Caller: Grant Edge Jr.    Relationship: Self    Best call back number: [phone]    Who are you requesting to speak with (clinical staff, provider,  specific staff member): CLINICAL       What was the call regarding: WAITING ON PRIOR AUTH ON [employer]     PHARMACY PHONE: [phone]    PLEASE ADVISE PATIENT STATUS OF P.A.

## 2025-01-24 DIAGNOSIS — Z76.89 ENCOUNTER FOR WEIGHT MANAGEMENT: ICD-10-CM

## 2025-01-24 DIAGNOSIS — E66.01 MORBID OBESITY: ICD-10-CM

## 2025-01-31 ENCOUNTER — TELEPHONE (OUTPATIENT)
Dept: INTERNAL MEDICINE | Facility: CLINIC | Age: 55
End: 2025-01-31

## 2025-01-31 NOTE — TELEPHONE ENCOUNTER
Caller: Grant Edge Jr.    Relationship: Self    Best call back number: [phone]    What was the call regarding: PATIENT CALLING IN STATES INSURANCE REACHED OUT TO HIM AND SAID THE PRIOR AUTH WAS NOT COMPLETED AND THAT THE INSURANCE NEEDS MORE INFORMATION TO CALL THIS NUMBER [phone]     PATIENT WOULD LIKE A CALLBACK TO SEE WHAT'S GOING ON WITH MEDICATION

## 2025-02-05 DIAGNOSIS — Z86.718 HISTORY OF RECURRENT DEEP VEIN THROMBOSIS (DVT): Primary | ICD-10-CM

## 2025-02-06 RX ORDER — RIVAROXABAN 20 MG/1
20 TABLET, FILM COATED ORAL DAILY
Qty: 90 TABLET | Refills: 1 | Status: SHIPPED | OUTPATIENT
Start: 2025-02-06

## 2025-02-11 ENCOUNTER — PATIENT MESSAGE (OUTPATIENT)
Dept: INTERNAL MEDICINE | Facility: CLINIC | Age: 55
End: 2025-02-11
Payer: COMMERCIAL

## 2025-02-11 ENCOUNTER — TELEPHONE (OUTPATIENT)
Dept: INTERNAL MEDICINE | Facility: CLINIC | Age: 55
End: 2025-02-11
Payer: COMMERCIAL

## 2025-02-11 DIAGNOSIS — I10 BENIGN ESSENTIAL HYPERTENSION: ICD-10-CM

## 2025-02-11 DIAGNOSIS — Z76.89 ENCOUNTER FOR WEIGHT MANAGEMENT: ICD-10-CM

## 2025-02-11 DIAGNOSIS — E66.01 MORBID OBESITY: Primary | ICD-10-CM

## 2025-02-11 RX ORDER — BENAZEPRIL HYDROCHLORIDE 40 MG/1
40 TABLET ORAL DAILY
Qty: 90 TABLET | Refills: 0 | Status: SHIPPED | OUTPATIENT
Start: 2025-02-11

## 2025-02-11 NOTE — TELEPHONE ENCOUNTER
Caller: Grant Edge Jr.    Relationship: Self    Best call back number: [phone]     Who are you requesting to speak with (clinical staff, provider,  specific staff member): AMEIL     What was the call regarding: WEGOVY     PATIENT STATES HE RECEIVED ANOTHER RESPONSE THIS MORNING     PATIENT STATES HE WOULD LIKE A CALLBACK

## 2025-02-13 ENCOUNTER — OFFICE VISIT (OUTPATIENT)
Dept: INTERNAL MEDICINE | Facility: CLINIC | Age: 55
End: 2025-02-13
Payer: COMMERCIAL

## 2025-02-13 VITALS
BODY MASS INDEX: 42.72 KG/M2 | WEIGHT: 298.4 LBS | HEIGHT: 70 IN | TEMPERATURE: 97.2 F | SYSTOLIC BLOOD PRESSURE: 145 MMHG | DIASTOLIC BLOOD PRESSURE: 94 MMHG | OXYGEN SATURATION: 95 % | HEART RATE: 78 BPM

## 2025-02-13 DIAGNOSIS — E66.01 CLASS 3 SEVERE OBESITY DUE TO EXCESS CALORIES WITHOUT SERIOUS COMORBIDITY WITH BODY MASS INDEX (BMI) OF 40.0 TO 44.9 IN ADULT: ICD-10-CM

## 2025-02-13 DIAGNOSIS — E66.01 MORBID OBESITY: Primary | ICD-10-CM

## 2025-02-13 DIAGNOSIS — E66.813 CLASS 3 SEVERE OBESITY DUE TO EXCESS CALORIES WITHOUT SERIOUS COMORBIDITY WITH BODY MASS INDEX (BMI) OF 40.0 TO 44.9 IN ADULT: ICD-10-CM

## 2025-02-13 DIAGNOSIS — Z76.89 ENCOUNTER FOR WEIGHT MANAGEMENT: ICD-10-CM

## 2025-02-13 PROCEDURE — 99213 OFFICE O/P EST LOW 20 MIN: CPT

## 2025-02-13 NOTE — PROGRESS NOTES
"Chief Complaint  Follow-up    Subjective        Grant Edge . presents to [facility]  History of Present Illness  54-year-old male presenting for weight management follow-up.  Has not been on semaglutide for a month due to insurance issues.  Has noticed that his diet has gotten worse but not terrible since off medication.  Medication has been greatly beneficial in reducing his caloric intake.  Has lost almost 30 pounds on lower doses of semaglutide.  Has had a visit with weight  which has helped get approval for future medications.  States that a new PA for medication will be needed after March 31.      Objective   Vital Signs:  /94 (BP Location: Right arm, Patient Position: Sitting, Cuff Size: Adult)   Pulse 78   Temp 97.2 °F (36.2 °C) (Temporal)   Ht 177.8 cm (70\")   Wt 135 kg (298 lb 6.4 oz)   SpO2 95%   BMI 42.82 kg/m²   Estimated body mass index is 42.82 kg/m² as calculated from the following:    Height as of this encounter: 177.8 cm (70\").    Weight as of this encounter: 135 kg (298 lb 6.4 oz).               Physical Exam  Vitals reviewed.   Constitutional:       Appearance: Normal appearance. He is obese.   Musculoskeletal:         General: Normal range of motion.      Cervical back: Normal range of motion.   Skin:     General: Skin is warm and dry.      Capillary Refill: Capillary refill takes less than 2 seconds.   Neurological:      General: No focal deficit present.      Mental Status: He is alert and oriented to person, place, and time.   Psychiatric:         Mood and Affect: Mood normal.         Behavior: Behavior normal.         Thought Content: Thought content normal.         Judgment: Judgment normal.        Result Review :      Common labs          6/20/2024    13:29 8/29/2024    15:01 10/22/2024    03:54   Common Labs   Glucose 78  102  109    BUN 17  10  10    Creatinine 1.14  1.32  1.21    Sodium 143  142  140    Potassium 4.6  3.9  4.2  "   Chloride 104  105  103    Calcium 9.2  9.4  9.4    Albumin 4.0  3.9  4.1    Total Bilirubin 0.9  1.0  1.3    Alkaline Phosphatase 99  111  114    AST (SGOT) 19  17  19    ALT (SGPT) 21  17  16    WBC  7.21     Hemoglobin  15.2     Hematocrit  48.0     Platelets  225     Total Cholesterol   141    Triglycerides   144    HDL Cholesterol   38    LDL Cholesterol    78    Hemoglobin A1C 6.20   6.20    PSA   2.140          Current Outpatient Medications on File Prior to Visit   Medication Sig Dispense Refill    benazepril (LOTENSIN) 40 MG tablet TAKE 1 TABLET BY MOUTH DAILY 90 tablet 0    esomeprazole (nexIUM) 40 MG capsule Take 1 capsule by mouth Every Morning Before Breakfast. 90 capsule 0    furosemide (Lasix) 20 MG tablet Take 1 tablet by mouth Every Other Day. 90 tablet 1    levothyroxine (SYNTHROID, LEVOTHROID) 112 MCG tablet TAKE 1 TABLET BY MOUTH DAILY 90 tablet 1    metoprolol succinate XL (TOPROL-XL) 50 MG 24 hr tablet TAKE 1 AND 1/2 TABLETS BY MOUTH DAILY 135 tablet 0    montelukast (SINGULAIR) 10 MG tablet TAKE 1 TABLET BY MOUTH EVERY NIGHT 90 tablet 1    potassium chloride 10 MEQ CR tablet TAKE 1 TABLET BY MOUTH DAILY 30 tablet 2    promethazine-dextromethorphan (PROMETHAZINE-DM) 6.25-15 MG/5ML syrup Take 5 mL by mouth 4 (Four) Times a Day As Needed for Cough. 180 mL 1    rivaroxaban (Xarelto) 20 MG tablet TAKE 1 TABLET BY MOUTH DAILY 90 tablet 1    Semaglutide-Weight Management 0.5 MG/0.5ML solution auto-injector Inject 0.5 mL under the skin into the appropriate area as directed 1 (One) Time Per Week. 2 mL 0    triamcinolone (KENALOG) 0.5 % ointment Apply 1 Application topically to the appropriate area as directed 2 (Two) Times a Day. 15 g 0    Wixela Inhub 100-50 MCG/ACT DISKUS INHALE 1 PUFF BY MOUTH TWICE DAILY 60 each 2    cetirizine (zyrTEC) 10 MG tablet Take 1 tablet by mouth Daily for 30 days. 30 tablet 0    fluticasone (FLONASE) 50 MCG/ACT nasal spray Use 2 sprays into the nostril(s) as directed by  provider Daily for 14 days. 16 g 0    TiZANidine (ZANAFLEX) 4 MG capsule Take 1 capsule by mouth 3 (Three) Times a Day As Needed for Muscle Spasms. (Patient not taking: Reported on 2/13/2025) 30 capsule 1     No current facility-administered medications on file prior to visit.                Assessment and Plan     Diagnoses and all orders for this visit:    1. Morbid obesity (Primary)    2. Encounter for weight management    3. Class 3 severe obesity due to excess calories without serious comorbidity with body mass index (BMI) of 40.0 to 44.9 in adult        Patient Instructions   Take 0.5 mg weekly and monitor. Follow-up between 3rd and 4th dose for recheck.            Follow Up     Return for Recheck.  Patient was given instructions and counseling regarding his condition or for health maintenance advice. Please see specific information pulled into the AVS if appropriate.

## 2025-02-20 DIAGNOSIS — J30.2 SEASONAL ALLERGIES: ICD-10-CM

## 2025-02-20 DIAGNOSIS — K21.00 GASTROESOPHAGEAL REFLUX DISEASE WITH ESOPHAGITIS WITHOUT HEMORRHAGE: ICD-10-CM

## 2025-02-21 RX ORDER — MONTELUKAST SODIUM 10 MG/1
10 TABLET ORAL NIGHTLY
Qty: 90 TABLET | Refills: 1 | Status: SHIPPED | OUTPATIENT
Start: 2025-02-21

## 2025-02-21 RX ORDER — ESOMEPRAZOLE MAGNESIUM 40 MG/1
40 CAPSULE, DELAYED RELEASE ORAL
Qty: 90 CAPSULE | Refills: 0 | Status: SHIPPED | OUTPATIENT
Start: 2025-02-21

## 2025-02-24 NOTE — PROGRESS NOTES
Subjective   Grant Edge Jr. is a 54 y.o. male. Referred for Left LE DVT     History of Present Illness   Mr. Edge is a 49-year-old male with history of hypertension, previous history of provoked DVT following total hip replacement about 6 to 7 years ago presents for further evaluation and management of left lower extremity DVT.  He noticed bilateral lower extremity swelling about 2 weeks ago following which he presented to the emergency room.  Bilateral lower extremity Doppler was performed on 5/30/2020 on which an acute left lower extremity DVT was noted in the popliteal vein.  All other veins appeared normal.  He was started on Eliquis.  He was seen by Patricia Peter on 6/3/2020 and started on hydrochlorothiazide for bilateral lower extremity edema.  He has also been referred to us for further management of the DVT.  He denies any history of tobacco use, denies any recent travel.  Denies any recent changes in his health besides the bilateral lower extremity swelling.  No significant weight loss, fatigue, night sweats, lymphadenopathy, cough, dyspnea, nausea, vomiting, hematemesis, melena, hematochezia.  Last colonoscopy was about 5 to 6 years ago in which there was a benign polyp.    Interval history  Mr. Edge presents to the clinic today for follow-up.  Continues on Xarelto 20 mg daily.  Tolerating Xarelto without any signs or symptoms of bleeding.  Denies any new swelling or cramping to his lower extremities.  Denies any chest pain, palpitations.  He has no new concerns.    The following portions of the patient's history were reviewed and updated as appropriate: allergies, current medications, past family history, past medical history, past social history, past surgical history and problem list.    Past Medical History:   Diagnosis Date    Abnormal ECG     Allergic     Arthritis     Chest pain     CTS (carpal tunnel syndrome) A few months.    DVT (deep venous thrombosis)     GERD (gastroesophageal reflux  disease)     Hip arthrosis     Hyperlipidemia     Hypertension     Hypokalemia     Knee swelling     Myocardial infarction     Obesity     Periarthritis of shoulder     Simple goiter     Sinus bradycardia     Vitamin D deficiency     hypothyroidism  Following total thyroidectomy    Past Surgical History:   Procedure Laterality Date    CARDIAC CATHETERIZATION N/A 10/18/2016    Procedure: Left Heart Cath;  Surgeon: Daniel Rosas MD;  Location: [facility] CATH INVASIVE LOCATION;  Service:     CHOLECYSTECTOMY      EPIDURAL Right 11/4/2022    Procedure: Right L5 LUMBAR/SACRAL TRANSFORAMINAL EPIDURAL. Hold xarelto x 3 days prior to procedure;  Surgeon: La Sun MD;  Location: [facility];  Service: Pain Management;  Laterality: Right;    EPIDURAL Right 6/19/2023    Procedure: LUMBAR/SACRAL TRANSFORAMINAL EPIDURAL RIGHT L5-S1  02889 HOLD XARELTO X3 DAYS;  Surgeon: La Sun MD;  Location: [facility];  Service: Pain Management;  Laterality: Right;    JOINT REPLACEMENT      LUMBAR EPIDURAL INJECTION N/A 9/29/2023    Procedure: lumbar epidural steroid injection at L4/5 81040;  Surgeon: La Sun MD;  Location: [facility];  Service: Pain Management;  Laterality: N/A;    MEDIAL BRANCH BLOCK Right 8/16/2023    Procedure: LUMBAR MEDIAL BRANCH BLOCK RIGHT L4-S1 #1  09271, 38952;  Surgeon: La Sun MD;  Location: [facility];  Service: Pain Management;  Laterality: Right;    NERVE BLOCK Right 8/30/2023    Procedure: right lateral femoral cutaneous nerve block 91168;  Surgeon: La Sun MD;  Location: [facility] MA[facility] OR;  Service: Pain Management;  Laterality: Right;    SALIVARY GLAND SURGERY      THYROIDECTOMY  2013    TONSILLECTOMY      TOTAL HIP ARTHROPLASTY REVISION Left 2015        Family History   Problem Relation Age of Onset    Diabetes Father     Heart disease Father     Arthritis Father     Hyperlipidemia Father     Hypertension Father    Maternal aunt - blood clots   Maternal  "aunt - lupus     Social History     Socioeconomic History    Marital status:    Tobacco Use    Smoking status: Never    Smokeless tobacco: Never   Vaping Use    Vaping status: Never Used   Substance and Sexual Activity    Alcohol use: No    Drug use: No    Sexual activity: Yes     Partners: Female     Birth control/protection: None      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    Objective   Blood pressure 142/85, pulse 90, temperature 97.8 °F (36.6 °C), temperature source Oral, resp. rate 16, height 177 cm (69.69\"), weight 133 kg (293 lb 4.8 oz), SpO2 96%.     Physical Exam   Constitutional: He is oriented to person, place, and time. He appears well-developed and well-nourished. He is obese.  HENT:   Head: Normocephalic.   Nose: Nose normal. Mouth/Throat: Mucous membranes are moist. Oropharynx is clear.   Eyes: Pupils are equal, round, and reactive to light. Conjunctivae are normal.   Cardiovascular: Normal rate, regular rhythm and normal heart sounds.   Pulmonary/Chest: Effort normal and breath sounds normal. No respiratory distress.   Abdominal: Normal appearance and bowel sounds are normal.   Musculoskeletal: Normal range of motion.   Neurological: He is alert and oriented to person, place, and time. He displays weakness (cane).   Skin: Skin is warm and dry. No rash noted.   Psychiatric: His behavior is normal. Judgment and thought content normal.   Vitals reviewed.    I have reexamined the patient and the results are consistent with the previously documented exam. MELANY Ramsay       No visits with results within 30 Day(s) from this visit.   Latest known visit with results is:   Office Visit on 10/22/2024   Component Date Value Ref Range Status    Hemoglobin A1C 10/22/2024 6.20 (H)  4.80 - 5.60 % Final    Comment: Hemoglobin A1C Ranges:  Increased Risk for Diabetes  5.7% to 6.4%  Diabetes                     >= " 6.5%  Diabetic Goal                < 7.0%      Glucose 10/22/2024 109 (H)  65 - 99 mg/dL Final    BUN 10/22/2024 10  6 - 20 mg/dL Final    Creatinine 10/22/2024 1.21  0.76 - 1.27 mg/dL Final    EGFR Result 10/22/2024 71.2  >60.0 mL/min/1.73 Final    Comment: GFR Normal >60  Chronic Kidney Disease <60  Kidney Failure <15      BUN/Creatinine Ratio 10/22/2024 8.3  7.0 - 25.0 Final    Sodium 10/22/2024 140  136 - 145 mmol/L Final    Potassium 10/22/2024 4.2  3.5 - 5.2 mmol/L Final    Comment: Slight hemolysis detected by analyzer. Result may be  falsely elevated.      Chloride 10/22/2024 103  98 - 107 mmol/L Final    Total CO2 10/22/2024 26.1  22.0 - 29.0 mmol/L Final    Calcium 10/22/2024 9.4  8.6 - 10.5 mg/dL Final    Total Protein 10/22/2024 6.8  6.0 - 8.5 g/dL Final    Albumin 10/22/2024 4.1  3.5 - 5.2 g/dL Final    Globulin 10/22/2024 2.7  gm/dL Final    A/G Ratio 10/22/2024 1.5  g/dL Final    Total Bilirubin 10/22/2024 1.3 (H)  0.0 - 1.2 mg/dL Final    Alkaline Phosphatase 10/22/2024 114  39 - 117 U/L Final    AST (SGOT) 10/22/2024 19  1 - 40 U/L Final    Comment: Slight hemolysis detected by analyzer. Result may be  falsely elevated.      ALT (SGPT) 10/22/2024 16  1 - 41 U/L Final    Specific Gravity, UA 10/22/2024 1.022  1.005 - 1.030 Final    pH, UA 10/22/2024 7.0  5.0 - 8.0 Final    Color, UA 10/22/2024 Yellow   Final    REFERENCE RANGE: Yellow, Straw    Appearance, UA 10/22/2024 Clear  Clear Final    Leukocytes, UA 10/22/2024 Negative  Negative Final    Protein 10/22/2024 Negative  Negative Final    Glucose, UA 10/22/2024 Negative  Negative Final    Ketones 10/22/2024 Trace (A)  Negative Final    Blood, UA 10/22/2024 Trace (A)  Negative Final    Bilirubin, UA 10/22/2024 Negative  Negative Final    Urobilinogen, UA 10/22/2024 Comment   Final    Comment: 1.0 E.U./dL  REFERENCE RANGE: 0.2 - 1.0 E.U./dL      Nitrite, UA 10/22/2024 Negative  Negative Final    Total Cholesterol 10/22/2024 141  0 - 200 mg/dL Final     Comment: Cholesterol Reference Ranges  (U.S. Department of Health and Human Services ATP III  Classifications)  Desirable          <200 mg/dL  Borderline High    200-239 mg/dL  High Risk          >240 mg/dL  Triglyceride Reference Ranges  (U.S. Department of Health and Human Services ATP III  Classifications)  Normal           <150 mg/dL  Borderline High  150-199 mg/dL  High             200-499 mg/dL  Very High        >500 mg/dL  HDL Reference Ranges  (U.S. Department of Health and Human Services ATP III  Classifications)  Low     <40 mg/dl (major risk factor for CHD)  High    >60 mg/dl ('negative' risk factor for CHD)  LDL Reference Ranges  (U.S. Department of Health and Human Services ATP III  Classifications)  Optimal          <100 mg/dL  Near Optimal     100-129 mg/dL  Borderline High  130-159 mg/dL  High             160-189 mg/dL  Very High        >189 mg/dL      Triglycerides 10/22/2024 144  0 - 150 mg/dL Final    HDL Cholesterol 10/22/2024 38 (L)  40 - 60 mg/dL Final    VLDL Cholesterol Hugh 10/22/2024 25  5 - 40 mg/dL Final    LDL Chol Calc (NIH) 10/22/2024 78  0 - 100 mg/dL Final    Chol/HDL Ratio 10/22/2024 3.71   Final    PSA 10/22/2024 2.140  0.000 - 4.000 ng/mL Final    Comment: Testing Method: Roche [employer] Electrochemiluminescence  Immunoassay(ECLIA)  Values obtained with different assay methods or kits cannot  be used interchangeably.      WBC, UA 10/22/2024 0-2  /HPF Final    REFERENCE RANGE: None Seen, 0-2    RBC, UA 10/22/2024 6-10 (A)  /HPF Final    REFERENCE RANGE: None Seen, 0-2    Epithelial Cells (non renal) 10/22/2024 0-2  /HPF Final    REFERENCE RANGE: None Seen, 0-2    Cast Type 10/22/2024 Comment   Final    HYALINE CASTS, UA            None Seen        /LPF       None Seen  N    Bacteria, UA 10/22/2024 Comment  None Seen /HPF Final    None Seen        No radiology results for the last 30 days.     Assessment & Plan      Mr. Edge is a 54-year-old -American male with a  previous history of left lower extremity DVT following a left hip replacement surgery, now with newly diagnosed left lower extremity DVT.     *DVT  This is his second episode of DVT which is unprovoked.    His the first episode was provoked secondary to surgery.    Given that he has had 2 DVTs so far and the most recent one being unprovoked thrombophilia work-up has been performed and all labs are negative.    Given that he has had 1 provoked DVT in the past but now the most recent one is unprovoked, although thrombophilia work-up is negative would recommend long-term anticoagulation to prevent any further DVTs and PEs.  Eliquis no longer covered by insurance and had switched to Xarelto  He will continue Xarelto indefinitely  No evidence of recurrent DVT or PE  Counseled on regular exercise and compliance with Xarelto  2/27/2025: Continues Xarelto, tolerating without signs or symptoms of bleeding.     Also recommend age appropriate [id number]-date on prostate cancer screening.    He is past due for colonoscopy.  Explained the importance of recommended screening and risk of DVT and PE with underlying malignancies.  Discussed once again today the need for up-to-date colonoscopy.  Patient wishes to schedule this himself as he previously saw Dr. James.    He received a phone call from the gastroenterology office however he has to schedule his colonoscopy.  Explained to him that he would have to hold the Xarelto 2 days prior to the procedure.    *Bilateral lower extremity swelling-could be secondary to amlodipine.  Resolved    *Hypertension-on hydrochlorothiazide, benazepril, metoprolol.  Blood pressure BP: 142/85     *Hypothyroidism-continue Synthroid, stable    *Obesity-previously discussed with Mr. Edge that obesity is a risk factor for DVT.  Recommend regular exercise and healthy diet.  BMI 49.8.  He had a recent knee issue requiring admission to the hospital limiting his exercise ability.    *Knee  effusion-thought to be secondary to osteoarthritis    PLAN:   Continue Xarelto 20 mg daily.  6 months MD.    High risk med.

## 2025-02-27 ENCOUNTER — LAB (OUTPATIENT)
Dept: OTHER | Facility: HOSPITAL | Age: 55
End: 2025-02-27
Payer: COMMERCIAL

## 2025-02-27 ENCOUNTER — OFFICE VISIT (OUTPATIENT)
Dept: ONCOLOGY | Facility: CLINIC | Age: 55
End: 2025-02-27
Payer: COMMERCIAL

## 2025-02-27 VITALS
HEART RATE: 90 BPM | OXYGEN SATURATION: 96 % | WEIGHT: 293.3 LBS | RESPIRATION RATE: 16 BRPM | TEMPERATURE: 97.8 F | DIASTOLIC BLOOD PRESSURE: 85 MMHG | HEIGHT: 70 IN | BODY MASS INDEX: 41.99 KG/M2 | SYSTOLIC BLOOD PRESSURE: 142 MMHG

## 2025-02-27 DIAGNOSIS — I82.432 ACUTE DEEP VEIN THROMBOSIS (DVT) OF POPLITEAL VEIN OF LEFT LOWER EXTREMITY: ICD-10-CM

## 2025-02-27 DIAGNOSIS — Z79.01 CURRENT USE OF LONG TERM ANTICOAGULATION: ICD-10-CM

## 2025-02-27 DIAGNOSIS — I82.432 ACUTE DEEP VEIN THROMBOSIS (DVT) OF POPLITEAL VEIN OF LEFT LOWER EXTREMITY: Primary | ICD-10-CM

## 2025-02-27 LAB
BASOPHILS # BLD AUTO: 0.07 10*3/MM3 (ref 0–0.2)
BASOPHILS NFR BLD AUTO: 1.2 % (ref 0–1.5)
DEPRECATED RDW RBC AUTO: 48.9 FL (ref 37–54)
EOSINOPHIL # BLD AUTO: 0.19 10*3/MM3 (ref 0–0.4)
EOSINOPHIL NFR BLD AUTO: 3.2 % (ref 0.3–6.2)
ERYTHROCYTE [DISTWIDTH] IN BLOOD BY AUTOMATED COUNT: 14.6 % (ref 12.3–15.4)
HCT VFR BLD AUTO: 46.5 % (ref 37.5–51)
HGB BLD-MCNC: 15.3 G/DL (ref 13–17.7)
IMM GRANULOCYTES # BLD AUTO: 0.04 10*3/MM3 (ref 0–0.05)
IMM GRANULOCYTES NFR BLD AUTO: 0.7 % (ref 0–0.5)
LYMPHOCYTES # BLD AUTO: 1.66 10*3/MM3 (ref 0.7–3.1)
LYMPHOCYTES NFR BLD AUTO: 27.6 % (ref 19.6–45.3)
MCH RBC QN AUTO: 29.7 PG (ref 26.6–33)
MCHC RBC AUTO-ENTMCNC: 32.9 G/DL (ref 31.5–35.7)
MCV RBC AUTO: 90.1 FL (ref 79–97)
MONOCYTES # BLD AUTO: 0.63 10*3/MM3 (ref 0.1–0.9)
MONOCYTES NFR BLD AUTO: 10.5 % (ref 5–12)
NEUTROPHILS NFR BLD AUTO: 3.42 10*3/MM3 (ref 1.7–7)
NEUTROPHILS NFR BLD AUTO: 56.8 % (ref 42.7–76)
NRBC BLD AUTO-RTO: 0 /100 WBC (ref 0–0.2)
PLATELET # BLD AUTO: 194 10*3/MM3 (ref 140–450)
PMV BLD AUTO: 10.9 FL (ref 6–12)
RBC # BLD AUTO: 5.16 10*6/MM3 (ref 4.14–5.8)
WBC NRBC COR # BLD AUTO: 6.01 10*3/MM3 (ref 3.4–10.8)

## 2025-02-27 PROCEDURE — 85025 COMPLETE CBC W/AUTO DIFF WBC: CPT | Performed by: INTERNAL MEDICINE

## 2025-03-04 ENCOUNTER — OFFICE VISIT (OUTPATIENT)
Dept: INTERNAL MEDICINE | Facility: CLINIC | Age: 55
End: 2025-03-04
Payer: COMMERCIAL

## 2025-03-04 VITALS
SYSTOLIC BLOOD PRESSURE: 154 MMHG | BODY MASS INDEX: 43.25 KG/M2 | TEMPERATURE: 98.2 F | OXYGEN SATURATION: 94 % | HEIGHT: 69 IN | HEART RATE: 91 BPM | DIASTOLIC BLOOD PRESSURE: 99 MMHG | WEIGHT: 292 LBS

## 2025-03-04 DIAGNOSIS — E66.813 CLASS 3 SEVERE OBESITY DUE TO EXCESS CALORIES WITHOUT SERIOUS COMORBIDITY WITH BODY MASS INDEX (BMI) OF 40.0 TO 44.9 IN ADULT: ICD-10-CM

## 2025-03-04 DIAGNOSIS — E66.01 CLASS 3 SEVERE OBESITY DUE TO EXCESS CALORIES WITHOUT SERIOUS COMORBIDITY WITH BODY MASS INDEX (BMI) OF 40.0 TO 44.9 IN ADULT: ICD-10-CM

## 2025-03-04 DIAGNOSIS — Z76.89 ENCOUNTER FOR WEIGHT MANAGEMENT: Primary | ICD-10-CM

## 2025-03-04 PROCEDURE — 99213 OFFICE O/P EST LOW 20 MIN: CPT

## 2025-03-04 NOTE — PROGRESS NOTES
"Chief Complaint  Chest Pain    Subjective        Grant Edge Jr. presents to [facility]  History of Present Illness  51-year-old male presenting for weight management and chest discomfort.  Has been tolerating higher dose of semaglutide.  States he had some diarrhea after a dose but it cleared after 2 days.  Has a follow-up with his weight loss  through insurance on 3/17.  A new PA for semaglutide will be needed on 3/31.    Has been having some chest discomfort for couple weeks.  States its around his sternum and feels as if he pulled something.  States is a 1 or 2 out of 10.  Denies any trauma or injury.  Has been lifting some objects at work.  Was previously seen by Dr. Urbano for echocardiogram in 2023.  May consider Holter monitor in the future.  Chest Pain         Objective   Vital Signs:  /99 (BP Location: Left arm, Patient Position: Sitting, Cuff Size: Large Adult)   Pulse 91   Temp 98.2 °F (36.8 °C) (Oral)   Ht 175.3 cm (69\")   Wt 132 kg (292 lb)   SpO2 94%   BMI 43.12 kg/m²   Estimated body mass index is 43.12 kg/m² as calculated from the following:    Height as of this encounter: 175.3 cm (69\").    Weight as of this encounter: 132 kg (292 lb).               Physical Exam  Vitals reviewed.   Constitutional:       Appearance: Normal appearance. He is obese.   Cardiovascular:      Rate and Rhythm: Normal rate and regular rhythm.   Musculoskeletal:         General: Normal range of motion.      Cervical back: Normal range of motion.   Skin:     General: Skin is warm and dry.      Capillary Refill: Capillary refill takes less than 2 seconds.   Neurological:      General: No focal deficit present.      Mental Status: He is alert and oriented to person, place, and time.   Psychiatric:         Mood and Affect: Mood normal.         Behavior: Behavior normal.         Thought Content: Thought content normal.         Judgment: Judgment normal.        Result Review " :      Common labs          8/29/2024    15:01 10/22/2024    03:54 2/27/2025    15:10   Common Labs   Glucose 102  109     BUN 10  10     Creatinine 1.32  1.21     Sodium 142  140     Potassium 3.9  4.2     Chloride 105  103     Calcium 9.4  9.4     Albumin 3.9  4.1     Total Bilirubin 1.0  1.3     Alkaline Phosphatase 111  114     AST (SGOT) 17  19     ALT (SGPT) 17  16     WBC 7.21   6.01    Hemoglobin 15.2   15.3    Hematocrit 48.0   46.5    Platelets 225   194    Total Cholesterol  141     Triglycerides  144     HDL Cholesterol  38     LDL Cholesterol   78     Hemoglobin A1C  6.20     PSA  2.140           Current Outpatient Medications on File Prior to Visit   Medication Sig Dispense Refill    benazepril (LOTENSIN) 40 MG tablet TAKE 1 TABLET BY MOUTH DAILY 90 tablet 0    esomeprazole (nexIUM) 40 MG capsule TAKE 1 CAPSULE BY MOUTH EVERY MORNING BEFORE BREAKFAST 90 capsule 0    furosemide (Lasix) 20 MG tablet Take 1 tablet by mouth Every Other Day. 90 tablet 1    levothyroxine (SYNTHROID, LEVOTHROID) 112 MCG tablet TAKE 1 TABLET BY MOUTH DAILY 90 tablet 1    metoprolol succinate XL (TOPROL-XL) 50 MG 24 hr tablet TAKE 1 AND 1/2 TABLETS BY MOUTH DAILY 135 tablet 0    montelukast (SINGULAIR) 10 MG tablet TAKE 1 TABLET BY MOUTH EVERY NIGHT 90 tablet 1    promethazine-dextromethorphan (PROMETHAZINE-DM) 6.25-15 MG/5ML syrup Take 5 mL by mouth 4 (Four) Times a Day As Needed for Cough. 180 mL 1    rivaroxaban (Xarelto) 20 MG tablet TAKE 1 TABLET BY MOUTH DAILY 90 tablet 1    triamcinolone (KENALOG) 0.5 % ointment Apply 1 Application topically to the appropriate area as directed 2 (Two) Times a Day. 15 g 0    Wixela Inhub 100-50 MCG/ACT DISKUS INHALE 1 PUFF BY MOUTH TWICE DAILY 60 each 2    cetirizine (zyrTEC) 10 MG tablet Take 1 tablet by mouth Daily for 30 days. 30 tablet 0    fluticasone (FLONASE) 50 MCG/ACT nasal spray Use 2 sprays into the nostril(s) as directed by provider Daily for 14 days. 16 g 0    TiZANidine  (ZANAFLEX) 4 MG capsule Take 1 capsule by mouth 3 (Three) Times a Day As Needed for Muscle Spasms. (Patient not taking: Reported on 3/4/2025) 30 capsule 1     No current facility-administered medications on file prior to visit.                Assessment and Plan     Diagnoses and all orders for this visit:    1. Encounter for weight management (Primary)  -     Semaglutide-Weight Management 1 MG/0.5ML solution auto-injector; Inject 0.5 mL under the skin into the appropriate area as directed 1 (One) Time Per Week.  Dispense: 2 mL; Refill: 0    2. Class 3 severe obesity due to excess calories without serious comorbidity with body mass index (BMI) of 40.0 to 44.9 in adult  -     Semaglutide-Weight Management 1 MG/0.5ML solution auto-injector; Inject 0.5 mL under the skin into the appropriate area as directed 1 (One) Time Per Week.  Dispense: 2 mL; Refill: 0        Patient Instructions   Increase semaglutide to 1 mg weekly. Monitor side effects. Contact Dr. Urbano for a follow-up. Follow-up between 3rd and 4th dose for recheck.            Follow Up     Return for Recheck.  Patient was given instructions and counseling regarding his condition or for health maintenance advice. Please see specific information pulled into the AVS if appropriate.

## 2025-03-04 NOTE — PATIENT INSTRUCTIONS
Increase semaglutide to 1 mg weekly. Monitor side effects. Contact Dr. Urbano for a follow-up. Follow-up between 3rd and 4th dose for recheck.

## 2025-03-05 DIAGNOSIS — I87.303 STASIS EDEMA OF BOTH LOWER EXTREMITIES: ICD-10-CM

## 2025-03-06 RX ORDER — POTASSIUM CHLORIDE 750 MG/1
10 TABLET, EXTENDED RELEASE ORAL DAILY
Qty: 30 TABLET | Refills: 2 | Status: SHIPPED | OUTPATIENT
Start: 2025-03-06

## 2025-03-12 ENCOUNTER — OFFICE VISIT (OUTPATIENT)
Dept: CARDIOLOGY | Facility: CLINIC | Age: 55
End: 2025-03-12
Payer: COMMERCIAL

## 2025-03-12 VITALS
HEIGHT: 69 IN | DIASTOLIC BLOOD PRESSURE: 88 MMHG | HEART RATE: 88 BPM | BODY MASS INDEX: 42.8 KG/M2 | SYSTOLIC BLOOD PRESSURE: 160 MMHG | WEIGHT: 289 LBS

## 2025-03-12 DIAGNOSIS — R07.89 ATYPICAL CHEST PAIN: Primary | ICD-10-CM

## 2025-03-12 PROCEDURE — 93000 ELECTROCARDIOGRAM COMPLETE: CPT | Performed by: STUDENT IN AN ORGANIZED HEALTH CARE EDUCATION/TRAINING PROGRAM

## 2025-03-12 PROCEDURE — 99214 OFFICE O/P EST MOD 30 MIN: CPT | Performed by: STUDENT IN AN ORGANIZED HEALTH CARE EDUCATION/TRAINING PROGRAM

## 2025-03-12 NOTE — PROGRESS NOTES
Subjective:     Encounter Date:03/12/2025      Patient ID: Grant Edge Jr. is a 54 y.o. male.    Chief Complaint:  Chest pain    HPI:   54 y.o. male prior DVT on Xarelto, hypothyroidism who presents for follow-up.  Patient was initially seen in April 2023 for lower extremity edema.  Echocardiogram was ordered at that time and this revealed normal EF with normal diastolic function.  He presents today for follow-up for further evaluation of chest pain.  Patient notes that over the past couple of months he has been experiencing intermittent chest pain.  It happens when he is sitting but not when he is lying down.  It is not worse with exertion or with walking.  He says that it is right in the center of his chest, lasts seconds and can occur about every day.  Due to family history, he has grown concerned and presented here for further evaluation.      With respect to his past history, he previously saw Dr. Rosas and had an abnormal nuclear stress test with subsequent normal left heart catheterization in 2016.  He also had a normal echocardiogram at that time.    FH:  Father- MI  Uncle- CABG    The following portions of the patient's history were reviewed and updated as appropriate: allergies, current medications, past family history, past medical history, past social history, past surgical history and problem list.     REVIEW OF SYSTEMS:   All systems reviewed.  Pertinent positives identified in HPI.  All other systems are negative.    Past Medical History:   Diagnosis Date    Abnormal ECG     Allergic     Arthritis     Chest pain     CTS (carpal tunnel syndrome) A few months.    DVT (deep venous thrombosis)     GERD (gastroesophageal reflux disease)     Hip arthrosis     Hyperlipidemia     Hypertension     Hypokalemia     Knee swelling     Myocardial infarction     Obesity     Periarthritis of shoulder     Rheumatoid arthritis     Simple goiter     Sinus bradycardia     Sleep apnea     Vitamin D  deficiency        Family History   Problem Relation Age of Onset    Diabetes Father     Heart disease Father     Arthritis Father     Hyperlipidemia Father     Hypertension Father     Heart attack Father     Heart failure Father     Coronary artery disease Father        Social History     Socioeconomic History    Marital status:    Tobacco Use    Smoking status: Never    Smokeless tobacco: Never   Vaping Use    Vaping status: Never Used   Substance and Sexual Activity    Alcohol use: No    Drug use: No    Sexual activity: Yes     Partners: Female     Birth control/protection: None       No Known Allergies    Past Surgical History:   Procedure Laterality Date    CARDIAC CATHETERIZATION N/A 10/18/2016    Procedure: Left Heart Cath;  Surgeon: Daniel Rosas MD;  Location: [facility] CATH INVASIVE LOC[facility];  Service:     CHOLECYSTECTOMY      EPIDURAL Right 11/04/2022    Procedure: Right L5 LUMBAR/SACRAL TRANSFORAMINAL EPIDURAL. Hold xarelto x 3 days prior to procedure;  Surgeon: La Sun MD;  Location: [facility];  Service: Pain Management;  Laterality: Right;    EPIDURAL Right 06/19/2023    Procedure: LUMBAR/SACRAL TRANSFORAMINAL EPIDURAL RIGHT L5-S1  64659 HOLD XARELTO X3 DAYS;  Surgeon: La Sun MD;  Location: [facility];  Service: Pain Management;  Laterality: Right;    JOINT REPLACEMENT      LUMBAR EPIDURAL INJECTION N/A 09/29/2023    Procedure: lumbar epidural steroid injection at L4/5 98544;  Surgeon: La Sun MD;  Location: [facility];  Service: Pain Management;  Laterality: N/A;    MEDIAL BRANCH BLOCK Right 08/16/2023    Procedure: LUMBAR MEDIAL BRANCH BLOCK RIGHT L4-S1 #1  94330, 74661;  Surgeon: La Sun MD;  Location: [facility];  Service: Pain Management;  Laterality: Right;    NERVE BLOCK Right 08/30/2023    Procedure: right lateral femoral cutaneous nerve block 75474;  Surgeon: La Sun MD;  Location: [facility];  Service: Pain Management;   Laterality: Right;    ORTHOPEDIC SURGERY      SALIVARY GLAND SURGERY      THYROIDECTOMY  2013    TONSILLECTOMY      TOTAL HIP ARTHROPLASTY REVISION Left 2015         ECG 12 Lead    Date/Time: 3/12/2025 1:02 PM  Performed by: Giovani Urbano MD    Authorized by: Giovani Urbano MD  Comparison: compared with previous ECG from 4/21/2023  Similar to previous ECG  Rhythm: sinus rhythm  Rate: normal  Conduction: conduction normal  QRS axis: normal  Other findings: low voltage    Clinical impression: normal ECG             Objective:         Vitals:    03/12/25 1235   BP: 160/88   Pulse: 88         PHYSICAL EXAM:  GEN: obese, well appearing, in NAD   HEENT: NCAT, EOMI, moist mucus membranes   Respiratory: CTAB, no wheezes, rales or rhonchi  CV: normal rate, regular rhythm, normal S1, S2, no murmurs, rubs, gallops, +2 radial pulses b/l  GI: soft, nontender, nondistended  MSK: no edema  Skin: no rash, warm, dry  Heme/Lymph: no bruising or bleeding  Psych: organized thought, normal behavior and affect  Neuro: Alert and Oriented x 3, grossly normal motor function        Assessment:         (R07.89) Atypical chest pain - Plan: Holter Monitor - 72 Hour Up To 15 Days    54 y.o. male prior DVT on Xarelto, hypothyroidism who presents for follow-up.         Plan:       #Atypical chest pain  Occurs at rest, lasts seconds. Unlikely to be 2/2 obstructive CAD. Possibly related to arrhythmia or PVCs, will obtain 2 week holter.    Jim Joseph, thank you very much for referring this kind patient to me. Please call me with any questions or concerns. I will see the patient again in the office pending his echocardiogram.         Giovani Urbano MD  03/12/25  [facility]    Outpatient Encounter Medications as of 3/12/2025   Medication Sig Dispense Refill    benazepril (LOTENSIN) 40 MG tablet TAKE 1 TABLET BY MOUTH DAILY 90 tablet 0    esomeprazole (nexIUM) 40 MG capsule TAKE 1 CAPSULE BY MOUTH EVERY MORNING BEFORE BREAKFAST 90 capsule 0     furosemide (Lasix) 20 MG tablet Take 1 tablet by mouth Every Other Day. 90 tablet 1    levothyroxine (SYNTHROID, LEVOTHROID) 112 MCG tablet TAKE 1 TABLET BY MOUTH DAILY 90 tablet 1    metoprolol succinate XL (TOPROL-XL) 50 MG 24 hr tablet TAKE 1 AND 1/2 TABLETS BY MOUTH DAILY 135 tablet 0    montelukast (SINGULAIR) 10 MG tablet TAKE 1 TABLET BY MOUTH EVERY NIGHT 90 tablet 1    potassium chloride 10 MEQ CR tablet TAKE 1 TABLET BY MOUTH DAILY 30 tablet 2    promethazine-dextromethorphan (PROMETHAZINE-DM) 6.25-15 MG/5ML syrup Take 5 mL by mouth 4 (Four) Times a Day As Needed for Cough. 180 mL 1    rivaroxaban (Xarelto) 20 MG tablet TAKE 1 TABLET BY MOUTH DAILY 90 tablet 1    Semaglutide-Weight Management 1 MG/0.5ML solution auto-injector Inject 0.5 mL under the skin into the appropriate area as directed 1 (One) Time Per Week. 2 mL 0    triamcinolone (KENALOG) 0.5 % ointment Apply 1 Application topically to the appropriate area as directed 2 (Two) Times a Day. 15 g 0    Wixela Inhub 100-50 MCG/ACT DISKUS INHALE 1 PUFF BY MOUTH TWICE DAILY 60 each 2    cetirizine (zyrTEC) 10 MG tablet Take 1 tablet by mouth Daily for 30 days. 30 tablet 0    fluticasone (FLONASE) 50 MCG/ACT nasal spray Use 2 sprays into the nostril(s) as directed by provider Daily for 14 days. 16 g 0    [DISCONTINUED] TiZANidine (ZANAFLEX) 4 MG capsule Take 1 capsule by mouth 3 (Three) Times a Day As Needed for Muscle Spasms. 30 capsule 1     No facility-administered encounter medications on file as of 3/12/2025.

## 2025-04-01 ENCOUNTER — OFFICE VISIT (OUTPATIENT)
Dept: INTERNAL MEDICINE | Facility: CLINIC | Age: 55
End: 2025-04-01
Payer: COMMERCIAL

## 2025-04-01 VITALS
HEART RATE: 89 BPM | WEIGHT: 284.6 LBS | DIASTOLIC BLOOD PRESSURE: 99 MMHG | BODY MASS INDEX: 42.15 KG/M2 | TEMPERATURE: 97.5 F | HEIGHT: 69 IN | SYSTOLIC BLOOD PRESSURE: 158 MMHG | OXYGEN SATURATION: 94 %

## 2025-04-01 DIAGNOSIS — E66.813 CLASS 3 SEVERE OBESITY DUE TO EXCESS CALORIES WITHOUT SERIOUS COMORBIDITY WITH BODY MASS INDEX (BMI) OF 40.0 TO 44.9 IN ADULT: ICD-10-CM

## 2025-04-01 DIAGNOSIS — Z76.89 ENCOUNTER FOR WEIGHT MANAGEMENT: Primary | ICD-10-CM

## 2025-04-01 DIAGNOSIS — E66.01 CLASS 3 SEVERE OBESITY DUE TO EXCESS CALORIES WITHOUT SERIOUS COMORBIDITY WITH BODY MASS INDEX (BMI) OF 40.0 TO 44.9 IN ADULT: ICD-10-CM

## 2025-04-01 RX ORDER — SEMAGLUTIDE 1.7 MG/.75ML
1.7 INJECTION, SOLUTION SUBCUTANEOUS WEEKLY
Qty: 3 ML | Refills: 0 | Status: SHIPPED | OUTPATIENT
Start: 2025-04-01

## 2025-04-01 NOTE — PROGRESS NOTES
"Chief Complaint  Weight Management (Wegovy follow up )    Subjective        Grant Edge . presents to [facility]  History of Present Illness  54-year-old male presenting for weight management follow-up.  Has been taking semaglutide 1 mg weekly.  Tolerating well.  Has lost 60 pounds in the past year.  Has been having some slowing down of his bowel movements.  Needed to use stool softener once in the past month.    Weight Management      Objective   Vital Signs:  /99 (BP Location: Left arm, Patient Position: Sitting, Cuff Size: Large Adult)   Pulse 89   Temp 97.5 °F (36.4 °C) (Temporal)   Ht 175.3 cm (69.02\")   Wt 129 kg (284 lb 9.6 oz)   SpO2 94%   BMI 42.01 kg/m²   Estimated body mass index is 42.01 kg/m² as calculated from the following:    Height as of this encounter: 175.3 cm (69.02\").    Weight as of this encounter: 129 kg (284 lb 9.6 oz).               Physical Exam  Vitals reviewed.   Constitutional:       Appearance: Normal appearance. He is obese.   Musculoskeletal:         General: Normal range of motion.      Cervical back: Normal range of motion.   Skin:     General: Skin is warm and dry.      Capillary Refill: Capillary refill takes less than 2 seconds.   Neurological:      General: No focal deficit present.      Mental Status: He is alert and oriented to person, place, and time.   Psychiatric:         Mood and Affect: Mood normal.         Behavior: Behavior normal.         Thought Content: Thought content normal.         Judgment: Judgment normal.        Result Review :      Common labs          8/29/2024    15:01 10/22/2024    03:54 2/27/2025    15:10   Common Labs   Glucose 102  109     BUN 10  10     Creatinine 1.32  1.21     Sodium 142  140     Potassium 3.9  4.2     Chloride 105  103     Calcium 9.4  9.4     Albumin 3.9  4.1     Total Bilirubin 1.0  1.3     Alkaline Phosphatase 111  114     AST (SGOT) 17  19     ALT (SGPT) 17  16     WBC 7.21   6.01  "   Hemoglobin 15.2   15.3    Hematocrit 48.0   46.5    Platelets 225   194    Total Cholesterol  141     Triglycerides  144     HDL Cholesterol  38     LDL Cholesterol   78     Hemoglobin A1C  6.20     PSA  2.140           Current Outpatient Medications on File Prior to Visit   Medication Sig Dispense Refill    benazepril (LOTENSIN) 40 MG tablet TAKE 1 TABLET BY MOUTH DAILY 90 tablet 0    esomeprazole (nexIUM) 40 MG capsule TAKE 1 CAPSULE BY MOUTH EVERY MORNING BEFORE BREAKFAST 90 capsule 0    furosemide (Lasix) 20 MG tablet Take 1 tablet by mouth Every Other Day. 90 tablet 1    levothyroxine (SYNTHROID, LEVOTHROID) 112 MCG tablet TAKE 1 TABLET BY MOUTH DAILY 90 tablet 1    metoprolol succinate XL (TOPROL-XL) 50 MG 24 hr tablet TAKE 1 AND 1/2 TABLETS BY MOUTH DAILY 135 tablet 0    montelukast (SINGULAIR) 10 MG tablet TAKE 1 TABLET BY MOUTH EVERY NIGHT 90 tablet 1    potassium chloride 10 MEQ CR tablet TAKE 1 TABLET BY MOUTH DAILY 30 tablet 2    promethazine-dextromethorphan (PROMETHAZINE-DM) 6.25-15 MG/5ML syrup Take 5 mL by mouth 4 (Four) Times a Day As Needed for Cough. 180 mL 1    rivaroxaban (Xarelto) 20 MG tablet TAKE 1 TABLET BY MOUTH DAILY 90 tablet 1    triamcinolone (KENALOG) 0.5 % ointment Apply 1 Application topically to the appropriate area as directed 2 (Two) Times a Day. 15 g 0    Wixela Inhub 100-50 MCG/ACT DISKUS INHALE 1 PUFF BY MOUTH TWICE DAILY 60 each 2    [DISCONTINUED] Semaglutide-Weight Management 1 MG/0.5ML solution auto-injector Inject 0.5 mL under the skin into the appropriate area as directed 1 (One) Time Per Week. 2 mL 0    cetirizine (zyrTEC) 10 MG tablet Take 1 tablet by mouth Daily for 30 days. 30 tablet 0    fluticasone (FLONASE) 50 MCG/ACT nasal spray Use 2 sprays into the nostril(s) as directed by provider Daily for 14 days. 16 g 0     No current facility-administered medications on file prior to visit.                Assessment and Plan     Diagnoses and all orders for this  visit:    1. Encounter for weight management (Primary)  -     Semaglutide-Weight Management (Wegovy) 1.7 MG/0.75ML solution auto-injector; Inject 0.75 mL under the skin into the appropriate area as directed 1 (One) Time Per Week.  Dispense: 3 mL; Refill: 0    2. Class 3 severe obesity due to excess calories without serious comorbidity with body mass index (BMI) of 40.0 to 44.9 in adult  -     Semaglutide-Weight Management (Wegovy) 1.7 MG/0.75ML solution auto-injector; Inject 0.75 mL under the skin into the appropriate area as directed 1 (One) Time Per Week.  Dispense: 3 mL; Refill: 0        Patient Instructions   Increase semaglutide to 1.7 mg weekly. Recommend docusate (Colace) or MiraLax as needed for constipation. Stay hydrated with water.  Follow-up in 4 weeks for annual exam and fasting labs.            Follow Up     Return in about 4 weeks (around 4/29/2025), or if symptoms worsen or fail to improve, for Annual physical.  Patient was given instructions and counseling regarding his condition or for health maintenance advice. Please see specific information pulled into the AVS if appropriate.

## 2025-04-01 NOTE — PATIENT INSTRUCTIONS
Increase semaglutide to 1.7 mg weekly. Recommend docusate (Colace) or MiraLax as needed for constipation. Stay hydrated with water.  Follow-up in 4 weeks for annual exam and fasting labs.

## 2025-04-17 DIAGNOSIS — I10 BENIGN ESSENTIAL HYPERTENSION: ICD-10-CM

## 2025-04-17 RX ORDER — METOPROLOL SUCCINATE 50 MG/1
75 TABLET, EXTENDED RELEASE ORAL DAILY
Qty: 135 TABLET | Refills: 0 | Status: SHIPPED | OUTPATIENT
Start: 2025-04-17

## 2025-04-18 DIAGNOSIS — E66.01 CLASS 3 SEVERE OBESITY DUE TO EXCESS CALORIES WITHOUT SERIOUS COMORBIDITY WITH BODY MASS INDEX (BMI) OF 40.0 TO 44.9 IN ADULT: ICD-10-CM

## 2025-04-18 DIAGNOSIS — E66.813 CLASS 3 SEVERE OBESITY DUE TO EXCESS CALORIES WITHOUT SERIOUS COMORBIDITY WITH BODY MASS INDEX (BMI) OF 40.0 TO 44.9 IN ADULT: ICD-10-CM

## 2025-04-18 DIAGNOSIS — I10 BENIGN ESSENTIAL HYPERTENSION: ICD-10-CM

## 2025-04-18 DIAGNOSIS — Z00.00 ANNUAL PHYSICAL EXAM: Primary | ICD-10-CM

## 2025-04-18 DIAGNOSIS — Z00.00 ANNUAL PHYSICAL EXAM: ICD-10-CM

## 2025-04-21 ENCOUNTER — TELEPHONE (OUTPATIENT)
Dept: INTERNAL MEDICINE | Facility: CLINIC | Age: 55
End: 2025-04-21
Payer: COMMERCIAL

## 2025-04-21 NOTE — TELEPHONE ENCOUNTER
Hub staff attempted to follow warm transfer process and was unsuccessful     Caller: Grant Edge Jr.    Relationship to patient: Self    Best call back number: [phone]    Patient is needing: PATIENT HAS LAB APPOINTMENT FOR  LABS TODAY NEEDS TO MOVE IT TO TOMORROW 4/22/25    PLEASE GIVE CALLBACK WITH APPOINTMENT CHANGE

## 2025-04-22 LAB
ALBUMIN SERPL-MCNC: 4.3 G/DL (ref 3.5–5.2)
ALBUMIN/GLOB SERPL: 1.8 G/DL
ALP SERPL-CCNC: 103 U/L (ref 39–117)
ALT SERPL-CCNC: 13 U/L (ref 1–41)
APPEARANCE UR: CLEAR
AST SERPL-CCNC: 12 U/L (ref 1–40)
BACTERIA #/AREA URNS HPF: ABNORMAL /HPF
BASOPHILS # BLD AUTO: 0.06 10*3/MM3 (ref 0–0.2)
BASOPHILS NFR BLD AUTO: 1.1 % (ref 0–1.5)
BILIRUB SERPL-MCNC: 1.3 MG/DL (ref 0–1.2)
BILIRUB UR QL STRIP: NEGATIVE
BUN SERPL-MCNC: 9 MG/DL (ref 6–20)
BUN/CREAT SERPL: 7.8 (ref 7–25)
CALCIUM SERPL-MCNC: 9.4 MG/DL (ref 8.6–10.5)
CASTS URNS MICRO: ABNORMAL
CHLORIDE SERPL-SCNC: 104 MMOL/L (ref 98–107)
CHOLEST SERPL-MCNC: 143 MG/DL (ref 0–200)
CHOLEST/HDLC SERPL: 4.33 {RATIO}
CO2 SERPL-SCNC: 25.1 MMOL/L (ref 22–29)
COLOR UR: YELLOW
CREAT SERPL-MCNC: 1.16 MG/DL (ref 0.76–1.27)
EGFRCR SERPLBLD CKD-EPI 2021: 74.8 ML/MIN/1.73
EOSINOPHIL # BLD AUTO: 0.23 10*3/MM3 (ref 0–0.4)
EOSINOPHIL NFR BLD AUTO: 4.3 % (ref 0.3–6.2)
EPI CELLS #/AREA URNS HPF: ABNORMAL /HPF
ERYTHROCYTE [DISTWIDTH] IN BLOOD BY AUTOMATED COUNT: 13.4 % (ref 12.3–15.4)
GLOBULIN SER CALC-MCNC: 2.4 GM/DL
GLUCOSE SERPL-MCNC: 73 MG/DL (ref 65–99)
GLUCOSE UR QL STRIP: NEGATIVE
HBA1C MFR BLD: 5.3 % (ref 4.8–5.6)
HCT VFR BLD AUTO: 51.6 % (ref 37.5–51)
HDLC SERPL-MCNC: 33 MG/DL (ref 40–60)
HGB BLD-MCNC: 16.3 G/DL (ref 13–17.7)
HGB UR QL STRIP: ABNORMAL
IMM GRANULOCYTES # BLD AUTO: 0.03 10*3/MM3 (ref 0–0.05)
IMM GRANULOCYTES NFR BLD AUTO: 0.6 % (ref 0–0.5)
KETONES UR QL STRIP: ABNORMAL
LDLC SERPL CALC-MCNC: 92 MG/DL (ref 0–100)
LEUKOCYTE ESTERASE UR QL STRIP: NEGATIVE
LYMPHOCYTES # BLD AUTO: 1.64 10*3/MM3 (ref 0.7–3.1)
LYMPHOCYTES NFR BLD AUTO: 30.7 % (ref 19.6–45.3)
MCH RBC QN AUTO: 29.8 PG (ref 26.6–33)
MCHC RBC AUTO-ENTMCNC: 31.6 G/DL (ref 31.5–35.7)
MCV RBC AUTO: 94.3 FL (ref 79–97)
MONOCYTES # BLD AUTO: 0.54 10*3/MM3 (ref 0.1–0.9)
MONOCYTES NFR BLD AUTO: 10.1 % (ref 5–12)
NEUTROPHILS # BLD AUTO: 2.84 10*3/MM3 (ref 1.7–7)
NEUTROPHILS NFR BLD AUTO: 53.2 % (ref 42.7–76)
NITRITE UR QL STRIP: NEGATIVE
NRBC BLD AUTO-RTO: 0 /100 WBC (ref 0–0.2)
PH UR STRIP: 6.5 [PH] (ref 5–8)
PLATELET # BLD AUTO: 247 10*3/MM3 (ref 140–450)
POTASSIUM SERPL-SCNC: 3.8 MMOL/L (ref 3.5–5.2)
PROT SERPL-MCNC: 6.7 G/DL (ref 6–8.5)
PROT UR QL STRIP: NEGATIVE
RBC # BLD AUTO: 5.47 10*6/MM3 (ref 4.14–5.8)
RBC #/AREA URNS HPF: ABNORMAL /HPF
SODIUM SERPL-SCNC: 142 MMOL/L (ref 136–145)
SP GR UR STRIP: 1.02 (ref 1–1.03)
TRIGL SERPL-MCNC: 92 MG/DL (ref 0–150)
TSH SERPL DL<=0.005 MIU/L-ACNC: 0.65 UIU/ML (ref 0.27–4.2)
UROBILINOGEN UR STRIP-MCNC: ABNORMAL MG/DL
VLDLC SERPL CALC-MCNC: 18 MG/DL (ref 5–40)
WBC # BLD AUTO: 5.34 10*3/MM3 (ref 3.4–10.8)
WBC #/AREA URNS HPF: ABNORMAL /HPF

## 2025-04-29 ENCOUNTER — OFFICE VISIT (OUTPATIENT)
Dept: INTERNAL MEDICINE | Facility: CLINIC | Age: 55
End: 2025-04-29
Payer: COMMERCIAL

## 2025-04-29 VITALS
OXYGEN SATURATION: 94 % | WEIGHT: 275 LBS | TEMPERATURE: 98.5 F | BODY MASS INDEX: 40.73 KG/M2 | HEIGHT: 69 IN | DIASTOLIC BLOOD PRESSURE: 96 MMHG | SYSTOLIC BLOOD PRESSURE: 150 MMHG | HEART RATE: 117 BPM

## 2025-04-29 DIAGNOSIS — E66.813 CLASS 3 SEVERE OBESITY DUE TO EXCESS CALORIES WITHOUT SERIOUS COMORBIDITY WITH BODY MASS INDEX (BMI) OF 40.0 TO 44.9 IN ADULT: ICD-10-CM

## 2025-04-29 DIAGNOSIS — I10 PRIMARY HYPERTENSION: Chronic | ICD-10-CM

## 2025-04-29 DIAGNOSIS — E66.01 CLASS 3 SEVERE OBESITY DUE TO EXCESS CALORIES WITHOUT SERIOUS COMORBIDITY WITH BODY MASS INDEX (BMI) OF 40.0 TO 44.9 IN ADULT: ICD-10-CM

## 2025-04-29 DIAGNOSIS — Z00.00 ANNUAL PHYSICAL EXAM: ICD-10-CM

## 2025-04-29 DIAGNOSIS — E78.2 MIXED HYPERLIPIDEMIA: Primary | Chronic | ICD-10-CM

## 2025-04-29 RX ORDER — SEMAGLUTIDE 2.4 MG/.75ML
2.4 INJECTION, SOLUTION SUBCUTANEOUS WEEKLY
Qty: 3 ML | Refills: 4 | Status: SHIPPED | OUTPATIENT
Start: 2025-04-29

## 2025-04-29 NOTE — PROGRESS NOTES
"Chief Complaint  Annual Exam    Subjective        Grant Edge Jr. presents to [facility]  History of Present Illness  54-year-old male presenting for annual exam.  Has lost 70 pounds on semaglutide in the past 6 months.  Will increase semaglutide dose to 2.4 mg weekly.  Has been able to walk walker more frequently.  Still using a cane to assist.  Noticed that he has not been over eating and that his digestion has settled down.    Patient is to call for colonoscopy later this year.    Taking other medications as prescribed and tolerating well.      Objective   Vital Signs:  /96 (BP Location: Left arm, Patient Position: Sitting, Cuff Size: Large Adult)   Pulse 117   Temp 98.5 °F (36.9 °C) (Temporal)   Ht 175.3 cm (69.02\")   Wt 125 kg (275 lb)   SpO2 94%   BMI 40.59 kg/m²   Estimated body mass index is 40.59 kg/m² as calculated from the following:    Height as of this encounter: 175.3 cm (69.02\").    Weight as of this encounter: 125 kg (275 lb).               Physical Exam  Vitals reviewed.   Constitutional:       Appearance: Normal appearance. He is obese.   Cardiovascular:      Rate and Rhythm: Normal rate and regular rhythm.      Pulses: Normal pulses.      Heart sounds: Normal heart sounds.   Pulmonary:      Effort: Pulmonary effort is normal.      Breath sounds: Normal breath sounds.   Musculoskeletal:         General: Normal range of motion.      Cervical back: Normal range of motion.   Skin:     General: Skin is warm and dry.      Capillary Refill: Capillary refill takes less than 2 seconds.   Neurological:      General: No focal deficit present.      Mental Status: He is alert and oriented to person, place, and time.   Psychiatric:         Mood and Affect: Mood normal.         Behavior: Behavior normal.         Thought Content: Thought content normal.         Judgment: Judgment normal.        Result Review :      Common labs          10/22/2024    03:54 2/27/2025    " 15:10 4/22/2025    11:45   Common Labs   Glucose 109   73    BUN 10   9    Creatinine 1.21   1.16    Sodium 140   142    Potassium 4.2   3.8    Chloride 103   104    Calcium 9.4   9.4    Albumin 4.1   4.3    Total Bilirubin 1.3   1.3    Alkaline Phosphatase 114   103    AST (SGOT) 19   12    ALT (SGPT) 16   13    WBC  6.01  5.34    Hemoglobin  15.3  16.3    Hematocrit  46.5  51.6    Platelets  194  247    Total Cholesterol 141   143    Triglycerides 144   92    HDL Cholesterol 38   33    LDL Cholesterol  78   92    Hemoglobin A1C 6.20   5.30    PSA 2.140            Current Outpatient Medications on File Prior to Visit   Medication Sig Dispense Refill    benazepril (LOTENSIN) 40 MG tablet TAKE 1 TABLET BY MOUTH DAILY 90 tablet 0    esomeprazole (nexIUM) 40 MG capsule TAKE 1 CAPSULE BY MOUTH EVERY MORNING BEFORE BREAKFAST 90 capsule 0    furosemide (Lasix) 20 MG tablet Take 1 tablet by mouth Every Other Day. 90 tablet 1    levothyroxine (SYNTHROID, LEVOTHROID) 112 MCG tablet TAKE 1 TABLET BY MOUTH DAILY 90 tablet 1    metoprolol succinate XL (TOPROL-XL) 50 MG 24 hr tablet TAKE 1 AND 1/2 TABLETS BY MOUTH DAILY 135 tablet 0    montelukast (SINGULAIR) 10 MG tablet TAKE 1 TABLET BY MOUTH EVERY NIGHT 90 tablet 1    potassium chloride 10 MEQ CR tablet TAKE 1 TABLET BY MOUTH DAILY 30 tablet 2    promethazine-dextromethorphan (PROMETHAZINE-DM) 6.25-15 MG/5ML syrup Take 5 mL by mouth 4 (Four) Times a Day As Needed for Cough. 180 mL 1    rivaroxaban (Xarelto) 20 MG tablet TAKE 1 TABLET BY MOUTH DAILY 90 tablet 1    triamcinolone (KENALOG) 0.5 % ointment Apply 1 Application topically to the appropriate area as directed 2 (Two) Times a Day. 15 g 0    Wixela Inhub 100-50 MCG/ACT DISKUS INHALE 1 PUFF BY MOUTH TWICE DAILY 60 each 2    cetirizine (zyrTEC) 10 MG tablet Take 1 tablet by mouth Daily for 30 days. 30 tablet 0    fluticasone (FLONASE) 50 MCG/ACT nasal spray Use 2 sprays into the nostril(s) as directed by provider Daily for  14 days. 16 g 0     No current facility-administered medications on file prior to visit.                Assessment and Plan     Diagnoses and all orders for this visit:    1. Mixed hyperlipidemia (Primary)    2. Primary hypertension    3. Annual physical exam    4. Class 3 severe obesity due to excess calories without serious comorbidity with body mass index (BMI) of 40.0 to 44.9 in adult  -     Semaglutide-Weight Management (Wegovy) 2.4 MG/0.75ML solution auto-injector; Inject 0.75 mL under the skin into the appropriate area as directed 1 (One) Time Per Week.  Dispense: 3 mL; Refill: 4        Patient Instructions   Increase 2.4 mg semaglutide weekly. Monitor for side effects. Recommend Debrox OTC for earwax. Increase activity as tolerated. Recommend weight training. Stay hydrated with water. Await for cardiology results. Contact gastroenterology for colonoscopy this year. Follow-up in 3 months for recheck.     The patient was counseled regarding nutrition, physical activity, healthy weight, injury prevention, misuse of tobacco, alcohol and illicit drugs, mental health, immunizations, and screenings.         Follow Up     Return in about 3 months (around 7/29/2025) for Recheck.  Patient was given instructions and counseling regarding his condition or for health maintenance advice. Please see specific information pulled into the AVS if appropriate.

## 2025-04-29 NOTE — PATIENT INSTRUCTIONS
Increase 2.4 mg semaglutide weekly. Monitor for side effects. Recommend Debrox OTC for earwax. Increase activity as tolerated. Recommend weight training. Stay hydrated with water. Await for cardiology results. Contact gastroenterology for colonoscopy this year. Follow-up in 3 months for recheck.

## 2025-05-09 DIAGNOSIS — Z87.09 HISTORY OF ASTHMA: ICD-10-CM

## 2025-05-09 RX ORDER — FLUTICASONE PROPIONATE AND SALMETEROL 100; 50 UG/1; UG/1
1 POWDER RESPIRATORY (INHALATION) 2 TIMES DAILY
Qty: 60 EACH | Refills: 2 | Status: SHIPPED | OUTPATIENT
Start: 2025-05-09

## 2025-05-09 NOTE — TELEPHONE ENCOUNTER
Rx Refill Note  Requested Prescriptions     Pending Prescriptions Disp Refills    Fluticasone-Salmeterol (ADVAIR/WIXELA) 100-50 MCG/ACT DISKUS [Pharmacy Med Name: FLUTICASONE/SALM DISK 100/50MCG 60S] 60 each 2     Sig: INHALE 1 PUFF BY MOUTH TWICE DAILY      Last office visit with prescribing clinician: 4/29/2025   Last telemedicine visit with prescribing clinician: Visit date not found   Next office visit with prescribing clinician: 7/31/2025                         Would you like a call back once the refill request has been completed: [] Yes [] No    If the office needs to give you a call back, can they leave a voicemail: [] Yes [] No    Janice Holloway MA  05/09/25, 13:30 EDT

## 2025-05-14 DIAGNOSIS — I10 BENIGN ESSENTIAL HYPERTENSION: ICD-10-CM

## 2025-05-14 RX ORDER — BENAZEPRIL HYDROCHLORIDE 40 MG/1
40 TABLET ORAL DAILY
Qty: 90 TABLET | Refills: 0 | Status: SHIPPED | OUTPATIENT
Start: 2025-05-14

## 2025-05-15 ENCOUNTER — OFFICE VISIT (OUTPATIENT)
Dept: ORTHOPEDIC SURGERY | Facility: CLINIC | Age: 55
End: 2025-05-15
Payer: COMMERCIAL

## 2025-05-15 VITALS — BODY MASS INDEX: 39.37 KG/M2 | TEMPERATURE: 97.3 F | WEIGHT: 275 LBS | HEIGHT: 70 IN

## 2025-05-15 DIAGNOSIS — M17.11 PRIMARY OSTEOARTHRITIS OF RIGHT KNEE: Primary | ICD-10-CM

## 2025-05-15 RX ADMIN — LIDOCAINE HYDROCHLORIDE 5 ML: 10 INJECTION, SOLUTION EPIDURAL; INFILTRATION; INTRACAUDAL; PERINEURAL at 15:15

## 2025-05-15 RX ADMIN — METHYLPREDNISOLONE ACETATE 80 MG: 80 INJECTION, SUSPENSION INTRA-ARTICULAR; INTRALESIONAL; INTRAMUSCULAR; SOFT TISSUE at 15:15

## 2025-05-19 RX ORDER — METHYLPREDNISOLONE ACETATE 80 MG/ML
80 INJECTION, SUSPENSION INTRA-ARTICULAR; INTRALESIONAL; INTRAMUSCULAR; SOFT TISSUE
Status: COMPLETED | OUTPATIENT
Start: 2025-05-15 | End: 2025-05-15

## 2025-05-19 RX ORDER — LIDOCAINE HYDROCHLORIDE 10 MG/ML
5 INJECTION, SOLUTION EPIDURAL; INFILTRATION; INTRACAUDAL; PERINEURAL
Status: COMPLETED | OUTPATIENT
Start: 2025-05-15 | End: 2025-05-15

## 2025-05-19 NOTE — PROGRESS NOTES
5/15/2025    Grant Edge Jr. is here today for worsening knee pain. Pt has undergone injection of the knee in the past with good resolution of symptoms. Pt is requesting a repeat injection.     KNEE Injection Procedure Note:    Large Joint Arthrocentesis: R knee  Date/Time: 5/15/2025 3:15 PM  Consent given by: patient  Site marked: site marked  Timeout: Immediately prior to procedure a time out was called to verify the correct patient, procedure, equipment, support staff and site/side marked as required   Supporting Documentation  Indications: pain and joint swelling   Procedure Details  Location: knee - R knee  Preparation: Patient was prepped and draped in the usual sterile fashion  Needle size: 22 G  Approach: anterolateral  Medications administered: 80 mg methylPREDNISolone acetate 80 MG/ML; 5 mL lidocaine PF 1% 1 %  Patient tolerance: patient tolerated the procedure well with no immediate complications    (3 mL of lidocaine was used for anesthetic purposes)      At the conclusion of the injection I discussed the importance of continued quad strengthening exercises on a daily basis. I will see the patient back if the symptoms should fail to improve or worsen.    Sujit Sánchez, APRN  5/15/2025

## 2025-05-28 DIAGNOSIS — K21.00 GASTROESOPHAGEAL REFLUX DISEASE WITH ESOPHAGITIS WITHOUT HEMORRHAGE: ICD-10-CM

## 2025-05-28 RX ORDER — ESOMEPRAZOLE MAGNESIUM 40 MG/1
40 CAPSULE, DELAYED RELEASE ORAL
Qty: 90 CAPSULE | Refills: 0 | Status: SHIPPED | OUTPATIENT
Start: 2025-05-28

## 2025-06-03 ENCOUNTER — PATIENT MESSAGE (OUTPATIENT)
Dept: INTERNAL MEDICINE | Facility: CLINIC | Age: 55
End: 2025-06-03
Payer: COMMERCIAL

## 2025-06-03 DIAGNOSIS — I87.303 STASIS EDEMA OF BOTH LOWER EXTREMITIES: ICD-10-CM

## 2025-06-04 RX ORDER — POTASSIUM CHLORIDE 750 MG/1
10 TABLET, EXTENDED RELEASE ORAL DAILY
Qty: 30 TABLET | Refills: 2 | Status: SHIPPED | OUTPATIENT
Start: 2025-06-04

## 2025-06-29 DIAGNOSIS — E03.8 OTHER SPECIFIED HYPOTHYROIDISM: ICD-10-CM

## 2025-06-30 RX ORDER — LEVOTHYROXINE SODIUM 112 UG/1
112 TABLET ORAL DAILY
Qty: 90 TABLET | Refills: 1 | Status: SHIPPED | OUTPATIENT
Start: 2025-06-30

## 2025-07-18 DIAGNOSIS — I10 BENIGN ESSENTIAL HYPERTENSION: ICD-10-CM

## 2025-07-18 RX ORDER — METOPROLOL SUCCINATE 50 MG/1
75 TABLET, EXTENDED RELEASE ORAL DAILY
Qty: 135 TABLET | Refills: 0 | Status: SHIPPED | OUTPATIENT
Start: 2025-07-18

## 2025-07-31 ENCOUNTER — OFFICE VISIT (OUTPATIENT)
Dept: INTERNAL MEDICINE | Facility: CLINIC | Age: 55
End: 2025-07-31
Payer: COMMERCIAL

## 2025-07-31 VITALS
HEIGHT: 70 IN | WEIGHT: 274.6 LBS | TEMPERATURE: 98.1 F | OXYGEN SATURATION: 94 % | DIASTOLIC BLOOD PRESSURE: 91 MMHG | HEART RATE: 99 BPM | SYSTOLIC BLOOD PRESSURE: 143 MMHG | BODY MASS INDEX: 39.31 KG/M2

## 2025-07-31 DIAGNOSIS — E66.813 CLASS 3 SEVERE OBESITY DUE TO EXCESS CALORIES WITHOUT SERIOUS COMORBIDITY WITH BODY MASS INDEX (BMI) OF 40.0 TO 44.9 IN ADULT: ICD-10-CM

## 2025-07-31 DIAGNOSIS — I10 BENIGN ESSENTIAL HYPERTENSION: Primary | ICD-10-CM

## 2025-07-31 PROCEDURE — 99213 OFFICE O/P EST LOW 20 MIN: CPT

## 2025-07-31 NOTE — PROGRESS NOTES
"Chief Complaint  Hyperlipidemia    Subjective        Grant Edge Jr. presents to [facility]  History of Present Illness  54-year-old male presenting with hypertension and weight management.  August will be his last month with [facility].  Will be retiring in September.  Has been able to tolerate walking 3+ miles at work since losing weight.  Has had a plateau in his weight loss.  Is planning to increase physical activity at a manageable pace during [facility].  Had injection of right knee in May.  They be considering knee or hip replacement in the future.      Objective   Vital Signs:  /91 (BP Location: Left arm, Patient Position: Sitting, Cuff Size: Large Adult)   Pulse 99   Temp 98.1 °F (36.7 °C) (Oral)   Ht 177.8 cm (70\")   Wt 125 kg (274 lb 9.6 oz)   SpO2 94%   BMI 39.40 kg/m²   Estimated body mass index is 39.4 kg/m² as calculated from the following:    Height as of this encounter: 177.8 cm (70\").    Weight as of this encounter: 125 kg (274 lb 9.6 oz).               Physical Exam  Vitals reviewed.   Constitutional:       Appearance: Normal appearance.   Musculoskeletal:      Cervical back: Normal range of motion.      Right knee: Decreased range of motion.   Skin:     General: Skin is warm and dry.      Capillary Refill: Capillary refill takes less than 2 seconds.   Neurological:      General: No focal deficit present.      Mental Status: He is alert and oriented to person, place, and time.   Psychiatric:         Mood and Affect: Mood normal.         Behavior: Behavior normal.         Thought Content: Thought content normal.         Judgment: Judgment normal.        Result Review :      Common labs          10/22/2024    03:54 2/27/2025    15:10 4/22/2025    11:45   Common Labs   Glucose 109   73    BUN 10   9    Creatinine 1.21   1.16    Sodium 140   142    Potassium 4.2   3.8    Chloride 103   104    Calcium 9.4   9.4    Albumin 4.1   4.3    Total Bilirubin 1.3   1.3  "   Alkaline Phosphatase 114   103    AST (SGOT) 19   12    ALT (SGPT) 16   13    WBC  6.01  5.34    Hemoglobin  15.3  16.3    Hematocrit  46.5  51.6    Platelets  194  247    Total Cholesterol 141   143    Triglycerides 144   92    HDL Cholesterol 38   33    LDL Cholesterol  78   92    Hemoglobin A1C 6.20   5.30    PSA 2.140            Current Outpatient Medications on File Prior to Visit   Medication Sig Dispense Refill    benazepril (LOTENSIN) 40 MG tablet TAKE 1 TABLET BY MOUTH DAILY 90 tablet 0    esomeprazole (nexIUM) 40 MG capsule TAKE 1 CAPSULE BY MOUTH EVERY MORNING BEFORE BREAKFAST 90 capsule 0    Fluticasone-Salmeterol (ADVAIR/WIXELA) 100-50 MCG/ACT DISKUS INHALE 1 PUFF BY MOUTH TWICE DAILY 60 each 2    furosemide (Lasix) 20 MG tablet Take 1 tablet by mouth Every Other Day. 90 tablet 1    levothyroxine (SYNTHROID, LEVOTHROID) 112 MCG tablet TAKE 1 TABLET BY MOUTH DAILY 90 tablet 1    metoprolol succinate XL (TOPROL-XL) 50 MG 24 hr tablet TAKE 1 AND 1/2 TABLETS BY MOUTH DAILY 135 tablet 0    montelukast (SINGULAIR) 10 MG tablet TAKE 1 TABLET BY MOUTH EVERY NIGHT 90 tablet 1    potassium chloride 10 MEQ CR tablet TAKE 1 TABLET BY MOUTH DAILY 30 tablet 2    promethazine-dextromethorphan (PROMETHAZINE-DM) 6.25-15 MG/5ML syrup Take 5 mL by mouth 4 (Four) Times a Day As Needed for Cough. 180 mL 1    rivaroxaban (Xarelto) 20 MG tablet TAKE 1 TABLET BY MOUTH DAILY 90 tablet 1    Semaglutide-Weight Management (Wegovy) 2.4 MG/0.75ML solution auto-injector Inject 0.75 mL under the skin into the appropriate area as directed 1 (One) Time Per Week. 3 mL 4    triamcinolone (KENALOG) 0.5 % ointment Apply 1 Application topically to the appropriate area as directed 2 (Two) Times a Day. 15 g 0    cetirizine (zyrTEC) 10 MG tablet Take 1 tablet by mouth Daily for 30 days. 30 tablet 0    fluticasone (FLONASE) 50 MCG/ACT nasal spray Use 2 sprays into the nostril(s) as directed by provider Daily for 14 days. 16 g 0     No current  facility-administered medications on file prior to visit.                Assessment and Plan     Diagnoses and all orders for this visit:    1. Benign essential hypertension (Primary)    2. Class 3 severe obesity due to excess calories without serious comorbidity with body mass index (BMI) of 40.0 to 44.9 in adult        Patient Instructions   Continue medications as prescribed. Stay active as tolerated. Stay hydrated with water. Contact Dr. James for colonoscopy. Follow-up in 4 months for recheck.            Follow Up     Return in about 4 months (around 11/30/2025) for Recheck.  Patient was given instructions and counseling regarding his condition or for health maintenance advice. Please see specific information pulled into the AVS if appropriate.

## 2025-07-31 NOTE — PATIENT INSTRUCTIONS
Continue medications as prescribed. Stay active as tolerated. Stay hydrated with water. Contact Dr. James for colonoscopy. Follow-up in 4 months for recheck.

## 2025-08-11 DIAGNOSIS — I10 BENIGN ESSENTIAL HYPERTENSION: ICD-10-CM

## 2025-08-11 DIAGNOSIS — Z86.718 HISTORY OF RECURRENT DEEP VEIN THROMBOSIS (DVT): ICD-10-CM

## 2025-08-11 RX ORDER — RIVAROXABAN 20 MG/1
20 TABLET, FILM COATED ORAL DAILY
Qty: 90 TABLET | Refills: 1 | Status: SHIPPED | OUTPATIENT
Start: 2025-08-11

## 2025-08-11 RX ORDER — BENAZEPRIL HYDROCHLORIDE 40 MG/1
40 TABLET ORAL DAILY
Qty: 90 TABLET | Refills: 0 | Status: SHIPPED | OUTPATIENT
Start: 2025-08-11

## 2025-08-12 ENCOUNTER — OFFICE VISIT (OUTPATIENT)
Dept: INTERNAL MEDICINE | Facility: CLINIC | Age: 55
End: 2025-08-12
Payer: COMMERCIAL

## 2025-08-12 VITALS
DIASTOLIC BLOOD PRESSURE: 89 MMHG | OXYGEN SATURATION: 95 % | WEIGHT: 275.5 LBS | HEART RATE: 94 BPM | BODY MASS INDEX: 39.44 KG/M2 | SYSTOLIC BLOOD PRESSURE: 143 MMHG | HEIGHT: 70 IN | TEMPERATURE: 98 F

## 2025-08-12 DIAGNOSIS — I10 PRIMARY HYPERTENSION: Primary | ICD-10-CM

## 2025-08-12 PROCEDURE — 99213 OFFICE O/P EST LOW 20 MIN: CPT

## 2025-08-12 RX ORDER — HYDROCHLOROTHIAZIDE 12.5 MG/1
12.5 TABLET ORAL DAILY
Qty: 30 TABLET | Refills: 2 | Status: SHIPPED | OUTPATIENT
Start: 2025-08-12

## 2025-08-20 ENCOUNTER — OFFICE VISIT (OUTPATIENT)
Dept: INTERNAL MEDICINE | Facility: CLINIC | Age: 55
End: 2025-08-20
Payer: COMMERCIAL

## 2025-08-20 VITALS
HEIGHT: 70 IN | WEIGHT: 273.5 LBS | OXYGEN SATURATION: 97 % | BODY MASS INDEX: 39.16 KG/M2 | HEART RATE: 97 BPM | DIASTOLIC BLOOD PRESSURE: 78 MMHG | SYSTOLIC BLOOD PRESSURE: 112 MMHG

## 2025-08-20 DIAGNOSIS — U07.1 COVID: ICD-10-CM

## 2025-08-20 DIAGNOSIS — R05.1 ACUTE COUGH: Primary | ICD-10-CM

## 2025-08-20 LAB
EXPIRATION DATE: ABNORMAL
FLUAV AG UPPER RESP QL IA.RAPID: NOT DETECTED
FLUBV AG UPPER RESP QL IA.RAPID: NOT DETECTED
INTERNAL CONTROL: ABNORMAL
Lab: ABNORMAL
SARS-COV-2 AG UPPER RESP QL IA.RAPID: DETECTED

## 2025-08-20 PROCEDURE — 99213 OFFICE O/P EST LOW 20 MIN: CPT | Performed by: NURSE PRACTITIONER

## 2025-08-20 PROCEDURE — 87428 SARSCOV & INF VIR A&B AG IA: CPT | Performed by: NURSE PRACTITIONER

## 2025-08-20 RX ORDER — BENZONATATE 100 MG/1
100 CAPSULE ORAL 3 TIMES DAILY PRN
Qty: 30 CAPSULE | Refills: 0 | Status: CANCELLED | OUTPATIENT
Start: 2025-08-20

## 2025-08-26 DIAGNOSIS — J30.2 SEASONAL ALLERGIES: ICD-10-CM

## 2025-08-26 DIAGNOSIS — K21.00 GASTROESOPHAGEAL REFLUX DISEASE WITH ESOPHAGITIS WITHOUT HEMORRHAGE: ICD-10-CM

## 2025-08-27 RX ORDER — MONTELUKAST SODIUM 10 MG/1
10 TABLET ORAL NIGHTLY
Qty: 90 TABLET | Refills: 1 | Status: SHIPPED | OUTPATIENT
Start: 2025-08-27

## 2025-08-27 RX ORDER — ESOMEPRAZOLE MAGNESIUM 40 MG/1
40 CAPSULE, DELAYED RELEASE ORAL
Qty: 90 CAPSULE | Refills: 0 | Status: SHIPPED | OUTPATIENT
Start: 2025-08-27

## (undated) DEVICE — NDL SPINE 22G 7IN BLK

## (undated) DEVICE — EPIDURAL TRAY: Brand: MEDLINE INDUSTRIES, INC.

## (undated) DEVICE — Device: Brand: PORTEX

## (undated) DEVICE — GLV SURG TRIUMPH PF LTX 7.5 STRL

## (undated) DEVICE — NDL SPINE 22G 31/2IN BLK

## (undated) DEVICE — GLV SURG TRIUMPH PF LTX 7 STRL

## (undated) DEVICE — NDL SPINE 22G 5IN BLK